# Patient Record
Sex: FEMALE | Race: BLACK OR AFRICAN AMERICAN | NOT HISPANIC OR LATINO | Employment: FULL TIME | URBAN - METROPOLITAN AREA
[De-identification: names, ages, dates, MRNs, and addresses within clinical notes are randomized per-mention and may not be internally consistent; named-entity substitution may affect disease eponyms.]

---

## 2023-10-01 ENCOUNTER — APPOINTMENT (OUTPATIENT)
Dept: CT IMAGING | Facility: HOSPITAL | Age: 53
DRG: 871 | End: 2023-10-01
Payer: COMMERCIAL

## 2023-10-01 ENCOUNTER — APPOINTMENT (OUTPATIENT)
Dept: GENERAL RADIOLOGY | Facility: HOSPITAL | Age: 53
DRG: 871 | End: 2023-10-01
Payer: COMMERCIAL

## 2023-10-01 ENCOUNTER — HOSPITAL ENCOUNTER (INPATIENT)
Facility: HOSPITAL | Age: 53
LOS: 11 days | Discharge: HOME HEALTH | DRG: 871 | End: 2023-10-12
Attending: EMERGENCY MEDICINE | Admitting: HOSPITALIST
Payer: COMMERCIAL

## 2023-10-01 DIAGNOSIS — G47.33 OSA (OBSTRUCTIVE SLEEP APNEA): ICD-10-CM

## 2023-10-01 DIAGNOSIS — J18.9 PNEUMONIA OF RIGHT UPPER LOBE DUE TO INFECTIOUS ORGANISM: Primary | ICD-10-CM

## 2023-10-01 DIAGNOSIS — J85.1 ABSCESS OF UPPER LOBE OF RIGHT LUNG WITH PNEUMONIA: ICD-10-CM

## 2023-10-01 DIAGNOSIS — E11.65 TYPE 2 DIABETES MELLITUS WITH HYPERGLYCEMIA, WITHOUT LONG-TERM CURRENT USE OF INSULIN: ICD-10-CM

## 2023-10-01 DIAGNOSIS — R09.02 HYPOXIA: ICD-10-CM

## 2023-10-01 DIAGNOSIS — I10 HYPERTENSION, UNSPECIFIED TYPE: ICD-10-CM

## 2023-10-01 PROBLEM — I16.0 HYPERTENSIVE URGENCY: Status: ACTIVE | Noted: 2023-10-01

## 2023-10-01 PROBLEM — J96.01 ACUTE RESPIRATORY FAILURE WITH HYPOXIA: Status: ACTIVE | Noted: 2023-10-01

## 2023-10-01 PROBLEM — R73.9 HYPERGLYCEMIA: Status: ACTIVE | Noted: 2023-10-01

## 2023-10-01 PROBLEM — A41.9 SEPSIS: Status: ACTIVE | Noted: 2023-10-01

## 2023-10-01 LAB
ALBUMIN SERPL-MCNC: 3.3 G/DL (ref 3.5–5.2)
ALBUMIN/GLOB SERPL: 0.7 G/DL
ALP SERPL-CCNC: 154 U/L (ref 39–117)
ALT SERPL W P-5'-P-CCNC: 35 U/L (ref 1–33)
ANION GAP SERPL CALCULATED.3IONS-SCNC: 10 MMOL/L (ref 5–15)
AST SERPL-CCNC: 19 U/L (ref 1–32)
B PARAPERT DNA SPEC QL NAA+PROBE: NOT DETECTED
B PERT DNA SPEC QL NAA+PROBE: NOT DETECTED
BASOPHILS # BLD AUTO: 0.09 10*3/MM3 (ref 0–0.2)
BASOPHILS NFR BLD AUTO: 0.4 % (ref 0–1.5)
BILIRUB SERPL-MCNC: 0.3 MG/DL (ref 0–1.2)
BUN SERPL-MCNC: 14 MG/DL (ref 6–20)
BUN/CREAT SERPL: 14.7 (ref 7–25)
C PNEUM DNA NPH QL NAA+NON-PROBE: NOT DETECTED
CALCIUM SPEC-SCNC: 9.6 MG/DL (ref 8.6–10.5)
CHLORIDE SERPL-SCNC: 101 MMOL/L (ref 98–107)
CO2 SERPL-SCNC: 30 MMOL/L (ref 22–29)
CREAT SERPL-MCNC: 0.95 MG/DL (ref 0.57–1)
D-LACTATE SERPL-SCNC: 1.2 MMOL/L (ref 0.5–2)
DEPRECATED RDW RBC AUTO: 44 FL (ref 37–54)
EGFRCR SERPLBLD CKD-EPI 2021: 72.2 ML/MIN/1.73
EOSINOPHIL # BLD AUTO: 0.02 10*3/MM3 (ref 0–0.4)
EOSINOPHIL NFR BLD AUTO: 0.1 % (ref 0.3–6.2)
ERYTHROCYTE [DISTWIDTH] IN BLOOD BY AUTOMATED COUNT: 14.6 % (ref 12.3–15.4)
FLUAV SUBTYP SPEC NAA+PROBE: NOT DETECTED
FLUBV RNA ISLT QL NAA+PROBE: NOT DETECTED
GEN 5 2HR TROPONIN T REFLEX: 37 NG/L
GLOBULIN UR ELPH-MCNC: 4.9 GM/DL
GLUCOSE BLDC GLUCOMTR-MCNC: 140 MG/DL (ref 70–130)
GLUCOSE BLDC GLUCOMTR-MCNC: 296 MG/DL (ref 70–130)
GLUCOSE SERPL-MCNC: 168 MG/DL (ref 65–99)
HADV DNA SPEC NAA+PROBE: NOT DETECTED
HBA1C MFR BLD: 7.5 % (ref 4.8–5.6)
HCOV 229E RNA SPEC QL NAA+PROBE: NOT DETECTED
HCOV HKU1 RNA SPEC QL NAA+PROBE: NOT DETECTED
HCOV NL63 RNA SPEC QL NAA+PROBE: NOT DETECTED
HCOV OC43 RNA SPEC QL NAA+PROBE: NOT DETECTED
HCT VFR BLD AUTO: 33.1 % (ref 34–46.6)
HGB BLD-MCNC: 10.4 G/DL (ref 12–15.9)
HMPV RNA NPH QL NAA+NON-PROBE: NOT DETECTED
HPIV1 RNA ISLT QL NAA+PROBE: NOT DETECTED
HPIV2 RNA SPEC QL NAA+PROBE: NOT DETECTED
HPIV3 RNA NPH QL NAA+PROBE: NOT DETECTED
HPIV4 P GENE NPH QL NAA+PROBE: NOT DETECTED
IMM GRANULOCYTES # BLD AUTO: 0.47 10*3/MM3 (ref 0–0.05)
IMM GRANULOCYTES NFR BLD AUTO: 2.1 % (ref 0–0.5)
LYMPHOCYTES # BLD AUTO: 1.1 10*3/MM3 (ref 0.7–3.1)
LYMPHOCYTES NFR BLD AUTO: 4.9 % (ref 19.6–45.3)
M PNEUMO IGG SER IA-ACNC: NOT DETECTED
MCH RBC QN AUTO: 26.1 PG (ref 26.6–33)
MCHC RBC AUTO-ENTMCNC: 31.4 G/DL (ref 31.5–35.7)
MCV RBC AUTO: 83.2 FL (ref 79–97)
MONOCYTES # BLD AUTO: 1.11 10*3/MM3 (ref 0.1–0.9)
MONOCYTES NFR BLD AUTO: 4.9 % (ref 5–12)
MRSA DNA SPEC QL NAA+PROBE: NORMAL
NEUTROPHILS NFR BLD AUTO: 19.66 10*3/MM3 (ref 1.7–7)
NEUTROPHILS NFR BLD AUTO: 87.6 % (ref 42.7–76)
NRBC BLD AUTO-RTO: 0.1 /100 WBC (ref 0–0.2)
NT-PROBNP SERPL-MCNC: 678 PG/ML (ref 0–900)
PLATELET # BLD AUTO: 395 10*3/MM3 (ref 140–450)
PMV BLD AUTO: 11 FL (ref 6–12)
POTASSIUM SERPL-SCNC: 4.2 MMOL/L (ref 3.5–5.2)
PROCALCITONIN SERPL-MCNC: 0.59 NG/ML (ref 0–0.25)
PROT SERPL-MCNC: 8.2 G/DL (ref 6–8.5)
QT INTERVAL: 304 MS
QTC INTERVAL: 439 MS
RBC # BLD AUTO: 3.98 10*6/MM3 (ref 3.77–5.28)
RHINOVIRUS RNA SPEC NAA+PROBE: NOT DETECTED
RSV RNA NPH QL NAA+NON-PROBE: NOT DETECTED
SARS-COV-2 RNA NPH QL NAA+NON-PROBE: NOT DETECTED
SODIUM SERPL-SCNC: 141 MMOL/L (ref 136–145)
TROPONIN T DELTA: 7 NG/L
TROPONIN T SERPL HS-MCNC: 30 NG/L
WBC NRBC COR # BLD: 22.45 10*3/MM3 (ref 3.4–10.8)

## 2023-10-01 PROCEDURE — 25510000001 IOPAMIDOL PER 1 ML: Performed by: HOSPITALIST

## 2023-10-01 PROCEDURE — 93010 ELECTROCARDIOGRAM REPORT: CPT | Performed by: INTERNAL MEDICINE

## 2023-10-01 PROCEDURE — 25010000002 PIPERACILLIN SOD-TAZOBACTAM PER 1 G: Performed by: HOSPITALIST

## 2023-10-01 PROCEDURE — 99285 EMERGENCY DEPT VISIT HI MDM: CPT

## 2023-10-01 PROCEDURE — 25010000002 VANCOMYCIN PER 500 MG: Performed by: HOSPITALIST

## 2023-10-01 PROCEDURE — 85025 COMPLETE CBC W/AUTO DIFF WBC: CPT | Performed by: EMERGENCY MEDICINE

## 2023-10-01 PROCEDURE — 25010000002 LABETALOL 5 MG/ML SOLUTION: Performed by: EMERGENCY MEDICINE

## 2023-10-01 PROCEDURE — 71045 X-RAY EXAM CHEST 1 VIEW: CPT

## 2023-10-01 PROCEDURE — 83880 ASSAY OF NATRIURETIC PEPTIDE: CPT | Performed by: EMERGENCY MEDICINE

## 2023-10-01 PROCEDURE — 0202U NFCT DS 22 TRGT SARS-COV-2: CPT | Performed by: EMERGENCY MEDICINE

## 2023-10-01 PROCEDURE — 83036 HEMOGLOBIN GLYCOSYLATED A1C: CPT | Performed by: HOSPITALIST

## 2023-10-01 PROCEDURE — 71275 CT ANGIOGRAPHY CHEST: CPT

## 2023-10-01 PROCEDURE — 87040 BLOOD CULTURE FOR BACTERIA: CPT | Performed by: EMERGENCY MEDICINE

## 2023-10-01 PROCEDURE — 25010000002 AZITHROMYCIN PER 500 MG: Performed by: EMERGENCY MEDICINE

## 2023-10-01 PROCEDURE — 63710000001 INSULIN LISPRO (HUMAN) PER 5 UNITS: Performed by: HOSPITALIST

## 2023-10-01 PROCEDURE — 84145 PROCALCITONIN (PCT): CPT | Performed by: EMERGENCY MEDICINE

## 2023-10-01 PROCEDURE — 82948 REAGENT STRIP/BLOOD GLUCOSE: CPT

## 2023-10-01 PROCEDURE — 93005 ELECTROCARDIOGRAM TRACING: CPT | Performed by: EMERGENCY MEDICINE

## 2023-10-01 PROCEDURE — 25010000002 CEFTRIAXONE PER 250 MG: Performed by: EMERGENCY MEDICINE

## 2023-10-01 PROCEDURE — 36415 COLL VENOUS BLD VENIPUNCTURE: CPT | Performed by: EMERGENCY MEDICINE

## 2023-10-01 PROCEDURE — 84484 ASSAY OF TROPONIN QUANT: CPT | Performed by: EMERGENCY MEDICINE

## 2023-10-01 PROCEDURE — 80053 COMPREHEN METABOLIC PANEL: CPT | Performed by: EMERGENCY MEDICINE

## 2023-10-01 PROCEDURE — 25010000002 ENOXAPARIN PER 10 MG: Performed by: HOSPITALIST

## 2023-10-01 PROCEDURE — 83605 ASSAY OF LACTIC ACID: CPT | Performed by: EMERGENCY MEDICINE

## 2023-10-01 PROCEDURE — 87641 MR-STAPH DNA AMP PROBE: CPT | Performed by: HOSPITALIST

## 2023-10-01 RX ORDER — AMLODIPINE BESYLATE 5 MG/1
5 TABLET ORAL
Status: DISCONTINUED | OUTPATIENT
Start: 2023-10-01 | End: 2023-10-02

## 2023-10-01 RX ORDER — DEXTROSE MONOHYDRATE 25 G/50ML
25 INJECTION, SOLUTION INTRAVENOUS
Status: DISCONTINUED | OUTPATIENT
Start: 2023-10-01 | End: 2023-10-12 | Stop reason: HOSPADM

## 2023-10-01 RX ORDER — VANCOMYCIN HYDROCHLORIDE 1 G/200ML
1000 INJECTION, SOLUTION INTRAVENOUS EVERY 12 HOURS
Status: DISCONTINUED | OUTPATIENT
Start: 2023-10-01 | End: 2023-10-02

## 2023-10-01 RX ORDER — LABETALOL HYDROCHLORIDE 5 MG/ML
10 INJECTION, SOLUTION INTRAVENOUS ONCE
Status: COMPLETED | OUTPATIENT
Start: 2023-10-01 | End: 2023-10-01

## 2023-10-01 RX ORDER — IBUPROFEN 600 MG/1
1 TABLET ORAL
Status: DISCONTINUED | OUTPATIENT
Start: 2023-10-01 | End: 2023-10-12 | Stop reason: HOSPADM

## 2023-10-01 RX ORDER — ONDANSETRON 2 MG/ML
4 INJECTION INTRAMUSCULAR; INTRAVENOUS EVERY 6 HOURS PRN
Status: DISCONTINUED | OUTPATIENT
Start: 2023-10-01 | End: 2023-10-12 | Stop reason: HOSPADM

## 2023-10-01 RX ORDER — NITROGLYCERIN 0.4 MG/1
0.4 TABLET SUBLINGUAL
Status: DISCONTINUED | OUTPATIENT
Start: 2023-10-01 | End: 2023-10-12 | Stop reason: HOSPADM

## 2023-10-01 RX ORDER — SODIUM CHLORIDE 0.9 % (FLUSH) 0.9 %
10 SYRINGE (ML) INJECTION AS NEEDED
Status: DISCONTINUED | OUTPATIENT
Start: 2023-10-01 | End: 2023-10-12 | Stop reason: HOSPADM

## 2023-10-01 RX ORDER — NICOTINE POLACRILEX 4 MG
15 LOZENGE BUCCAL
Status: DISCONTINUED | OUTPATIENT
Start: 2023-10-01 | End: 2023-10-12 | Stop reason: HOSPADM

## 2023-10-01 RX ORDER — BISACODYL 10 MG
10 SUPPOSITORY, RECTAL RECTAL DAILY PRN
Status: DISCONTINUED | OUTPATIENT
Start: 2023-10-01 | End: 2023-10-03

## 2023-10-01 RX ORDER — ACETAMINOPHEN 325 MG/1
650 TABLET ORAL EVERY 4 HOURS PRN
Status: DISCONTINUED | OUTPATIENT
Start: 2023-10-01 | End: 2023-10-12 | Stop reason: HOSPADM

## 2023-10-01 RX ORDER — INSULIN LISPRO 100 [IU]/ML
2-7 INJECTION, SOLUTION INTRAVENOUS; SUBCUTANEOUS
Status: DISCONTINUED | OUTPATIENT
Start: 2023-10-01 | End: 2023-10-12 | Stop reason: HOSPADM

## 2023-10-01 RX ORDER — ONDANSETRON 4 MG/1
4 TABLET, FILM COATED ORAL EVERY 6 HOURS PRN
Status: DISCONTINUED | OUTPATIENT
Start: 2023-10-01 | End: 2023-10-12 | Stop reason: HOSPADM

## 2023-10-01 RX ORDER — SODIUM CHLORIDE 0.9 % (FLUSH) 0.9 %
10 SYRINGE (ML) INJECTION EVERY 12 HOURS SCHEDULED
Status: DISCONTINUED | OUTPATIENT
Start: 2023-10-01 | End: 2023-10-12 | Stop reason: HOSPADM

## 2023-10-01 RX ORDER — AMOXICILLIN 250 MG
2 CAPSULE ORAL 2 TIMES DAILY
Status: DISCONTINUED | OUTPATIENT
Start: 2023-10-01 | End: 2023-10-03

## 2023-10-01 RX ORDER — ACETAMINOPHEN 650 MG/1
650 SUPPOSITORY RECTAL EVERY 4 HOURS PRN
Status: DISCONTINUED | OUTPATIENT
Start: 2023-10-01 | End: 2023-10-12 | Stop reason: HOSPADM

## 2023-10-01 RX ORDER — ENOXAPARIN SODIUM 100 MG/ML
40 INJECTION SUBCUTANEOUS DAILY
Status: DISCONTINUED | OUTPATIENT
Start: 2023-10-01 | End: 2023-10-01 | Stop reason: DRUGHIGH

## 2023-10-01 RX ORDER — BISACODYL 5 MG/1
5 TABLET, DELAYED RELEASE ORAL DAILY PRN
Status: DISCONTINUED | OUTPATIENT
Start: 2023-10-01 | End: 2023-10-03

## 2023-10-01 RX ORDER — POLYETHYLENE GLYCOL 3350 17 G/17G
17 POWDER, FOR SOLUTION ORAL DAILY PRN
Status: DISCONTINUED | OUTPATIENT
Start: 2023-10-01 | End: 2023-10-03

## 2023-10-01 RX ORDER — SODIUM CHLORIDE 9 MG/ML
100 INJECTION, SOLUTION INTRAVENOUS CONTINUOUS
Status: DISCONTINUED | OUTPATIENT
Start: 2023-10-01 | End: 2023-10-02

## 2023-10-01 RX ORDER — ACETAMINOPHEN 500 MG
1000 TABLET ORAL ONCE
Status: COMPLETED | OUTPATIENT
Start: 2023-10-01 | End: 2023-10-01

## 2023-10-01 RX ORDER — LABETALOL 100 MG/1
100 TABLET, FILM COATED ORAL EVERY 12 HOURS SCHEDULED
Status: DISCONTINUED | OUTPATIENT
Start: 2023-10-01 | End: 2023-10-03

## 2023-10-01 RX ORDER — ENOXAPARIN SODIUM 100 MG/ML
40 INJECTION SUBCUTANEOUS EVERY 12 HOURS
Status: DISCONTINUED | OUTPATIENT
Start: 2023-10-01 | End: 2023-10-03

## 2023-10-01 RX ORDER — SODIUM CHLORIDE 9 MG/ML
40 INJECTION, SOLUTION INTRAVENOUS AS NEEDED
Status: DISCONTINUED | OUTPATIENT
Start: 2023-10-01 | End: 2023-10-12 | Stop reason: HOSPADM

## 2023-10-01 RX ORDER — ACETAMINOPHEN 160 MG/5ML
650 SOLUTION ORAL EVERY 4 HOURS PRN
Status: DISCONTINUED | OUTPATIENT
Start: 2023-10-01 | End: 2023-10-12 | Stop reason: HOSPADM

## 2023-10-01 RX ADMIN — ACETAMINOPHEN 650 MG: 325 TABLET, FILM COATED ORAL at 18:15

## 2023-10-01 RX ADMIN — ENOXAPARIN SODIUM 40 MG: 100 INJECTION SUBCUTANEOUS at 17:23

## 2023-10-01 RX ADMIN — INSULIN LISPRO 4 UNITS: 100 INJECTION, SOLUTION INTRAVENOUS; SUBCUTANEOUS at 21:46

## 2023-10-01 RX ADMIN — SODIUM CHLORIDE, POTASSIUM CHLORIDE, SODIUM LACTATE AND CALCIUM CHLORIDE 1000 ML: 600; 310; 30; 20 INJECTION, SOLUTION INTRAVENOUS at 11:39

## 2023-10-01 RX ADMIN — VANCOMYCIN HYDROCHLORIDE 1000 MG: 1 INJECTION, SOLUTION INTRAVENOUS at 19:45

## 2023-10-01 RX ADMIN — LABETALOL HYDROCHLORIDE 100 MG: 100 TABLET, FILM COATED ORAL at 17:21

## 2023-10-01 RX ADMIN — AMLODIPINE BESYLATE 5 MG: 5 TABLET ORAL at 17:21

## 2023-10-01 RX ADMIN — IOPAMIDOL 100 ML: 755 INJECTION, SOLUTION INTRAVENOUS at 15:00

## 2023-10-01 RX ADMIN — PIPERACILLIN SODIUM AND TAZOBACTAM SODIUM 4.5 G: 4; .5 INJECTION, SOLUTION INTRAVENOUS at 18:41

## 2023-10-01 RX ADMIN — Medication 10 ML: at 21:47

## 2023-10-01 RX ADMIN — CEFTRIAXONE 2000 MG: 2 INJECTION, POWDER, FOR SOLUTION INTRAMUSCULAR; INTRAVENOUS at 13:13

## 2023-10-01 RX ADMIN — Medication 10 ML: at 17:23

## 2023-10-01 RX ADMIN — AZITHROMYCIN MONOHYDRATE 500 MG: 500 INJECTION, POWDER, LYOPHILIZED, FOR SOLUTION INTRAVENOUS at 14:04

## 2023-10-01 RX ADMIN — SODIUM CHLORIDE 100 ML/HR: 9 INJECTION, SOLUTION INTRAVENOUS at 17:23

## 2023-10-01 RX ADMIN — ACETAMINOPHEN 1000 MG: 500 TABLET ORAL at 11:44

## 2023-10-01 RX ADMIN — LABETALOL HYDROCHLORIDE 10 MG: 5 INJECTION, SOLUTION INTRAVENOUS at 13:03

## 2023-10-01 NOTE — ED PROVIDER NOTES
EMERGENCY DEPARTMENT ENCOUNTER    Room Number:  33/33  PCP: Provider, No Known  Historian: Patient      HPI:  Chief Complaint: Shortness of breath  A complete HPI/ROS/PMH/PSH/SH/FH are unobtainable due to: None  Context: Nay Gonzalez is a 53 y.o. female who presents to the ED c/o shortness of breath.  Patient states she has had cough and shortness of breath for a week.  Has had some shoulder pain as well.  Patient states she did not realize she had a fever till she got here.  No sick contacts.  Has had no vomiting or diarrhea.  No chest pain.  Patient does not have a primary doctor.  States she has never been told she has high blood pressure.  Has had no focal weakness or numbness.            PAST MEDICAL HISTORY  Active Ambulatory Problems     Diagnosis Date Noted    No Active Ambulatory Problems     Resolved Ambulatory Problems     Diagnosis Date Noted    No Resolved Ambulatory Problems     No Additional Past Medical History         PAST SURGICAL HISTORY  History reviewed. No pertinent surgical history.      FAMILY HISTORY  History reviewed. No pertinent family history.      SOCIAL HISTORY  Social History     Socioeconomic History    Marital status:    Tobacco Use    Smoking status: Never   Substance and Sexual Activity    Alcohol use: Yes    Drug use: Never         ALLERGIES  Patient has no known allergies.        REVIEW OF SYSTEMS  Review of Systems   Cough congestion, body aches, shortness of breath      PHYSICAL EXAM  ED Triage Vitals   Temp Heart Rate Resp BP SpO2   10/01/23 1046 10/01/23 1046 10/01/23 1046 10/01/23 1054 10/01/23 1046   (!) 102.5 øF (39.2 øC) (!) 135 22 (!) 245/130 92 %      Temp src Heart Rate Source Patient Position BP Location FiO2 (%)   10/01/23 1046 10/01/23 1046 -- -- --   Tympanic Monitor          Physical Exam      GENERAL: no acute distress  HENT: nares patent  EYES: no scleral icterus  CV: regular rhythm, tachycardic  RESPIRATORY: normal effort.  Diminished breath sounds  bilaterally  ABDOMEN: soft  MUSCULOSKELETAL: no deformity  NEURO: alert, moves all extremities, follows commands  PSYCH:  calm, cooperative  SKIN: warm, dry    Vital signs and nursing notes reviewed.          LAB RESULTS  Recent Results (from the past 24 hour(s))   ECG 12 Lead Dyspnea    Collection Time: 10/01/23 11:26 AM   Result Value Ref Range    QT Interval 304 ms    QTC Interval 439 ms   Respiratory Panel PCR w/COVID-19(SARS-CoV-2) N[facility]OL[facility]S/CHARLY/CORINE/PAD/COR/MAD/MEDARDO In-House, NP Swab in UTM/VTM, 3-4 HR TAT - Swab, Nasopharynx    Collection Time: 10/01/23 11:37 AM    Specimen: Nasopharynx; Swab   Result Value Ref Range    ADENOVIRUS, PCR Not Detected Not Detected    Coronavirus 229E Not Detected Not Detected    Coronavirus HKU1 Not Detected Not Detected    Coronavirus NL63 Not Detected Not Detected    Coronavirus OC43 Not Detected Not Detected    COVID19 Not Detected Not Detected - Ref. Range    Human Metapneumovirus Not Detected Not Detected    Human Rhinovirus/Enterovirus Not Detected Not Detected    Influenza A PCR Not Detected Not Detected    Influenza B PCR Not Detected Not Detected    Parainfluenza Virus 1 Not Detected Not Detected    Parainfluenza Virus 2 Not Detected Not Detected    Parainfluenza Virus 3 Not Detected Not Detected    Parainfluenza Virus 4 Not Detected Not Detected    RSV, PCR Not Detected Not Detected    Bordetella pertussis pcr Not Detected Not Detected    Bordetella parapertussis PCR Not Detected Not Detected    Chlamydophila pneumoniae PCR Not Detected Not Detected    Mycoplasma pneumo by PCR Not Detected Not Detected   Comprehensive Metabolic Panel    Collection Time: 10/01/23 11:38 AM    Specimen: Blood   Result Value Ref Range    Glucose 168 (H) 65 - 99 mg/dL    BUN 14 6 - 20 mg/dL    Creatinine 0.95 0.57 - 1.00 mg/dL    Sodium 141 136 - 145 mmol/L    Potassium 4.2 3.5 - 5.2 mmol/L    Chloride 101 98 - 107 mmol/L    CO2 30.0 (H) 22.0 - 29.0 mmol/L    Calcium 9.6 8.6 - 10.5 mg/dL    Total  Protein 8.2 6.0 - 8.5 g/dL    Albumin 3.3 (L) 3.5 - 5.2 g/dL    ALT (SGPT) 35 (H) 1 - 33 U/L    AST (SGOT) 19 1 - 32 U/L    Alkaline Phosphatase 154 (H) 39 - 117 U/L    Total Bilirubin 0.3 0.0 - 1.2 mg/dL    Globulin 4.9 gm/dL    A/G Ratio 0.7 g/dL    BUN/Creatinine Ratio 14.7 7.0 - 25.0    Anion Gap 10.0 5.0 - 15.0 mmol/L    eGFR 72.2 >60.0 mL/min/1.73   BNP    Collection Time: 10/01/23 11:38 AM    Specimen: Blood   Result Value Ref Range    proBNP 678.0 0.0 - 900.0 pg/mL   High Sensitivity Troponin T    Collection Time: 10/01/23 11:38 AM    Specimen: Blood   Result Value Ref Range    HS Troponin T 30 (H) <10 ng/L   Lactic Acid, Plasma    Collection Time: 10/01/23 11:38 AM    Specimen: Blood   Result Value Ref Range    Lactate 1.2 0.5 - 2.0 mmol/L   Procalcitonin    Collection Time: 10/01/23 11:38 AM    Specimen: Blood   Result Value Ref Range    Procalcitonin 0.59 (H) 0.00 - 0.25 ng/mL   CBC Auto Differential    Collection Time: 10/01/23 11:38 AM    Specimen: Blood   Result Value Ref Range    WBC 22.45 (H) 3.40 - 10.80 10*3/mm3    RBC 3.98 3.77 - 5.28 10*6/mm3    Hemoglobin 10.4 (L) 12.0 - 15.9 g/dL    Hematocrit 33.1 (L) 34.0 - 46.6 %    MCV 83.2 79.0 - 97.0 fL    MCH 26.1 (L) 26.6 - 33.0 pg    MCHC 31.4 (L) 31.5 - 35.7 g/dL    RDW 14.6 12.3 - 15.4 %    RDW-SD 44.0 37.0 - 54.0 fl    MPV 11.0 6.0 - 12.0 fL    Platelets 395 140 - 450 10*3/mm3    Neutrophil % 87.6 (H) 42.7 - 76.0 %    Lymphocyte % 4.9 (L) 19.6 - 45.3 %    Monocyte % 4.9 (L) 5.0 - 12.0 %    Eosinophil % 0.1 (L) 0.3 - 6.2 %    Basophil % 0.4 0.0 - 1.5 %    Immature Grans % 2.1 (H) 0.0 - 0.5 %    Neutrophils, Absolute 19.66 (H) 1.70 - 7.00 10*3/mm3    Lymphocytes, Absolute 1.10 0.70 - 3.10 10*3/mm3    Monocytes, Absolute 1.11 (H) 0.10 - 0.90 10*3/mm3    Eosinophils, Absolute 0.02 0.00 - 0.40 10*3/mm3    Basophils, Absolute 0.09 0.00 - 0.20 10*3/mm3    Immature Grans, Absolute 0.47 (H) 0.00 - 0.05 10*3/mm3    nRBC 0.1 0.0 - 0.2 /100 WBC       Ordered  the above labs and reviewed the results.        RADIOLOGY  XR Chest 1 View    Result Date: 10/1/2023  CHEST SINGLE VIEW  HISTORY: Shortness of breath and fever.  COMPARISON: None  FINDINGS: There is essentially complete opacity of the right upper lobe in the upper half of the right thorax extending above the minor fissure. This is consistent with large airspace disease/infiltrate. Mass or obstructing mass with postobstructive atelectasis or infiltrate also in the differential diagnosis. Findings need short interval follow-up or further evaluation with chest CT. The heart size is upper normal. Left lung appears clear.       Opacification of the upper half of the right thorax most likely due to a large right upper lobe infiltrate and this could be correlated with clinical data. Mass or obstructing mass with postobstructive atelectasis or infiltrate are also in the differential diagnosis and recommend short interval follow-up to evaluate for resolution or CT.  This report was finalized on 10/1/2023 12:18 PM by Dr. Jeffery Fung M.D.       Ordered the above noted radiological studies.  Chest x-ray independently interpreted by me and shows right-sided pneumonia          PROCEDURES  Procedures      EKG          EKG time: 1126  Rhythm/Rate: Sinus tachycardia 125  P waves and MA: Normal P waves  QRS, axis: Normal QRS  ST and T waves: Normal ST-T wave    Interpreted Contemporaneously by me, independently viewed  No prior      MEDICATIONS GIVEN IN ER  Medications   cefTRIAXone (ROCEPHIN) 2,000 mg in sodium chloride 0.9 % 100 mL IVPB-VTB (2,000 mg Intravenous New Bag 10/1/23 1313)   azithromycin (ZITHROMAX) 500 mg in sodium chloride 0.9 % 250 mL IVPB-VTB (has no administration in time range)   amLODIPine (NORVASC) tablet 5 mg (has no administration in time range)   labetalol (NORMODYNE) tablet 100 mg (has no administration in time range)   cefTRIAXone (ROCEPHIN) 2,000 mg in sodium chloride 0.9 % 100 mL IVPB-VTB (has no  administration in time range)   acetaminophen (TYLENOL) tablet 1,000 mg (1,000 mg Oral Given 10/1/23 1144)   lactated ringers bolus 1,000 mL (1,000 mL Intravenous New Bag 10/1/23 1139)   labetalol (NORMODYNE,TRANDATE) injection 10 mg (10 mg Intravenous Given 10/1/23 1303)                   MEDICAL DECISION MAKING, PROGRESS, and CONSULTS     Discussion below represents my analysis of pertinent findings related to patient's condition, differential diagnosis, treatment plan and final disposition.      Additional sources:  - Discussed/ obtained information from independent historians: None    - External (non-ED) record review: Epic reviewed and patient has never been here before and there are no records in epic    - Chronic or social conditions impacting care: None    - Shared decision making: None      Orders placed during this visit:  Orders Placed This Encounter   Procedures    Blood Culture - Blood,    Blood Culture - Blood,    Respiratory Panel PCR w/COVID-19(SARS-CoV-2) [facility]/CHARLY/CORINE/PAD/COR/MAD/MEDARDO In-House, NP Swab in UTM/VTM, 3-4 HR TAT - Swab, Nasopharynx    Legionella Antigen, Urine - Urine, Urine, Clean Catch    MRSA Screen, PCR (Inpatient) - Swab, Nares    Respiratory Culture - Sputum, Cough    S. Pneumo Ag Urine or CSF - Urine, Urine, Clean Catch    XR Chest 1 View    CT Angiogram Chest    Comprehensive Metabolic Panel    BNP    High Sensitivity Troponin T    Lactic Acid, Plasma    Procalcitonin    CBC Auto Differential    High Sensitivity Troponin T 2Hr    Hemoglobin A1c    Code Status and Medical Interventions:    [facility] (on-call MD unless specified) Details    ECG 12 Lead Dyspnea    Inpatient Admission    Inpatient Admission    CBC & Differential         Additional orders considered but not ordered:  None        Differential diagnosis includes but is not limited to:    Pneumonia versus pulmonary embolism versus sepsis      Independent interpretation of labs, radiology studies, and discussions with  consultants:  ED Course as of 10/01/23 1336   [place] Oct 01, 2023   7039 13:09 EDT  Patient with dense right upper lobe infiltrate.  Patient has been given Rocephin and azithromycin.  Has been given fluids.  Patient's blood pressure was markedly elevated but combination of acetaminophen and labetalol it is improved.  Patient is not on any outpatient medications.  Has been discussed with Dr. Elena who will admit for further eval. [SL]      ED Course User Index  [SL] Shaun Rome MD                 DIAGNOSIS  Final diagnoses:   Pneumonia of right upper lobe due to infectious organism   Hypoxia   Hypertension, unspecified type         DISPOSITION  admit            Latest Documented Vital Signs:  As of 13:36 EDT  BP- (!) 186/92 HR- 112 Temp- (!) 102.5 øF (39.2 øC) (Tympanic) O2 sat- 98%              --    Please note that portions of this were completed with a voice recognition program.       Note Disclaimer: At [facility], we believe that sharing information builds trust and better relationships. You are receiving this note because you are receiving care at [facility] or recently visited. It is possible you will see health information before a provider has talked with you about it. This kind of information can be easy to misunderstand. To help you fully understand what it means for your health, we urge you to discuss this note with your provider.            Shaun Rome MD  10/01/23 1661

## 2023-10-01 NOTE — Clinical Note
Level of Care: Telemetry [5]   Diagnosis: PNA (pneumonia) [131090]   Admitting Physician: KELLY MONTERROSO [[id number]]   Attending Physician: KELLY MONTERROSO [[id number]]   Certification: I Certify That Inpatient Hospital Services Are Medically Necessary For Greater Than 2 Midnights

## 2023-10-01 NOTE — CONSULTS
Referring Provider: Dr. Elena  Reason for Consultation: Pneumonia questionable need for bronchoscopy    Patient Care Team:  Provider, No Known as PCP - General    Chief complaint:   Fever and shortness of breath    History of present illness:    Subjective   This is a 53-year-old female patient, lifelong non-smoker with no prior history of lung disease.    She presented to the hospital today for symptoms of cough and shortness of breath.  Initially she started feeling sick about 10 days ago with congestion.  This was followed by dyspnea and cough which was initially dry but later productive of yellowish phlegm.  She denies trouble swallowing or choking.  She denies sick contact.  She is not aware of fever at home but she had a temperature of 102.5 on admission.  In addition her BP was extremely elevated reaching 245/130.  She had a CXR showing RUL dense pulmonary consolidation followed by CT chest as below.    Review of Systems  Constitutional: No fever or chills.   ENMT: No sinus congestion.  Snoring.  Apnea.  Witnessed by her .  Cardiovascular: No chest pain, palpitation or legs swelling.    Respiratory: Dyspnea as above.  Gastrointestinal: No constipation, diarrhea or abdominal pain   Neurology: No headache, weakness, numbness or dizziness.   Musculoskeletal: No joints pain, stiffness or swelling.  She did feel that she has swelling in her right upper chest started about the same time she started to feel sick.  Psychiatry: No depression.  Genitourinary: No dysuria or frequent urination  Endo: No weight changes. No cold or warm intolerance.  Lymphatic: No swollen glands.  Integumentary: No rash.    History  PMH: None  PSH: None  Allergies: Only seasonal.  Family history: Positive for heart disease  Social History     Socioeconomic History    Marital status:    Tobacco Use    Smoking status: Never   Substance and Sexual Activity    Alcohol use: Yes    Drug use: Never         ,   No  medications prior to admission.   , Scheduled Meds:  amLODIPine, 5 mg, Oral, Q24H  [START ON 10/2/2023] cefTRIAXone, 2,000 mg, Intravenous, Q24H  enoxaparin, 40 mg, Subcutaneous, Q12H  insulin lispro, 2-7 Units, Subcutaneous, 4x Daily AC & at Bedtime  labetalol, 100 mg, Oral, Q12H  senna-docusate sodium, 2 tablet, Oral, BID  sodium chloride, 10 mL, Intravenous, Q12H   , Continuous Infusions:   , PRN Meds:    acetaminophen **OR** acetaminophen **OR** acetaminophen    senna-docusate sodium **AND** polyethylene glycol **AND** bisacodyl **AND** bisacodyl    Calcium Replacement - Follow Nurse / BPA Driven Protocol    dextrose    dextrose    glucagon (human recombinant)    Magnesium Standard Dose Replacement - Follow Nurse / BPA Driven Protocol    nitroglycerin    ondansetron **OR** ondansetron    Phosphorus Replacement - Follow Nurse / BPA Driven Protocol    Potassium Replacement - Follow Nurse / BPA Driven Protocol    sodium chloride    sodium chloride, and Allergies:  Patient has no known allergies.    Objective     Vital Signs   Temp:  [99 øF (37.2 øC)-102.5 øF (39.2 øC)] 99 øF (37.2 øC)  Heart Rate:  [] 98  Resp:  [18-22] 18  BP: (163-245)/() 163/87    PPE used per hospital policy    Physical Exam:  Constitutional: Not in acute distress.  Eyes: Injected conjunctivae, EOMI. pupils equal reactive to light.  ENMT: [place] 3. No oral thrush. Tonsils grade  Neck: Large. Trachea midline. No thyromegaly  Heart: RRR, no murmur  Lungs/chest: Equal but diminished air entry throughout.  Coarse breath sounds mostly on the right.  In the right upper lobe..  Swelling and tenderness on palpation of the right sternoclavicular joint and medial aspect of the right clavicle.  Abdomen: Obese. Soft. No tenderness or dullness. No HSM.  Extremities: No cyanosis, clubbing or pitting edema.  Warm extremities and well-perfused.  Neuro: Conscious, alert, oriented x3.  Strength 5/5 in arms.  Psych: Appropriate mood and affect.     Integumentary: No rash.  Normal skin turgor  Lymphatic: No palpable cervical or supraclavicular lymph nodes.      Diagnostic imaging:  I personally and independently reviewed the following images:   CTA chest 10/1/2023: Large RUL masslike consolidation.  Noted different densities with possible abscess/necrosis.  Right lower lobe consolidation in the dependent region of the lungs likely atelectasis.  Mild to moderate right pleural effusion.  Enlarged mediastinal adenopathies: Station 2R and 4R    Laboratory workup:    Results from last 7 days   Lab Units 10/01/23  1138   SODIUM mmol/L 141   POTASSIUM mmol/L 4.2   CHLORIDE mmol/L 101   CO2 mmol/L 30.0*   BUN mg/dL 14   CREATININE mg/dL 0.95   GLUCOSE mg/dL 168*   CALCIUM mg/dL 9.6     Results from last 7 days   Lab Units 10/01/23  1339 10/01/23  1138   HSTROP T ng/L 37* 30*     Results from last 7 days   Lab Units 10/01/23  1138   WBC 10*3/mm3 22.45*   HEMOGLOBIN g/dL 10.4*   HEMATOCRIT % 33.1*   PLATELETS 10*3/mm3 395         Results from last 7 days   Lab Units 10/01/23  1138   PROBNP pg/mL 678.0       Assessment   RUL masslike consolidation and appears that has necrotic component and probably abscess  Sepsis  Enlarged mediastinal adenopathies: Station 2R and 4R.  Likely reactive  Tenderness and swelling on palpation of the right medial sternoclavicular joints concerning for infection and fading the area  RLL consolidation in the dependent region of the lungs likely atelectasis  Mild to moderate right pleural effusion  Acute hypoxic respiratory failure, secondary to the above  Super morbid obesity, BMI 59  Loud snoring and apnea concerning for sleep apnea  New diagnosis of HTN  New diagnosis of DM type II    Recommendations:    Change antibiotic therapy from Rocephin to Unasyn to cover anaerobic pathogen due to suspected abscess formation  Agree with thoracic surgery consultation  Oxygen by NC and titrate keep SPO2 >90%  Pharmacological DVT prophylaxis is  recommended  Would require follow-up imaging down the road regarding the mediastinal adenopathies but those are likely reactive.  HTN managed per primary.  Insulin as needed for DM type II.  IV hydration for sepsis    Patient seems to be hemodynamically stable for now but she is at high risk of deterioration due to severe infection and abscess formation.    Christian Bernal MD  10/01/23  15:16 EDT

## 2023-10-01 NOTE — H&P
Name: Nay Gonzalez ADMIT: 10/1/2023   : 1970  PCP: Provider, No Known    MRN: [id number] LOS: 0 days   AGE/SEX: 53 y.o. female  ROOM:      Chief Complaint   Patient presents with    Shortness of Breath    Fever       Subjective   Patient is a 53 y.o. female who presents to [facility] with the above chief complaint.  She feels her symptoms started about a week ago.  She has had a cough all week long and has had progressive shortness of breath.  She also developed some pain in her right shoulder.  She did not realize she was having fevers until she got here to the emergency room.  Her appetites been poor but she denies any vomiting or diarrhea.  She denies any chest pain.  She does not see physicians regularly and takes no medications.  She does not smoke has no history of asthma or allergies.    History of Present Illness    History reviewed. No pertinent past medical history.  History reviewed. No pertinent surgical history.  History reviewed. No pertinent family history.  Social History     Tobacco Use    Smoking status: Never   Substance Use Topics    Alcohol use: Yes    Drug use: Never     (Not in a hospital admission)    Allergies:  Patient has no known allergies.    Review of Systems     Objective    Vital Signs  Temp:  [102.5 øF (39.2 øC)] 102.5 øF (39.2 øC)  Heart Rate:  [112-135] 112  Resp:  [18-22] 18  BP: (186-245)/() 186/92  SpO2:  [85 %-98 %] 98 %  on  Flow (L/min):  [3-4] 3;   Device (Oxygen Therapy): nasal cannula  Body mass index is 59.34 kg/mý.    Physical Exam  Constitutional:       General: She is not in acute distress.     Appearance: She is obese. She is ill-appearing.   Cardiovascular:      Rate and Rhythm: Regular rhythm. Tachycardia present.   Pulmonary:      Effort: Pulmonary effort is normal. No respiratory distress.   Abdominal:      General: Bowel sounds are normal.      Palpations: Abdomen is soft.   Neurological:      General: No focal deficit  present.      Mental Status: She is alert. Mental status is at baseline.       Results Review:   I reviewed the patient's new clinical results.  Results from last 7 days   Lab Units 10/01/23  1138   WBC 10*3/mm3 22.45*   HEMOGLOBIN g/dL 10.4*   PLATELETS 10*3/mm3 395     Results from last 7 days   Lab Units 10/01/23  1138   SODIUM mmol/L 141   POTASSIUM mmol/L 4.2   CHLORIDE mmol/L 101   CO2 mmol/L 30.0*   BUN mg/dL 14   CREATININE mg/dL 0.95   GLUCOSE mg/dL 168*   ALBUMIN g/dL 3.3*   BILIRUBIN mg/dL 0.3   ALK PHOS U/L 154*   AST (SGOT) U/L 19   ALT (SGPT) U/L 35*   Estimated Creatinine Clearance: 100.8 mL/min (by C-G formula based on SCr of 0.95 mg/dL).  Results from last 7 days   Lab Units 10/01/23  1138   HSTROP T ng/L 30*   PROBNP pg/mL 678.0         Invalid input(s): LDLCALC    XR Chest 1 View   Final Result   Opacification of the upper half of the right thorax most   likely due to a large right upper lobe infiltrate and this could be   correlated with clinical data. Mass or obstructing mass with   postobstructive atelectasis or infiltrate are also in the differential   diagnosis and recommend short interval follow-up to evaluate for   resolution or CT.       This report was finalized on 10/1/2023 12:18 PM by Dr. Jeffery Fung M.D.          CT Angiogram Chest    (Results Pending)     Assessment & Plan       PNA (pneumonia)    Acute respiratory failure with hypoxia    Sepsis    Hyperglycemia    Hypertensive urgency    Pneumonia      Assessment & Plan  This is a 53-year-old female without significant past medical history presents to the hospital with shortness of breath and is found have a dense right upper lobe pneumonia with acute hypoxic respiratory failure  -What remains to question is whether this is a mass with postobstructive pneumonia or strictly right upper lobe pneumonia.  We need to get a CT scan of the chest to further evaluate as it does change the course of care if this is a postobstructive  pneumonia.  -In the meantime pneumonia work-up has been ordered and on IV antibiotics.  Respiratory viral panel negative for atypical organisms  -She has hypertensive urgency on admission with systolic pressures in the 250s.  Would like to get her systolic pressure down to 180 and further titrate medications.  I have initiated Norvasc and oral labetalol will titrate further medications over the coming days.  -The hope is that her oxygen requirements are related to the pneumonia but given her hypoxia and tachycardia we will go ahead and get a CTA of the chest to evaluate for possible thromboembolic disease as well.  -We will ask pulmonary to see the patient this is this is truly a dense pneumonia might require bronchoscopy to help clear secretions.  If it is postobstructive could benefit from bronchoscopy and biopsy.  -Continue supplemental oxygen and titrate as appropriate.  -Check A1c given hyperglycemia  -Lovenox for DVT prophylaxis  -Full code      I discussed the patients findings and my recommendations with patient, family, and nursing staff.    Juan Elena MD  [facility]  10/01/23  14:00 EDT     Addendum-  Discussed with radiology following completion of the CT scan.  Looks like a right UL abscess with some erosison into the chest wall and rib. Called and disucssed with ID and Thoracic surgery by phone and will broaden antibiotics.  Likely to need draiange and possible lobectomy down the road.

## 2023-10-01 NOTE — PLAN OF CARE
Goal Outcome Evaluation:  Plan of Care Reviewed With: patient        Progress: no change  Outcome Evaluation: pt says she can breathe better in the chair. Oxygen sats upper 90s on 3 liters nc but labored breathing and soa with activity. Pt c/o right shoulder pain and given tylenol. antibiotics and iv fluids in progress.

## 2023-10-01 NOTE — PROGRESS NOTES
Progress note    Full note to follow. Chart reviewed.     Colette is admitted with signs and symptoms of pneumonia. She is septic by clinic criteria without evidence of shock or organ failure. She had hypertensive crisis on admission with mild troponin leak which I believe could be related to demand ischemia. She has a large 12 cm right upper chest mass/ abscess with upper lobe consolidation and extension to the chest wall. There is a pocket of air next to right sternoclavicular joint, which raises the suspicion for SC joint involvement. I did not appreciate cortical changes on the CT scan which is not sensitive to assess bone infection. Her BMI is 60 and she has a relatively small skeleton for her body habitus. She also has small lung volumes.     The multitude and severity of the above medical problems reflect poor underlying health conditions and high risk for decompensation. I recommend:    Broad spectrum antibiotics.   ID consultation.  IR consultation for image guided chest tube.  Send pleural fluid for culture.   Follow blood culture.  TTE for mild troponin elevation and risk stratification.   Optimize blood pressure.     Lonnie Brantley MD  Thoracic Surgeon

## 2023-10-01 NOTE — ED NOTES
Nursing report ED to floor  Nay Gonzalez  53 y.o.  female    HPI :   Chief Complaint   Patient presents with    Shortness of Breath    Fever       Admitting doctor:   Juan Elena MD    Admitting diagnosis:   The primary encounter diagnosis was Pneumonia of right upper lobe due to infectious organism. Diagnoses of Hypoxia and Hypertension, unspecified type were also pertinent to this visit.    Code status:   Current Code Status       Date Active Code Status Order ID Comments User Context       10/1/2023 1256 CPR (Attempt to Resuscitate) [id number]  Juan Elena MD ED        Question Answer    Code Status (Patient has no pulse and is not breathing) CPR (Attempt to Resuscitate)    Medical Interventions (Patient has pulse or is breathing) Full Support                    Allergies:   Patient has no known allergies.    Isolation:   Enhanced Droplet/Contact     Intake and Output  No intake or output data in the 24 hours ending 10/01/23 1409    Weight:       10/01/23  1047   Weight: (!) 152 kg (335 lb)       Most recent vitals:   Vitals:    10/01/23 1105 10/01/23 1143 10/01/23 1302 10/01/23 1404   BP:  (!) 198/127 (!) 186/92 163/87   Pulse: (!) 128 (!) 124 112 98   Resp:   18 18   Temp:       TempSrc:       SpO2: 94%  98% 96%   Weight:       Height:           Active LDAs/IV Access:   Lines, Drains & Airways       Active LDAs       Name Placement date Placement time Site Days    Peripheral IV 10/01/23 1137 Anterior;Right Forearm 10/01/23  1137  Forearm  less than 1                    Labs (abnormal labs have a star):   Labs Reviewed   COMPREHENSIVE METABOLIC PANEL - Abnormal; Notable for the following components:       Result Value    Glucose 168 (*)     CO2 30.0 (*)     Albumin 3.3 (*)     ALT (SGPT) 35 (*)     Alkaline Phosphatase 154 (*)     All other components within normal limits    Narrative:     GFR Normal >60  Chronic Kidney Disease <60  Kidney Failure <15     TROPONIN - Abnormal; Notable for the  "following components:    HS Troponin T 30 (*)     All other components within normal limits    Narrative:     High Sensitive Troponin T Reference Range:  <10.0 ng/L- Negative Female for AMI  <15.0 ng/L- Negative Male for AMI  >=10 - Abnormal Female indicating possible myocardial injury.  >=15 - Abnormal Male indicating possible myocardial injury.   Clinicians would have to utilize clinical acumen, EKG, Troponin, and serial changes to determine if it is an Acute Myocardial Infarction or myocardial injury due to an underlying chronic condition.        PROCALCITONIN - Abnormal; Notable for the following components:    Procalcitonin 0.59 (*)     All other components within normal limits    Narrative:     As a Marker for Sepsis (Non-Neonates):    1. <0.5 ng/mL represents a low risk of severe sepsis and/or septic shock.  2. >2 ng/mL represents a high risk of severe sepsis and/or septic shock.    As a Marker for Lower Respiratory Tract Infections that require antibiotic therapy:    PCT on Admission    Antibiotic Therapy       6-12 Hrs later    >0.5                Strongly Recommended  >0.25 - <0.5        Recommended   0.1 - 0.25          Discouraged              Remeasure/reassess PCT  <0.1                Strongly Discouraged     Remeasure/reassess PCT    As 28 day mortality risk marker: \"Change in Procalcitonin Result\" (>80% or <=80%) if Day 0 (or Day 1) and Day 4 values are available. Refer to [email or web address]-pct-[email or web address]    Change in PCT <=80%  A decrease of PCT levels below or equal to 80% defines a positive change in PCT test result representing a higher risk for 28-day all-cause mortality of patients diagnosed with severe sepsis for septic shock.    Change in PCT >80%  A decrease of PCT levels of more than 80% defines a negative change in PCT result representing a lower risk for 28-day all-cause mortality of patients diagnosed with severe sepsis or septic shock.      CBC WITH AUTO DIFFERENTIAL - Abnormal; " Notable for the following components:    WBC 22.45 (*)     Hemoglobin 10.4 (*)     Hematocrit 33.1 (*)     MCH 26.1 (*)     MCHC 31.4 (*)     Neutrophil % 87.6 (*)     Lymphocyte % 4.9 (*)     Monocyte % 4.9 (*)     Eosinophil % 0.1 (*)     Immature Grans % 2.1 (*)     Neutrophils, Absolute 19.66 (*)     Monocytes, Absolute 1.11 (*)     Immature Grans, Absolute 0.47 (*)     All other components within normal limits   RESPIRATORY PANEL PCR W/ COVID-19 (SARS-COV-2) NICOL[facility]S/CHARLY/CORINE/PAD/COR/MAD/MEDARDO IN-HOUSE, NP SWAB IN [place], 3-4 HR TAT - Normal    Narrative:     In the setting of a positive respiratory panel with a viral infection PLUS a negative procalcitonin without other underlying concern for bacterial infection, consider observing off antibiotics or discontinuation of antibiotics and continue supportive care. If the respiratory panel is positive for atypical bacterial infection (Bordetella pertussis, Chlamydophila pneumoniae, or Mycoplasma pneumoniae), consider antibiotic de-escalation to target atypical bacterial infection.   BNP (IN-HOUSE) - Normal    Narrative:     This assay is used as an aid in the diagnosis of individuals suspected of having heart failure. It can be used as an aid in the diagnosis of acute decompensated heart failure (ADHF) in patients presenting with signs and symptoms of ADHF to the emergency department (ED). In addition, NT-proBNP of <300 pg/mL indicates ADHF is not likely.   LACTIC ACID, PLASMA - Normal   BLOOD CULTURE   BLOOD CULTURE   LEGIONELLA ANTIGEN, URINE   MRSA SCREEN, PCR   RESPIRATORY CULTURE   STREP PNEUMO AG, URINE OR CSF   HIGH SENSITIVITIY TROPONIN T 2HR   HEMOGLOBIN A1C   CBC AND DIFFERENTIAL    Narrative:     The following orders were created for panel order CBC & Differential.  Procedure                               Abnormality         Status                     ---------                               -----------         ------                     CBC Auto  Differential[[id number]]        Abnormal            Final result                 Please view results for these tests on the individual orders.       EKG:   ECG 12 Lead Dyspnea   Preliminary Result   HEART RATE= 125  bpm   RR Interval= 480  ms   RI Interval= 149  ms   P Horizontal Axis= 18  deg   P Front Axis= 58  deg   QRSD Interval= 83  ms   QT Interval= 304  ms   QTcB= 439  ms   QRS Axis= -40  deg   T Wave Axis= 89  deg   - ABNORMAL ECG -   Sinus tachycardia   Left axis deviation   Abnormal R-wave progression, late transition   Nonspecific T abnormalities, lateral leads   Baseline wander in lead(s) II,III,aVL,aVF,V1   Electronically Signed By:    Date and Time of Study: 2023-10-01 11:26:27          Meds given in ED:   Medications   azithromycin (ZITHROMAX) 500 mg in sodium chloride 0.9 % 250 mL IVPB-VTB (500 mg Intravenous New Bag 10/1/23 1404)   amLODIPine (NORVASC) tablet 5 mg (has no administration in time range)   labetalol (NORMODYNE) tablet 100 mg (has no administration in time range)   cefTRIAXone (ROCEPHIN) 2,000 mg in sodium chloride 0.9 % 100 mL IVPB-VTB (has no administration in time range)   acetaminophen (TYLENOL) tablet 1,000 mg (1,000 mg Oral Given 10/1/23 1144)   lactated ringers bolus 1,000 mL (1,000 mL Intravenous New Bag 10/1/23 1139)   cefTRIAXone (ROCEPHIN) 2,000 mg in sodium chloride 0.9 % 100 mL IVPB-VTB (2,000 mg Intravenous New Bag 10/1/23 1313)   labetalol (NORMODYNE,TRANDATE) injection 10 mg (10 mg Intravenous Given 10/1/23 1303)       Imaging results:  XR Chest 1 View    Result Date: 10/1/2023  Opacification of the upper half of the right thorax most likely due to a large right upper lobe infiltrate and this could be correlated with clinical data. Mass or obstructing mass with postobstructive atelectasis or infiltrate are also in the differential diagnosis and recommend short interval follow-up to evaluate for resolution or CT.  This report was finalized on 10/1/2023 12:18 PM by   Jeffery Fung M.D.       Ambulatory status:   -     Social issues:   Social History     Socioeconomic History    Marital status:    Tobacco Use    Smoking status: Never   Substance and Sexual Activity    Alcohol use: Yes    Drug use: Never       NIH Stroke Scale:       Tin Wills RN  10/01/23 14:09 EDT    Nursing report ED to floor  Nay Gonzalez  53 y.o.  female    HPI :   Chief Complaint   Patient presents with    Shortness of Breath    Fever       Admitting doctor:   Juan Elena MD    Admitting diagnosis:   The primary encounter diagnosis was Pneumonia of right upper lobe due to infectious organism. Diagnoses of Hypoxia and Hypertension, unspecified type were also pertinent to this visit.    Code status:   Current Code Status       Date Active Code Status Order ID Comments User Context       10/1/2023 1256 CPR (Attempt to Resuscitate) [id number]  Juan Elena MD ED        Question Answer    Code Status (Patient has no pulse and is not breathing) CPR (Attempt to Resuscitate)    Medical Interventions (Patient has pulse or is breathing) Full Support                    Allergies:   Patient has no known allergies.    Isolation:   Enhanced Droplet/Contact     Intake and Output  No intake or output data in the 24 hours ending 10/01/23 1409    Weight:       10/01/23  1047   Weight: (!) 152 kg (335 lb)       Most recent vitals:   Vitals:    10/01/23 1105 10/01/23 1143 10/01/23 1302 10/01/23 1404   BP:  (!) 198/127 (!) 186/92 163/87   Pulse: (!) 128 (!) 124 112 98   Resp:   18 18   Temp:       TempSrc:       SpO2: 94%  98% 96%   Weight:       Height:           Active LDAs/IV Access:   Lines, Drains & Airways       Active LDAs       Name Placement date Placement time Site Days    Peripheral IV 10/01/23 1137 Anterior;Right Forearm 10/01/23  1137  Forearm  less than 1                    Labs (abnormal labs have a star):   Labs Reviewed   COMPREHENSIVE METABOLIC PANEL - Abnormal; Notable for the  "following components:       Result Value    Glucose 168 (*)     CO2 30.0 (*)     Albumin 3.3 (*)     ALT (SGPT) 35 (*)     Alkaline Phosphatase 154 (*)     All other components within normal limits    Narrative:     GFR Normal >60  Chronic Kidney Disease <60  Kidney Failure <15     TROPONIN - Abnormal; Notable for the following components:    HS Troponin T 30 (*)     All other components within normal limits    Narrative:     High Sensitive Troponin T Reference Range:  <10.0 ng/L- Negative Female for AMI  <15.0 ng/L- Negative Male for AMI  >=10 - Abnormal Female indicating possible myocardial injury.  >=15 - Abnormal Male indicating possible myocardial injury.   Clinicians would have to utilize clinical acumen, EKG, Troponin, and serial changes to determine if it is an Acute Myocardial Infarction or myocardial injury due to an underlying chronic condition.        PROCALCITONIN - Abnormal; Notable for the following components:    Procalcitonin 0.59 (*)     All other components within normal limits    Narrative:     As a Marker for Sepsis (Non-Neonates):    1. <0.5 ng/mL represents a low risk of severe sepsis and/or septic shock.  2. >2 ng/mL represents a high risk of severe sepsis and/or septic shock.    As a Marker for Lower Respiratory Tract Infections that require antibiotic therapy:    PCT on Admission    Antibiotic Therapy       6-12 Hrs later    >0.5                Strongly Recommended  >0.25 - <0.5        Recommended   0.1 - 0.25          Discouraged              Remeasure/reassess PCT  <0.1                Strongly Discouraged     Remeasure/reassess PCT    As 28 day mortality risk marker: \"Change in Procalcitonin Result\" (>80% or <=80%) if Day 0 (or Day 1) and Day 4 values are available. Refer to http://www.[email or web address]-pct-calculator.com    Change in PCT <=80%  A decrease of PCT levels below or equal to 80% defines a positive change in PCT test result representing a higher risk for 28-day all-cause mortality of " patients diagnosed with severe sepsis for septic shock.    Change in PCT >80%  A decrease of PCT levels of more than 80% defines a negative change in PCT result representing a lower risk for 28-day all-cause mortality of patients diagnosed with severe sepsis or septic shock.      CBC WITH AUTO DIFFERENTIAL - Abnormal; Notable for the following components:    WBC 22.45 (*)     Hemoglobin 10.4 (*)     Hematocrit 33.1 (*)     MCH 26.1 (*)     MCHC 31.4 (*)     Neutrophil % 87.6 (*)     Lymphocyte % 4.9 (*)     Monocyte % 4.9 (*)     Eosinophil % 0.1 (*)     Immature Grans % 2.1 (*)     Neutrophils, Absolute 19.66 (*)     Monocytes, Absolute 1.11 (*)     Immature Grans, Absolute 0.47 (*)     All other components within normal limits   RESPIRATORY PANEL PCR W/ COVID-19 (SARS-COV-2) N[facility]OL[facility]S/CHARLY/CORINE/PAD/COR/MAD/MEDARDO IN-HOUSE, NP SWAB IN [facility]/VTP, 3-4 HR TAT - Normal    Narrative:     In the setting of a positive respiratory panel with a viral infection PLUS a negative procalcitonin without other underlying concern for bacterial infection, consider observing off antibiotics or discontinuation of antibiotics and continue supportive care. If the respiratory panel is positive for atypical bacterial infection (Bordetella pertussis, Chlamydophila pneumoniae, or Mycoplasma pneumoniae), consider antibiotic de-escalation to target atypical bacterial infection.   BNP (IN-HOUSE) - Normal    Narrative:     This assay is used as an aid in the diagnosis of individuals suspected of having heart failure. It can be used as an aid in the diagnosis of acute decompensated heart failure (ADHF) in patients presenting with signs and symptoms of ADHF to the emergency department (ED). In addition, NT-proBNP of <300 pg/mL indicates ADHF is not likely.   LACTIC ACID, PLASMA - Normal   BLOOD CULTURE   BLOOD CULTURE   LEGIONELLA ANTIGEN, URINE   MRSA SCREEN, PCR   RESPIRATORY CULTURE   STREP PNEUMO AG, URINE OR CSF   HIGH SENSITIVITIY TROPONIN T 2HR    HEMOGLOBIN A1C   CBC AND DIFFERENTIAL    Narrative:     The following orders were created for panel order CBC & Differential.  Procedure                               Abnormality         Status                     ---------                               -----------         ------                     CBC Auto Differential[[id number]]        Abnormal            Final result                 Please view results for these tests on the individual orders.       EKG:   ECG 12 Lead Dyspnea   Preliminary Result   HEART RATE= 125  bpm   RR Interval= 480  ms   IN Interval= 149  ms   P Horizontal Axis= 18  deg   P Front Axis= 58  deg   QRSD Interval= 83  ms   QT Interval= 304  ms   QTcB= 439  ms   QRS Axis= -40  deg   T Wave Axis= 89  deg   - ABNORMAL ECG -   Sinus tachycardia   Left axis deviation   Abnormal R-wave progression, late transition   Nonspecific T abnormalities, lateral leads   Baseline wander in lead(s) II,III,aVL,aVF,V1   Electronically Signed By:    Date and Time of Study: 2023-10-01 11:26:27          Meds given in ED:   Medications   azithromycin (ZITHROMAX) 500 mg in sodium chloride 0.9 % 250 mL IVPB-VTB (500 mg Intravenous New Bag 10/1/23 1404)   amLODIPine (NORVASC) tablet 5 mg (has no administration in time range)   labetalol (NORMODYNE) tablet 100 mg (has no administration in time range)   cefTRIAXone (ROCEPHIN) 2,000 mg in sodium chloride 0.9 % 100 mL IVPB-VTB (has no administration in time range)   acetaminophen (TYLENOL) tablet 1,000 mg (1,000 mg Oral Given 10/1/23 1144)   lactated ringers bolus 1,000 mL (1,000 mL Intravenous New Bag 10/1/23 1139)   cefTRIAXone (ROCEPHIN) 2,000 mg in sodium chloride 0.9 % 100 mL IVPB-VTB (2,000 mg Intravenous New Bag 10/1/23 1313)   labetalol (NORMODYNE,TRANDATE) injection 10 mg (10 mg Intravenous Given 10/1/23 1303)       Imaging results:  XR Chest 1 View    Result Date: 10/1/2023  Opacification of the upper half of the right thorax most likely due to a large right  upper lobe infiltrate and this could be correlated with clinical data. Mass or obstructing mass with postobstructive atelectasis or infiltrate are also in the differential diagnosis and recommend short interval follow-up to evaluate for resolution or CT.  This report was finalized on 10/1/2023 12:18 PM by Dr. Jeffery Fung M.D.       Ambulatory status:   -     Social issues:   Social History     Socioeconomic History    Marital status:    Tobacco Use    Smoking status: Never   Substance and Sexual Activity    Alcohol use: Yes    Drug use: Never       NIH Stroke Scale:       Tin Wills RN  10/01/23 14:09 EDT

## 2023-10-01 NOTE — PROGRESS NOTES
"[facility] Clinical Pharmacy Services: Vancomycin Pharmacokinetic Initial Consult Note    Nay Gonzalez is a 53 y.o. female who is on day 1/7 of pharmacy to dose vancomycin.    Indication:  Pulmonary Abscess  Consulting Provider: Dr. Juan Elena  Planned Duration of Therapy: 7 days  Loading Dose Ordered or Given: No loading dose  MRSA PCR performed: Yes; Result: In process  Culture/Source:   10/1 Respiratory Cx: Negative  10/1 Blood Cx (2/2): In process    Target: -600 mg/L.hr   Pertinent Vanc Dosing History: N/A  Other Antimicrobials:   Zoysn 4.5g IV Q8H    Vitals/Labs  Ht: 160 cm (63\"); Wt: (!) 152 kg (335 lb)  Temp Readings from Last 1 Encounters:   10/01/23 100 øF (37.8 øC) (Oral)    Estimated Creatinine Clearance: 100.8 mL/min (by C-G formula based on SCr of 0.95 mg/dL).       Results from last 7 days   Lab Units 10/01/23  1138   CREATININE mg/dL 0.95   WBC 10*3/mm3 22.45*     Assessment/Plan:    Vancomycin Dose: 1000 mg IV every 12 hours  Predictive AUC level for the dose ordered is 525 mg/L.hr, which is within the target of 400-600 mg/L.hr  Vanc Trough has been ordered for 10/2 at 1730     Pharmacy will follow patient's kidney function and will adjust doses and obtain levels as necessary. Thank you for involving pharmacy in this patient's care. Please contact pharmacy with any questions or concerns.                           Neli Barboza, [facility]  Clinical Pharmacist   "

## 2023-10-02 ENCOUNTER — APPOINTMENT (OUTPATIENT)
Dept: CARDIOLOGY | Facility: HOSPITAL | Age: 53
DRG: 871 | End: 2023-10-02
Payer: COMMERCIAL

## 2023-10-02 ENCOUNTER — APPOINTMENT (OUTPATIENT)
Dept: GENERAL RADIOLOGY | Facility: HOSPITAL | Age: 53
DRG: 871 | End: 2023-10-02
Payer: COMMERCIAL

## 2023-10-02 ENCOUNTER — APPOINTMENT (OUTPATIENT)
Dept: CT IMAGING | Facility: HOSPITAL | Age: 53
DRG: 871 | End: 2023-10-02
Payer: COMMERCIAL

## 2023-10-02 PROBLEM — J85.1 ABSCESS OF UPPER LOBE OF RIGHT LUNG WITH PNEUMONIA: Status: ACTIVE | Noted: 2023-10-01

## 2023-10-02 PROBLEM — A41.9 SEPSIS: Status: ACTIVE | Noted: 2023-10-02

## 2023-10-02 PROBLEM — E11.65 TYPE 2 DIABETES MELLITUS WITH HYPERGLYCEMIA: Status: ACTIVE | Noted: 2023-10-02

## 2023-10-02 LAB
ALBUMIN SERPL-MCNC: 2.9 G/DL (ref 3.5–5.2)
ALBUMIN SERPL-MCNC: 3.1 G/DL (ref 3.5–5.2)
ANION GAP SERPL CALCULATED.3IONS-SCNC: 10.3 MMOL/L (ref 5–15)
ANION GAP SERPL CALCULATED.3IONS-SCNC: 14.1 MMOL/L (ref 5–15)
ANISOCYTOSIS BLD QL: NORMAL
ARTERIAL PATENCY WRIST A: POSITIVE
ASCENDING AORTA: 3.3 CM
ATMOSPHERIC PRESS: 752.2 MMHG
ATMOSPHERIC PRESS: 753 MMHG
ATMOSPHERIC PRESS: 753.2 MMHG
ATMOSPHERIC PRESS: 753.5 MMHG
BASE EXCESS BLDA CALC-SCNC: -0.1 MMOL/L (ref 0–2)
BASE EXCESS BLDA CALC-SCNC: -0.3 MMOL/L (ref 0–2)
BASE EXCESS BLDA CALC-SCNC: -1.4 MMOL/L (ref 0–2)
BASE EXCESS BLDA CALC-SCNC: 1.6 MMOL/L (ref 0–2)
BASOPHILS # BLD AUTO: 0.11 10*3/MM3 (ref 0–0.2)
BASOPHILS NFR BLD AUTO: 0.5 % (ref 0–1.5)
BDY SITE: ABNORMAL
BH CV ECHO MEAS - ACS: 1.3 CM
BH CV ECHO MEAS - AO MAX PG: 14.3 MMHG
BH CV ECHO MEAS - AO MEAN PG: 8 MMHG
BH CV ECHO MEAS - AO ROOT DIAM: 3.6 CM
BH CV ECHO MEAS - AO V2 MAX: 189 CM/SEC
BH CV ECHO MEAS - AO V2 VTI: 31.2 CM
BH CV ECHO MEAS - AVA(I,D): 4.1 CM2
BH CV ECHO MEAS - EDV(CUBED): 157.5 ML
BH CV ECHO MEAS - EDV(MOD-SP2): 155 ML
BH CV ECHO MEAS - EDV(MOD-SP4): 146 ML
BH CV ECHO MEAS - EF(MOD-BP): 60.1 %
BH CV ECHO MEAS - EF(MOD-SP2): 62.6 %
BH CV ECHO MEAS - EF(MOD-SP4): 58.2 %
BH CV ECHO MEAS - ESV(CUBED): 50.7 ML
BH CV ECHO MEAS - ESV(MOD-SP2): 58 ML
BH CV ECHO MEAS - ESV(MOD-SP4): 61 ML
BH CV ECHO MEAS - FS: 31.5 %
BH CV ECHO MEAS - IVS/LVPW: 1.08 CM
BH CV ECHO MEAS - IVSD: 1.4 CM
BH CV ECHO MEAS - LAT PEAK E' VEL: 8.5 CM/SEC
BH CV ECHO MEAS - LV DIASTOLIC VOL/BSA (35-75): 62.8 CM2
BH CV ECHO MEAS - LV MASS(C)D: 311.7 GRAMS
BH CV ECHO MEAS - LV MAX PG: 6.4 MMHG
BH CV ECHO MEAS - LV MEAN PG: 3 MMHG
BH CV ECHO MEAS - LV SYSTOLIC VOL/BSA (12-30): 26.2 CM2
BH CV ECHO MEAS - LV V1 MAX: 126 CM/SEC
BH CV ECHO MEAS - LV V1 VTI: 22.3 CM
BH CV ECHO MEAS - LVIDD: 5.4 CM
BH CV ECHO MEAS - LVIDS: 3.7 CM
BH CV ECHO MEAS - LVOT AREA: 5.7 CM2
BH CV ECHO MEAS - LVOT DIAM: 2.7 CM
BH CV ECHO MEAS - LVPWD: 1.3 CM
BH CV ECHO MEAS - MED PEAK E' VEL: 7.1 CM/SEC
BH CV ECHO MEAS - MV A DUR: 0.11 SEC
BH CV ECHO MEAS - MV A MAX VEL: 88.9 CM/SEC
BH CV ECHO MEAS - MV DEC SLOPE: 692 CM/SEC2
BH CV ECHO MEAS - MV DEC TIME: 0.16 SEC
BH CV ECHO MEAS - MV E MAX VEL: 93.8 CM/SEC
BH CV ECHO MEAS - MV E/A: 1.06
BH CV ECHO MEAS - MV MAX PG: 5.9 MMHG
BH CV ECHO MEAS - MV MEAN PG: 4 MMHG
BH CV ECHO MEAS - MV P1/2T: 53.3 MSEC
BH CV ECHO MEAS - MV V2 VTI: 26.4 CM
BH CV ECHO MEAS - MVA(P1/2T): 4.1 CM2
BH CV ECHO MEAS - MVA(VTI): 4.8 CM2
BH CV ECHO MEAS - PA ACC TIME: 0.1 SEC
BH CV ECHO MEAS - PA V2 MAX: 107 CM/SEC
BH CV ECHO MEAS - QP/QS: 0.4
BH CV ECHO MEAS - RV MAX PG: 2.06 MMHG
BH CV ECHO MEAS - RV V1 MAX: 71.8 CM/SEC
BH CV ECHO MEAS - RV V1 VTI: 13.6 CM
BH CV ECHO MEAS - RVOT DIAM: 2.2 CM
BH CV ECHO MEAS - SI(MOD-SP2): 41.7 ML/M2
BH CV ECHO MEAS - SI(MOD-SP4): 36.6 ML/M2
BH CV ECHO MEAS - SV(LVOT): 127.7 ML
BH CV ECHO MEAS - SV(MOD-SP2): 97 ML
BH CV ECHO MEAS - SV(MOD-SP4): 85 ML
BH CV ECHO MEAS - SV(RVOT): 51.7 ML
BH CV ECHO MEAS - TAPSE (>1.6): 2.7 CM
BH CV ECHO MEASUREMENTS AVERAGE E/E' RATIO: 12.03
BH CV XLRA - RV BASE: 3.8 CM
BH CV XLRA - RV LENGTH: 9 CM
BH CV XLRA - RV MID: 3.5 CM
BH CV XLRA - TDI S': 13.1 CM/SEC
BUN SERPL-MCNC: 16 MG/DL (ref 6–20)
BUN SERPL-MCNC: 27 MG/DL (ref 6–20)
BUN/CREAT SERPL: 13.8 (ref 7–25)
BUN/CREAT SERPL: 14.8 (ref 7–25)
BURR CELLS BLD QL SMEAR: NORMAL
CALCIUM SPEC-SCNC: 9.3 MG/DL (ref 8.6–10.5)
CALCIUM SPEC-SCNC: 9.6 MG/DL (ref 8.6–10.5)
CHLORIDE SERPL-SCNC: 100 MMOL/L (ref 98–107)
CHLORIDE SERPL-SCNC: 102 MMOL/L (ref 98–107)
CO2 BLDA-SCNC: 30.5 MMOL/L (ref 23–27)
CO2 BLDA-SCNC: 31.6 MMOL/L (ref 23–27)
CO2 BLDA-SCNC: 34.8 MMOL/L (ref 23–27)
CO2 BLDA-SCNC: >36.08 MMOL/L (ref 23–27)
CO2 SERPL-SCNC: 23.9 MMOL/L (ref 22–29)
CO2 SERPL-SCNC: 29.7 MMOL/L (ref 22–29)
CREAT SERPL-MCNC: 1.08 MG/DL (ref 0.57–1)
CREAT SERPL-MCNC: 1.95 MG/DL (ref 0.57–1)
CRP SERPL-MCNC: 28.05 MG/DL (ref 0–0.5)
DEPRECATED RDW RBC AUTO: 46.8 FL (ref 37–54)
DEVICE COMMENT: ABNORMAL
EGFRCR SERPLBLD CKD-EPI 2021: 30.5 ML/MIN/1.73
EGFRCR SERPLBLD CKD-EPI 2021: 61.9 ML/MIN/1.73
EOSINOPHIL # BLD AUTO: 0.02 10*3/MM3 (ref 0–0.4)
EOSINOPHIL NFR BLD AUTO: 0.1 % (ref 0.3–6.2)
ERYTHROCYTE [DISTWIDTH] IN BLOOD BY AUTOMATED COUNT: 14.9 % (ref 12.3–15.4)
GAS FLOW AIRWAY: 6 LPM
GIANT PLATELETS: NORMAL
GLUCOSE BLDC GLUCOMTR-MCNC: 134 MG/DL (ref 70–130)
GLUCOSE BLDC GLUCOMTR-MCNC: 142 MG/DL (ref 70–130)
GLUCOSE BLDC GLUCOMTR-MCNC: 154 MG/DL (ref 70–130)
GLUCOSE BLDC GLUCOMTR-MCNC: 93 MG/DL (ref 70–130)
GLUCOSE SERPL-MCNC: 108 MG/DL (ref 65–99)
GLUCOSE SERPL-MCNC: 123 MG/DL (ref 65–99)
HCO3 BLDA-SCNC: 28.4 MMOL/L (ref 22–28)
HCO3 BLDA-SCNC: 29.8 MMOL/L (ref 22–28)
HCO3 BLDA-SCNC: 31.5 MMOL/L (ref 22–28)
HCO3 BLDA-SCNC: 32.9 MMOL/L (ref 22–28)
HCT VFR BLD AUTO: 34.8 % (ref 34–46.6)
HEMODILUTION: NO
HGB BLD-MCNC: 10.7 G/DL (ref 12–15.9)
HYPOCHROMIA BLD QL: NORMAL
INHALED O2 CONCENTRATION: 100 %
INHALED O2 CONCENTRATION: 60 %
INHALED O2 CONCENTRATION: 60 %
LEFT ATRIUM VOLUME INDEX: 38.5 ML/M2
LYMPHOCYTES # BLD AUTO: 1.22 10*3/MM3 (ref 0.7–3.1)
LYMPHOCYTES NFR BLD AUTO: 5 % (ref 19.6–45.3)
Lab: ABNORMAL
MAGNESIUM SERPL-MCNC: 2.1 MG/DL (ref 1.6–2.6)
MCH RBC QN AUTO: 26.4 PG (ref 26.6–33)
MCHC RBC AUTO-ENTMCNC: 30.7 G/DL (ref 31.5–35.7)
MCV RBC AUTO: 85.9 FL (ref 79–97)
MODALITY: ABNORMAL
MONOCYTES # BLD AUTO: 1.54 10*3/MM3 (ref 0.1–0.9)
MONOCYTES NFR BLD AUTO: 6.4 % (ref 5–12)
NEUTROPHILS NFR BLD AUTO: 20.74 10*3/MM3 (ref 1.7–7)
NEUTROPHILS NFR BLD AUTO: 85.4 % (ref 42.7–76)
NOTIFIED WHO: ABNORMAL
O2 A-A PPRESDIFF RESPIRATORY: 0.3 MMHG
OVALOCYTES BLD QL SMEAR: NORMAL
PCO2 BLDA: 108.4 MM HG (ref 35–45)
PCO2 BLDA: 112.4 MM HG (ref 35–45)
PCO2 BLDA: 59.3 MM HG (ref 35–45)
PCO2 BLDA: 66.9 MM HG (ref 35–45)
PH BLDA: 7.07 PH UNITS (ref 7.35–7.45)
PH BLDA: 7.07 PH UNITS (ref 7.35–7.45)
PH BLDA: 7.24 PH UNITS (ref 7.35–7.45)
PH BLDA: 7.31 PH UNITS (ref 7.35–7.45)
PHOSPHATE SERPL-MCNC: 4.6 MG/DL (ref 2.5–4.5)
PHOSPHATE SERPL-MCNC: 5.1 MG/DL (ref 2.5–4.5)
PLATELET # BLD AUTO: 353 10*3/MM3 (ref 140–450)
PMV BLD AUTO: 11.4 FL (ref 6–12)
PO2 BLDA: 122.2 MM HG (ref 80–100)
PO2 BLDA: 174.3 MM HG (ref 80–100)
PO2 BLDA: 90.5 MM HG (ref 80–100)
PO2 BLDA: 96.8 MM HG (ref 80–100)
POIKILOCYTOSIS BLD QL SMEAR: NORMAL
POLYCHROMASIA BLD QL SMEAR: NORMAL
POTASSIUM SERPL-SCNC: 5.2 MMOL/L (ref 3.5–5.2)
POTASSIUM SERPL-SCNC: 5.4 MMOL/L (ref 3.5–5.2)
PSV: 8 CMH2O
RBC # BLD AUTO: 4.05 10*6/MM3 (ref 3.77–5.28)
READ BACK: YES
SAO2 % BLDCOA: 91.2 % (ref 92–98.5)
SAO2 % BLDCOA: 92.7 % (ref 92–98.5)
SAO2 % BLDCOA: 97.8 % (ref 92–98.5)
SAO2 % BLDCOA: 99.4 % (ref 92–98.5)
SET MECH RESP RATE: 28
SET MECH RESP RATE: 28
SET MECH RESP RATE: 30
SINUS: 3.3 CM
SMALL PLATELETS BLD QL SMEAR: ADEQUATE
SODIUM SERPL-SCNC: 138 MMOL/L (ref 136–145)
SODIUM SERPL-SCNC: 142 MMOL/L (ref 136–145)
STJ: 2.9 CM
TOTAL RATE: 28 BREATHS/MINUTE
TOTAL RATE: 28 BREATHS/MINUTE
TOTAL RATE: 30 BREATHS/MINUTE
TOTAL RATE: 30 BREATHS/MINUTE
VENTILATOR MODE: ABNORMAL
VT ON VENT VENT: 445 ML
VT ON VENT VENT: 448 ML
VT ON VENT VENT: 500 ML
WBC MORPH BLD: NORMAL
WBC NRBC COR # BLD: 24.25 10*3/MM3 (ref 3.4–10.8)

## 2023-10-02 PROCEDURE — 63710000001 INSULIN LISPRO (HUMAN) PER 5 UNITS: Performed by: HOSPITALIST

## 2023-10-02 PROCEDURE — 0BH17EZ INSERTION OF ENDOTRACHEAL AIRWAY INTO TRACHEA, VIA NATURAL OR ARTIFICIAL OPENING: ICD-10-PCS | Performed by: HOSPITALIST

## 2023-10-02 PROCEDURE — 94799 UNLISTED PULMONARY SVC/PX: CPT

## 2023-10-02 PROCEDURE — 93306 TTE W/DOPPLER COMPLETE: CPT

## 2023-10-02 PROCEDURE — 36556 INSERT NON-TUNNEL CV CATH: CPT | Performed by: SURGERY

## 2023-10-02 PROCEDURE — 83735 ASSAY OF MAGNESIUM: CPT | Performed by: HOSPITALIST

## 2023-10-02 PROCEDURE — 25010000002 PIPERACILLIN SOD-TAZOBACTAM PER 1 G: Performed by: HOSPITALIST

## 2023-10-02 PROCEDURE — 36600 WITHDRAWAL OF ARTERIAL BLOOD: CPT

## 2023-10-02 PROCEDURE — 82948 REAGENT STRIP/BLOOD GLUCOSE: CPT

## 2023-10-02 PROCEDURE — 87205 SMEAR GRAM STAIN: CPT | Performed by: HOSPITALIST

## 2023-10-02 PROCEDURE — 82803 BLOOD GASES ANY COMBINATION: CPT

## 2023-10-02 PROCEDURE — 25010000002 FENTANYL CITRATE (PF) 50 MCG/ML SOLUTION

## 2023-10-02 PROCEDURE — 99223 1ST HOSP IP/OBS HIGH 75: CPT | Performed by: NURSE PRACTITIONER

## 2023-10-02 PROCEDURE — 76937 US GUIDE VASCULAR ACCESS: CPT | Performed by: SURGERY

## 2023-10-02 PROCEDURE — 85007 BL SMEAR W/DIFF WBC COUNT: CPT | Performed by: HOSPITALIST

## 2023-10-02 PROCEDURE — 25010000002 PROPOFOL 10 MG/ML EMULSION: Performed by: HOSPITALIST

## 2023-10-02 PROCEDURE — 80069 RENAL FUNCTION PANEL: CPT | Performed by: HOSPITALIST

## 2023-10-02 PROCEDURE — 0B9C8ZX DRAINAGE OF RIGHT UPPER LUNG LOBE, VIA NATURAL OR ARTIFICIAL OPENING ENDOSCOPIC, DIAGNOSTIC: ICD-10-PCS | Performed by: HOSPITALIST

## 2023-10-02 PROCEDURE — 94761 N-INVAS EAR/PLS OXIMETRY MLT: CPT

## 2023-10-02 PROCEDURE — 97530 THERAPEUTIC ACTIVITIES: CPT

## 2023-10-02 PROCEDURE — 97162 PT EVAL MOD COMPLEX 30 MIN: CPT

## 2023-10-02 PROCEDURE — 94002 VENT MGMT INPAT INIT DAY: CPT

## 2023-10-02 PROCEDURE — 25510000001 PERFLUTREN (DEFINITY) 8.476 MG IN SODIUM CHLORIDE (PF) 0.9 % 10 ML INJECTION: Performed by: NURSE PRACTITIONER

## 2023-10-02 PROCEDURE — 85025 COMPLETE CBC W/AUTO DIFF WBC: CPT | Performed by: HOSPITALIST

## 2023-10-02 PROCEDURE — 25010000002 PROPOFOL 10 MG/ML EMULSION

## 2023-10-02 PROCEDURE — 93306 TTE W/DOPPLER COMPLETE: CPT | Performed by: INTERNAL MEDICINE

## 2023-10-02 PROCEDURE — 5A1945Z RESPIRATORY VENTILATION, 24-96 CONSECUTIVE HOURS: ICD-10-PCS | Performed by: HOSPITALIST

## 2023-10-02 PROCEDURE — 02HV33Z INSERTION OF INFUSION DEVICE INTO SUPERIOR VENA CAVA, PERCUTANEOUS APPROACH: ICD-10-PCS | Performed by: SURGERY

## 2023-10-02 PROCEDURE — 94660 CPAP INITIATION&MGMT: CPT

## 2023-10-02 PROCEDURE — 87070 CULTURE OTHR SPECIMN AEROBIC: CPT | Performed by: HOSPITALIST

## 2023-10-02 PROCEDURE — 99221 1ST HOSP IP/OBS SF/LOW 40: CPT | Performed by: SURGERY

## 2023-10-02 PROCEDURE — 25010000002 ENOXAPARIN PER 10 MG: Performed by: HOSPITALIST

## 2023-10-02 PROCEDURE — 86140 C-REACTIVE PROTEIN: CPT | Performed by: INTERNAL MEDICINE

## 2023-10-02 PROCEDURE — 99223 1ST HOSP IP/OBS HIGH 75: CPT | Performed by: INTERNAL MEDICINE

## 2023-10-02 RX ORDER — PROPOFOL 10 MG/ML
VIAL (ML) INTRAVENOUS
Status: COMPLETED
Start: 2023-10-02 | End: 2023-10-02

## 2023-10-02 RX ORDER — FENTANYL CITRATE 50 UG/ML
INJECTION, SOLUTION INTRAMUSCULAR; INTRAVENOUS
Status: COMPLETED
Start: 2023-10-02 | End: 2023-10-02

## 2023-10-02 RX ORDER — PANTOPRAZOLE SODIUM 40 MG/10ML
40 INJECTION, POWDER, LYOPHILIZED, FOR SOLUTION INTRAVENOUS
Status: DISCONTINUED | OUTPATIENT
Start: 2023-10-02 | End: 2023-10-05

## 2023-10-02 RX ORDER — FENTANYL CITRATE 50 UG/ML
25 INJECTION, SOLUTION INTRAMUSCULAR; INTRAVENOUS
Status: DISPENSED | OUTPATIENT
Start: 2023-10-02 | End: 2023-10-09

## 2023-10-02 RX ORDER — IPRATROPIUM BROMIDE AND ALBUTEROL SULFATE 2.5; .5 MG/3ML; MG/3ML
3 SOLUTION RESPIRATORY (INHALATION) EVERY 6 HOURS PRN
Status: DISCONTINUED | OUTPATIENT
Start: 2023-10-02 | End: 2023-10-12 | Stop reason: HOSPADM

## 2023-10-02 RX ORDER — FENTANYL CITRATE 50 UG/ML
50 INJECTION, SOLUTION INTRAMUSCULAR; INTRAVENOUS ONCE
Status: COMPLETED | OUTPATIENT
Start: 2023-10-02 | End: 2023-10-02

## 2023-10-02 RX ORDER — CHLORHEXIDINE GLUCONATE ORAL RINSE 1.2 MG/ML
15 SOLUTION DENTAL EVERY 12 HOURS SCHEDULED
Status: DISCONTINUED | OUTPATIENT
Start: 2023-10-02 | End: 2023-10-05

## 2023-10-02 RX ORDER — VANCOMYCIN HYDROCHLORIDE 1 G/200ML
1000 INJECTION, SOLUTION INTRAVENOUS EVERY 12 HOURS
Status: DISCONTINUED | OUTPATIENT
Start: 2023-10-02 | End: 2023-10-02

## 2023-10-02 RX ADMIN — PROPOFOL INJECTABLE EMULSION 40 MCG/KG/MIN: 10 INJECTION, EMULSION INTRAVENOUS at 23:33

## 2023-10-02 RX ADMIN — AMLODIPINE BESYLATE 5 MG: 5 TABLET ORAL at 09:07

## 2023-10-02 RX ADMIN — Medication 10 ML: at 09:07

## 2023-10-02 RX ADMIN — INSULIN LISPRO 2 UNITS: 100 INJECTION, SOLUTION INTRAVENOUS; SUBCUTANEOUS at 09:07

## 2023-10-02 RX ADMIN — Medication 10 ML: at 20:04

## 2023-10-02 RX ADMIN — PERFLUTREN 3 ML: 6.52 INJECTION, SUSPENSION INTRAVENOUS at 17:25

## 2023-10-02 RX ADMIN — SENNOSIDES AND DOCUSATE SODIUM 2 TABLET: 50; 8.6 TABLET ORAL at 09:07

## 2023-10-02 RX ADMIN — PROPOFOL INJECTABLE EMULSION 40 MCG/KG/MIN: 10 INJECTION, EMULSION INTRAVENOUS at 17:50

## 2023-10-02 RX ADMIN — ACETAMINOPHEN 650 MG: 325 TABLET, FILM COATED ORAL at 11:02

## 2023-10-02 RX ADMIN — ENOXAPARIN SODIUM 40 MG: 100 INJECTION SUBCUTANEOUS at 20:03

## 2023-10-02 RX ADMIN — LABETALOL HYDROCHLORIDE 100 MG: 100 TABLET, FILM COATED ORAL at 06:55

## 2023-10-02 RX ADMIN — ACETAMINOPHEN 650 MG: 650 SUPPOSITORY RECTAL at 23:41

## 2023-10-02 RX ADMIN — PANTOPRAZOLE SODIUM 40 MG: 40 INJECTION, POWDER, FOR SOLUTION INTRAVENOUS at 20:03

## 2023-10-02 RX ADMIN — FENTANYL CITRATE 50 MCG: 50 INJECTION, SOLUTION INTRAMUSCULAR; INTRAVENOUS at 17:54

## 2023-10-02 RX ADMIN — PIPERACILLIN SODIUM AND TAZOBACTAM SODIUM 4.5 G: 4; .5 INJECTION, SOLUTION INTRAVENOUS at 20:03

## 2023-10-02 RX ADMIN — ACETAMINOPHEN 650 MG: 325 TABLET, FILM COATED ORAL at 00:17

## 2023-10-02 RX ADMIN — PROPOFOL INJECTABLE EMULSION 40 MCG/KG/MIN: 10 INJECTION, EMULSION INTRAVENOUS at 20:10

## 2023-10-02 RX ADMIN — PIPERACILLIN SODIUM AND TAZOBACTAM SODIUM 4.5 G: 4; .5 INJECTION, SOLUTION INTRAVENOUS at 11:44

## 2023-10-02 RX ADMIN — CHLORHEXIDINE GLUCONATE 15 ML: 1.2 RINSE ORAL at 20:04

## 2023-10-02 NOTE — PROCEDURES
Endotracheal Intubation Procedure Note    Indication for endotracheal intubation: respiratory failure.  Sedation:  propofol .  Equipment: A video GlideScope was used and  Glideoscope 4  laryngoscope blade and 7.5mm cuffed endotracheal tube.  Number of attempts: 2.  ETT location confirmed by  bronchoscopy .    Johnny Tuttle MD  10/2/2023

## 2023-10-02 NOTE — PROCEDURES
Insert Central Line At Bedside    Date/Time: 10/2/2023 1:31 PM  Performed by: Ilya Briones MD  Authorized by: Ilya Briones MD   Consent: Verbal consent obtained. Written consent obtained.  Consent given by: patient  Patient understanding: patient states understanding of the procedure being performed  Patient consent: the patient's understanding of the procedure matches consent given  Procedure consent: procedure consent matches procedure scheduled  Relevant documents: relevant documents present and verified  Patient identity confirmed: verbally with patient  Indications: vascular access    Anesthesia:  Local Anesthetic: lidocaine 1% with epinephrine  Anesthetic total: 5 mL    Sedation:  Patient sedated: no    Preparation: skin prepped with ChloraPrep  Skin prep agent dried: skin prep agent completely dried prior to procedure  Sterile barriers: all five maximum sterile barriers used - cap, mask, sterile gown, sterile gloves, and large sterile sheet  Hand hygiene: hand hygiene performed prior to central venous catheter insertion  Location details: left internal jugular  Patient position: Trendelenburg  Catheter type: triple lumen  Catheter size: 7 Fr  Pre-procedure: landmarks identified  Ultrasound guidance: yes  Sterile ultrasound techniques: sterile gel and sterile probe covers were used  Number of attempts: 3  Successful placement: yes  Post-procedure: line sutured and dressing applied  Assessment: blood return through all ports and free fluid flow  Patient tolerance: patient tolerated the procedure well with no immediate complications  Comments: Chest x-ray pending at this time

## 2023-10-02 NOTE — PROCEDURES
Bronchoscopy Procedure Note    Procedure:  Bronchoscopy, Diagnostic  Bronchoalveolar lavage, BAL    Pre-Operative Diagnosis:  Pneumonia    Post-Operative Diagnosis: Same    Indication:  Airway clearance    Anesthesia: ICU sedation    Procedure Details: The bronchocope was inserted into the main airway via the endotracheal tube. An anatomical survey was done of the main airways and the subsegmental bronchus to at least the first subsegmental level of all five lobes of both lungs.  The findings are reported below.  A bronchoalveolar lavage was performed using aliquots of normal saline instilled into the airways then aspirated back.    Findings:  Bronchoscope passed through ET tube to trachea. All airways were visualized to at least the first subsegment level of all 5 lobes of both lungs. Diffusely erythematous airways with RUL airways narrowed from extrinsic compression. Mild secretions which were suctioned. BAL was performed with 60 cc instilled in RUL and 15 cc returned. No endobronchial lesions seen.    Estimated Blood Loss:  Minimal           Specimens:  Sent serosanguinous fluid                Complications:  None; patient tolerated the procedure well.           Disposition: ICU - intubated and critically ill.      Patient tolerated the procedure well.    Johnny Tuttle MD  10/2/2023  18:01 EDT

## 2023-10-02 NOTE — H&P (VIEW-ONLY)
Inpatient Thoracic Surgery Consult  Consult performed by: Quynh Choe APRN  Consult ordered by: Juan Elena MD  Reason for consult: pulmonary abscess eroding into the chest wall involving the first rib        Patient Care Team:  Provider, No Known as PCP - General    Chief Complaint   Patient presents with    Shortness of Breath    Fever       Lenin Gonzalez is a 53-year-old female who presented to [facility] yesterday complaining of progressive shortness of air and cough.  She reports her symptoms have been ongoing for about the last 10 days but have worsened recently.  Her cough is productive of yellowish sputum but she denies hemoptysis.  She reports she has had a fever and diaphoresis but denies nausea or vomiting.  She denies any trouble swallowing or choking.  She is unaware of any sick contacts.  Blood pressure was extremely elevated at 245/130 in the emergency department.  Chest x-ray demonstrated a right upper lobe dense pulmonary consolidation so a CT of the chest was then performed.  Patient is a non-smoker.  He denies any history of lung disease or malignancy.  He denies any illicit drug use.  She does not take any home meds.  She is newly diagnosed type II diabetic.  CT imaging demonstrated a right upper lobe consolidation which appears to have a necrotic component and extend into the chest wall and first rib.  We have been consulted to see this lady for evaluation of this pulmonary abscess.  Upon arrival to her room, the patient is diaphoretic and febrile.  She is utilizing supplemental oxygen.  Her spouse is at bedside and assists with her history.      Review of Systems   Constitutional:  Positive for activity change, diaphoresis and fever. Negative for unexpected weight change.   HENT:  Positive for congestion. Negative for trouble swallowing.    Eyes: Negative.    Respiratory:  Positive for cough and shortness of breath.    Cardiovascular:  Negative  for chest pain.   Gastrointestinal:  Negative for diarrhea, nausea and vomiting.   Endocrine: Negative.    Genitourinary: Negative.    Skin: Negative.       History reviewed. No pertinent past medical history.  Past Surgical History:   Procedure Laterality Date    TEETH EXTRACTION       History reviewed. No pertinent family history.  Social History     Socioeconomic History    Marital status:    Tobacco Use    Smoking status: Never     Passive exposure: Never    Smokeless tobacco: Never   Vaping Use    Vaping Use: Never used   Substance and Sexual Activity    Alcohol use: Yes     Alcohol/week: 1.0 standard drink     Types: 1 Glasses of wine per week     Comment: MAYBE ONE DRINK A  MONTH    Drug use: Never    Sexual activity: Defer     No medications prior to admission.     No Known Allergies    Objective      Vital Signs  Temp:  [99 øF (37.2 øC)-102.7 øF (39.3 øC)] 99 øF (37.2 øC)  Heart Rate:  [100-125] 106  Resp:  [20-26] 26  BP: (123-161)/() 140/79    Intake & Output (last day)         10/01 0701  10/02 0700 10/02 0701  10/03 0700    I.V. (mL/kg) 1000 (7.1)     IV Piggyback 300     Total Intake(mL/kg) 1300 (9.3)     Urine (mL/kg/hr) 400 500 (0.5)    Total Output 400 500    Net +900 -500          Urine Unmeasured Occurrence 1 x 1 x            Physical Exam  Vitals and nursing note reviewed.   Constitutional:       Appearance: She is morbidly obese. She is toxic-appearing.      Interventions: Nasal cannula in place.   HENT:      Head: Normocephalic and atraumatic.      Mouth/Throat:      Mouth: Mucous membranes are moist.   Eyes:      General: No scleral icterus.     Conjunctiva/sclera: Conjunctivae normal.   Cardiovascular:      Rate and Rhythm: Tachycardia present.      Pulses: Normal pulses.   Pulmonary:      Breath sounds: Decreased breath sounds present. No wheezing, rhonchi or rales.   Musculoskeletal:         General: Swelling and tenderness present.      Cervical back: Neck supple.       Comments: Right sternoclavicular swelling and tenderness palpated   Skin:     General: Skin is warm.   Neurological:      Mental Status: She is alert and oriented to person, place, and time. Mental status is at baseline.   Psychiatric:         Mood and Affect: Mood normal.         Behavior: Behavior normal. Behavior is cooperative.         Thought Content: Thought content normal.         Judgment: Judgment normal.       Results Review:    I reviewed the patient's new clinical results.  I reviewed the patient's new imaging results and agree with the interpretation.  I reviewed the patient's other test results and agree with the interpretation  Discussed with patient, spouse at bedside, RN, Dr. Brantley.    Imaging Results (Last 24 Hours)       Procedure Component Value Units Date/Time    XR Chest Post CVA Port [[id number]] Resulted: 10/02/23 1348     Updated: 10/02/23 1417    CT Angiogram Chest [[id number]] Collected: 10/01/23 1521     Updated: 10/01/23 1543    Narrative:      EXAM: CT ANGIOGRAM CHEST-     HISTORY: Hypoxia with abnormal chest x-ray.     TECHNIQUE: Radiation dose reduction techniques were utilized, including  automated exposure control and exposure modulation based on body size.   3 mm images were obtained through the chest after the administration of  IV contrast.     COMPARISON: Same day chest radiograph        FINDINGS: Organized right upper lobe 11.8 x 7.2 cm air and fluid  collection (series 5 image 50). Collection causes compressive  atelectasis of the adjacent right upper lobe. Fluid and locules of air  extending to the anterior chest wall anterior to the first right rib and  anterior superior to the medial right clavicle (series 5/image 30,  series 5/image 15, series 7/image 73 and dedicated screenshot). No  osteolysis of the adjacent osseous structures. Small dependent right  pleural effusion. Right axillary, mediastinal and right hilar lymph  nodes are likely reactive. Reference 1.9 cm low right  paratracheal lymph  node (series 5/image 39). Additional reference 1.3 cm right axillary  lymph node (series 5/image 45). No pneumomediastinum or organized  mediastinal fluid collection.     Dilated main pulmonary artery (36 mm). Contrast bolus timing limits  evaluation of the pulmonary arteries. No central pulmonary embolus  (main, interlobar and proximal segmental). Nondilated thoracic aorta.  Nondilated esophagus.       Impression:      1. Large (12 cm) right upper lobe pulmonary abscess with extension into  the anterior right chest wall as detailed above.  2. Small right pleural effusion.  3. Mediastinal, right hilar and right axillary lymphadenopathy is likely  reactive.  4. No central pulmonary embolus. Contrast bolus timing limits evaluation  of the more distal pulmonary arteries.  5. Dilated main pulmonary artery (36 mm).     Above findings were discussed with Dr. Elena at 3:30 p.m. on  10/1/2023.     This report was finalized on 10/1/2023 3:40 PM by Dr. Aniceto Epstein M.D.               Lab Results:  Lab Results (last 24 hours)       Procedure Component Value Units Date/Time    Blood Gas, Arterial - [[id number]]  (Abnormal) Collected: 10/02/23 1344    Specimen: Arterial Blood Updated: 10/02/23 1351     Site Right Brachial     Cole's Test Positive     pH, Arterial 7.074 pH units      pCO2, Arterial 112.4 mm Hg      pO2, Arterial 96.8 mm Hg      HCO3, Arterial 32.9 mmol/L      Base Excess, Arterial -0.3 mmol/L      Comment: Serial Number: [id number]:  [id number]        O2 Saturation, Arterial 92.7 %      CO2 Content >36.08 mmol/L      Barometric Pressure for Blood Gas 752.2000 mmHg      Modality Cannula     Flow Rate 6.0000 lpm      Rate 30 Breaths/minute      Notified Who [email or web address] [email or web address]     Read Back Yes     Notified Time 13:48     Hemodilution No     Device Comment drawn by [id number] at 1340    POC Glucose Once [[id number]]  (Abnormal) Collected: 10/02/23 1347    Specimen: Blood Updated: 10/02/23 1348      Glucose 142 mg/dL     Blood Culture - Blood, Hand, Left [[id number]]  (Normal) Collected: 10/01/23 1339    Specimen: Blood from Hand, Left Updated: 10/02/23 1345     Blood Culture No growth at 24 hours    Renal Function Panel [[id number]]  (Abnormal) Collected: 10/02/23 0501    Specimen: Blood Updated: 10/02/23 1300     Glucose 108 mg/dL      BUN 16 mg/dL      Creatinine 1.08 mg/dL      Sodium 138 mmol/L      Potassium 5.4 mmol/L      Comment: Slight hemolysis detected by analyzer. Results may be affected.        Chloride 100 mmol/L      CO2 23.9 mmol/L      Calcium 9.6 mg/dL      Albumin 3.1 g/dL      Phosphorus 4.6 mg/dL      Anion Gap 14.1 mmol/L      BUN/Creatinine Ratio 14.8     eGFR 61.9 mL/min/1.73     Narrative:      GFR Normal >60  Chronic Kidney Disease <60  Kidney Failure <15      Blood Culture - Blood, Arm, Left [[id number]]  (Normal) Collected: 10/01/23 1231    Specimen: Blood from Arm, Left Updated: 10/02/23 1245     Blood Culture No growth at 24 hours    Narrative:      Less than seven (7) mL's of blood was collected.  Insufficient quantity may yield false negative results.    Magnesium [[id number]]  (Normal) Collected: 10/02/23 0501    Specimen: Blood Updated: 10/02/23 1216     Magnesium 2.1 mg/dL     C-reactive Protein [[id number]]  (Abnormal) Collected: 10/02/23 0501    Specimen: Blood Updated: 10/02/23 1216     C-Reactive Protein 28.05 mg/dL     POC Glucose Once [[id number]]  (Abnormal) Collected: 10/02/23 1123    Specimen: Blood Updated: 10/02/23 1124     Glucose 134 mg/dL     Scan Slide [[id number]] Collected: 10/02/23 0501    Specimen: Blood Updated: 10/02/23 0816     Anisocytosis Slight/1+     Flatonia Cells Slight/1+     Hypochromia Mod/2+     Ovalocytes Slight/1+     Poikilocytes Slight/1+     Polychromasia Slight/1+     WBC Morphology Normal     Platelet Estimate Adequate     Giant Platelets Slight/1+    POC Glucose Once [[id number]]  (Abnormal) Collected: 10/02/23 0745    Specimen: Blood Updated:  10/02/23 0746     Glucose 154 mg/dL     CBC & Differential [[id number]]  (Abnormal) Collected: 10/02/23 0501    Specimen: Blood Updated: 10/02/23 0646    Narrative:      The following orders were created for panel order CBC & Differential.  Procedure                               Abnormality         Status                     ---------                               -----------         ------                     CBC Auto Differential[[id number]]        Abnormal            Final result                 Please view results for these tests on the individual orders.    CBC Auto Differential [[id number]]  (Abnormal) Collected: 10/02/23 0501    Specimen: Blood Updated: 10/02/23 0646     WBC 24.25 10*3/mm3      RBC 4.05 10*6/mm3      Hemoglobin 10.7 g/dL      Hematocrit 34.8 %      MCV 85.9 fL      MCH 26.4 pg      MCHC 30.7 g/dL      RDW 14.9 %      RDW-SD 46.8 fl      MPV 11.4 fL      Platelets 353 10*3/mm3      Neutrophil % 85.4 %      Lymphocyte % 5.0 %      Monocyte % 6.4 %      Eosinophil % 0.1 %      Basophil % 0.5 %      Neutrophils, Absolute 20.74 10*3/mm3      Lymphocytes, Absolute 1.22 10*3/mm3      Monocytes, Absolute 1.54 10*3/mm3      Eosinophils, Absolute 0.02 10*3/mm3      Basophils, Absolute 0.11 10*3/mm3     POC Glucose Once [[id number]]  (Abnormal) Collected: 10/01/23 2048    Specimen: Blood Updated: 10/01/23 2050     Glucose 296 mg/dL     MRSA Screen, PCR (Inpatient) - Swab, Nares [[id number]]  (Normal) Collected: 10/01/23 1538    Specimen: Swab from Nares Updated: 10/01/23 1832     MRSA PCR No MRSA Detected    Narrative:      The negative predictive value of this diagnostic test is high and should only be used to consider de-escalating anti-MRSA therapy. A positive result may indicate colonization with MRSA and must be correlated clinically.    POC Glucose Once [[id number]]  (Abnormal) Collected: 10/01/23 1650    Specimen: Blood Updated: 10/01/23 1651     Glucose 140 mg/dL                 Assessment & Plan        PNA (pneumonia)    Acute respiratory failure with hypoxia    Hypertensive urgency    Abscess of upper lobe of right lung with pneumonia    Sepsis    Type 2 diabetes mellitus with hyperglycemia      Assessment & Plan    I have independently reviewed the patient's chart, labs and radiographic imaging.  CT angiogram demonstrates a large right upper lobe pulmonary abscess with extension into the anterior right chest wall.  There is a small right pleural effusion.  Mediastinal, right hilar and right axillary lymphadenopathy may be reactive.  No evidence of central pulmonary embolus.  Dilated main pulmonary artery is noted.    Right upper lobe consolidation/abscess: Appears to be tracking into the chest wall and first rib.  Patient has palpable tenderness and swelling.  We will for CT-guided chest tube and send the fluid for cultures. Chest tube to -20cm suction.    Sepsis: Antibiotics have been initiated.  ID is following.    Respiratory insufficiency: Requiring supplemental oxygen via nasal cannula.  She is quite dyspneic.  Hopefully can tolerate getting CT-guided chest tube.  Unsure if she will be able to lie flat.    Hypertension: Patient in hypertensive crisis on admission but now improved.  She has been initiated on labetalol and amlodipine.  Continue to follow closely.    I discussed the patients findings and our recommendations with patient, family, nursing staff, and Dr. Brantley.     Thank you for this consult and allowing us to participate in the care of your patient.  We will follow along with you during this hospitalization.       FERDINAND Zuniga  [facility]  10/02/23  14:19 EDT    Greater than 76 minutes was spent reviewing the patient's chart, radiographic imaging, labs, provider notes, assessing the patient and developing a plan of care which was discussed with the patient/family and other providers.    Addendum: Patient required general surgery central line placement this  afternoon due to difficult IV access with IV team unable to place PICC line.  Apparently, she was quite lethargic after the procedure.  ABGs demonstrated significant respiratory acidosis with pH is 7.07.  The patient was then transferred to ICU for further monitoring.  She is now on BiPAP.  Discussed with patient's RN.  We will have to hold off on CT-guided chest tube until patient is either not requiring BiPAP or is intubated.

## 2023-10-02 NOTE — PLAN OF CARE
Goal Outcome Evaluation:  Plan of Care Reviewed With: patient           Outcome Evaluation: Pt. is a 53 year old Female admitted to the hospital with PNA and c/o Right shoulder pain. Pt. reports that prior to admission she was independent with functional mobility and used no A.D. during ambulation.  Pt. currently presents with decreased strength, decreased balance, decreased ROM, and decreased tolerance to functional activity.  This AM, pt. able to ambulate 5 feet (FW/BW), CGA x 1, with HHA x 1.  Pt. requires CGA x 1 for sit <-> stand transfers with HHA x1.  Limited in upright mobility distance due to fatigue and weakness.  Instructed pt. in BLE ther. ex. program to be performed on her own throughout the day for general strengthening.  Pt. will benefit from skilled inpt. P.T. to address her functional deficits and to assist pt. in regaining her maximum level of independence with functional mobility.      Anticipated Discharge Disposition (PT): home with assist, home with home health, home with outpatient therapy services    Patient was not wearing a face mask during this therapy encounter. Therapist used appropriate personal protective equipment including eye protection, mask, and gloves.  Mask used was standard procedure mask. Appropriate PPE was worn during the entire therapy session. Hand hygiene was completed before and after therapy session. Patient is not in enhanced droplet precautions.

## 2023-10-02 NOTE — CONSULTS
Inpatient Thoracic Surgery Consult  Consult performed by: Quynh Choe APRN  Consult ordered by: Juan Elena MD  Reason for consult: pulmonary abscess eroding into the chest wall involving the first rib        Patient Care Team:  Provider, No Known as PCP - General    Chief Complaint   Patient presents with    Shortness of Breath    Fever       Lenin Gonzalez is a 53-year-old female who presented to [facility] yesterday complaining of progressive shortness of air and cough.  She reports her symptoms have been ongoing for about the last 10 days but have worsened recently.  Her cough is productive of yellowish sputum but she denies hemoptysis.  She reports she has had a fever and diaphoresis but denies nausea or vomiting.  She denies any trouble swallowing or choking.  She is unaware of any sick contacts.  Blood pressure was extremely elevated at 245/130 in the emergency department.  Chest x-ray demonstrated a right upper lobe dense pulmonary consolidation so a CT of the chest was then performed.  Patient is a non-smoker.  He denies any history of lung disease or malignancy.  He denies any illicit drug use.  She does not take any home meds.  She is newly diagnosed type II diabetic.  CT imaging demonstrated a right upper lobe consolidation which appears to have a necrotic component and extend into the chest wall and first rib.  We have been consulted to see this lady for evaluation of this pulmonary abscess.  Upon arrival to her room, the patient is diaphoretic and febrile.  She is utilizing supplemental oxygen.  Her spouse is at bedside and assists with her history.      Review of Systems   Constitutional:  Positive for activity change, diaphoresis and fever. Negative for unexpected weight change.   HENT:  Positive for congestion. Negative for trouble swallowing.    Eyes: Negative.    Respiratory:  Positive for cough and shortness of breath.    Cardiovascular:  Negative  for chest pain.   Gastrointestinal:  Negative for diarrhea, nausea and vomiting.   Endocrine: Negative.    Genitourinary: Negative.    Skin: Negative.       History reviewed. No pertinent past medical history.  Past Surgical History:   Procedure Laterality Date    TEETH EXTRACTION       History reviewed. No pertinent family history.  Social History     Socioeconomic History    Marital status:    Tobacco Use    Smoking status: Never     Passive exposure: Never    Smokeless tobacco: Never   Vaping Use    Vaping Use: Never used   Substance and Sexual Activity    Alcohol use: Yes     Alcohol/week: 1.0 standard drink     Types: 1 Glasses of wine per week     Comment: MAYBE ONE DRINK A  MONTH    Drug use: Never    Sexual activity: Defer     No medications prior to admission.     No Known Allergies    Objective      Vital Signs  Temp:  [99 øF (37.2 øC)-102.7 øF (39.3 øC)] 99 øF (37.2 øC)  Heart Rate:  [100-125] 106  Resp:  [20-26] 26  BP: (123-161)/() 140/79    Intake & Output (last day)         10/01 0701  10/02 0700 10/02 0701  10/03 0700    I.V. (mL/kg) 1000 (7.1)     IV Piggyback 300     Total Intake(mL/kg) 1300 (9.3)     Urine (mL/kg/hr) 400 500 (0.5)    Total Output 400 500    Net +900 -500          Urine Unmeasured Occurrence 1 x 1 x            Physical Exam  Vitals and nursing note reviewed.   Constitutional:       Appearance: She is morbidly obese. She is toxic-appearing.      Interventions: Nasal cannula in place.   HENT:      Head: Normocephalic and atraumatic.      Mouth/Throat:      Mouth: Mucous membranes are moist.   Eyes:      General: No scleral icterus.     Conjunctiva/sclera: Conjunctivae normal.   Cardiovascular:      Rate and Rhythm: Tachycardia present.      Pulses: Normal pulses.   Pulmonary:      Breath sounds: Decreased breath sounds present. No wheezing, rhonchi or rales.   Musculoskeletal:         General: Swelling and tenderness present.      Cervical back: Neck supple.       Comments: Right sternoclavicular swelling and tenderness palpated   Skin:     General: Skin is warm.   Neurological:      Mental Status: She is alert and oriented to person, place, and time. Mental status is at baseline.   Psychiatric:         Mood and Affect: Mood normal.         Behavior: Behavior normal. Behavior is cooperative.         Thought Content: Thought content normal.         Judgment: Judgment normal.       Results Review:    I reviewed the patient's new clinical results.  I reviewed the patient's new imaging results and agree with the interpretation.  I reviewed the patient's other test results and agree with the interpretation  Discussed with patient, spouse at bedside, RN, Dr. Brantley.    Imaging Results (Last 24 Hours)       Procedure Component Value Units Date/Time    XR Chest Post CVA Port [[id number]] Resulted: 10/02/23 1348     Updated: 10/02/23 1417    CT Angiogram Chest [[id number]] Collected: 10/01/23 1521     Updated: 10/01/23 1543    Narrative:      EXAM: CT ANGIOGRAM CHEST-     HISTORY: Hypoxia with abnormal chest x-ray.     TECHNIQUE: Radiation dose reduction techniques were utilized, including  automated exposure control and exposure modulation based on body size.   3 mm images were obtained through the chest after the administration of  IV contrast.     COMPARISON: Same day chest radiograph        FINDINGS: Organized right upper lobe 11.8 x 7.2 cm air and fluid  collection (series 5 image 50). Collection causes compressive  atelectasis of the adjacent right upper lobe. Fluid and locules of air  extending to the anterior chest wall anterior to the first right rib and  anterior superior to the medial right clavicle (series 5/image 30,  series 5/image 15, series 7/image 73 and dedicated screenshot). No  osteolysis of the adjacent osseous structures. Small dependent right  pleural effusion. Right axillary, mediastinal and right hilar lymph  nodes are likely reactive. Reference 1.9 cm low right  paratracheal lymph  node (series 5/image 39). Additional reference 1.3 cm right axillary  lymph node (series 5/image 45). No pneumomediastinum or organized  mediastinal fluid collection.     Dilated main pulmonary artery (36 mm). Contrast bolus timing limits  evaluation of the pulmonary arteries. No central pulmonary embolus  (main, interlobar and proximal segmental). Nondilated thoracic aorta.  Nondilated esophagus.       Impression:      1. Large (12 cm) right upper lobe pulmonary abscess with extension into  the anterior right chest wall as detailed above.  2. Small right pleural effusion.  3. Mediastinal, right hilar and right axillary lymphadenopathy is likely  reactive.  4. No central pulmonary embolus. Contrast bolus timing limits evaluation  of the more distal pulmonary arteries.  5. Dilated main pulmonary artery (36 mm).     Above findings were discussed with Dr. Elena at 3:30 p.m. on  10/1/2023.     This report was finalized on 10/1/2023 3:40 PM by Dr. Aniceto Epstein M.D.               Lab Results:  Lab Results (last 24 hours)       Procedure Component Value Units Date/Time    Blood Gas, Arterial - [[id number]]  (Abnormal) Collected: 10/02/23 1344    Specimen: Arterial Blood Updated: 10/02/23 1351     Site Right Brachial     Cole's Test Positive     pH, Arterial 7.074 pH units      pCO2, Arterial 112.4 mm Hg      pO2, Arterial 96.8 mm Hg      HCO3, Arterial 32.9 mmol/L      Base Excess, Arterial -0.3 mmol/L      Comment: Serial Number: [id number]:  [id number]        O2 Saturation, Arterial 92.7 %      CO2 Content >36.08 mmol/L      Barometric Pressure for Blood Gas 752.2000 mmHg      Modality Cannula     Flow Rate 6.0000 lpm      Rate 30 Breaths/minute      Notified Who [email or web address] [email or web address]     Read Back Yes     Notified Time 13:48     Hemodilution No     Device Comment drawn by [id number] at 1340    POC Glucose Once [[id number]]  (Abnormal) Collected: 10/02/23 1347    Specimen: Blood Updated: 10/02/23 1348      Glucose 142 mg/dL     Blood Culture - Blood, Hand, Left [[id number]]  (Normal) Collected: 10/01/23 1339    Specimen: Blood from Hand, Left Updated: 10/02/23 1345     Blood Culture No growth at 24 hours    Renal Function Panel [[id number]]  (Abnormal) Collected: 10/02/23 0501    Specimen: Blood Updated: 10/02/23 1300     Glucose 108 mg/dL      BUN 16 mg/dL      Creatinine 1.08 mg/dL      Sodium 138 mmol/L      Potassium 5.4 mmol/L      Comment: Slight hemolysis detected by analyzer. Results may be affected.        Chloride 100 mmol/L      CO2 23.9 mmol/L      Calcium 9.6 mg/dL      Albumin 3.1 g/dL      Phosphorus 4.6 mg/dL      Anion Gap 14.1 mmol/L      BUN/Creatinine Ratio 14.8     eGFR 61.9 mL/min/1.73     Narrative:      GFR Normal >60  Chronic Kidney Disease <60  Kidney Failure <15      Blood Culture - Blood, Arm, Left [[id number]]  (Normal) Collected: 10/01/23 1231    Specimen: Blood from Arm, Left Updated: 10/02/23 1245     Blood Culture No growth at 24 hours    Narrative:      Less than seven (7) mL's of blood was collected.  Insufficient quantity may yield false negative results.    Magnesium [[id number]]  (Normal) Collected: 10/02/23 0501    Specimen: Blood Updated: 10/02/23 1216     Magnesium 2.1 mg/dL     C-reactive Protein [[id number]]  (Abnormal) Collected: 10/02/23 0501    Specimen: Blood Updated: 10/02/23 1216     C-Reactive Protein 28.05 mg/dL     POC Glucose Once [[id number]]  (Abnormal) Collected: 10/02/23 1123    Specimen: Blood Updated: 10/02/23 1124     Glucose 134 mg/dL     Scan Slide [[id number]] Collected: 10/02/23 0501    Specimen: Blood Updated: 10/02/23 0816     Anisocytosis Slight/1+     [place] Cells Slight/1+     Hypochromia Mod/2+     Ovalocytes Slight/1+     Poikilocytes Slight/1+     Polychromasia Slight/1+     WBC Morphology Normal     Platelet Estimate Adequate     Giant Platelets Slight/1+    POC Glucose Once [[id number]]  (Abnormal) Collected: 10/02/23 0745    Specimen: Blood Updated:  10/02/23 0746     Glucose 154 mg/dL     CBC & Differential [[id number]]  (Abnormal) Collected: 10/02/23 0501    Specimen: Blood Updated: 10/02/23 0646    Narrative:      The following orders were created for panel order CBC & Differential.  Procedure                               Abnormality         Status                     ---------                               -----------         ------                     CBC Auto Differential[[id number]]        Abnormal            Final result                 Please view results for these tests on the individual orders.    CBC Auto Differential [[id number]]  (Abnormal) Collected: 10/02/23 0501    Specimen: Blood Updated: 10/02/23 0646     WBC 24.25 10*3/mm3      RBC 4.05 10*6/mm3      Hemoglobin 10.7 g/dL      Hematocrit 34.8 %      MCV 85.9 fL      MCH 26.4 pg      MCHC 30.7 g/dL      RDW 14.9 %      RDW-SD 46.8 fl      MPV 11.4 fL      Platelets 353 10*3/mm3      Neutrophil % 85.4 %      Lymphocyte % 5.0 %      Monocyte % 6.4 %      Eosinophil % 0.1 %      Basophil % 0.5 %      Neutrophils, Absolute 20.74 10*3/mm3      Lymphocytes, Absolute 1.22 10*3/mm3      Monocytes, Absolute 1.54 10*3/mm3      Eosinophils, Absolute 0.02 10*3/mm3      Basophils, Absolute 0.11 10*3/mm3     POC Glucose Once [[id number]]  (Abnormal) Collected: 10/01/23 2048    Specimen: Blood Updated: 10/01/23 2050     Glucose 296 mg/dL     MRSA Screen, PCR (Inpatient) - Swab, Nares [[id number]]  (Normal) Collected: 10/01/23 1538    Specimen: Swab from Nares Updated: 10/01/23 1832     MRSA PCR No MRSA Detected    Narrative:      The negative predictive value of this diagnostic test is high and should only be used to consider de-escalating anti-MRSA therapy. A positive result may indicate colonization with MRSA and must be correlated clinically.    POC Glucose Once [[id number]]  (Abnormal) Collected: 10/01/23 1650    Specimen: Blood Updated: 10/01/23 1651     Glucose 140 mg/dL                 Assessment & Plan        PNA (pneumonia)    Acute respiratory failure with hypoxia    Hypertensive urgency    Abscess of upper lobe of right lung with pneumonia    Sepsis    Type 2 diabetes mellitus with hyperglycemia      Assessment & Plan    I have independently reviewed the patient's chart, labs and radiographic imaging.  CT angiogram demonstrates a large right upper lobe pulmonary abscess with extension into the anterior right chest wall.  There is a small right pleural effusion.  Mediastinal, right hilar and right axillary lymphadenopathy may be reactive.  No evidence of central pulmonary embolus.  Dilated main pulmonary artery is noted.    Right upper lobe consolidation/abscess: Appears to be tracking into the chest wall and first rib.  Patient has palpable tenderness and swelling.  We will for CT-guided chest tube and send the fluid for cultures. Chest tube to -20cm suction.    Sepsis: Antibiotics have been initiated.  ID is following.    Respiratory insufficiency: Requiring supplemental oxygen via nasal cannula.  She is quite dyspneic.  Hopefully can tolerate getting CT-guided chest tube.  Unsure if she will be able to lie flat.    Hypertension: Patient in hypertensive crisis on admission but now improved.  She has been initiated on labetalol and amlodipine.  Continue to follow closely.    I discussed the patients findings and our recommendations with patient, family, nursing staff, and Dr. Brantley.     Thank you for this consult and allowing us to participate in the care of your patient.  We will follow along with you during this hospitalization.       FERDINAND Zuniga  [facility]  10/02/23  14:19 EDT    Greater than 76 minutes was spent reviewing the patient's chart, radiographic imaging, labs, provider notes, assessing the patient and developing a plan of care which was discussed with the patient/family and other providers.    Addendum: Patient required general surgery central line placement this  afternoon due to difficult IV access with IV team unable to place PICC line.  Apparently, she was quite lethargic after the procedure.  ABGs demonstrated significant respiratory acidosis with pH is 7.07.  The patient was then transferred to ICU for further monitoring.  She is now on BiPAP.  Discussed with patient's RN.  We will have to hold off on CT-guided chest tube until patient is either not requiring BiPAP or is intubated.

## 2023-10-02 NOTE — PROGRESS NOTES
[facility]    [phone]   LOS: 1 day     Name: Nay Gonzalez  Age/Sex: 53 y.o. female  :  1970        PCP: Provider, No Known  Chief Complaint   Patient presents with    Shortness of Breath    Fever      Subjective   She feels ok today denies new complaints still with shoulder discomfort and not feeling well.  Biggest complaint is being thristy.  Per Nursing and IV team She doesn't have any other areas for vscular access this AM.  REcomending vascualr to lace IJ or subclavian  General: No Fever or Chills, Cardiac: No Chest Pain or Palpitations, GI: No Nausea, Vomiting, or Diarrhea, and Other: No bleeding    amLODIPine, 5 mg, Oral, Q24H  enoxaparin, 40 mg, Subcutaneous, Q12H  insulin lispro, 2-7 Units, Subcutaneous, 4x Daily AC & at Bedtime  labetalol, 100 mg, Oral, Q12H  piperacillin-tazobactam, 4.5 g, Intravenous, Q8H  senna-docusate sodium, 2 tablet, Oral, BID  sodium chloride, 10 mL, Intravenous, Q12H  vancomycin, 1,000 mg, Intravenous, Q12H      Pharmacy Consult - Pharmacy to dose,   sodium chloride, 100 mL/hr, Last Rate: Stopped (10/02/23 0225)        Objective   Vital Signs  Temp:  [99 øF (37.2 øC)-102.7 øF (39.3 øC)] 100.2 øF (37.9 øC)  Heart Rate:  [] 116  Resp:  [18-22] 22  BP: (144-245)/() 152/91  Body mass index is 59.34 kg/mý.    Intake/Output Summary (Last 24 hours) at 10/2/2023 0555  Last data filed at 10/2/2023 0225  Gross per 24 hour   Intake 1300 ml   Output 400 ml   Net 900 ml       Physical Exam  Vitals and nursing note reviewed.   Constitutional:       General: She is not in acute distress.     Appearance: She is obese. She is ill-appearing.   Cardiovascular:      Rate and Rhythm: Regular rhythm. Tachycardia present.   Pulmonary:      Effort: Pulmonary effort is normal. No respiratory distress.      Comments: Dont are any movement in the RU lung file;ds otherwise CTA  Abdominal:      General: Bowel sounds are normal. There is no distension.       Palpations: Abdomen is soft.   Neurological:      General: No focal deficit present.      Mental Status: She is alert. Mental status is at baseline.   Psychiatric:         Mood and Affect: Mood normal.         Behavior: Behavior normal.         Results Review:       I reviewed the patient's new clinical results.  Results from last 7 days   Lab Units 10/01/23  1138   WBC 10*3/mm3 22.45*   HEMOGLOBIN g/dL 10.4*   PLATELETS 10*3/mm3 395     Results from last 7 days   Lab Units 10/01/23  1138   SODIUM mmol/L 141   POTASSIUM mmol/L 4.2   CHLORIDE mmol/L 101   CO2 mmol/L 30.0*   BUN mg/dL 14   CREATININE mg/dL 0.95   CALCIUM mg/dL 9.6   Estimated Creatinine Clearance: 100.8 mL/min (by C-G formula based on SCr of 0.95 mg/dL).  Lab Results   Component Value Date    HGBA1C 7.50 (H) 10/01/2023     Glucose   Date/Time Value Ref Range Status   10/02/2023 0745 154 (H) 70 - 130 mg/dL Final   10/01/2023 2048 296 (H) 70 - 130 mg/dL Final   10/01/2023 1650 140 (H) 70 - 130 mg/dL Final         Assessment & Plan   Active Hospital Problems    Diagnosis  POA    **PNA (pneumonia) [J18.9]  Yes    Acute respiratory failure with hypoxia [J96.01]  Unknown    Sepsis [A41.9]  Unknown    Hyperglycemia [R73.9]  Unknown    Hypertensive urgency [I16.0]  Unknown    Pneumonia [J18.9]  Yes      Resolved Hospital Problems   No resolved problems to display.       PLAN  This is a 53-year-old female with a history of morbid obesity, new diabetes of type 2 diabetes and hypertension who presents to the hospital with cough shortness of breath and fevers and is found to have a right upper lobe pulmonary abscess  -It remains unclear the source of this abscess.  Her MRSA screen was negative.  We can probably discontinue the vancomycin and continue Zosyn for now.  She does not have any risk factors for tuberculosis she is not an IV drug user.  I suspect this is likely related to chronic aspiration but it is unclear at this time.  ID and thoracic surgery to  evaluate today.  -Planning for IR drain placement today.  Again discussed with vascular surgery last night and they do plan on formally evaluating the patient today but given her tight lung windows and body habitus surgical intervention will be quite difficult.  -She needs to continue IV antibiotics.  We can discontinue IV fluids today given she is normotensive but mildly tachycardic.  -Access is an issue and will be needed for long term abx.  Unfortunately she isnt a candidate for a PICC or midline.  Could do left IJ and subclavian, would avoid the R side given infection proximity.  Discussed with general surgery and vascular surgery.  Blood cultures negative so far but not yet 24hrs old.    -A1c 7.5 cover with SSI today and plan Jardiamce or Metformin and ozempic at DC  -mechanical DVT prophylaxis  -full code      Disposition  Expected discharge date/ time has not been documented.       Juan Elena MD  [facility]  10/02/23  05:55 EDT      Update:  Notified by nursing this afternoon that she was more lethargic and not looking really great.  We were able to get her central IV access placed by general surgery and greatly appreciate their assistance.  An ABG was done following line placement and she is found to have significant CO2 retention and a very low pH.  We will need to transfer to the ICU for urgent BiPAP.  I have also ordered a repeat renal function panel as her creatinine was increasing and she did have some hyperkalemia this morning.  We will follow-up the chemistry results once they are back.  I did attempt to call her  on the phone but it is a nondescript voicemail and no voicemail was left.  Discussed with the pulmonologist and he agrees with plan to transfer to the ICU.  We will reach out to thoracic surgery and discussed with them as well as I do not think she is stable enough to go down for CT-guided drain placement at this time.

## 2023-10-02 NOTE — CONSULTS
Colorectal & General Surgery  Consultation    Patient: Nay Gonzalez  YOB: 1970  MRN: [id number]      Assessment  Nay Gonzalez is a 53 y.o. female with pneumonia and associated pulmonary abscess with difficult IV access.  I was asked to place a central line.  I discussed the risk, benefits, alternatives with the patient, including the risk of pneumothorax and arterial placement.  She wished to proceed.  See procedure note for details of the procedure.    Chest x-ray is currently pending at this time.  I will follow-up.      History of Present Illness   Nay Gonzalez is a 53 y.o. female who I am seeing in consultation regarding intravenous access at the request of Juan Elena MD.  53-year-old lady presented to the emergency department last night with shortness of air.  She was found to have sepsis secondary to pneumonia with associated pulmonary abscess.  Difficult IV access overnight.  The PICC team was unable to identify any suitable targets for PICC line.  I was subsequently asked to place a central line.  No blood thinners.      Past Medical History   History reviewed. No pertinent past medical history.     Past Surgical History   Past Surgical History:   Procedure Laterality Date    TEETH EXTRACTION         Social History  Social History     Socioeconomic History    Marital status:    Tobacco Use    Smoking status: Never     Passive exposure: Never    Smokeless tobacco: Never   Vaping Use    Vaping Use: Never used   Substance and Sexual Activity    Alcohol use: Yes     Alcohol/week: 1.0 standard drink     Types: 1 Glasses of wine per week     Comment: MAYBE ONE DRINK A  MONTH    Drug use: Never    Sexual activity: Defer       Family History  History reviewed. No pertinent family history.    Review of Systems  Negative except as documented in the HPI.     Allergies  No Known Allergies    Medications    Current Facility-Administered Medications:     acetaminophen (TYLENOL)  tablet 650 mg, 650 mg, Oral, Q4H PRN, 650 mg at 10/02/23 1102 **OR** acetaminophen (TYLENOL) 160 MG/5ML oral solution 650 mg, 650 mg, Oral, Q4H PRN **OR** acetaminophen (TYLENOL) suppository 650 mg, 650 mg, Rectal, Q4H PRN, Juan Elena MD    amLODIPine (NORVASC) tablet 5 mg, 5 mg, Oral, Q24H, Juan Elena MD, 5 mg at 10/02/23 0907    sennosides-docusate (PERICOLACE) 8.6-50 MG per tablet 2 tablet, 2 tablet, Oral, BID, 2 tablet at 10/02/23 0907 **AND** polyethylene glycol (MIRALAX) packet 17 g, 17 g, Oral, Daily PRN **AND** bisacodyl (DULCOLAX) EC tablet 5 mg, 5 mg, Oral, Daily PRN **AND** bisacodyl (DULCOLAX) suppository 10 mg, 10 mg, Rectal, Daily PRN, Juan Elena MD    Calcium Replacement - Follow Nurse / BPA Driven Protocol, , Does not apply, PRN, Juan Elena MD    dextrose (D50W) (25 g/50 mL) IV injection 25 g, 25 g, Intravenous, Q15 Min PRN, Juan Elena MD    dextrose (GLUTOSE) oral gel 15 g, 15 g, Oral, Q15 Min PRN, Juan Elena MD    Enoxaparin Sodium (LOVENOX) syringe 40 mg, 40 mg, Subcutaneous, Q12H, Juan Elena MD, 40 mg at 10/01/23 1723    glucagon (GLUCAGEN) injection 1 mg, 1 mg, Intramuscular, Q15 Min PRN, Juan Elena MD    [START ON 10/3/2023] influenza vac split quad (FLUZONE,FLUARIX,AFLURIA,FLULAVAL) injection 0.5 mL, 0.5 mL, Intramuscular, During Hospitalization, Juan Elena MD    insulin lispro (HUMALOG/ADMELOG) injection 2-7 Units, 2-7 Units, Subcutaneous, 4x Daily AC & at Bedtime, Juan Elena MD, 2 Units at 10/02/23 0907    ipratropium-albuterol (DUO-NEB) nebulizer solution 3 mL, 3 mL, Nebulization, Q6H PRN, Johnny Tuttle MD    labetalol (NORMODYNE) tablet 100 mg, 100 mg, Oral, Q12H, Juan Elena MD, 100 mg at 10/02/23 0655    Magnesium Standard Dose Replacement - Follow Nurse / BPA Driven Protocol, , Does not apply, PRN, Juan Elena MD    nitroglycerin (NITROSTAT) SL tablet 0.4 mg, 0.4  mg, Sublingual, Q5 Min PRN, Juan Elena MD    ondansetron (ZOFRAN) tablet 4 mg, 4 mg, Oral, Q6H PRN **OR** ondansetron (ZOFRAN) injection 4 mg, 4 mg, Intravenous, Q6H PRN, Juan Elena MD    Phosphorus Replacement - Follow Nurse / BPA Driven Protocol, , Does not apply, Ulices PALOMARES Stephen J, MD    piperacillin-tazobactam (ZOSYN) 4.5 g in iso-osmotic dextrose 100 mL IVPB (premix), 4.5 g, Intravenous, Q8H, Juan Elena MD, Last Rate: 0 mL/hr at 10/02/23 0214, 4.5 g at 10/02/23 1144    Potassium Replacement - Follow Nurse / BPA Driven Protocol, , Does not apply, Ulices PALOMARES Stephen J, MD    sodium chloride 0.9 % flush 10 mL, 10 mL, Intravenous, Q12H, Juan Elena MD, 10 mL at 10/02/23 0907    sodium chloride 0.9 % flush 10 mL, 10 mL, Intravenous, PRN, Juan Elena MD, 10 mL at 10/01/23 1723    sodium chloride 0.9 % infusion 40 mL, 40 mL, Intravenous, Ulices PALOMARES Stephen J, MD    Vital Signs  Vitals:    10/02/23 1144   BP:    Pulse: 112   Resp:    Temp: (!) 101 øF (38.3 øC)   SpO2:           Physical Exam  Constitutional: Resting comfortably, no acute distress  Neck: Supple, trachea midline  Respiratory: No increased work of breathing, Symmetric excursion  Cardiovascular: Well pefursed, no jugular venous distention evident   Abdominal:  Soft, non-tender, non-distended  Lymphatics: No cervical or suprascapular adenopathy  Skin: Warm, dry, no rash on visualized skin surfaces  Musculoskeletal: Symmetric strength, no obvious gross abnormalities  Psychiatric: Alert and oriented x3, normal affect     Laboratory Results  I have personally reviewed CBC with WBC 24, hemoglobin 10, platelets 353.  CRP 28.  Creatinine 1.08, potassium 5.4, albumin 3.1.    Radiology  I have personally reviewed CT scan of the chest demonstrates bilaterally patent internal jugular veins, quite deep from the skin.           Yamil Briones MD  Colorectal & General Surgery  [facility]    [address], [address]  [place], [place], [address]  P: [phone]  F: [phone]

## 2023-10-02 NOTE — CASE MANAGEMENT/SOCIAL WORK
Discharge Planning Assessment  [facility]     Patient Name: Nay Gonzalez  MRN: [id number]  Today's Date: 10/2/2023    Admit Date: 10/1/2023    Plan: Home, family will transport   Discharge Needs Assessment       Row Name 10/02/23 1310       Living Environment    People in Home spouse    Name(s) of People in Home Godwin Gonzalez    Current Living Arrangements home    Potentially Unsafe Housing Conditions none    Primary Care Provided by self    Provides Primary Care For no one    Family Caregiver if Needed spouse    Family Caregiver Names Godwin Gonzalez    Quality of Family Relationships helpful;involved    Able to Return to Prior Arrangements yes       Resource/Environmental Concerns    Resource/Environmental Concerns none    Transportation Concerns none       Food Insecurity    Within the past 12 months, you worried that your food would run out before you got the money to buy more. Never true    Within the past 12 months, the food you bought just didn't last and you didn't have money to get more. Never true       Transition Planning    Patient/Family Anticipates Transition to home    Patient/Family Anticipated Services at Transition none    Transportation Anticipated family or friend will provide       Discharge Needs Assessment    Equipment Currently Used at Home none    Concerns to be Addressed care coordination/care conferences    Anticipated Changes Related to Illness other (see comments)    Equipment Needed After Discharge walker, rolling    Provided Post Acute Provider List? N/A    Provided Post Acute Provider Quality & Resource List? N/A                   Discharge Plan       Row Name 10/02/23 131       Plan    Plan Home, family will transport    Plan Comments Met with pt at bedside. Introduced self, and explained role of . Face sheet verified, pt does not have a PCP, informed pt that at discharge, CCP will assist with obtaining a PCP. Pt stated that if any care needs arise, her   "or her mother could assist with any care needs. Prior to hospitalization, pt was independent in ADL's, however pt does not know what she will need at discharge. Pt has never used home health or been to rehab and pt has fears re returning home, as she has never \"been this sick\". Pt is requesting a walker at discharge. When questioned if she should need oxygen, pt stated that she has no preferred provider. Pt stated that her spouse will transport home. Explained that CCP would follow to assess for discharge needs. Informed that CCP would assist with DME and other needs that may arise prior to dc.                  Continued Care and Services - Admitted Since 10/1/2023    Coordination has not been started for this encounter.       Expected Discharge Date and Time       Expected Discharge Date Expected Discharge Time    Oct 6, 2023            Demographic Summary    No documentation.                  Functional Status    No documentation.                  Psychosocial    No documentation.                  Abuse/Neglect    No documentation.                  Legal    No documentation.                  Substance Abuse    No documentation.                  Patient Forms    No documentation.                     Sunita Bonilla RN    "

## 2023-10-02 NOTE — PROGRESS NOTES
"  Consult Daily Progress Note  [address]  9/23/2023    Patient Identification:  Nay Gonzalez  53 y.o.  female  1970  [id number]          LOS 2    Reason for Admission / Chief Complaint:  Fever, shortness of breath, pneumonia    Hospital Course:   53-year-old female, non-smoker, who presented with several days of cough, shortness of breath, fever.  Found to have a right upper lobe consolidation concerning for abscess.  Started on antibiotics with plan for IR guided chest tube placement.    Interval History:  Admitted overnight for pneumonia  Remains febrile to 101.5 this morning  States that her breathing is mildly improved  Continues to have a cough with clear sputum production  Denies any chest pain or palpitations  Denies any nausea, vomiting, diarrhea  Denies any previous history of pulmonary disease, no asthma, COPD, frequent lung infections, smoking    Physical Exam:  /65 (BP Location: Left arm, Patient Position: Lying)   Pulse 70   Temp 97.8 øF (36.6 øC) (Oral)   Resp 18   Ht 162.6 cm (64\")   Wt 45.6 kg (100 lb 8.5 oz)   SpO2 96%   BMI 17.26 kg/mý   Body mass index is 17.26 kg/mý.    Intake/Output    Intake/Output Summary (Last 24 hours) at 9/23/2023 1010  Last data filed at 9/23/2023 0900  Gross per 24 hour   Intake 720 ml   Output 70 ml   Net 650 ml     General: Alert, nontoxic, NAD, obese  HEENT: NC/AT, EOMI, MMM  Neck: Supple, trachea midline  Cardiac: RRR, no murmur, gallops, rubs  Pulmonary: Absent at right upper lobe, distant bilaterally, no wheezing  GI: Soft, non-tender, non-distended, normal bowel sounds  Extremities: Warm, well perfused, no LE edema  Skin: no visible rash  Neuro: CN II - XII grossly intact  Psychiatry: Normal mood and affect    Data Review:  Notable Labs:  Results from last 7 days   Lab Units 09/21/23  0550 09/20/23  0515 09/17/23 1943   WBC 10*3/mm3 7.94 10.92* 13.88*   HEMOGLOBIN g/dL 8.4* 9.2* 10.9*   PLATELETS 10*3/mm3 520* 559* 666* "     Results from last 7 days   Lab Units 09/21/23  0550 09/20/23 0515 09/17/23 1949   SODIUM mmol/L 140 138 133*   POTASSIUM mmol/L 3.4* 4.1 3.8   CHLORIDE mmol/L 110* 109* 98   CO2 mmol/L 23.0 16.7* 24.3   BUN mg/dL 11 12 14   CREATININE mg/dL 0.69 0.81 0.84   GLUCOSE mg/dL 93 58* 122*   CALCIUM mg/dL 7.9* 7.9* 8.4*   Estimated Creatinine Clearance: 47.6 mL/min (by C-G formula based on SCr of 0.69 mg/dL).    Results from last 7 days   Lab Units 09/21/23  0550 09/20/23  0515 09/17/23 2206 09/17/23 1949 09/17/23 1943   AST (SGOT) U/L 15  --   --  20  --    ALT (SGPT) U/L 5  --   --  9  --    LACTATE mmol/L  --   --  1.0  --   --    CRP mg/dL  --  9.52*  --   --   --    PLATELETS 10*3/mm3 520* 559*  --   --  666*     Imaging:  Reviewed chest images personally from past 3 days    ASSESSMENT  /  PLAN:    Right upper lobe pneumonia/abscess  Moderate right pleural effusion  Acute hypoxic respiratory failure  Atelectasis  Mediastinal lymphadenopathy  Super morbid obesity  Snoring/apnea with concerns for CRYSTAL  Hypertension  Diabetes mellitus, type II    Large right upper lobe consolidation with narrowed right upper lobe airways which are likely due to the consolidation and not a postobstructive process from endobronchial lesion based on imaging.  Mediastinal lymphadenopathy is also present which is likely reactive in nature.    -Continue Zosyn at this time with infectious work-up pending  -Plan for CT-guided chest tube placement with cultures today  -Wean oxygen as tolerated for SPO2 greater than 92%  -Infectious work-up as per infectious disease  -Appreciate thoracic surgery recommendations  -DuoNeb every 6 as needed  -Patient will need to follow-up in pulmonary clinic upon discharge to ensure resolution of consolidation and mediastinal lymphadenopathy    Thank you for allowing us to participate in this patients care. Pulmonary will continue to follow.     Johnny Tuttle MD  [facility]  Pulmonary and  Critical Care Medicine, Interventional Pulmonology    Parts of this note may be an electronic transcription/translation of spoken language to printed text using the Dragon dictation system.       Addendum  =========================  Patient with worsening mental status with ABG performed demonstrating respiratory acidosis with pH of 7.07 and PCO2 greater than 110.  Transferred to the ICU emergently and placed on BiPAP of 20/8 with tidal volumes 450-550, respiratory rate set 28, minute ventilation greater than 15.  Suspect that her hypercapnic respiratory failure is in the setting of pneumonia with decreased reserve in the setting of morbid obesity.  She will remain on Zosyn therapy, MRSA negative we will continue to hold off on vancomycin.  We will keep her on BiPAP at this time and repeat ABG in 30 minutes.  If worsening respiratory status and unable to correct hypercapnia we will have to consider intubation.  She will also need to have CT-guided chest tube placed once more clinically stable.    Critical care time: 40 minutes

## 2023-10-02 NOTE — NURSING NOTE
Iv team called to evaluate pt for piv, attempts made during the night without success. Placed a 20G successfully in lt arm after 2 attempts (once in RFA). Pt only had 2 very small accessible veins noted with U/S in each forearm, unable to thread the RFA, with current iv tx plan and due to the size of the vessel and lack of blood flow around the catheter the vascular integrity of this vessel will be very limited. I do recommend pt be evaluated soon for a CVC due to absolutely no other accessible veins found. Upper arms were evaluated with U/S and due to size of the arms the depth of the vessels can't be visualized for a picc or midline. Spoke with Dr Elena and Zohra RN about the above issues and need to get better access as soon as possible to avoid pt having no veins for any other piv for iv meds. Pt also reported lab unable to obtain blood work needed.

## 2023-10-02 NOTE — THERAPY EVALUATION
Patient Name: Nay Gonzalez  : 1970    MRN: [id number]                              Today's Date: 10/2/2023       Admit Date: 10/1/2023    Visit Dx:     ICD-10-CM ICD-9-CM   1. Pneumonia of right upper lobe due to infectious organism  J18.9 486   2. Hypoxia  R09.02 799.02   3. Hypertension, unspecified type  I10 401.9     Patient Active Problem List   Diagnosis    PNA (pneumonia)    Acute respiratory failure with hypoxia    Hypertensive urgency    Abscess of upper lobe of right lung with pneumonia    Sepsis    Type 2 diabetes mellitus with hyperglycemia     History reviewed. No pertinent past medical history.  Past Surgical History:   Procedure Laterality Date    TEETH EXTRACTION        General Information       Row Name 10/02/23 1105          Physical Therapy Time and Intention    Document Type evaluation  Pt. admitted with PNA and Right shld pain  -MS     Mode of Treatment physical therapy;individual therapy  -MS       Row Name 10/02/23 110          General Information    Patient Profile Reviewed yes  -MS     Prior Level of Function independent:  No use of A.D. prior to admission per pt. report  -MS     Existing Precautions/Restrictions fall   Exit alarm  -MS     Barriers to Rehab none identified  -MS       Row Name 10/02/23 1105          Cognition    Orientation Status (Cognition) oriented x 3  -MS       Row Name 10/02/23 1105          Safety Issues, Functional Mobility    Comment, Safety Issues/Impairments (Mobility) Gait belt used for safety.  -MS               User Key  (r) = Recorded By, (t) = Taken By, (c) = Cosigned By      Initials Name Provider Type    MS Aniceto Gandara, PT Physical Therapist                   Mobility       Row Name 10/02/23 110          Bed Mobility    Comment, (Bed Mobility) Up in chair upon entering room.  -MS       Row Name 10/02/23 1106          Transfers    Comment, (Transfers) Pt. performed sit <-> stand transers x 2 reps for functional strength training.  -MS        Row Name 10/02/23 1106          Sit-Stand Transfer    Sit-Stand [place] (Transfers) contact guard  -MS     Assistive Device (Sit-Stand Transfers) --  HHA x 1  -MS       Row Name 10/02/23 1106          Gait/Stairs (Locomotion)    [place] Level (Gait) contact guard  -MS     Assistive Device (Gait) --  HHA x 1  -MS     Distance in Feet (Gait) 5 feet (FW/BW)  -MS     Comment, (Gait/Stairs) Limited in gait distance due to fatigue and weakness.  -MS               User Key  (r) = Recorded By, (t) = Taken By, (c) = Cosigned By      Initials Name Provider Type    MS GandaraAniceto, PT Physical Therapist                   Obj/Interventions       Row Name 10/02/23 1107          Range of Motion Comprehensive    Comment, General Range of Motion BUE/LE (imp. 25%)  -MS       Row Name 10/02/23 1107          Strength Comprehensive (MMT)    Comment, General Manual Muscle Testing (MMT) Assessment BUE/LE (3-/5)  -MS       Row Name 10/02/23 1107          Motor Skills    Therapeutic Exercise --  Instructed pt. in BLE ther. ex. program (Hip flexion, LAQ's) to be done throughout the day on her own for general strengthening.  -MS               User Key  (r) = Recorded By, (t) = Taken By, (c) = Cosigned By      Initials Name Provider Type    MS Gandara Aniceto RODRIGUEZ, PT Physical Therapist                   Goals/Plan       Row Name 10/02/23 1109          Bed Mobility Goal 1 (PT)    Activity/Assistive Device (Bed Mobility Goal 1, PT) bed mobility activities, all  -MS     [place] Level/Cues Needed (Bed Mobility Goal 1, PT) standby assist  -MS     Time Frame (Bed Mobility Goal 1, PT) long term goal (LTG);1 week  -MS       Row Name 10/02/23 1109          Transfer Goal 1 (PT)    Activity/Assistive Device (Transfer Goal 1, PT) transfers, all  -MS     [place] Level/Cues Needed (Transfer Goal 1, PT) standby assist  -MS     Time Frame (Transfer Goal 1, PT) long term goal (LTG);1 week  -MS       Row Name 10/02/23 1109          Gait  Training Goal 1 (PT)    Activity/Assistive Device (Gait Training Goal 1, PT) gait (walking locomotion)  -MS     [place] Level (Gait Training Goal 1, PT) standby assist  -MS     Distance (Gait Training Goal 1, PT) 100 feet  -MS     Time Frame (Gait Training Goal 1, PT) long term goal (LTG);1 week  -MS       Row Name 10/02/23 1109          Therapy Assessment/Plan (PT)    Planned Therapy Interventions (PT) balance training;bed mobility training;gait training;home exercise program;patient/family education;postural re-education;transfer training;strengthening;ROM (range of motion)  -MS               User Key  (r) = Recorded By, (t) = Taken By, (c) = Cosigned By      Initials Name Provider Type    Aniceto Ac L, PT Physical Therapist                   Clinical Impression       Row Name 10/02/23 1108          Pain    Pretreatment Pain Rating 6/10  -MS     Posttreatment Pain Rating 6/10  -MS     Pain Location - Side/Orientation Right  -MS     Pain Location - shoulder  -MS     Pre/Posttreatment Pain Comment Pt. reports this pain began ~ 1 week ago (no h/o fall)  -MS     Pain Intervention(s) Medication (See MAR);Nursing Notified;Repositioned  -MS       Row Name 10/02/23 1108          Plan of Care Review    Plan of Care Reviewed With patient  -MS       Row Name 10/02/23 1108          Therapy Assessment/Plan (PT)    Rehab Potential (PT) good, to achieve stated therapy goals  -MS     Criteria for Skilled Interventions Met (PT) skilled treatment is necessary  -MS     Therapy Frequency (PT) 6 times/wk  -MS       Row Name 10/02/23 1108          Positioning and Restraints    Pre-Treatment Position sitting in chair/recliner  -MS     Post Treatment Position chair  -MS     In Chair notified nsg;sitting;call light within reach;encouraged to call for assist;exit alarm on  All lines intact.  -MS               User Key  (r) = Recorded By, (t) = Taken By, (c) = Cosigned By      Initials Name Provider Type    Aniceto Ac  L, PT Physical Therapist                   Outcome Measures       Row Name 10/02/23 1109 10/02/23 0830       How much help from another person do you currently need...    Turning from your back to your side while in flat bed without using bedrails? 3  -MS 3  -HS    Moving from lying on back to sitting on the side of a flat bed without bedrails? 3  -MS 3  -HS    Moving to and from a bed to a chair (including a wheelchair)? 3  -MS 3  -HS    Standing up from a chair using your arms (e.g., wheelchair, bedside chair)? 3  -MS 3  -HS    Climbing 3-5 steps with a railing? 3  -MS 2  -HS    To walk in hospital room? 3  -MS 3  -HS    AM-PAC 6 Clicks Score (PT) 18  -MS 17  -HS    Highest level of mobility 6 --> Walked 10 steps or more  -MS 5 --> Static standing  -HS      Row Name 10/02/23 1109          Functional Assessment    Outcome Measure Options AM-PAC 6 Clicks Basic Mobility (PT)  -MS               User Key  (r) = Recorded By, (t) = Taken By, (c) = Cosigned By      Initials Name Provider Type    MS Aniceto Gandara, PT Physical Therapist    HS Zohra Powell, RN Registered Nurse                                 Physical Therapy Education       Title: PT OT SLP Therapies (Done)       Topic: Physical Therapy (Done)       Point: Mobility training (Done)       Learning Progress Summary             Patient Acceptance, E,D, VU,NR by MS at 10/2/2023 1110                         Point: Home exercise program (Done)       Learning Progress Summary             Patient Acceptance, E,D, VU,NR by MS at 10/2/2023 1110                         Point: Body mechanics (Done)       Learning Progress Summary             Patient Acceptance, E,D, VU,NR by MS at 10/2/2023 1110                         Point: Precautions (Done)       Learning Progress Summary             Patient Acceptance, E,D, VU,NR by MS at 10/2/2023 1110                                         User Key       Initials Effective Dates Name Provider Type Discipline    MS  06/16/21 -  Aniceto Gandara PT Physical Therapist PT                  PT Recommendation and Plan  Planned Therapy Interventions (PT): balance training, bed mobility training, gait training, home exercise program, patient/family education, postural re-education, transfer training, strengthening, ROM (range of motion)  Plan of Care Reviewed With: patient  Outcome Evaluation: Pt. is a 53 year old Female admitted to the hospital with PNA and c/o Right shoulder pain. Pt. reports that prior to admission she was independent with functional mobility and used no A.D. during ambulation.  Pt. currently presents with decreased strength, decreased balance, decreased ROM, and decreased tolerance to functional activity.  This AM, pt. able to ambulate 5 feet (FW/BW), CGA x 1, with HHA x 1.  Pt. requires CGA x 1 for sit <-> stand transfers with HHA x1.  Limited in upright mobility distance due to fatigue and weakness.  Instructed pt. in BLE ther. ex. program to be performed on her own throughout the day for general strengthening.  Pt. will benefit from skilled inpt. P.T. to address her functional deficits and to assist pt. in regaining her maximum level of independence with functional mobility.     Time Calculation:         PT Charges       Row Name 10/02/23 1114             Time Calculation    Start Time 1005  -MS      Stop Time 1020  -MS      Time Calculation (min) 15 min  -MS      PT Received On 10/02/23  -MS      PT - Next Appointment 10/03/23  -MS      PT Goal Re-Cert Due Date 10/09/23  -MS         Time Calculation- PT    Total Timed Code Minutes- PT 14 minute(s)  -MS                User Key  (r) = Recorded By, (t) = Taken By, (c) = Cosigned By      Initials Name Provider Type    Aniceto Ac PT Physical Therapist                  Therapy Charges for Today       Code Description Service Date Service Provider Modifiers Qty    69666397985 HC PT EVAL MOD COMPLEXITY 2 10/2/2023 Aniceto Gandara, PT GP 1    [id number]  HC PT THERAPEUTIC ACT EA 15 MIN 10/2/2023 Aniecto Gandara, PT GP 1            PT G-Codes  Outcome Measure Options: AM-PAC 6 Clicks Basic Mobility (PT)  AM-PAC 6 Clicks Score (PT): 18  PT Discharge Summary  Anticipated Discharge Disposition (PT): home with assist, home with home health, home with outpatient therapy services    Aniceto Gandara, PT  10/2/2023

## 2023-10-02 NOTE — PLAN OF CARE
Problem: Adult Inpatient Plan of Care  Goal: Plan of Care Review  10/2/2023 0800 by Jackelyn Navarro, RN  Outcome: Ongoing, Progressing  Flowsheets  Taken 10/2/2023 0800  Progress: no change  Taken 10/2/2023 0800  Outcome Evaluation: Pt up in chair during shift, reports easier to breathe. Maintained on 3L N/C with sats >90%. Soa with min  exertion, non prod cough at present. IVF's and ABX as ordered. IV site required placement by Ultrasound by ER RN, new site very positional. Temp max 102.7, trending down after Tylenol, 99.7. NPO after midnight per MD order. Temp max, poor venous access and NPO order  D/W SHERYL STREETER, no new orders noted. New IV site now occluded. IV Therapy paged.  Pt stood for weight this am, very weak and love poorly. Safety maintained.

## 2023-10-02 NOTE — CONSULTS
"Referring Provider: Juan Elena MD  [id number]  [facility] [address]  [place] [address]    Reason for Consultation: pulm abscess     History of present illness:  Nay Gonzalez is a very nice 53 y.o. who I am asked to evaluate and give opinion for \"pulm abscess.\" History is obtained from the patient and review of the old medical records which I summarize/synthesize as follows: She says she does not seek medical care for any problems very often. She has a fear of going to see doctors. She denies a past history of any serious or unusual infections.     She presented to the ER on 10/1/23 with shortness of breath and R shoulder pain that had been going on for about a week. She was coughing up thick yellow-green sputum. No recent infections. She had her teeth pulled many years ago. She does not choke on her food very often. She had not noticed fevers at home but was febrile in the ER. There were no alleviating factors to her symptoms.     In the ER she had a temp of 102.5 F with  and /130. Labs with a WBC of 22. CXR with a RUL infiltrate and CT showed a RUL abscess. Thoracic surgery, pulmonary and now ID have all been consulted. She is vancomycin and Zosyn. The current plan is for thoracic surgery to order an IR-guided chest tube to be placed and to treat w/ antibiotics.     PMH:  Super obesity BMI 54    Past Surgical History:   Procedure Laterality Date    TEETH EXTRACTION         Social History:  Works from home for [employer]    Non-smoker  No IV drug use    Antibiotic allergies and intolerances:  None    Medications:    Current Facility-Administered Medications:     acetaminophen (TYLENOL) tablet 650 mg, 650 mg, Oral, Q4H PRN, 650 mg at 10/02/23 0017 **OR** acetaminophen (TYLENOL) 160 MG/5ML oral solution 650 mg, 650 mg, Oral, Q4H PRN **OR** acetaminophen (TYLENOL) suppository 650 mg, 650 mg, Rectal, Q4H PRN, Juan Elena MD    amLODIPine (NORVASC) tablet 5 mg, 5 mg, Oral, " Q24H, Juan Elena MD, 5 mg at 10/01/23 1721    sennosides-docusate (PERICOLACE) 8.6-50 MG per tablet 2 tablet, 2 tablet, Oral, BID **AND** polyethylene glycol (MIRALAX) packet 17 g, 17 g, Oral, Daily PRN **AND** bisacodyl (DULCOLAX) EC tablet 5 mg, 5 mg, Oral, Daily PRN **AND** bisacodyl (DULCOLAX) suppository 10 mg, 10 mg, Rectal, Daily PRN, Juan Elena MD    Calcium Replacement - Follow Nurse / BPA Driven Protocol, , Does not apply, PRN, Juan Elena MD    dextrose (D50W) (25 g/50 mL) IV injection 25 g, 25 g, Intravenous, Q15 Min PRN, Juan Elena MD    dextrose (GLUTOSE) oral gel 15 g, 15 g, Oral, Q15 Min PRN, Juan Elena MD    Enoxaparin Sodium (LOVENOX) syringe 40 mg, 40 mg, Subcutaneous, Q12H, Juan Elena MD, 40 mg at 10/01/23 1723    glucagon (GLUCAGEN) injection 1 mg, 1 mg, Intramuscular, Q15 Min PRN, Juan Elena MD    [START ON 10/3/2023] influenza vac split quad (FLUZONE,FLUARIX,AFLURIA,FLULAVAL) injection 0.5 mL, 0.5 mL, Intramuscular, During Hospitalization, Juan Elena MD    insulin lispro (HUMALOG/ADMELOG) injection 2-7 Units, 2-7 Units, Subcutaneous, 4x Daily AC & at Bedtime, Juan Elena MD, 4 Units at 10/01/23 2146    labetalol (NORMODYNE) tablet 100 mg, 100 mg, Oral, Q12H, Juan Elena MD, 100 mg at 10/02/23 0655    Magnesium Standard Dose Replacement - Follow Nurse / BPA Driven Protocol, , Does not apply, PRN, Juan Elena MD    nitroglycerin (NITROSTAT) SL tablet 0.4 mg, 0.4 mg, Sublingual, Q5 Min PRN, Juan Elena MD    ondansetron (ZOFRAN) tablet 4 mg, 4 mg, Oral, Q6H PRN **OR** ondansetron (ZOFRAN) injection 4 mg, 4 mg, Intravenous, Q6H PRN, Juan Elena MD    Pharmacy to dose vancomycin, , Does not apply, Continuous PRN, Juan Elena MD    Phosphorus Replacement - Follow Nurse / BPA Driven Protocol, , Does not apply, PRUlices SALCEDO Stephen J, MD    piperacillin-tazobactam  (ZOSYN) 4.5 g in iso-osmotic dextrose 100 mL IVPB (premix), 4.5 g, Intravenous, Q8H, Juan Elena MD, Last Rate: 0 mL/hr at 10/02/23 0214, Restarted at 10/02/23 0425    Potassium Replacement - Follow Nurse / BPA Driven Protocol, , Does not apply, PRN, Juan Elena MD    sodium chloride 0.9 % flush 10 mL, 10 mL, Intravenous, Q12H, Juan Elena MD, 10 mL at 10/01/23 2147    sodium chloride 0.9 % flush 10 mL, 10 mL, Intravenous, PRN, Juan Elena MD, 10 mL at 10/01/23 1723    sodium chloride 0.9 % infusion 40 mL, 40 mL, Intravenous, PRN, Juan Elena MD    sodium chloride 0.9 % infusion, 100 mL/hr, Intravenous, Continuous, Christian Bernal MD, Stopped at 10/02/23 0225    vancomycin (VANCOCIN) 1000 mg/200 mL dextrose 5% IVPB, 1,000 mg, Intravenous, Q12H, Juan Elena MD      Objective   Vital Signs   Temp:  [99 øF (37.2 øC)-102.7 øF (39.3 øC)] 101.5 øF (38.6 øC)  Heart Rate:  [] 100  Resp:  [18-22] 20  BP: (123-245)/() 123/70    Physical Exam:   General: awake, alert, NAD, very nice, in chair  Eyes: no scleral icterus  ENT: no thrush; edentulous  Cardiovascular: tachycardic  Respiratory: normal work of breathing on 3L NC; R rales  GI: Abdomen is soft, not tender  :  no Sauer catheter  Skin: No rashes  Neurological: Alert and oriented x 3  Psychiatric: Normal mood and affect   Vasc: PIV w/o erythema    Labs:     Lab Results   Component Value Date    WBC 24.25 (H) 10/02/2023    HGB 10.7 (L) 10/02/2023    HCT 34.8 10/02/2023    MCV 85.9 10/02/2023     10/02/2023       Lab Results   Component Value Date    GLUCOSE 168 (H) 10/01/2023    BUN 14 10/01/2023    CREATININE 0.95 10/01/2023    BCR 14.7 10/01/2023    CO2 30.0 (H) 10/01/2023    CALCIUM 9.6 10/01/2023    ALBUMIN 3.3 (L) 10/01/2023    AST 19 10/01/2023    ALT 35 (H) 10/01/2023     No results found for: CRP    Lab Results   Component Value Date    HGBA1C 7.50 (H) 10/01/2023     Procal  "0.59    Microbiology:  10/1 RPP: negative  10/1 BCx: NGTD  10/1 MRSA nares: negative      Radiology:  CXR personally reviewed and shows a RUL infiltrate    CT chest:   \"1. Large (12 cm) right upper lobe pulmonary abscess with extension into   the anterior right chest wall as detailed above.   2. Small right pleural effusion.   3. Mediastinal, right hilar and right axillary lymphadenopathy is likely   reactive.   4. No central pulmonary embolus. Contrast bolus timing limits evaluation   of the more distal pulmonary arteries.   5. Dilated main pulmonary artery (36 mm). \"    ASSESSMENT/PLAN:  Right upper lobe pulmonary abscess  Sepsis due to #1  Super obesity BMI 54  Uncontrolled diabetes type 2 A1c  7.5%  Hypertension    Unclear etiology of her pulmonary abscess. Aspiration would be most likely. Noted plans for IR-guided drainage today. Please send for cultures. Continue Zosyn. Stop vancomycin w/ negative MRSA nares screen. I can resume it if culture positive for MRSA. Check CRP. Check CBC, BMP, HIV Ab, and Hep C Ab in the AM.     ID will follow.     "

## 2023-10-03 ENCOUNTER — APPOINTMENT (OUTPATIENT)
Dept: CT IMAGING | Facility: HOSPITAL | Age: 53
DRG: 871 | End: 2023-10-03
Payer: COMMERCIAL

## 2023-10-03 ENCOUNTER — APPOINTMENT (OUTPATIENT)
Dept: ULTRASOUND IMAGING | Facility: HOSPITAL | Age: 53
DRG: 871 | End: 2023-10-03
Payer: COMMERCIAL

## 2023-10-03 ENCOUNTER — APPOINTMENT (OUTPATIENT)
Dept: GENERAL RADIOLOGY | Facility: HOSPITAL | Age: 53
DRG: 871 | End: 2023-10-03
Payer: COMMERCIAL

## 2023-10-03 PROBLEM — E87.5 HYPERKALEMIA: Status: RESOLVED | Noted: 2023-10-03 | Resolved: 2023-10-03

## 2023-10-03 PROBLEM — E87.5 HYPERKALEMIA: Status: ACTIVE | Noted: 2023-10-03

## 2023-10-03 PROBLEM — E66.01 OBESITY, MORBID, BMI 50 OR HIGHER: Status: ACTIVE | Noted: 2023-10-03

## 2023-10-03 PROBLEM — D64.9 ANEMIA: Status: ACTIVE | Noted: 2023-10-03

## 2023-10-03 PROBLEM — N17.9 AKI (ACUTE KIDNEY INJURY): Status: ACTIVE | Noted: 2023-10-03

## 2023-10-03 LAB
ABO GROUP BLD: NORMAL
ALBUMIN SERPL-MCNC: 2.5 G/DL (ref 3.5–5.2)
ALBUMIN/GLOB SERPL: 0.6 G/DL
ALP SERPL-CCNC: 138 U/L (ref 39–117)
ALT SERPL W P-5'-P-CCNC: 22 U/L (ref 1–33)
ANION GAP SERPL CALCULATED.3IONS-SCNC: 15 MMOL/L (ref 5–15)
ARTERIAL PATENCY WRIST A: POSITIVE
AST SERPL-CCNC: 17 U/L (ref 1–32)
ATMOSPHERIC PRESS: 752.9 MMHG
BASE EXCESS BLDA CALC-SCNC: 4.1 MMOL/L (ref 0–2)
BASOPHILS # BLD AUTO: 0.02 10*3/MM3 (ref 0–0.2)
BASOPHILS NFR BLD AUTO: 0.1 % (ref 0–1.5)
BDY SITE: ABNORMAL
BILIRUB SERPL-MCNC: 0.3 MG/DL (ref 0–1.2)
BLD GP AB SCN SERPL QL: NEGATIVE
BUN SERPL-MCNC: 31 MG/DL (ref 6–20)
BUN/CREAT SERPL: 11.4 (ref 7–25)
CALCIUM SPEC-SCNC: 9 MG/DL (ref 8.6–10.5)
CHLORIDE SERPL-SCNC: 102 MMOL/L (ref 98–107)
CO2 BLDA-SCNC: 28.1 MMOL/L (ref 23–27)
CO2 SERPL-SCNC: 25 MMOL/L (ref 22–29)
CREAT SERPL-MCNC: 2.73 MG/DL (ref 0.57–1)
DEPRECATED RDW RBC AUTO: 43.8 FL (ref 37–54)
DEVICE COMMENT: ABNORMAL
EGFRCR SERPLBLD CKD-EPI 2021: 20.4 ML/MIN/1.73
EOSINOPHIL # BLD AUTO: 0.04 10*3/MM3 (ref 0–0.4)
EOSINOPHIL NFR BLD AUTO: 0.2 % (ref 0.3–6.2)
ERYTHROCYTE [DISTWIDTH] IN BLOOD BY AUTOMATED COUNT: 14.9 % (ref 12.3–15.4)
FERRITIN SERPL-MCNC: 440 NG/ML (ref 13–150)
FOLATE SERPL-MCNC: >20 NG/ML (ref 4.78–24.2)
GLOBULIN UR ELPH-MCNC: 4.4 GM/DL
GLUCOSE BLDC GLUCOMTR-MCNC: 122 MG/DL (ref 70–130)
GLUCOSE BLDC GLUCOMTR-MCNC: 130 MG/DL (ref 70–130)
GLUCOSE FLD-MCNC: 2 MG/DL
GLUCOSE SERPL-MCNC: 87 MG/DL (ref 65–99)
HCO3 BLDA-SCNC: 27 MMOL/L (ref 22–28)
HCT VFR BLD AUTO: 25.2 % (ref 34–46.6)
HCT VFR BLD AUTO: 25.7 % (ref 34–46.6)
HCV AB SER DONR QL: NORMAL
HEMODILUTION: NO
HGB BLD-MCNC: 8 G/DL (ref 12–15.9)
HGB BLD-MCNC: 8 G/DL (ref 12–15.9)
HIV 1+2 AB+HIV1 P24 AG SERPL QL IA: NORMAL
IMM GRANULOCYTES # BLD AUTO: 0.27 10*3/MM3 (ref 0–0.05)
IMM GRANULOCYTES NFR BLD AUTO: 1.5 % (ref 0–0.5)
INHALED O2 CONCENTRATION: 65 %
IRON 24H UR-MRATE: 18 MCG/DL (ref 37–145)
IRON SATN MFR SERPL: 9 % (ref 20–50)
LDH FLD-CCNC: >2500 U/L
LYMPHOCYTES # BLD AUTO: 1.03 10*3/MM3 (ref 0.7–3.1)
LYMPHOCYTES NFR BLD AUTO: 5.9 % (ref 19.6–45.3)
MCH RBC QN AUTO: 26.1 PG (ref 26.6–33)
MCHC RBC AUTO-ENTMCNC: 31.7 G/DL (ref 31.5–35.7)
MCV RBC AUTO: 82.4 FL (ref 79–97)
MODALITY: ABNORMAL
MONOCYTES # BLD AUTO: 1.04 10*3/MM3 (ref 0.1–0.9)
MONOCYTES NFR BLD AUTO: 6 % (ref 5–12)
NEUTROPHILS NFR BLD AUTO: 15.07 10*3/MM3 (ref 1.7–7)
NEUTROPHILS NFR BLD AUTO: 86.3 % (ref 42.7–76)
NRBC BLD AUTO-RTO: 0.2 /100 WBC (ref 0–0.2)
O2 A-A PPRESDIFF RESPIRATORY: 0.2 MMHG
PCO2 BLDA: 34.8 MM HG (ref 35–45)
PEEP RESPIRATORY: 8 CM[H2O]
PH BLDA: 7.5 PH UNITS (ref 7.35–7.45)
PLATELET # BLD AUTO: 319 10*3/MM3 (ref 140–450)
PMV BLD AUTO: 10.9 FL (ref 6–12)
PO2 BLDA: 109.1 MM HG (ref 80–100)
POTASSIUM SERPL-SCNC: 4.5 MMOL/L (ref 3.5–5.2)
PROT SERPL-MCNC: 6.9 G/DL (ref 6–8.5)
RBC # BLD AUTO: 3.06 10*6/MM3 (ref 3.77–5.28)
RETICS # AUTO: 0.03 10*6/MM3 (ref 0.02–0.13)
RETICS/RBC NFR AUTO: 1.06 % (ref 0.7–1.9)
RH BLD: POSITIVE
SAO2 % BLDCOA: 98.7 % (ref 92–98.5)
SET MECH RESP RATE: 30
SODIUM SERPL-SCNC: 142 MMOL/L (ref 136–145)
T&S EXPIRATION DATE: NORMAL
TIBC SERPL-MCNC: 197 MCG/DL (ref 298–536)
TOTAL RATE: 30 BREATHS/MINUTE
TRANSFERRIN SERPL-MCNC: 132 MG/DL (ref 200–360)
URATE SERPL-MCNC: 9.4 MG/DL (ref 2.4–5.7)
VENTILATOR MODE: ABNORMAL
VIT B12 BLD-MCNC: 962 PG/ML (ref 211–946)
VT ON VENT VENT: 500 ML
WBC NRBC COR # BLD: 17.47 10*3/MM3 (ref 3.4–10.8)

## 2023-10-03 PROCEDURE — 86901 BLOOD TYPING SEROLOGIC RH(D): CPT | Performed by: HOSPITALIST

## 2023-10-03 PROCEDURE — 25010000002 ENOXAPARIN PER 10 MG: Performed by: NURSE PRACTITIONER

## 2023-10-03 PROCEDURE — 87205 SMEAR GRAM STAIN: CPT | Performed by: NURSE PRACTITIONER

## 2023-10-03 PROCEDURE — C1729 CATH, DRAINAGE: HCPCS

## 2023-10-03 PROCEDURE — 82746 ASSAY OF FOLIC ACID SERUM: CPT | Performed by: HOSPITALIST

## 2023-10-03 PROCEDURE — 94761 N-INVAS EAR/PLS OXIMETRY MLT: CPT

## 2023-10-03 PROCEDURE — 85025 COMPLETE CBC W/AUTO DIFF WBC: CPT | Performed by: INTERNAL MEDICINE

## 2023-10-03 PROCEDURE — 85045 AUTOMATED RETICULOCYTE COUNT: CPT | Performed by: HOSPITALIST

## 2023-10-03 PROCEDURE — 82948 REAGENT STRIP/BLOOD GLUCOSE: CPT

## 2023-10-03 PROCEDURE — 85018 HEMOGLOBIN: CPT | Performed by: HOSPITALIST

## 2023-10-03 PROCEDURE — 87116 MYCOBACTERIA CULTURE: CPT | Performed by: NURSE PRACTITIONER

## 2023-10-03 PROCEDURE — 87070 CULTURE OTHR SPECIMN AEROBIC: CPT | Performed by: NURSE PRACTITIONER

## 2023-10-03 PROCEDURE — 25010000002 ENOXAPARIN PER 10 MG: Performed by: HOSPITALIST

## 2023-10-03 PROCEDURE — 86900 BLOOD TYPING SEROLOGIC ABO: CPT | Performed by: HOSPITALIST

## 2023-10-03 PROCEDURE — G0432 EIA HIV-1/HIV-2 SCREEN: HCPCS | Performed by: INTERNAL MEDICINE

## 2023-10-03 PROCEDURE — 25010000002 PROPOFOL 10 MG/ML EMULSION: Performed by: HOSPITALIST

## 2023-10-03 PROCEDURE — 86803 HEPATITIS C AB TEST: CPT | Performed by: INTERNAL MEDICINE

## 2023-10-03 PROCEDURE — 0 LIDOCAINE 1 % SOLUTION: Performed by: RADIOLOGY

## 2023-10-03 PROCEDURE — 84550 ASSAY OF BLOOD/URIC ACID: CPT | Performed by: HOSPITALIST

## 2023-10-03 PROCEDURE — 82945 GLUCOSE OTHER FLUID: CPT | Performed by: NURSE PRACTITIONER

## 2023-10-03 PROCEDURE — 94799 UNLISTED PULMONARY SVC/PX: CPT

## 2023-10-03 PROCEDURE — 82728 ASSAY OF FERRITIN: CPT | Performed by: HOSPITALIST

## 2023-10-03 PROCEDURE — 87186 SC STD MICRODIL/AGAR DIL: CPT | Performed by: NURSE PRACTITIONER

## 2023-10-03 PROCEDURE — 88305 TISSUE EXAM BY PATHOLOGIST: CPT | Performed by: NURSE PRACTITIONER

## 2023-10-03 PROCEDURE — 94003 VENT MGMT INPAT SUBQ DAY: CPT

## 2023-10-03 PROCEDURE — 82784 ASSAY IGA/IGD/IGG/IGM EACH: CPT | Performed by: INTERNAL MEDICINE

## 2023-10-03 PROCEDURE — 99233 SBSQ HOSP IP/OBS HIGH 50: CPT | Performed by: INTERNAL MEDICINE

## 2023-10-03 PROCEDURE — 25010000002 FENTANYL CITRATE (PF) 50 MCG/ML SOLUTION: Performed by: INTERNAL MEDICINE

## 2023-10-03 PROCEDURE — 85014 HEMATOCRIT: CPT | Performed by: HOSPITALIST

## 2023-10-03 PROCEDURE — 87070 CULTURE OTHR SPECIMN AEROBIC: CPT | Performed by: INTERNAL MEDICINE

## 2023-10-03 PROCEDURE — 99232 SBSQ HOSP IP/OBS MODERATE 35: CPT | Performed by: NURSE PRACTITIONER

## 2023-10-03 PROCEDURE — 87206 SMEAR FLUORESCENT/ACID STAI: CPT | Performed by: NURSE PRACTITIONER

## 2023-10-03 PROCEDURE — 83540 ASSAY OF IRON: CPT | Performed by: HOSPITALIST

## 2023-10-03 PROCEDURE — 88112 CYTOPATH CELL ENHANCE TECH: CPT | Performed by: NURSE PRACTITIONER

## 2023-10-03 PROCEDURE — 86334 IMMUNOFIX E-PHORESIS SERUM: CPT | Performed by: INTERNAL MEDICINE

## 2023-10-03 PROCEDURE — 36600 WITHDRAWAL OF ARTERIAL BLOOD: CPT

## 2023-10-03 PROCEDURE — 83521 IG LIGHT CHAINS FREE EACH: CPT | Performed by: INTERNAL MEDICINE

## 2023-10-03 PROCEDURE — 0W9930Z DRAINAGE OF RIGHT PLEURAL CAVITY WITH DRAINAGE DEVICE, PERCUTANEOUS APPROACH: ICD-10-PCS | Performed by: RADIOLOGY

## 2023-10-03 PROCEDURE — 87075 CULTR BACTERIA EXCEPT BLOOD: CPT | Performed by: NURSE PRACTITIONER

## 2023-10-03 PROCEDURE — 86850 RBC ANTIBODY SCREEN: CPT | Performed by: HOSPITALIST

## 2023-10-03 PROCEDURE — 76775 US EXAM ABDO BACK WALL LIM: CPT

## 2023-10-03 PROCEDURE — 87015 SPECIMEN INFECT AGNT CONCNTJ: CPT | Performed by: NURSE PRACTITIONER

## 2023-10-03 PROCEDURE — 25010000002 PIPERACILLIN SOD-TAZOBACTAM PER 1 G: Performed by: HOSPITALIST

## 2023-10-03 PROCEDURE — 83615 LACTATE (LD) (LDH) ENZYME: CPT | Performed by: NURSE PRACTITIONER

## 2023-10-03 PROCEDURE — 82803 BLOOD GASES ANY COMBINATION: CPT

## 2023-10-03 PROCEDURE — 84466 ASSAY OF TRANSFERRIN: CPT | Performed by: HOSPITALIST

## 2023-10-03 PROCEDURE — 82607 VITAMIN B-12: CPT | Performed by: HOSPITALIST

## 2023-10-03 PROCEDURE — 80053 COMPREHEN METABOLIC PANEL: CPT | Performed by: INTERNAL MEDICINE

## 2023-10-03 PROCEDURE — 71045 X-RAY EXAM CHEST 1 VIEW: CPT

## 2023-10-03 PROCEDURE — 87205 SMEAR GRAM STAIN: CPT | Performed by: INTERNAL MEDICINE

## 2023-10-03 PROCEDURE — 87147 CULTURE TYPE IMMUNOLOGIC: CPT | Performed by: NURSE PRACTITIONER

## 2023-10-03 RX ORDER — BISACODYL 10 MG
10 SUPPOSITORY, RECTAL RECTAL DAILY PRN
Status: DISCONTINUED | OUTPATIENT
Start: 2023-10-03 | End: 2023-10-05

## 2023-10-03 RX ORDER — LIDOCAINE HYDROCHLORIDE 10 MG/ML
25 INJECTION, SOLUTION INFILTRATION; PERINEURAL ONCE
Status: COMPLETED | OUTPATIENT
Start: 2023-10-03 | End: 2023-10-03

## 2023-10-03 RX ORDER — HYDRALAZINE HYDROCHLORIDE 20 MG/ML
20 INJECTION INTRAMUSCULAR; INTRAVENOUS EVERY 4 HOURS PRN
Status: DISCONTINUED | OUTPATIENT
Start: 2023-10-03 | End: 2023-10-11

## 2023-10-03 RX ORDER — HYDRALAZINE HYDROCHLORIDE 25 MG/1
25 TABLET, FILM COATED ORAL EVERY 8 HOURS SCHEDULED
Status: DISCONTINUED | OUTPATIENT
Start: 2023-10-03 | End: 2023-10-03

## 2023-10-03 RX ORDER — AMOXICILLIN 250 MG
2 CAPSULE ORAL 2 TIMES DAILY
Status: DISCONTINUED | OUTPATIENT
Start: 2023-10-03 | End: 2023-10-05

## 2023-10-03 RX ORDER — HYDRALAZINE HYDROCHLORIDE 25 MG/1
25 TABLET, FILM COATED ORAL EVERY 8 HOURS SCHEDULED
Status: DISCONTINUED | OUTPATIENT
Start: 2023-10-03 | End: 2023-10-05

## 2023-10-03 RX ORDER — BISACODYL 5 MG/1
5 TABLET, DELAYED RELEASE ORAL DAILY PRN
Status: DISCONTINUED | OUTPATIENT
Start: 2023-10-03 | End: 2023-10-05

## 2023-10-03 RX ORDER — LABETALOL 100 MG/1
100 TABLET, FILM COATED ORAL EVERY 12 HOURS SCHEDULED
Status: DISCONTINUED | OUTPATIENT
Start: 2023-10-03 | End: 2023-10-05

## 2023-10-03 RX ORDER — ENOXAPARIN SODIUM 100 MG/ML
60 INJECTION SUBCUTANEOUS EVERY 12 HOURS
Status: DISCONTINUED | OUTPATIENT
Start: 2023-10-03 | End: 2023-10-04

## 2023-10-03 RX ORDER — POLYETHYLENE GLYCOL 3350 17 G/17G
17 POWDER, FOR SOLUTION ORAL DAILY PRN
Status: DISCONTINUED | OUTPATIENT
Start: 2023-10-03 | End: 2023-10-05

## 2023-10-03 RX ADMIN — PIPERACILLIN SODIUM AND TAZOBACTAM SODIUM 4.5 G: 4; .5 INJECTION, SOLUTION INTRAVENOUS at 02:29

## 2023-10-03 RX ADMIN — ENOXAPARIN SODIUM 60 MG: 100 INJECTION SUBCUTANEOUS at 21:15

## 2023-10-03 RX ADMIN — ENOXAPARIN SODIUM 40 MG: 100 INJECTION SUBCUTANEOUS at 04:00

## 2023-10-03 RX ADMIN — PROPOFOL INJECTABLE EMULSION 40 MCG/KG/MIN: 10 INJECTION, EMULSION INTRAVENOUS at 12:56

## 2023-10-03 RX ADMIN — PROPOFOL INJECTABLE EMULSION 40 MCG/KG/MIN: 10 INJECTION, EMULSION INTRAVENOUS at 15:51

## 2023-10-03 RX ADMIN — FENTANYL CITRATE 25 MCG: 50 INJECTION, SOLUTION INTRAMUSCULAR; INTRAVENOUS at 09:34

## 2023-10-03 RX ADMIN — LIDOCAINE HYDROCHLORIDE 25 ML: 10 INJECTION, SOLUTION INFILTRATION; PERINEURAL at 13:45

## 2023-10-03 RX ADMIN — LABETALOL HYDROCHLORIDE 100 MG: 100 TABLET, FILM COATED ORAL at 12:50

## 2023-10-03 RX ADMIN — PIPERACILLIN SODIUM AND TAZOBACTAM SODIUM 4.5 G: 4; .5 INJECTION, SOLUTION INTRAVENOUS at 20:29

## 2023-10-03 RX ADMIN — PROPOFOL INJECTABLE EMULSION 45 MCG/KG/MIN: 10 INJECTION, EMULSION INTRAVENOUS at 01:44

## 2023-10-03 RX ADMIN — CHLORHEXIDINE GLUCONATE 15 ML: 1.2 RINSE ORAL at 21:15

## 2023-10-03 RX ADMIN — PROPOFOL INJECTABLE EMULSION 40 MCG/KG/MIN: 10 INJECTION, EMULSION INTRAVENOUS at 09:48

## 2023-10-03 RX ADMIN — PROPOFOL INJECTABLE EMULSION 35 MCG/KG/MIN: 10 INJECTION, EMULSION INTRAVENOUS at 06:47

## 2023-10-03 RX ADMIN — FENTANYL CITRATE 25 MCG: 50 INJECTION, SOLUTION INTRAMUSCULAR; INTRAVENOUS at 18:58

## 2023-10-03 RX ADMIN — Medication 10 ML: at 09:49

## 2023-10-03 RX ADMIN — PROPOFOL INJECTABLE EMULSION 40 MCG/KG/MIN: 10 INJECTION, EMULSION INTRAVENOUS at 18:29

## 2023-10-03 RX ADMIN — PROPOFOL INJECTABLE EMULSION 40 MCG/KG/MIN: 10 INJECTION, EMULSION INTRAVENOUS at 21:15

## 2023-10-03 RX ADMIN — FENTANYL CITRATE 25 MCG: 50 INJECTION, SOLUTION INTRAMUSCULAR; INTRAVENOUS at 06:23

## 2023-10-03 RX ADMIN — LABETALOL HYDROCHLORIDE 100 MG: 100 TABLET, FILM COATED ORAL at 20:31

## 2023-10-03 RX ADMIN — SENNOSIDES AND DOCUSATE SODIUM 2 TABLET: 50; 8.6 TABLET ORAL at 12:51

## 2023-10-03 RX ADMIN — HYDRALAZINE HYDROCHLORIDE 25 MG: 25 TABLET, FILM COATED ORAL at 15:49

## 2023-10-03 RX ADMIN — FENTANYL CITRATE 25 MCG: 50 INJECTION, SOLUTION INTRAMUSCULAR; INTRAVENOUS at 22:04

## 2023-10-03 RX ADMIN — SENNOSIDES AND DOCUSATE SODIUM 2 TABLET: 50; 8.6 TABLET ORAL at 21:17

## 2023-10-03 RX ADMIN — PANTOPRAZOLE SODIUM 40 MG: 40 INJECTION, POWDER, FOR SOLUTION INTRAVENOUS at 09:33

## 2023-10-03 RX ADMIN — CHLORHEXIDINE GLUCONATE 15 ML: 1.2 RINSE ORAL at 08:55

## 2023-10-03 RX ADMIN — HYDRALAZINE HYDROCHLORIDE 25 MG: 25 TABLET, FILM COATED ORAL at 21:15

## 2023-10-03 RX ADMIN — PIPERACILLIN SODIUM AND TAZOBACTAM SODIUM 4.5 G: 4; .5 INJECTION, SOLUTION INTRAVENOUS at 12:52

## 2023-10-03 RX ADMIN — PROPOFOL INJECTABLE EMULSION 40 MCG/KG/MIN: 10 INJECTION, EMULSION INTRAVENOUS at 03:59

## 2023-10-03 NOTE — PROGRESS NOTES
"    Chief Complaint: Pulmonary abscess, respiratory failure, follow-up    Subjective:  Symptoms:  Worsening.    Diet:  NPO.    Patient is now intubated and sedated in CCU.  Spoke with patient's mother and  who is at bedside.    Vital Signs:  Temp:  [99.2 øF (37.3 øC)-101.1 øF (38.4 øC)] 99.3 øF (37.4 øC)  Heart Rate:  [] 91  Resp:  [30] 30  BP: ()/() 173/86  FiO2 (%):  [49 %-99 %] 49 %    Intake & Output (last day)         10/02 0701  10/03 0700 10/03 0701  10/04 0700    I.V. (mL/kg) 131.4 (0.9)     IV Piggyback 200 100    Total Intake(mL/kg) 331.4 (2.4) 100 (0.7)    Urine (mL/kg/hr) 500 (0.1)     Total Output 500     Net -168.6 +100          Urine Unmeasured Occurrence 2 x             Objective:  General Appearance:  Comfortable and ill-appearing.    Vital signs: (most recent): Blood pressure 173/86, pulse 91, temperature 99.3 øF (37.4 øC), temperature source Oral, resp. rate (!) 30, height 160 cm (62.99\"), weight (!) 140 kg (308 lb 3.3 oz), last menstrual period 10/01/2023, SpO2 98 %.  Vital signs are normal.  No fever.    Lungs:  Normal effort and tachypnea.  There are decreased breath sounds.  No rales, wheezes or rhonchi.    Heart: Normal rate.  Regular rhythm.    Chest: (Right sternoclavicular swelling updated)  Abdomen: Abdomen is soft.  Bowel sounds are normal.     Extremities: Decreased range of motion.    Skin:  Warm and dry.          Results Review:     I reviewed the patient's new clinical results.  I reviewed the patient's new imaging results and agree with the interpretation.  I reviewed the patient's other test results and agree with the interpretation  Discussed with patient, family at bedside, RN and Dr. Ny.    Imaging Results (Last 24 Hours)       Procedure Component Value Units Date/Time    CT Guided Chest Tube [[id number]] Resulted: 10/03/23 1507     Updated: 10/03/23 1425    XR Chest 1 View [[id number]] Collected: 10/03/23 0720     Updated: 10/03/23 0725    Narrative:   "    XR CHEST 1 VW-10/30/2023     HISTORY: Intubation.     Endotracheal tube is seen with its tip overlying the trachea at the  level of the aortic arch. Heart size is mildly enlarged. There is  wedge-shaped moderately large area of dense consolidation/atelectasis in  the right upper lobe. Left lung appears clear.     Left-sided central venous catheter is again seen but it courses across  the midline terminating in the right apex possibly in the right  subclavian and/or back into the lower portion of the right internal  jugular vein near its junction with the right subclavian vein.     No pneumothorax is seen.       Impression:      1. Endotracheal tube in good position as described.  2. Dense consolidation/atelectasis of the right upper lobe is again  seen.  3. Again, the left side central venous catheter courses across the  midline terminating in the right apex as described.  4. No pneumothorax is seen.     This report was finalized on 10/3/2023 7:22 AM by Dr. Jeramy Marquez M.D.               Lab Results:     Lab Results (last 24 hours)       Procedure Component Value Units Date/Time    Reticulocytes [[id number]]  (Normal) Collected: 10/03/23 0408    Specimen: Blood Updated: 10/03/23 1526     Reticulocyte % 1.06 %      Reticulocyte Absolute 0.0328 10*6/mm3     Body Fluid Culture - Body Fluid, Pleural Cavity [[id number]] Collected: 10/03/23 1400    Specimen: Body Fluid from Pleural Cavity Updated: 10/03/23 1433    Anaerobic Culture - Body Fluid, Pleural Cavity [[id number]] Collected: 10/03/23 1400    Specimen: Body Fluid from Pleural Cavity Updated: 10/03/23 1433    pH, Body Fluid - Body Fluid, Pleural Cavity [[id number]] Collected: 10/03/23 1400    Specimen: Body Fluid from Pleural Cavity Updated: 10/03/23 1433    AFB Culture - Body Fluid, Pleural Cavity [[id number]] Collected: 10/03/23 1400    Specimen: Body Fluid from Pleural Cavity Updated: 10/03/23 1433    Lactate Dehydrogenase, Body Fluid - Body Fluid, Pleural  Cavity [[id number]] Collected: 10/03/23 1400    Specimen: Body Fluid from Pleural Cavity Updated: 10/03/23 1433    Glucose, Body Fluid - Pleural Fluid, Pleural Cavity [[id number]] Collected: 10/03/23 1400    Specimen: Pleural Fluid from Pleural Cavity Updated: 10/03/23 1433    Respiratory Culture - Sputum, ET Suction [[id number]] Collected: 10/03/23 0945    Specimen: Sputum from ET Suction Updated: 10/03/23 1427     Gram Stain No WBCs or organisms seen    Blood Culture - Blood, Hand, Left [[id number]]  (Normal) Collected: 10/01/23 1339    Specimen: Blood from Hand, Left Updated: 10/03/23 1345     Blood Culture No growth at 2 days    POC Glucose Once [[id number]]  (Normal) Collected: 10/03/23 1243    Specimen: Blood Updated: 10/03/23 1245     Glucose 130 mg/dL     Blood Culture - Blood, Arm, Left [[id number]]  (Normal) Collected: 10/01/23 1231    Specimen: Blood from Arm, Left Updated: 10/03/23 1245     Blood Culture No growth at 2 days    Narrative:      Less than seven (7) mL's of blood was collected.  Insufficient quantity may yield false negative results.    Folate [[id number]]  (Normal) Collected: 10/03/23 0408    Specimen: Blood Updated: 10/03/23 1211     Folate >20.00 ng/mL     Narrative:      Results may be falsely increased if patient taking Biotin.      Ferritin [[id number]]  (Abnormal) Collected: 10/03/23 0408    Specimen: Blood Updated: 10/03/23 1211     Ferritin 440.00 ng/mL     Narrative:      Results may be falsely decreased if patient taking Biotin.      Vitamin B12 [[id number]]  (Abnormal) Collected: 10/03/23 0408    Specimen: Blood Updated: 10/03/23 1211     Vitamin B-12 962 pg/mL     Narrative:      Results may be falsely increased if patient taking Biotin.      Iron Profile [[id number]]  (Abnormal) Collected: 10/03/23 0408    Specimen: Blood Updated: 10/03/23 1158     Iron 18 mcg/dL      Iron Saturation (TSAT) 9 %      Transferrin 132 mg/dL      TIBC 197 mcg/dL     Respiratory Culture - Wash, Bronchus  [[id number]] Collected: 10/02/23 1756    Specimen: Wash from Bronchus Updated: 10/03/23 1153     Respiratory Culture Scant growth (1+) The culture consists of normal respiratory annabel. This is a preliminary report; final report to follow.     Gram Stain Rare (1+) WBCs seen      No organisms seen    Uric Acid [[id number]]  (Abnormal) Collected: 10/03/23 0408    Specimen: Blood Updated: 10/03/23 1004     Uric Acid 9.4 mg/dL     CBC & Differential [[id number]]  (Abnormal) Collected: 10/03/23 0408    Specimen: Blood Updated: 10/03/23 0529    Narrative:      The following orders were created for panel order CBC & Differential.  Procedure                               Abnormality         Status                     ---------                               -----------         ------                     CBC Auto Differential[[id number]]        Abnormal            Final result                 Please view results for these tests on the individual orders.    CBC Auto Differential [[id number]]  (Abnormal) Collected: 10/03/23 0408    Specimen: Blood Updated: 10/03/23 0529     WBC 17.47 10*3/mm3      RBC 3.06 10*6/mm3      Hemoglobin 8.0 g/dL      Hematocrit 25.2 %      MCV 82.4 fL      MCH 26.1 pg      MCHC 31.7 g/dL      RDW 14.9 %      RDW-SD 43.8 fl      MPV 10.9 fL      Platelets 319 10*3/mm3      Neutrophil % 86.3 %      Lymphocyte % 5.9 %      Monocyte % 6.0 %      Eosinophil % 0.2 %      Basophil % 0.1 %      Immature Grans % 1.5 %      Neutrophils, Absolute 15.07 10*3/mm3      Lymphocytes, Absolute 1.03 10*3/mm3      Monocytes, Absolute 1.04 10*3/mm3      Eosinophils, Absolute 0.04 10*3/mm3      Basophils, Absolute 0.02 10*3/mm3      Immature Grans, Absolute 0.27 10*3/mm3      nRBC 0.2 /100 WBC     HIV-1 / O / 2 Ag / Antibody [[id number]]  (Normal) Collected: 10/03/23 0408    Specimen: Blood Updated: 10/03/23 0459     HIV DUO Non-Reactive    Narrative:      The HIV antibody/antigen combo assay is a qualitative assay for HIV that  includes the p24 antigen as well as antibodies to HIV types 1 and 2. This test is intended to be used as a screening assay in the diagnosis of HIV infection in patients over the age of 2.    Hepatitis C Antibody [[id number]]  (Normal) Collected: 10/03/23 0408    Specimen: Blood Updated: 10/03/23 0459     Hepatitis C Ab Non-Reactive    Narrative:      Results may be falsely decreased if patient taking Biotin.      Comprehensive Metabolic Panel [[id number]]  (Abnormal) Collected: 10/03/23 0408    Specimen: Blood Updated: 10/03/23 0453     Glucose 87 mg/dL      BUN 31 mg/dL      Creatinine 2.73 mg/dL      Sodium 142 mmol/L      Potassium 4.5 mmol/L      Chloride 102 mmol/L      CO2 25.0 mmol/L      Calcium 9.0 mg/dL      Total Protein 6.9 g/dL      Albumin 2.5 g/dL      ALT (SGPT) 22 U/L      AST (SGOT) 17 U/L      Alkaline Phosphatase 138 U/L      Total Bilirubin 0.3 mg/dL      Globulin 4.4 gm/dL      A/G Ratio 0.6 g/dL      BUN/Creatinine Ratio 11.4     Anion Gap 15.0 mmol/L      eGFR 20.4 mL/min/1.73     Narrative:      GFR Normal >60  Chronic Kidney Disease <60  Kidney Failure <15      Blood Gas, Arterial - [[id number]]  (Abnormal) Collected: 10/03/23 0339    Specimen: Arterial Blood Updated: 10/03/23 0342     Site Right Radial     Cole's Test Positive     pH, Arterial 7.498 pH units      pCO2, Arterial 34.8 mm Hg      pO2, Arterial 109.1 mm Hg      HCO3, Arterial 27.0 mmol/L      Base Excess, Arterial 4.1 mmol/L      Comment: Serial Number: [id number]:  [id number]        O2 Saturation, Arterial 98.7 %      A-a DO2 0.2 mmHg      CO2 Content 28.1 mmol/L      Barometric Pressure for Blood Gas 752.9000 mmHg      Modality Adult Vent     FIO2 65 %      Ventilator Mode VC     Set Tidal Volume 500     Set Mech Resp Rate 30     Rate 30 Breaths/minute      PEEP 8     Hemodilution No     Device Comment Sat 97% VC+ ETCO2 30    POC Glucose Once [[id number]]  (Normal) Collected: 10/02/23 2137    Specimen: Blood Updated: 10/02/23  2138     Glucose 93 mg/dL     Renal Function Panel [[id number]]  (Abnormal) Collected: 10/02/23 2026    Specimen: Blood, Central Line Updated: 10/02/23 2056     Glucose 123 mg/dL      BUN 27 mg/dL      Creatinine 1.95 mg/dL      Sodium 142 mmol/L      Potassium 5.2 mmol/L      Chloride 102 mmol/L      CO2 29.7 mmol/L      Calcium 9.3 mg/dL      Albumin 2.9 g/dL      Phosphorus 5.1 mg/dL      Anion Gap 10.3 mmol/L      BUN/Creatinine Ratio 13.8     eGFR 30.5 mL/min/1.73     Narrative:      GFR Normal >60  Chronic Kidney Disease <60  Kidney Failure <15      Blood Gas, Arterial - [[id number]]  (Abnormal) Collected: 10/02/23 1840    Specimen: Arterial Blood Updated: 10/02/23 1844     Site Right Radial     Cole's Test Positive     pH, Arterial 7.309 pH units      pCO2, Arterial 59.3 mm Hg      pO2, Arterial 174.3 mm Hg      HCO3, Arterial 29.8 mmol/L      Base Excess, Arterial 1.6 mmol/L      Comment: Serial Number: [id number]:  [id number]        O2 Saturation, Arterial 99.4 %      A-a DO2 0.3 mmHg      CO2 Content 31.6 mmol/L      Barometric Pressure for Blood Gas 753.0000 mmHg      Modality Adult Vent     FIO2 100 %      Ventilator Mode VC     Set Tidal Volume 500     Set Mech Resp Rate 30     Rate 30 Breaths/minute      PSV 8 cmH2O      Notified Who Damaris Elena     Read Back Yes     Notified Time 18:43     Hemodilution No     Device Comment Sat 100% VC+ ETCO2 47    Blood Gas, Arterial - [[id number]]  (Abnormal) Collected: 10/02/23 1701    Specimen: Arterial Blood Updated: 10/02/23 1704     Site Right Radial     Cole's Test Positive     pH, Arterial 7.071 pH units      pCO2, Arterial 108.4 mm Hg      pO2, Arterial 90.5 mm Hg      HCO3, Arterial 31.5 mmol/L      Base Excess, Arterial -1.4 mmol/L      Comment: Serial Number: [id number]:  [id number]        O2 Saturation, Arterial 91.2 %      A-a DO2 0.3 mmHg      CO2 Content 34.8 mmol/L      Barometric Pressure for Blood Gas 753.2000 mmHg      Modality BiPap     FIO2 60  %      Set Tidal Volume 448     Set [facility] Resp Rate 28     Rate 28 Breaths/minute      Notified Who Dr Tuttle     Read Back Yes     Notified Time 17:02     Hemodilution No     Device Comment ST F28/IPAP 24/EPAP 8/60%             Assessment & Plan       PNA (pneumonia)    Acute respiratory failure with hypoxia    Hypertensive urgency    Abscess of upper lobe of right lung with pneumonia    Sepsis    Type 2 diabetes mellitus with hyperglycemia    ELLY (acute kidney injury)    Obesity, morbid, BMI 50 or higher    Anemia       Assessment & Plan    Patient is now intubated and sedated in ICU.  Have requested proceeding with chest tube placement for drainage of this abscess, at least while patient is sedated and intubated.  Based on drainage and follow-up chest imaging, further surgery/debridement may be warranted.  We have requested cultures of the fluid.   Place chest tube to -20 cm suction today.  Check chest x-ray in AM.  We will continue to follow.    FERDINAND Zuniga  [facility]  10/03/23  15:30 EDT    Greater than 38 minutes was spent reviewing the patient's chart, radiographic imaging, labs, provider notes, assessing the patient and developing a plan of care.  This was discussed with the patient and RN.

## 2023-10-03 NOTE — PROGRESS NOTES
[facility]    [phone]   LOS: 2 days     Name: Nay Gonzalez  Age/Sex: 53 y.o. female  :  1970        PCP: Provider, No Known  Chief Complaint   Patient presents with    Shortness of Breath    Fever      Subjective   Intubated and sedated  at the bedside    chlorhexidine, 15 mL, Mouth/Throat, Q12H  enoxaparin, 40 mg, Subcutaneous, Q12H  hydrALAZINE, 25 mg, Oral, Q8H  insulin lispro, 2-7 Units, Subcutaneous, 4x Daily AC & at Bedtime  labetalol, 100 mg, Oral, Q12H  pantoprazole, 40 mg, Intravenous, Q24H  piperacillin-tazobactam, 4.5 g, Intravenous, Q8H  senna-docusate sodium, 2 tablet, Oral, BID  sodium chloride, 10 mL, Intravenous, Q12H      propofol, 5-50 mcg/kg/min, Last Rate: 40 mcg/kg/min (10/03/23 0948)        Objective   Vital Signs  Temp:  [99 øF (37.2 øC)-101.1 øF (38.4 øC)] 99.3 øF (37.4 øC)  Heart Rate:  [] 85  Resp:  [26-44] 30  BP: ()/() 166/77  FiO2 (%):  [49 %-99 %] 49 %  Body mass index is 54.61 kg/mý.    Intake/Output Summary (Last 24 hours) at 10/3/2023 1132  Last data filed at 10/2/2023 2200  Gross per 24 hour   Intake 331.39 ml   Output --   Net 331.39 ml       Physical Exam  Vitals and nursing note reviewed.   Constitutional:       General: She is not in acute distress.     Appearance: She is obese. She is ill-appearing.      Comments: Intubated and sedated   Cardiovascular:      Rate and Rhythm: Normal rate and regular rhythm.         Results Review:       I reviewed the patient's new clinical results.  Results from last 7 days   Lab Units 10/03/23  0408 10/02/23  0501 10/01/23  1138   WBC 10*3/mm3 17.47* 24.25* 22.45*   HEMOGLOBIN g/dL 8.0* 10.7* 10.4*   PLATELETS 10*3/mm3 319 353 395     Results from last 7 days   Lab Units 10/03/23  0408 10/02/23  2026 10/02/23  0501 10/01/23  1138   SODIUM mmol/L 142 142 138 141   POTASSIUM mmol/L 4.5 5.2 5.4* 4.2   CHLORIDE mmol/L 102 102 100 101   CO2 mmol/L 25.0 29.7* 23.9 30.0*   BUN mg/dL 31* 27*  16 14   CREATININE mg/dL 2.73* 1.95* 1.08* 0.95   CALCIUM mg/dL 9.0 9.3 9.6 9.6   MAGNESIUM mg/dL  --   --  2.1  --    PHOSPHORUS mg/dL  --  5.1* 4.6*  --    Estimated Creatinine Clearance: 33.3 mL/min (A) (by C-G formula based on SCr of 2.73 mg/dL (H)).      Assessment & Plan   Active Hospital Problems    Diagnosis  POA    **PNA (pneumonia) [J18.9]  Yes    ELLY (acute kidney injury) [N17.9]  Unknown    Obesity, morbid, BMI 50 or higher [E66.01]  Unknown    Anemia [D64.9]  Unknown    Sepsis [A41.9]  Unknown    Type 2 diabetes mellitus with hyperglycemia [E11.65]  Unknown    Acute respiratory failure with hypoxia [J96.01]  Unknown    Hypertensive urgency [I16.0]  Unknown    Abscess of upper lobe of right lung with pneumonia [J85.1]  Yes      Resolved Hospital Problems    Diagnosis Date Resolved POA    Hyperkalemia [E87.5] 10/03/2023 Unknown       PLAN  This is a 53-year-old female with a history of morbid obesity, new diabetes of type 2 diabetes and hypertension who presents to the hospital with cough shortness of breath and fevers and is found to have a right upper lobe pulmonary abscess   -ABG better after intubation  -plan CT guided chest tube today  -hgb down 2.5 gm no evifence of blood loss with type and screen and follow labs  -anemia workup initiated  -ELLY worse today ask nephrology to see and workup inititated; likely secondary to sepsis in combination with BP fluctuations and GEREMIAS  -WBC trding down, contineu zosyn ID following  -aprreciate pulm assistance  -Full code  -Lovenox for DVT (if hgb drops further consider holding)        Disposition  Expected Discharge Date: 10/6/2023; Expected Discharge Time:        Juan Elena MD  [facility]  10/03/23  11:32 EDT

## 2023-10-03 NOTE — PROGRESS NOTES
"[facility] Clinical Pharmacy Services: Enoxaparin Consult    Nay Gonzalez has a pharmacy consult to dose prophylactic enoxaparin per  's request.     Indication: VTE Prophylaxis  Home Anticoagulation: none     Relevant clinical data and objective history reviewed:  53 y.o. female 160 cm (62.99\") (!) 140 kg (308 lb 3.3 oz)   Body mass index is 54.61 kg/mý.   Results from last 7 days   Lab Units 10/03/23  0408 10/02/23  2026 10/02/23  0501 10/01/23  1138   CREATININE mg/dL 2.73* 1.95* 1.08* 0.95   PLATELETS 10*3/mm3 319  --  353 395   HEMOGLOBIN g/dL 8.0*  --  10.7* 10.4*     Estimated Creatinine Clearance: 33.3 mL/min (A) (by C-G formula based on SCr of 2.73 mg/dL (H)).    Assessment/Plan    ELLY, creatinine increasing, not anuric. Nephrology following. Dosing further complicated by morbid obesity with BMI>50. Will start enoxaparin 60mg Sq q12h, dose adjusted for elevated BMI. If renal function worsens will need to adjust dosing. Will continue to follow and adjust as needed.    Patricia Jimenez, PharmD  Clinical Pharmacist    "

## 2023-10-03 NOTE — PROGRESS NOTES
[facility] INPATIENT PROGRESS NOTE         [facility] CORONARY CARE    10/3/2023      PATIENT IDENTIFICATION:  Name: Nay Gonzalez ADMIT: 10/1/2023   : 1970  PCP: Provider, No Known    MRN: [id number] LOS: 2 days   AGE/SEX: 53 y.o. female  ROOM: [facility]                     LOS 2    Reason for visit: Respiratory failure and pneumonia      SUBJECTIVE:      Sedated on ventilator.  Hypertensive with systolic in the 170s.  On 50% FiO2 and 7.5 with PEEP.  Chart reviewed.  Discussed with family at bedside and questions answered to their satisfaction.  Diminished breath sounds bilaterally secondary to body habitus.  No wheezing or rhonchi.  No edema.    Objective   OBJECTIVE:    Vital Sign Min/Max for last 24 hours  Temp  Min: 99 øF (37.2 øC)  Max: 102.3 øF (39.1 øC)   BP  Min: 93/58  Max: 170/76   Pulse  Min: 80  Max: 112   Resp  Min: 26  Max: 44   SpO2  Min: 88 %  Max: 100 %   No data recorded   Weight  Min: 140 kg (308 lb 3.3 oz)  Max: 140 kg (308 lb 10.3 oz)       FiO2 (%):  [49 %-99 %] 49 %  S RR:  [20-30] 30  PEEP/CPAP (cm H2O):  [7.5 cm H20] 7.5 cm H20  MAP (cm H2O):  [9-20] 19           FiO2 (%): 49 %     Body mass index is 54.61 kg/mý.    Intake/Output Summary (Last 24 hours) at 10/3/2023 0946  Last data filed at 10/2/2023 2200  Gross per 24 hour   Intake 331.39 ml   Output --   Net 331.39 ml         Exam:  GEN:  No distress, appears stated age.  Morbidly obese  EYES:   PERRL, anicteric sclerae  ENT:    External ears/nose normal, OP clear  NECK:  No adenopathy, midline trachea  LUNGS: Normal chest on inspection, palpation and diminished breath sounds on auscultation  CV:  Normal S1S2, without murmur  ABD:  Nontender, nondistended, no hepatosplenomegaly, +BS  EXT:  No pitting edema.  No cyanosis or clubbing.  No mottling and normal cap refill.    Assessment     Scheduled meds:  chlorhexidine, 15 mL, Mouth/Throat, Q12H  enoxaparin, 40 mg, Subcutaneous, Q12H  insulin lispro, 2-7 Units,  Lab draw at bedside   Subcutaneous, 4x Daily AC & at Bedtime  labetalol, 100 mg, Oral, Q12H  pantoprazole, 40 mg, Intravenous, Q24H  piperacillin-tazobactam, 4.5 g, Intravenous, Q8H  senna-docusate sodium, 2 tablet, Oral, BID  sodium chloride, 10 mL, Intravenous, Q12H      IV meds:                      propofol, 5-50 mcg/kg/min, Last Rate: 40 mcg/kg/min (10/03/23 0913)      Data Review:  Results from last 7 days   Lab Units 10/03/23  0408 10/02/23  2026 10/02/23  0501 10/01/23  1138   SODIUM mmol/L 142 142 138 141   POTASSIUM mmol/L 4.5 5.2 5.4* 4.2   CHLORIDE mmol/L 102 102 100 101   CO2 mmol/L 25.0 29.7* 23.9 30.0*   BUN mg/dL 31* 27* 16 14   CREATININE mg/dL 2.73* 1.95* 1.08* 0.95   GLUCOSE mg/dL 87 123* 108* 168*   CALCIUM mg/dL 9.0 9.3 9.6 9.6         Estimated Creatinine Clearance: 33.3 mL/min (A) (by C-G formula based on SCr of 2.73 mg/dL (H)).  Results from last 7 days   Lab Units 10/03/23  0408 10/02/23  0501 10/01/23  1138   WBC 10*3/mm3 17.47* 24.25* 22.45*   HEMOGLOBIN g/dL 8.0* 10.7* 10.4*   PLATELETS 10*3/mm3 319 353 395         Results from last 7 days   Lab Units 10/03/23  0408 10/01/23  1138   ALT (SGPT) U/L 22 35*   AST (SGOT) U/L 17 19     Results from last 7 days   Lab Units 10/03/23  0339 10/02/23  1840 10/02/23  1701 10/02/23  1459 10/02/23  1344   PH, ARTERIAL pH units 7.498* 7.309* 7.071* 7.236* 7.074*   PO2 ART mm Hg 109.1* 174.3* 90.5 122.2* 96.8   PCO2, ARTERIAL mm Hg 34.8* 59.3* 108.4* 66.9* 112.4*   HCO3 ART mmol/L 27.0 29.8* 31.5* 28.4* 32.9*     Results from last 7 days   Lab Units 10/01/23  1138   PROCALCITONIN ng/mL 0.59*   LACTATE mmol/L 1.2         Hemoglobin A1C   Date/Time Value Ref Range Status   10/01/2023 1138 7.50 (H) 4.80 - 5.60 % Final     Glucose   Date/Time Value Ref Range Status   10/02/2023 2137 93 70 - 130 mg/dL Final   10/02/2023 1347 142 (H) 70 - 130 mg/dL Final   10/02/2023 1123 134 (H) 70 - 130 mg/dL Final   10/02/2023 0745 154 (H) 70 - 130 mg/dL Final   10/01/2023 2048 296 (H) 70 - 130  mg/dL Final   10/01/2023 1650 140 (H) 70 - 130 mg/dL Final     10/3 chest x-ray reviewed            2D echo 10/2 reviewed: EF 61 to 65% with grade 2 diastolic dysfunction.    CT chest 10/1 reviewed        Microbiology reviewed: No growth to date              Active Hospital Problems    Diagnosis  POA    **PNA (pneumonia) [J18.9]  Yes    ELLY (acute kidney injury) [N17.9]  Unknown    Obesity, morbid, BMI 50 or higher [E66.01]  Unknown    Anemia [D64.9]  Unknown    Sepsis [A41.9]  Unknown    Type 2 diabetes mellitus with hyperglycemia [E11.65]  Unknown    Acute respiratory failure with hypoxia [J96.01]  Unknown    Hypertensive urgency [I16.0]  Unknown    Abscess of upper lobe of right lung with pneumonia [J85.1]  Yes      Resolved Hospital Problems    Diagnosis Date Resolved POA    Hyperkalemia [E87.5] 10/03/2023 Unknown         ASSESSMENT:  Right upper lobe pneumonia/abscess  Respiratory failure requiring mechanical ventilation  Moderate right pleural effusion  Acute hypoxic respiratory failure  Atelectasis  Mediastinal lymphadenopathy  Super morbid obesity  Snoring/apnea with concerns for CRYSTAL  Hypertension  Diabetes mellitus, type II  Acute kidney injury: Worsening      PLAN:  Make appropriate ventilator changes based on ABG results and continue supportive care.  Antibiotics per infectious disease recommendations.  Thoracic surgery notes reviewed and noted plan for CT-guided chest tube.  May require more definitive surgical intervention if does not resolve.  Consult nephrology for worsening acute kidney injury.  Control glucose.  Control blood pressure and will increase blood pressure medications for persistent hypertension.  DVT/ulcer prophylaxis.  Discussed with family at bedside in detail and questions answered to their satisfaction.    Discussed with multidisciplinary ICU team on rounds this morning.        CCT: 45 min    Brannon Gonzales MD  Pulmonary and Critical Care Medicine  [facility]  [facility]  10/3/2023    09:46 EDT

## 2023-10-03 NOTE — SIGNIFICANT NOTE
10/03/23 0920   OTHER   Discipline physical therapist   Rehab Time/Intention   Session Not Performed unable to treat, medical status change  (pt required intubation yesterday with transfer to CCU, PT will follow up tomorrow for pt status)   Recommendation   PT - Next Appointment 10/04/23

## 2023-10-03 NOTE — POST-PROCEDURE NOTE
POST PROCEDURE NOTE    Procedure: CT guided chest tube    Pre-Procedure Diagnosis: r lung abscess    Post-procedure Diagnosis: same    Findings: successful 14 Fr right chest tube plcmt    Complications: none    Blood loss: min    Specimen Removed: 60 ml purulent fluid    Disposition:   Transfer back to inpatient room

## 2023-10-03 NOTE — PLAN OF CARE
Goal Outcome Evaluation: Pt brought to ICU for bipap, ABG repeated, pt intubated and bronched, wash sent for culture. VSS on 40 of Propofol, pt arouses to voice, nods appropriately and follows commands in all four extremities. Family updated at bedside.         Problem: Adult Inpatient Plan of Care  Goal: Plan of Care Review  Outcome: Ongoing, Not Progressing     Problem: Adult Inpatient Plan of Care  Goal: Absence of Hospital-Acquired Illness or Injury  Intervention: Prevent Infection  Recent Flowsheet Documentation  Taken 10/2/2023 2200 by Damaris Prado RN  Infection Prevention: environmental surveillance performed  Taken 10/2/2023 2100 by Damaris Prado RN  Infection Prevention: environmental surveillance performed  Taken 10/2/2023 2000 by Damaris Prado RN  Infection Prevention: environmental surveillance performed  Taken 10/2/2023 1900 by Damaris Prado RN  Infection Prevention: environmental surveillance performed  Taken 10/2/2023 1800 by Damaris Prado RN  Infection Prevention: environmental surveillance performed  Taken 10/2/2023 1700 by Damaris Prado RN  Infection Prevention: environmental surveillance performed  Taken 10/2/2023 1600 by Damaris Prado RN  Infection Prevention: environmental surveillance performed  Taken 10/2/2023 1430 by Damaris Prado RN  Infection Prevention:   environmental surveillance performed   equipment surfaces disinfected   hand hygiene promoted   personal protective equipment utilized   rest/sleep promoted   single patient room provided   visitors restricted/screened     Problem: Adult Inpatient Plan of Care  Goal: Optimal Comfort and Wellbeing  Outcome: Ongoing, Not Progressing     Problem: Gas Exchange Impaired  Goal: Optimal Gas Exchange  Outcome: Ongoing, Not Progressing  Intervention: Optimize Oxygenation and Ventilation  Recent Flowsheet Documentation  Taken 10/2/2023 2200 by Damaris Prado RN  Head of Bed (HOB) Positioning: HOB at 30  degrees  Taken 10/2/2023 2000 by Damaris Prado RN  Head of Bed (HOB) Positioning: HOB at 30 degrees  Airway/Ventilation Management: pulmonary hygiene promoted  Taken 10/2/2023 1800 by Damaris Prado RN  Head of Bed (HOB) Positioning: HOB at 30 degrees  Taken 10/2/2023 1600 by Damaris Prado RN  Head of Bed (HOB) Positioning: HOB at 30-45 degrees  Taken 10/2/2023 1430 by Damaris Prado RN  Head of Bed (HOB) Positioning: HOB at 30 degrees  Airway/Ventilation Management: pulmonary hygiene promoted     Problem: Adjustment to Illness (Sepsis/Septic Shock)  Goal: Optimal Coping  Outcome: Ongoing, Not Progressing  Intervention: Optimize Psychosocial Adjustment to Illness  Recent Flowsheet Documentation  Taken 10/2/2023 2000 by Damaris Prado RN  Family/Support System Care:   self-care encouraged   support provided  Taken 10/2/2023 1430 by Damaris Prado RN  Family/Support System Care:   self-care encouraged   support provided     Problem: Glycemic Control Impaired (Sepsis/Septic Shock)  Goal: Blood Glucose Level Within Desired Range  Outcome: Ongoing, Not Progressing  Intervention: Optimize Glycemic Control  Recent Flowsheet Documentation  Taken 10/2/2023 2000 by Damaris Prado RN  Glycemic Management: blood glucose monitored  Taken 10/2/2023 1430 by Damaris Prado RN  Glycemic Management: blood glucose monitored     Problem: Infection Progression (Sepsis/Septic Shock)  Goal: Absence of Infection Signs and Symptoms  Outcome: Ongoing, Not Progressing  Intervention: Initiate Sepsis Management  Recent Flowsheet Documentation  Taken 10/2/2023 2200 by Damaris Prado RN  Infection Prevention: environmental surveillance performed  Taken 10/2/2023 2100 by Damaris Prado RN  Infection Prevention: environmental surveillance performed  Taken 10/2/2023 2000 by Damaris Prado RN  Infection Prevention: environmental surveillance performed  Isolation Precautions:   protective   precautions  maintained  Taken 10/2/2023 1900 by Damaris Prado RN  Infection Prevention: environmental surveillance performed  Taken 10/2/2023 1800 by Damaris Prado RN  Infection Prevention: environmental surveillance performed  Taken 10/2/2023 1700 by Damaris Prado RN  Infection Prevention: environmental surveillance performed  Taken 10/2/2023 1600 by Damaris Prado RN  Infection Prevention: environmental surveillance performed  Isolation Precautions:   protective   precautions maintained  Taken 10/2/2023 1430 by Damaris Prado RN  Infection Prevention:   environmental surveillance performed   equipment surfaces disinfected   hand hygiene promoted   personal protective equipment utilized   rest/sleep promoted   single patient room provided   visitors restricted/screened  Isolation Precautions:   protective   precautions maintained  Intervention: Promote Recovery  Recent Flowsheet Documentation  Taken 10/2/2023 2000 by Damaris Prado RN  Activity Management: bedrest  Airway/Ventilation Support: pulmonary hygiene promoted  Taken 10/2/2023 1430 by Damaris Prado RN  Activity Management: bedrest  Airway/Ventilation Support: pulmonary hygiene promoted     Problem: Nutrition Impaired (Sepsis/Septic Shock)  Goal: Optimal Nutrition Intake  Outcome: Ongoing, Not Progressing

## 2023-10-03 NOTE — CONSULTS
[facility] Consult Note      Patient Name: Nay Gonzalez  : 1970  MRN: [id number]  Primary Care Physician:  Provider, No Known  Referring Physician: Juan Elena MD  Date of admission: 10/1/2023    Subjective     Reason for Consult: Acute kidney injury    HPI:   Nay Gonzalez is a 53 y.o. female patient was admitted on 10/1/2023, when she presented with fever and shortness of breath he had hypertensive crisis blood pressure was about 245/130 dropped rapidly to the 120 range for short period of time and it was late to go up and currently about 160 systolic.  Her creatinine on admission 0.95 and now it is up to 2.73, hence nephrology consult was requested  According to her mother she had a recent sinus infection and she was taken some decongestant but she has not been on any medication on a regular basis.  No reported medical history of any type of except the patient is obese.  On 10/1/2023 she had also CT angiogram of the chest.  According to her mother she did not have any history of hypertension or diabetes    Review of Systems:   Not obtainable    Personal History     History reviewed. No pertinent past medical history.    Past Surgical History:   Procedure Laterality Date    TEETH EXTRACTION         Family History: family history is not on file.    Social History:  reports that she has never smoked. She has never been exposed to tobacco smoke. She has never used smokeless tobacco. She reports current alcohol use of about 1.0 standard drink per week. She reports that she does not use drugs.    Home Medications:  Prior to Admission medications    Not on File       Allergies:  No Known Allergies    Objective     Vitals:   Temp:  [99 øF (37.2 øC)-101.1 øF (38.4 øC)] 99.3 øF (37.4 øC)  Heart Rate:  [] 85  Resp:  [26-44] 30  BP: ()/() 166/77  Flow (L/min):  [5] 5  FiO2 (%):  [49 %-99 %] 49 %    Intake/Output Summary (Last 24 hours) at 10/3/2023  1128  Last data filed at 10/2/2023 2200  Gross per 24 hour   Intake 331.39 ml   Output --   Net 331.39 ml       Physical Exam:   Constitutional: Super morbid obesity BMI 4054.61 kg/mý, on the ventilator and sedated no acute distress  HEENT: Sclera anicteric, no conjunctival injection, orally intubated  Neck: Come to assess because of her body habitus  Respiratory: Lateral rhonchi and crackles, nonlabored respiration  Cardiovascular: RRR, no murmurs, no rubs or gallops, no carotid bruit  Gastrointestinal: Positive bowel sounds, abdomen is soft, protuberant, no guarding  : No palpable bladder, external urinary catheter  Musculoskeletal: No edema, no clubbing or cyanosis  Psychiatric: Unable to assess  Neurologic: Unable to assess  Skin: Warm and dry       Scheduled Meds:     chlorhexidine, 15 mL, Mouth/Throat, Q12H  enoxaparin, 40 mg, Subcutaneous, Q12H  hydrALAZINE, 25 mg, Oral, Q8H  insulin lispro, 2-7 Units, Subcutaneous, 4x Daily AC & at Bedtime  labetalol, 100 mg, Oral, Q12H  pantoprazole, 40 mg, Intravenous, Q24H  piperacillin-tazobactam, 4.5 g, Intravenous, Q8H  senna-docusate sodium, 2 tablet, Oral, BID  sodium chloride, 10 mL, Intravenous, Q12H      IV Meds:   propofol, 5-50 mcg/kg/min, Last Rate: 40 mcg/kg/min (10/03/23 0948)        Results Reviewed:   I have personally reviewed the results from the time of this admission to 10/3/2023 11:28 EDT     Lab Results   Component Value Date    GLUCOSE 87 10/03/2023    CALCIUM 9.0 10/03/2023     10/03/2023    K 4.5 10/03/2023    CO2 25.0 10/03/2023     10/03/2023    BUN 31 (H) 10/03/2023    CREATININE 2.73 (H) 10/03/2023    BCR 11.4 10/03/2023    ANIONGAP 15.0 10/03/2023      Lab Results   Component Value Date    MG 2.1 10/02/2023    PHOS 5.1 (H) 10/02/2023    ALBUMIN 2.5 (L) 10/03/2023           Assessment / Plan       PNA (pneumonia)    Acute respiratory failure with hypoxia    Hypertensive urgency    Abscess of upper lobe of right lung with  pneumonia    Sepsis    Type 2 diabetes mellitus with hyperglycemia    ELLY (acute kidney injury)    Obesity, morbid, BMI 50 or higher    Anemia      ASSESSMENT:  Acute kidney injury associated with sudden correction of her hypertension initially also possible component of contrast-induced nephropathy because patient had CTA on 10/1/2023.  Creatinine is increasing, But her electrolyte within acceptable range.  Acute respiratory failure on the ventilator  Now diagnosis of hypertension  New diagnosis of diabetes mellitus type 2  Super morbid obesity  Anemia, the etiology not very clear    PLAN:  Check random urine for sodium, chloride and protein to creatinine ratio  Check iron stores and immunofixation  I agree with the present treatment and will try to keep her blood pressure in the 1 40-1 60 systolic range hoping that will help renal function stabilizes  Surveillance labs  I discussed the case with the patient's mother at the bedside also with the nursing staff    I reviewed the chart and other providers notes, reviewed imaging and lab data.    Thank you for involving us in the care of Nay Gonzalez.  Please feel free to call with any questions.    Iain Owens MD  10/03/23  11:28 EDT    [facility]  [phone]      Please note that portions of this note were completed with a voice recognition program.

## 2023-10-03 NOTE — PROGRESS NOTES
ID NOTE    CC: f/u pulm abscess     Subj: History from pt's mother since she is now intubated. She moved to CCU yesterday afternoon and was intubated. Fever curve better. WBC better. Creatinine is worse.     Medications:    Current Facility-Administered Medications:     acetaminophen (TYLENOL) tablet 650 mg, 650 mg, Oral, Q4H PRN, 650 mg at 10/02/23 1102 **OR** acetaminophen (TYLENOL) 160 MG/5ML oral solution 650 mg, 650 mg, Oral, Q4H PRN **OR** acetaminophen (TYLENOL) suppository 650 mg, 650 mg, Rectal, Q4H PRN, Juan Elena MD, 650 mg at 10/02/23 2341    sennosides-docusate (PERICOLACE) 8.6-50 MG per tablet 2 tablet, 2 tablet, Oral, BID, 2 tablet at 10/02/23 0907 **AND** polyethylene glycol (MIRALAX) packet 17 g, 17 g, Oral, Daily PRN **AND** bisacodyl (DULCOLAX) EC tablet 5 mg, 5 mg, Oral, Daily PRN **AND** bisacodyl (DULCOLAX) suppository 10 mg, 10 mg, Rectal, Daily PRN, Juan Elena MD    Calcium Replacement - Follow Nurse / BPA Driven Protocol, , Does not apply, PRN, Juan Elena MD    chlorhexidine (PERIDEX) 0.12 % solution 15 mL, 15 mL, Mouth/Throat, Q12H, Johnny Tuttle MD, 15 mL at 10/02/23 2004    dextrose (D50W) (25 g/50 mL) IV injection 25 g, 25 g, Intravenous, Q15 Min PRN, Juan Elena MD    dextrose (GLUTOSE) oral gel 15 g, 15 g, Oral, Q15 Min PRN, Juan Elena MD    Enoxaparin Sodium (LOVENOX) syringe 40 mg, 40 mg, Subcutaneous, Q12H, Juan Elena MD, 40 mg at 10/03/23 0400    fentaNYL citrate (PF) (SUBLIMAZE) injection 25 mcg, 25 mcg, Intravenous, Q30 Min PRN, Otto Castillo MD, 25 mcg at 10/03/23 0623    glucagon (GLUCAGEN) injection 1 mg, 1 mg, Intramuscular, Q15 Min PRN, Juan Elena MD    influenza vac split quad (FLUZONE,FLUARIX,AFLURIA,FLULAVAL) injection 0.5 mL, 0.5 mL, Intramuscular, During Hospitalization, Juan Elena MD    insulin lispro (HUMALOG/ADMELOG) injection 2-7 Units, 2-7 Units, Subcutaneous, 4x Daily AC &  at Bedtime, Juan Elena MD, 2 Units at 10/02/23 0907    ipratropium-albuterol (DUO-NEB) nebulizer solution 3 mL, 3 mL, Nebulization, Q6H PRN, Johnny Tuttle MD    labetalol (NORMODYNE) tablet 100 mg, 100 mg, Oral, Q12H, Juan Elena MD, 100 mg at 10/02/23 0655    Magnesium Standard Dose Replacement - Follow Nurse / BPA Driven Protocol, , Does not apply, PRN, Juan Elena MD    nitroglycerin (NITROSTAT) SL tablet 0.4 mg, 0.4 mg, Sublingual, Q5 Min PRN, Juan Elena MD    ondansetron (ZOFRAN) tablet 4 mg, 4 mg, Oral, Q6H PRN **OR** ondansetron (ZOFRAN) injection 4 mg, 4 mg, Intravenous, Q6H PRN, Juan Elena MD    pantoprazole (PROTONIX) injection 40 mg, 40 mg, Intravenous, Q24H, Johnny Tuttle MD, 40 mg at 10/02/23 2003    Phosphorus Replacement - Follow Nurse / BPA Driven Protocol, , Does not apply, PRN, Juan Elena MD    piperacillin-tazobactam (ZOSYN) 4.5 g in iso-osmotic dextrose 100 mL IVPB (premix), 4.5 g, Intravenous, Q8H, Juan Elena MD, Last Rate: 0 mL/hr at 10/02/23 0214, 4.5 g at 10/03/23 0229    Potassium Replacement - Follow Nurse / BPA Driven Protocol, , Does not apply, PRN, Juan Elena MD    propofol (DIPRIVAN) infusion 10 mg/mL 100 mL, 5-50 mcg/kg/min, Intravenous, Titrated, Johnny Tuttle MD, Last Rate: 29.4 mL/hr at 10/03/23 0647, 35 mcg/kg/min at 10/03/23 0647    sodium chloride 0.9 % flush 10 mL, 10 mL, Intravenous, Q12H, Juan Elena MD, 10 mL at 10/02/23 2004    sodium chloride 0.9 % flush 10 mL, 10 mL, Intravenous, PRN, Juan Elena MD, 10 mL at 10/01/23 1723    sodium chloride 0.9 % infusion 40 mL, 40 mL, Intravenous, PRN, Juan Elena MD      Objective   Vital Signs   Temp:  [99 øF (37.2 øC)-102.3 øF (39.1 øC)] 99.5 øF (37.5 øC)  Heart Rate:  [] 82  Resp:  [26-44] 30  BP: ()/() 168/74  FiO2 (%):  [49 %-99 %] 49 %    Physical Exam:   General: sedated and intubated, not on  "pressors  Eyes: no scleral icterus  ENT: ETT in place  Cardiovascular: NR  Respiratory: R rales; no wheezing; 50% FiO2  GI: Abdomen is soft, not tender  :  external urinary catheter  Skin: No rashes  Vasc: LIJ CVC w/o erythema    Labs:   CBC, BMP, CRP, and blood cultures reviewed today  Lab Results   Component Value Date    WBC 17.47 (H) 10/03/2023    HGB 8.0 (L) 10/03/2023    HCT 25.2 (L) 10/03/2023    MCV 82.4 10/03/2023     10/03/2023     Lab Results   Component Value Date    GLUCOSE 87 10/03/2023    CALCIUM 9.0 10/03/2023     10/03/2023    K 4.5 10/03/2023    CO2 25.0 10/03/2023     10/03/2023    BUN 31 (H) 10/03/2023    CREATININE 2.73 (H) 10/03/2023    EGFR 20.4 (L) 10/03/2023    BCR 11.4 10/03/2023    ANIONGAP 15.0 10/03/2023     Lab Results   Component Value Date    CRP 28.05 (H) 10/02/2023     Lab Results   Component Value Date    HGBA1C 7.50 (H) 10/01/2023     HIV negative  Hep C negative  Procal 0.59    Microbiology:  10/1 RPP: negative  10/1 BCx: NGTD  10/1 MRSA nares: negative    New Radiology:  CXR personally reviewed and shows a RUL infiltrate and new ETT in place    Prior Radiology:  CT chest:   \"1. Large (12 cm) right upper lobe pulmonary abscess with extension into   the anterior right chest wall as detailed above.   2. Small right pleural effusion.   3. Mediastinal, right hilar and right axillary lymphadenopathy is likely   reactive.   4. No central pulmonary embolus. Contrast bolus timing limits evaluation   of the more distal pulmonary arteries.   5. Dilated main pulmonary artery (36 mm). \"    ASSESSMENT/PLAN:  Right upper lobe pulmonary abscess  Acute hypercapneic and hypoxic respiratory failure requiring mechanical ventilation  Sepsis due to #1  Super obesity BMI 54  Uncontrolled diabetes type 2 A1c  7.5%  Hypertension    She is now intubated. Plans for CT-guided chest tube today. I will follow-up her cultures. Continue Zosyn dosed for obesity and renal function. I will " order a sputum/respiratory culture now that she is intubated. It would be helpful to identify an organism.     ID will follow. D/W thoracic surgery team re: case and plan.

## 2023-10-04 ENCOUNTER — APPOINTMENT (OUTPATIENT)
Dept: GENERAL RADIOLOGY | Facility: HOSPITAL | Age: 53
DRG: 871 | End: 2023-10-04
Payer: COMMERCIAL

## 2023-10-04 LAB
ALBUMIN SERPL-MCNC: 2.6 G/DL (ref 3.5–5.2)
ALBUMIN/GLOB SERPL: 0.6 G/DL
ALP SERPL-CCNC: 114 U/L (ref 39–117)
ALT SERPL W P-5'-P-CCNC: 17 U/L (ref 1–33)
AMORPH URATE CRY URNS QL MICRO: ABNORMAL /HPF
ANION GAP SERPL CALCULATED.3IONS-SCNC: 13.7 MMOL/L (ref 5–15)
ARTERIAL PATENCY WRIST A: POSITIVE
ARTERIAL PATENCY WRIST A: POSITIVE
AST SERPL-CCNC: 10 U/L (ref 1–32)
ATMOSPHERIC PRESS: 751.2 MMHG
ATMOSPHERIC PRESS: 751.8 MMHG
BACTERIA SPEC RESP CULT: NORMAL
BACTERIA UR QL AUTO: ABNORMAL /HPF
BASE EXCESS BLDA CALC-SCNC: -0.2 MMOL/L (ref 0–2)
BASE EXCESS BLDA CALC-SCNC: 0.6 MMOL/L (ref 0–2)
BASOPHILS # BLD AUTO: 0.04 10*3/MM3 (ref 0–0.2)
BASOPHILS NFR BLD AUTO: 0.3 % (ref 0–1.5)
BDY SITE: ABNORMAL
BDY SITE: ABNORMAL
BILIRUB SERPL-MCNC: 0.3 MG/DL (ref 0–1.2)
BILIRUB UR QL STRIP: NEGATIVE
BUN SERPL-MCNC: 41 MG/DL (ref 6–20)
BUN/CREAT SERPL: 8.8 (ref 7–25)
CALCIUM SPEC-SCNC: 9.1 MG/DL (ref 8.6–10.5)
CHLORIDE SERPL-SCNC: 99 MMOL/L (ref 98–107)
CHLORIDE UR-SCNC: <20 MMOL/L
CLARITY UR: ABNORMAL
CO2 BLDA-SCNC: 26.9 MMOL/L (ref 23–27)
CO2 BLDA-SCNC: 27.5 MMOL/L (ref 23–27)
CO2 SERPL-SCNC: 27.3 MMOL/L (ref 22–29)
COARSE GRAN CASTS URNS QL MICRO: ABNORMAL /LPF
COLOR UR: ABNORMAL
CREAT SERPL-MCNC: 4.64 MG/DL (ref 0.57–1)
CREAT UR-MCNC: 168.6 MG/DL
CREAT UR-MCNC: 170.4 MG/DL
DEPRECATED RDW RBC AUTO: 44.7 FL (ref 37–54)
DEVICE COMMENT: ABNORMAL
DEVICE COMMENT: ABNORMAL
EGFRCR SERPLBLD CKD-EPI 2021: 10.8 ML/MIN/1.73
EOSINOPHIL # BLD AUTO: 0.09 10*3/MM3 (ref 0–0.4)
EOSINOPHIL NFR BLD AUTO: 0.7 % (ref 0.3–6.2)
EOSINOPHIL SPEC QL MICRO: 0 % EOS/100 CELLS (ref 0–0)
ERYTHROCYTE [DISTWIDTH] IN BLOOD BY AUTOMATED COUNT: 14.9 % (ref 12.3–15.4)
FERRITIN SERPL-MCNC: 426 NG/ML (ref 13–150)
GLOBULIN UR ELPH-MCNC: 4.4 GM/DL
GLUCOSE BLDC GLUCOMTR-MCNC: 109 MG/DL (ref 70–130)
GLUCOSE BLDC GLUCOMTR-MCNC: 130 MG/DL (ref 70–130)
GLUCOSE BLDC GLUCOMTR-MCNC: 134 MG/DL (ref 70–130)
GLUCOSE BLDC GLUCOMTR-MCNC: 98 MG/DL (ref 70–130)
GLUCOSE SERPL-MCNC: 119 MG/DL (ref 65–99)
GLUCOSE UR STRIP-MCNC: NEGATIVE MG/DL
GRAM STN SPEC: NORMAL
GRAM STN SPEC: NORMAL
GRAN CASTS URNS QL MICRO: ABNORMAL /LPF
HCO3 BLDA-SCNC: 25.5 MMOL/L (ref 22–28)
HCO3 BLDA-SCNC: 26.2 MMOL/L (ref 22–28)
HCT VFR BLD AUTO: 24.5 % (ref 34–46.6)
HEMODILUTION: NO
HEMODILUTION: NO
HGB BLD-MCNC: 7.8 G/DL (ref 12–15.9)
HGB UR QL STRIP.AUTO: ABNORMAL
HYALINE CASTS UR QL AUTO: ABNORMAL /LPF
IGA SERPL-MCNC: 509 MG/DL (ref 87–352)
IGG SERPL-MCNC: 1612 MG/DL (ref 586–1602)
IGM SERPL-MCNC: 74 MG/DL (ref 26–217)
IMM GRANULOCYTES # BLD AUTO: 0.16 10*3/MM3 (ref 0–0.05)
IMM GRANULOCYTES NFR BLD AUTO: 1.3 % (ref 0–0.5)
INHALED O2 CONCENTRATION: 25 %
INHALED O2 CONCENTRATION: 40 %
INSPIRATORY TIME: 1
IRON 24H UR-MRATE: 27 MCG/DL (ref 37–145)
IRON SATN MFR SERPL: 13 % (ref 20–50)
KAPPA LC FREE SER-MCNC: 157.8 MG/L (ref 3.3–19.4)
KAPPA LC FREE/LAMBDA FREE SER: 1.26 {RATIO} (ref 0.26–1.65)
KETONES UR QL STRIP: NEGATIVE
LAMBDA LC FREE SERPL-MCNC: 125.4 MG/L (ref 5.7–26.3)
LEUKOCYTE ESTERASE UR QL STRIP.AUTO: NEGATIVE
LYMPHOCYTES # BLD AUTO: 1.08 10*3/MM3 (ref 0.7–3.1)
LYMPHOCYTES NFR BLD AUTO: 8.9 % (ref 19.6–45.3)
MAGNESIUM SERPL-MCNC: 2.6 MG/DL (ref 1.6–2.6)
MCH RBC QN AUTO: 26.4 PG (ref 26.6–33)
MCHC RBC AUTO-ENTMCNC: 31.8 G/DL (ref 31.5–35.7)
MCV RBC AUTO: 82.8 FL (ref 79–97)
MODALITY: ABNORMAL
MODALITY: ABNORMAL
MONOCYTES # BLD AUTO: 0.65 10*3/MM3 (ref 0.1–0.9)
MONOCYTES NFR BLD AUTO: 5.4 % (ref 5–12)
NEUTROPHILS NFR BLD AUTO: 10.08 10*3/MM3 (ref 1.7–7)
NEUTROPHILS NFR BLD AUTO: 83.4 % (ref 42.7–76)
NITRITE UR QL STRIP: NEGATIVE
NRBC BLD AUTO-RTO: 0.2 /100 WBC (ref 0–0.2)
O2 A-A PPRESDIFF RESPIRATORY: 0.5 MMHG
O2 A-A PPRESDIFF RESPIRATORY: 0.6 MMHG
PCO2 BLDA: 44.7 MM HG (ref 35–45)
PCO2 BLDA: 45.6 MM HG (ref 35–45)
PEEP RESPIRATORY: 5 CM[H2O]
PEEP RESPIRATORY: 8 CM[H2O]
PH BLDA: 7.36 PH UNITS (ref 7.35–7.45)
PH BLDA: 7.38 PH UNITS (ref 7.35–7.45)
PH UR STRIP.AUTO: <=5 [PH] (ref 5–8)
PHOSPHATE SERPL-MCNC: 7 MG/DL (ref 2.5–4.5)
PLATELET # BLD AUTO: 285 10*3/MM3 (ref 140–450)
PMV BLD AUTO: 11 FL (ref 6–12)
PO2 BLDA: 127.2 MM HG (ref 80–100)
PO2 BLDA: 77.8 MM HG (ref 80–100)
POTASSIUM SERPL-SCNC: 4.9 MMOL/L (ref 3.5–5.2)
PROT ?TM UR-MCNC: 356 MG/DL
PROT PATTERN SERPL IFE-IMP: ABNORMAL
PROT SERPL-MCNC: 7 G/DL (ref 6–8.5)
PROT UR QL STRIP: ABNORMAL
PROT/CREAT UR: 2111.5 MG/G CREA (ref 0–200)
PSV: 10 CMH2O
RBC # BLD AUTO: 2.96 10*6/MM3 (ref 3.77–5.28)
RBC # UR STRIP: ABNORMAL /HPF
REF LAB TEST METHOD: ABNORMAL
RENAL EPI CELLS #/AREA URNS HPF: ABNORMAL /HPF
SAO2 % BLDCOA: 94.7 % (ref 92–98.5)
SAO2 % BLDCOA: 98.8 % (ref 92–98.5)
SET MECH RESP RATE: 18
SODIUM SERPL-SCNC: 140 MMOL/L (ref 136–145)
SODIUM UR-SCNC: 20 MMOL/L
SODIUM UR-SCNC: 23 MMOL/L
SP GR UR STRIP: 1.03 (ref 1–1.03)
SQUAMOUS #/AREA URNS HPF: ABNORMAL /HPF
TIBC SERPL-MCNC: 201 MCG/DL (ref 298–536)
TOTAL RATE: 18 BREATHS/MINUTE
TOTAL RATE: 38 BREATHS/MINUTE
TRANSFERRIN SERPL-MCNC: 135 MG/DL (ref 200–360)
URATE SERPL-MCNC: 9.9 MG/DL (ref 2.4–5.7)
UROBILINOGEN UR QL STRIP: ABNORMAL
UUN 24H UR-MCNC: 378 MG/DL
VENTILATOR MODE: ABNORMAL
VENTILATOR MODE: ABNORMAL
VT ON VENT VENT: 500 ML
WBC # UR STRIP: ABNORMAL /HPF
WBC NRBC COR # BLD: 12.1 10*3/MM3 (ref 3.4–10.8)

## 2023-10-04 PROCEDURE — 25010000002 CEFTRIAXONE PER 250 MG: Performed by: INTERNAL MEDICINE

## 2023-10-04 PROCEDURE — 94799 UNLISTED PULMONARY SVC/PX: CPT

## 2023-10-04 PROCEDURE — 99233 SBSQ HOSP IP/OBS HIGH 50: CPT | Performed by: INTERNAL MEDICINE

## 2023-10-04 PROCEDURE — 25010000002 PROPOFOL 10 MG/ML EMULSION: Performed by: HOSPITALIST

## 2023-10-04 PROCEDURE — 84100 ASSAY OF PHOSPHORUS: CPT | Performed by: HOSPITALIST

## 2023-10-04 PROCEDURE — 84156 ASSAY OF PROTEIN URINE: CPT | Performed by: HOSPITALIST

## 2023-10-04 PROCEDURE — 80053 COMPREHEN METABOLIC PANEL: CPT | Performed by: INTERNAL MEDICINE

## 2023-10-04 PROCEDURE — 94003 VENT MGMT INPAT SUBQ DAY: CPT

## 2023-10-04 PROCEDURE — 25010000002 HEPARIN (PORCINE) PER 1000 UNITS: Performed by: INTERNAL MEDICINE

## 2023-10-04 PROCEDURE — 25010000002 METRONIDAZOLE 500 MG/100ML SOLUTION: Performed by: INTERNAL MEDICINE

## 2023-10-04 PROCEDURE — 82570 ASSAY OF URINE CREATININE: CPT | Performed by: HOSPITALIST

## 2023-10-04 PROCEDURE — 84300 ASSAY OF URINE SODIUM: CPT | Performed by: HOSPITALIST

## 2023-10-04 PROCEDURE — 3E0L3GC INTRODUCTION OF OTHER THERAPEUTIC SUBSTANCE INTO PLEURAL CAVITY, PERCUTANEOUS APPROACH: ICD-10-PCS | Performed by: NURSE PRACTITIONER

## 2023-10-04 PROCEDURE — 99232 SBSQ HOSP IP/OBS MODERATE 35: CPT | Performed by: NURSE PRACTITIONER

## 2023-10-04 PROCEDURE — 84540 ASSAY OF URINE/UREA-N: CPT | Performed by: HOSPITALIST

## 2023-10-04 PROCEDURE — 83540 ASSAY OF IRON: CPT | Performed by: INTERNAL MEDICINE

## 2023-10-04 PROCEDURE — 82948 REAGENT STRIP/BLOOD GLUCOSE: CPT

## 2023-10-04 PROCEDURE — 94660 CPAP INITIATION&MGMT: CPT

## 2023-10-04 PROCEDURE — 81001 URINALYSIS AUTO W/SCOPE: CPT | Performed by: HOSPITALIST

## 2023-10-04 PROCEDURE — 82436 ASSAY OF URINE CHLORIDE: CPT | Performed by: INTERNAL MEDICINE

## 2023-10-04 PROCEDURE — 85025 COMPLETE CBC W/AUTO DIFF WBC: CPT | Performed by: INTERNAL MEDICINE

## 2023-10-04 PROCEDURE — 32561 LYSE CHEST FIBRIN INIT DAY: CPT | Performed by: NURSE PRACTITIONER

## 2023-10-04 PROCEDURE — 84300 ASSAY OF URINE SODIUM: CPT | Performed by: INTERNAL MEDICINE

## 2023-10-04 PROCEDURE — 25010000002 ALTEPLASE PER 1 MG: Performed by: NURSE PRACTITIONER

## 2023-10-04 PROCEDURE — 36600 WITHDRAWAL OF ARTERIAL BLOOD: CPT

## 2023-10-04 PROCEDURE — 82728 ASSAY OF FERRITIN: CPT | Performed by: INTERNAL MEDICINE

## 2023-10-04 PROCEDURE — 83735 ASSAY OF MAGNESIUM: CPT | Performed by: INTERNAL MEDICINE

## 2023-10-04 PROCEDURE — 25010000002 PIPERACILLIN SOD-TAZOBACTAM PER 1 G: Performed by: HOSPITALIST

## 2023-10-04 PROCEDURE — 25010000002 FENTANYL CITRATE (PF) 50 MCG/ML SOLUTION: Performed by: INTERNAL MEDICINE

## 2023-10-04 PROCEDURE — 82803 BLOOD GASES ANY COMBINATION: CPT

## 2023-10-04 PROCEDURE — 84550 ASSAY OF BLOOD/URIC ACID: CPT | Performed by: INTERNAL MEDICINE

## 2023-10-04 PROCEDURE — 94761 N-INVAS EAR/PLS OXIMETRY MLT: CPT

## 2023-10-04 PROCEDURE — 87205 SMEAR GRAM STAIN: CPT | Performed by: HOSPITALIST

## 2023-10-04 PROCEDURE — 71045 X-RAY EXAM CHEST 1 VIEW: CPT

## 2023-10-04 PROCEDURE — 84466 ASSAY OF TRANSFERRIN: CPT | Performed by: INTERNAL MEDICINE

## 2023-10-04 RX ORDER — METRONIDAZOLE 500 MG/100ML
500 INJECTION, SOLUTION INTRAVENOUS EVERY 8 HOURS
Status: DISCONTINUED | OUTPATIENT
Start: 2023-10-04 | End: 2023-10-05

## 2023-10-04 RX ORDER — HEPARIN SODIUM 5000 [USP'U]/ML
5000 INJECTION, SOLUTION INTRAVENOUS; SUBCUTANEOUS EVERY 8 HOURS SCHEDULED
Status: DISCONTINUED | OUTPATIENT
Start: 2023-10-04 | End: 2023-10-12

## 2023-10-04 RX ORDER — ENOXAPARIN SODIUM 100 MG/ML
30 INJECTION SUBCUTANEOUS EVERY 24 HOURS
Status: DISCONTINUED | OUTPATIENT
Start: 2023-10-04 | End: 2023-10-04

## 2023-10-04 RX ADMIN — CHLORHEXIDINE GLUCONATE 15 ML: 1.2 RINSE ORAL at 20:07

## 2023-10-04 RX ADMIN — ALTEPLASE 10 MG: KIT at 12:06

## 2023-10-04 RX ADMIN — HYDRALAZINE HYDROCHLORIDE 25 MG: 25 TABLET, FILM COATED ORAL at 14:49

## 2023-10-04 RX ADMIN — FENTANYL CITRATE 25 MCG: 50 INJECTION, SOLUTION INTRAMUSCULAR; INTRAVENOUS at 01:44

## 2023-10-04 RX ADMIN — CEFTRIAXONE SODIUM 2000 MG: 2 INJECTION, POWDER, FOR SOLUTION INTRAMUSCULAR; INTRAVENOUS at 12:04

## 2023-10-04 RX ADMIN — DORNASE ALFA 5 MG: 1 SOLUTION RESPIRATORY (INHALATION) at 12:07

## 2023-10-04 RX ADMIN — HYDRALAZINE HYDROCHLORIDE 25 MG: 25 TABLET, FILM COATED ORAL at 07:30

## 2023-10-04 RX ADMIN — PIPERACILLIN SODIUM AND TAZOBACTAM SODIUM 4.5 G: 4; .5 INJECTION, SOLUTION INTRAVENOUS at 03:10

## 2023-10-04 RX ADMIN — PROPOFOL INJECTABLE EMULSION 40 MCG/KG/MIN: 10 INJECTION, EMULSION INTRAVENOUS at 05:39

## 2023-10-04 RX ADMIN — METRONIDAZOLE 500 MG: 500 INJECTION, SOLUTION INTRAVENOUS at 20:05

## 2023-10-04 RX ADMIN — Medication 10 ML: at 08:46

## 2023-10-04 RX ADMIN — PROPOFOL INJECTABLE EMULSION 40 MCG/KG/MIN: 10 INJECTION, EMULSION INTRAVENOUS at 00:15

## 2023-10-04 RX ADMIN — PANTOPRAZOLE SODIUM 40 MG: 40 INJECTION, POWDER, FOR SOLUTION INTRAVENOUS at 08:46

## 2023-10-04 RX ADMIN — LABETALOL HYDROCHLORIDE 100 MG: 100 TABLET, FILM COATED ORAL at 08:46

## 2023-10-04 RX ADMIN — METRONIDAZOLE 500 MG: 500 INJECTION, SOLUTION INTRAVENOUS at 12:05

## 2023-10-04 RX ADMIN — CHLORHEXIDINE GLUCONATE 15 ML: 1.2 RINSE ORAL at 08:51

## 2023-10-04 RX ADMIN — Medication 10 ML: at 20:07

## 2023-10-04 RX ADMIN — HEPARIN SODIUM 5000 UNITS: 5000 INJECTION INTRAVENOUS; SUBCUTANEOUS at 21:19

## 2023-10-04 RX ADMIN — PROPOFOL INJECTABLE EMULSION 40 MCG/KG/MIN: 10 INJECTION, EMULSION INTRAVENOUS at 03:09

## 2023-10-04 RX ADMIN — HYDRALAZINE HYDROCHLORIDE 25 MG: 25 TABLET, FILM COATED ORAL at 21:19

## 2023-10-04 RX ADMIN — LABETALOL HYDROCHLORIDE 100 MG: 100 TABLET, FILM COATED ORAL at 20:05

## 2023-10-04 RX ADMIN — SENNOSIDES AND DOCUSATE SODIUM 2 TABLET: 50; 8.6 TABLET ORAL at 08:51

## 2023-10-04 RX ADMIN — PROPOFOL INJECTABLE EMULSION 40 MCG/KG/MIN: 10 INJECTION, EMULSION INTRAVENOUS at 08:41

## 2023-10-04 NOTE — PROCEDURES
Pre-op Diagnosis: Loculated Pleural Effusion, right     Post-Op Diagnosis: Loculated Pleural Effusion, right     Procedure performed: Irrigation pleural cavity with TPA and dornase    Anesthesia: None    Summary of procedure: The right pleural tube was accessed. 10 mg of TPA (reconstituted in 30cc of sterile water) and 5 mg of dornase (reconstituted in 30cc normal saline) was instilled into the pleural cavity. The pleural tube then was clamped.      The patient's position is to be changed to every 15 minutes for 2 hours.  The tube will then be unclamped and placed back to suction.  Patient tolerated the procedure well. We will re-evaluate with a CXR in the morning.      Appreciate assistance from PAOLA Mcgrath.    Dayna Harvey, DNP, APRN

## 2023-10-04 NOTE — CONSULTS
"Nutrition Services    Patient Name:  Nay Gonzalez  YOB: 1970  MRN: [id number]  Admit Date:  10/1/2023  Assessment Date:  10/04/23    Summary: Nutrition Consult for TF assessment  This is a 52 yo female who presented with SOA/fevers and noted to have a pulmonary abscess. She is currently on the vent. ELLY worse  and nephrology following. No indication for dialysis today   Labs: BUN 41, cr 4.64, phos 7.0  Propofol 33.69(887kcals)  BMI 54.49, morbid obesity    Plan/Recommendations:  1) When ok to start nutrition, rec Novasource Renal at 20ml/hr do not advance.  2) Water flushes 75sql5ly and nephrology to manage    Will continue to follow clinical course and monitor nutritional needs.       CLINICAL NUTRITION ASSESSMENT      Reason for Assessment Tube Feeding Assessment      Diagnosis/Problem   Pulmonary abscess/pneumonia, sepsis, pleural effusion, respiratory failure on the vent, Acute kidney injury, super morbid obesity, anemia, DM   Medical/Surgical History History reviewed. No pertinent past medical history.    Past Surgical History:   Procedure Laterality Date    TEETH EXTRACTION          Anthropometrics        Current Height  Current Weight  BMI kg/m2 Height: 160 cm (62.99\")  Weight: (!) 140 kg (307 lb 8.7 oz) (10/04/23 0500)  Body mass index is 54.49 kg/mý.   Adjusted BMI (if applicable)    BMI Category Obese, Class III (40 or higher)   Ideal Body Weight (IBW) 115lb   Usual Body Weight (UBW) 300's   Weight Trend Stable   Weight History Wt Readings from Last 30 Encounters:   10/04/23 0500 (!) 140 kg (307 lb 8.7 oz)   10/03/23 2304 (!) 140 kg (307 lb 8.7 oz)   10/03/23 0500 (!) 140 kg (308 lb 3.3 oz)   10/02/23 1724 (!) 140 kg (308 lb 10.3 oz)   10/02/23 0659 (!) 140 kg (308 lb 10.3 oz)   10/01/23 1047 (!) 152 kg (335 lb)      --  Labs       Pertinent Labs    Results from last 7 days   Lab Units 10/04/23  0414 10/03/23  0408 10/02/23  2026 10/02/23  0501 10/01/23  1138   SODIUM mmol/L 140 142 142  "  < > 141   POTASSIUM mmol/L 4.9 4.5 5.2   < > 4.2   CHLORIDE mmol/L 99 102 102   < > 101   CO2 mmol/L 27.3 25.0 29.7*   < > 30.0*   BUN mg/dL 41* 31* 27*   < > 14   CREATININE mg/dL 4.64* 2.73* 1.95*   < > 0.95   CALCIUM mg/dL 9.1 9.0 9.3   < > 9.6   BILIRUBIN mg/dL 0.3 0.3  --   --  0.3   ALK PHOS U/L 114 138*  --   --  154*   ALT (SGPT) U/L 17 22  --   --  35*   AST (SGOT) U/L 10 17  --   --  19   GLUCOSE mg/dL 119* 87 123*   < > 168*    < > = values in this interval not displayed.     Results from last 7 days   Lab Units 10/04/23  0414 10/02/23  2026 10/02/23  0501   MAGNESIUM mg/dL 2.6  --  2.1   PHOSPHORUS mg/dL 7.0*   < > 4.6*   HEMOGLOBIN g/dL 7.8*   < > 10.7*   HEMATOCRIT % 24.5*   < > 34.8   WBC 10*3/mm3 12.10*   < > 24.25*   ALBUMIN g/dL 2.6*   < > 3.1*    < > = values in this interval not displayed.     Results from last 7 days   Lab Units 10/04/23  0414 10/03/23  0408 10/02/23  0501 10/01/23  1138   PLATELETS 10*3/mm3 285 319 353 395     COVID19   Date Value Ref Range Status   10/01/2023 Not Detected Not Detected - Ref. Range Final     Lab Results   Component Value Date    HGBA1C 7.50 (H) 10/01/2023          Medications           Scheduled Medications alteplase (ACTIVASE) 10 mg in 0.9% NaCl 30 mL, 10 mg, Intrapleural, Once   And  dornase alpha (PULMOZYME) 5 mg in SWFI intrapleural syringe, 5 mg, Intrapleural, Once  cefTRIAXone, 2,000 mg, Intravenous, Q24H  chlorhexidine, 15 mL, Mouth/Throat, Q12H  heparin (porcine), 5,000 Units, Subcutaneous, Q8H  hydrALAZINE, 25 mg, Nasogastric, Q8H  insulin lispro, 2-7 Units, Subcutaneous, 4x Daily AC & at Bedtime  labetalol, 100 mg, Nasogastric, Q12H  metroNIDAZOLE, 500 mg, Intravenous, Q8H  pantoprazole, 40 mg, Intravenous, Q24H  senna-docusate sodium, 2 tablet, Nasogastric, BID  sodium chloride, 10 mL, Intravenous, Q12H       Infusions propofol, 5-50 mcg/kg/min, Last Rate: 40 mcg/kg/min (10/04/23 0841)       PRN Medications   acetaminophen **OR** acetaminophen  **OR** acetaminophen    senna-docusate sodium **AND** polyethylene glycol **AND** bisacodyl **AND** bisacodyl    Calcium Replacement - Follow Nurse / BPA Driven Protocol    dextrose    dextrose    fentaNYL citrate (PF)    glucagon (human recombinant)    hydrALAZINE    influenza vaccine    ipratropium-albuterol    Magnesium Standard Dose Replacement - Follow Nurse / BPA Driven Protocol    nitroglycerin    ondansetron **OR** ondansetron    Phosphorus Replacement - Follow Nurse / BPA Driven Protocol    Potassium Replacement - Follow Nurse / BPA Driven Protocol    sodium chloride    sodium chloride     Physical Findings          General Findings obese, ventilator support   Oral/Mouth Cavity tooth or teeth missing   Edema  2+ (mild)   Gastrointestinal last bowel movement: 9/30   Skin  skin intact   Tubes/Drains/Lines chest tube, Cortrak, NG tube, bridle in place   NFPE Not indicated at this time   --  Estimated/Assessed Needs        Current Weight  Weight: (!) 140 kg (307 lb 8.7 oz) (10/04/23 0500)       Energy Requirements    Weight for Calculation 52.4 kg   Method for Estimation  22 kcal/kg, 25 kcal/kg   EST Needs (kcal/day) 6071-9695       Protein Requirements    Weight for Calculation 55.4 kg   EST Protein Needs (g/kg) 1.5 gm/kg   EST Daily Needs (g/day) 83       Fluid Requirements     Method for Estimation Defer to physician    EST Needs (mL/day)      Current Nutrition Orders & Evaluation of Intake       Oral Nutrition     Food Allergies NKFA   Current PO Diet NPO Diet NPO Type: Strict NPO   Supplement n/a   PO Evaluation     % PO Intake     Factors Affecting Intake: altered respiratory status   --  PES STATEMENT / NUTRITION DIAGNOSIS      Nutrition Dx Problem  Problem: Inadequate Oral Intake  Etiology: Medical Diagnosis - respiratory failure on vent    Signs/Symptoms: NPO     NUTRITION INTERVENTION / PLAN OF CARE      Intervention Goal(s) Maintain nutrition status, Reduce/improve symptoms, Disease  management/therapy, Initiate TF/PN, and No significant weight loss         RD Intervention/Action Await initiation of EN/PN, Continue to monitor, Care plan reviewed, and Recommend/order: Enteral Nutrition   --      Prescription/Orders:       PO Diet       Supplements       Enteral Nutrition See below      Parenteral Nutrition    New Prescription Ordered?    --   Enteral Prescription:     Enteral Route NG    TF Delivery Method Continuous    Enteral Product Novasource Renal    Modular None    Propofol Rate/Kcal 33.69(887kcals)    TF Start Rate  20 mL/hr--trickle feeds for now    TF Goal Rate      Free Water Flush 30 mL Q 4 hr--MD to manage    Provision at Goal:          Calories 960 kcals plus propofol kcal,          Protein  43 gm protein,          Fluid (mL) 344 mL free water + 180 mL in flushes    Prescription Ordered Yes         Monitor/Evaluation Per protocol   Discharge Plan/Needs Pending clinical course   --    RD to follow per protocol.      Electronically signed by:  Maura Gann RD  10/04/23 11:43 EDT

## 2023-10-04 NOTE — PROGRESS NOTES
[facility] Progress Note      Patient Name: Nay Gonzalez  : 1970  MRN: [id number]  Primary Care Physician:  Provider, No Known  Date of admission: 10/1/2023    Subjective     Interval History:   Follow-up acute kidney injury    The patient is currently on the ventilator, sedated, her vital signs appears to be stable urine output in the last shift was 250 cc patient has external urinary catheter she would benefit from Sauer catheter a better understanding of her I's and O's.    Review of Systems:   As noted above    Objective     Vitals:   Heart Rate:  [71-92] 72  Resp:  [18-30] 18  BP: (109-173)/(60-86) 122/71  Flow (L/min):  [5] 5  FiO2 (%):  [30 %-49 %] 30 %    Intake/Output Summary (Last 24 hours) at 10/4/2023 0906  Last data filed at 10/4/2023 0841  Gross per 24 hour   Intake 839.15 ml   Output 370 ml   Net 469.15 ml       Physical Exam:    General Appearance: Morbidly obese, on the ventilator, sedated, chronically ill, no acute distress  Skin: warm and dry  HEENT: Orally intubated  Neck: Difficult to assess due to body habitus  Lungs: Bilateral rhonchi, breathing effort not  Heart: RRR, normal S1 and S2, no  Abdomen: soft, no guarding, protuberant, normoactive bowel  : Difficult to assess for palpable bladder with body habitus  Extremities: no edema, cyanosis or clubbing  Neuro: Unable to assess    Scheduled Meds:     cefTRIAXone, 2,000 mg, Intravenous, Q24H  chlorhexidine, 15 mL, Mouth/Throat, Q12H  enoxaparin, 30 mg, Subcutaneous, Q24H  hydrALAZINE, 25 mg, Nasogastric, Q8H  insulin lispro, 2-7 Units, Subcutaneous, 4x Daily AC & at Bedtime  labetalol, 100 mg, Nasogastric, Q12H  metroNIDAZOLE, 500 mg, Intravenous, Q8H  pantoprazole, 40 mg, Intravenous, Q24H  senna-docusate sodium, 2 tablet, Nasogastric, BID  sodium chloride, 10 mL, Intravenous, Q12H      IV Meds:   Pharmacy to Dose enoxaparin (LOVENOX),   propofol, 5-50 mcg/kg/min, Last Rate: 40 mcg/kg/min (10/04/23  0841)        Results Reviewed:   I have personally reviewed the results from the time of this admission to 10/4/2023 09:06 EDT     Results from last 7 days   Lab Units 10/04/23  0414 10/03/23  0408 10/02/23  2026 10/02/23  0501 10/01/23  1138   SODIUM mmol/L 140 142 142   < > 141   POTASSIUM mmol/L 4.9 4.5 5.2   < > 4.2   CHLORIDE mmol/L 99 102 102   < > 101   CO2 mmol/L 27.3 25.0 29.7*   < > 30.0*   BUN mg/dL 41* 31* 27*   < > 14   CREATININE mg/dL 4.64* 2.73* 1.95*   < > 0.95   CALCIUM mg/dL 9.1 9.0 9.3   < > 9.6   BILIRUBIN mg/dL 0.3 0.3  --   --  0.3   ALK PHOS U/L 114 138*  --   --  154*   ALT (SGPT) U/L 17 22  --   --  35*   AST (SGOT) U/L 10 17  --   --  19   GLUCOSE mg/dL 119* 87 123*   < > 168*    < > = values in this interval not displayed.       Estimated Creatinine Clearance: 19.6 mL/min (A) (by C-G formula based on SCr of 4.64 mg/dL (H)).    Results from last 7 days   Lab Units 10/04/23  0414 10/02/23  2026 10/02/23  0501   MAGNESIUM mg/dL 2.6  --  2.1   PHOSPHORUS mg/dL 7.0* 5.1* 4.6*       Results from last 7 days   Lab Units 10/04/23  0414 10/03/23  0408   URIC ACID mg/dL 9.9* 9.4*       Results from last 7 days   Lab Units 10/04/23  0414 10/03/23  1916 10/03/23  0408 10/02/23  0501 10/01/23  1138   WBC 10*3/mm3 12.10*  --  17.47* 24.25* 22.45*   HEMOGLOBIN g/dL 7.8* 8.0* 8.0* 10.7* 10.4*   PLATELETS 10*3/mm3 285  --  319 353 395             Assessment / Plan     ASSESSMENT:  Acute kidney injury associated with sudden correction of her hypertension initially also possible component of contrast-induced nephropathy because patient had CTA on 10/1/2023.  Creatinine is increasing up to 4.64, But her electrolyte within acceptable range.  No indication for dialysis  Acute respiratory failure on the ventilator  Now diagnosis of hypertension  New diagnosis of diabetes mellitus type 2  Super morbid obesity  Anemia, the etiology not very clear, most likely patient have iron deficiency iron saturation 13% but  her TIBC is on the low side, suggestive of anemia of chronic disease too.    PLAN:  Continue the same treatment  No indication for dialysis today  Surveillance labs    I discussed the case with the patient's mother at the bedside and her nurse  I reviewed the chart and other providers notes, I reviewed imaging and lab data.  Copied text in this note has been reviewed and is accurate as of 10/04/23.       Thank you for involving us in the care of Nay Gonzalez.  Please feel free to call with any questions.    Iain Owens MD  10/04/23  09:06 EDT    [facility]  [phone]    Please note that portions of this note were completed with a voice recognition program.

## 2023-10-04 NOTE — PAYOR COMM NOTE
"Tc Faganyn (53 y.o. Female)     PLEASE SEE ATTACHED FOR CONTINUED INPT STAY DAYS    REF #   [id number]    PLEASE CALL JOCELYN GEORGE RN/ DEPT @ [phone]   OR [phone]    THANK YOU  JOCELYN GEORGE RN  [facility]        Date of Birth   1970    Social Security Number       Address   [address] [address]    Home Phone   [phone]    MRN   [id number]       [facility]    Marital Status                               Admission Date   10/1/23    Admission Type   Emergency    Admitting Provider   Juan Elena MD    Attending Provider   Juan Elena MD    Department, Room/Bed   [facility] CORONARY CARE, N333/1       Discharge Date       Discharge Disposition       Discharge Destination                                 Attending Provider: Juan Elena MD    Allergies: No Known Allergies    Isolation: None   Infection: None   Code Status: CPR    Ht: 160 cm (62.99\")   Wt: 140 kg (307 lb 8.7 oz)    Admission Cmt: None   Principal Problem: PNA (pneumonia) [J18.9]                   Active Insurance as of 10/1/2023       Primary Coverage       Payor Plan Insurance Group Employer/Plan Group    [employer]HE[employer] BLUE CROSS ANTHEM BLUE CROSS BLUE SHIELD PPO [id number]       Payor Plan Address Payor Plan Phone Number Payor Plan Fax Number Effective Dates    [address] [phone]  12/11/2022 - None Entered    [facility] [id number]         Subscriber Name Subscriber Birth Date Member ID       GODWIN FAGAN [birth year]/1972 [id number]                     Emergency Contacts        (Rel.) Home Phone Work Phone Mobile Phone    Godwin Fagan (Spouse) -- -- [phone]    Hoa Duncan (Mother) -- -- [phone]              [place]: NPI [id number]  Tax ID [id number]  Oxygen Therapy (last 2 days)       Date/Time SpO2 Device (Oxygen Therapy) Flow (L/min) Oxygen Concentration (%) ETCO2 (mmHg)    10/04/23 1307 -- -- -- " -- 41    10/04/23 1200 99 ventilator 5 25 --    10/04/23 1100 99 -- -- -- --    10/04/23 1000 98 -- -- -- --    10/04/23 0900 100 -- -- -- --    10/04/23 0800 99 -- 5 30 --    10/04/23 0725 100 ventilator -- 30 41    10/04/23 0700 100 -- -- -- --    10/04/23 0600 100 -- -- -- --    10/04/23 0500 100 -- -- -- --    10/04/23 0400 100 ventilator -- 40 41    10/04/23 0328 100 ventilator -- 40 --    10/04/23 0300 100 -- -- -- --    10/04/23 0200 100 -- -- -- --    10/04/23 0100 100 -- -- -- --    10/04/23 0000 100 ventilator -- 40 --    10/03/23 2328 100 ventilator -- 40 44    10/03/23 2304 100 -- -- -- --    10/03/23 2300 100 -- -- -- --    10/03/23 2100 100 -- -- -- --    10/03/23 2000 -- ventilator -- 40 --    10/03/23 1911 100 ventilator -- 40 48    10/03/23 1900 100 -- -- -- --    10/03/23 1830 99 -- -- -- --    10/03/23 1800 100 -- -- -- --    10/03/23 1607 99 ventilator -- 40 --    10/03/23 1606 -- -- -- -- 32    10/03/23 1600 -- ventilator 5 40 --    10/03/23 1300 99 ventilator -- 50 --    10/03/23 1200 -- ventilator 5 50 --    10/03/23 1130 98 ventilator -- 50 --    10/03/23 1000 99 -- -- -- --    10/03/23 0900 98 -- -- -- --    10/03/23 0800 97 ventilator 5 50 --    10/03/23 0718 -- -- -- -- 29    10/03/23 0715 97 ventilator -- 50 --    10/03/23 0700 97 -- -- -- --    10/03/23 0630 98 -- -- -- --    10/03/23 0600 98 -- -- -- --    10/03/23 0530 98 -- -- -- --    10/03/23 0500 98 -- -- -- --    10/03/23 0430 99 -- -- -- --    10/03/23 0400 96 ventilator -- 50 --    10/03/23 0344 96 ventilator -- 50 --    10/03/23 0330 98 -- -- -- --    10/03/23 0328 98 ventilator -- 65 30    10/03/23 0300 98 -- -- -- --    10/03/23 0230 98 -- -- -- --    10/03/23 0200 98 -- -- -- --    10/03/23 0147 98 -- -- -- --    10/03/23 0145 98 -- -- -- --    10/03/23 0130 98 -- -- -- --    10/03/23 0115 98 -- -- -- --    10/03/23 0100 98 -- -- -- --    10/03/23 0030 99 -- -- -- --    10/03/23 0016 99 -- -- -- --    10/03/23 0000 --  ventilator -- 65 --    10/02/23 2334 100 ventilator -- 65 --    10/02/23 2331 100 ventilator -- 85 32    10/02/23 2330 100 -- -- -- --    10/02/23 2300 100 -- -- -- --    10/02/23 2245 100 -- -- -- --    10/02/23 2230 100 -- -- -- --    10/02/23 2215 100 -- -- -- --    10/02/23 2200 100 -- -- -- --    10/02/23 2145 100 -- -- -- --    10/02/23 2130 100 -- -- -- --    10/02/23 2115 100 -- -- -- --    10/02/23 2100 100 -- -- -- --    10/02/23 2045 100 -- -- -- --    10/02/23 2030 100 -- -- -- --    10/02/23 2015 100 -- -- -- --    10/02/23 2000 100 ventilator -- 85 --    10/02/23 1945 100 -- -- -- --    10/02/23 1930 100 -- -- -- --    10/02/23 1915 100 -- -- -- --    10/02/23 1904 93 ventilator -- 85 47    10/02/23 1900 99 ventilator -- -- --    10/02/23 1845 100 -- -- -- --    10/02/23 1830 100 -- -- -- --    10/02/23 1815 100 -- -- -- --    10/02/23 1800 97 -- -- -- --    10/02/23 1745 98 -- -- -- --    10/02/23 1743 98 ventilator -- 100 58    10/02/23 1730 96 -- -- -- --    10/02/23 1715 99 -- -- -- --    10/02/23 1700 96 -- -- -- --    10/02/23 1645 95 -- -- -- --    10/02/23 1630 93 -- -- -- --    10/02/23 1615 93 -- -- -- --    10/02/23 1600 93 -- -- -- --    10/02/23 1545 94 -- -- -- --    10/02/23 1530 93 -- -- -- --    10/02/23 1515 93 -- -- -- --    10/02/23 1500 95 -- -- -- --    10/02/23 1445 88 -- -- -- --    10/02/23 1430 91 NPPV/NIV -- 40 --    10/02/23 1429 92 NPPV/NIV -- 40 --    10/02/23 1354 98 nasal cannula 5 -- --    10/02/23 0830 -- nasal cannula 3 -- --    10/02/23 0744 98 nasal cannula 3 -- --    10/02/23 0652 95 nasal cannula 3 -- --    10/02/23 0423 93 nasal cannula 3 -- --    10/02/23 0357 -- nasal cannula 3 -- --    10/02/23 0110 97 nasal cannula 3 -- --    10/02/23 0028 -- nasal cannula 3 -- --          Ventilator/Non-Invasive Ventilation Settings (From admission, onward)       Start     Ordered    10/03/23 5591  Ventilator - Vent Mode: AC/VC+; Rate: Other; Rate: 18; FiO2: Titrate Per  SpO2; Titrate Oxygen for SpO2: 92% or Greater; PEEP: 7.5; Tidal Volume: mL; TV: 500  Continuous        Question Answer Comment   Vent Mode AC/VC+    Rate Other    Rate 18    FiO2 Titrate Per SpO2    Titrate Oxygen for SpO2 92% or Greater    PEEP 7.5    Tidal Volume mL            10/03/23 1749    10/02/23 1758  Ventilator - Vent Mode: AC/VC+; Rate: Other; Rate: 30; FiO2: Titrate Per SpO2; Titrate Oxygen for SpO2: 92% or Greater; PEEP: 7.5; Tidal Volume: mL; TV: 500  Continuous,   Status:  Canceled        Question Answer Comment   Vent Mode AC/VC+    Rate Other    Rate 30    FiO2 Titrate Per SpO2    Titrate Oxygen for SpO2 92% or Greater    PEEP 7.5    Tidal Volume mL            10/02/23 1758    10/02/23 1352  NIPPV (CPAP or BIPAP)  Until Discontinued,   Status:  Canceled        Question Answer Comment   Indication Acute Respiratory Failure    Type BIPAP    IPAP 15    EPAP 8    Titrate Oxygen for SpO2 90% or Greater        10/02/23 1352                     Physician Progress Notes (last 72 hours)        Dayna Harvey, MOSES, APRN at 10/04/23 1230       Attestation signed by Lonnie Brantley MD at 10/04/23 1312    I have reviewed this documentation and agree.                      Chief Complaint: Pulmonary abscess, respiratory failure, follow-up    Subjective:  Symptoms:  Worsening.    Diet:  NPO.    Patient remains intubated and sedated in CCU.        Vital Signs:  Heart Rate:  [68-92] 73  Resp:  [18] 18  BP: (109-173)/(60-86) 112/66  FiO2 (%):  [30 %-40 %] 30 %    Intake & Output (last day)         10/03 0701  10/04 0700 10/04 0701  10/05 0700    I.V. (mL/kg) 689.2 (5) 50 (0.4)    IV Piggyback 100 100    Total Intake(mL/kg) 789.2 (5.7) 150 (1.1)    Urine (mL/kg/hr) 250 (0.1)     Chest Tube 120     Total Output 370     Net +419.2 +150                  Objective:  General Appearance:  Comfortable and ill-appearing.    Vital signs: (most recent): Blood pressure 112/66, pulse 73, temperature 99.3 øF (37.4 øC),  "temperature source Oral, resp. rate 18, height 160 cm (62.99\"), weight (!) 140 kg (307 lb 8.7 oz), last menstrual period 10/01/2023, SpO2 98 %.  Vital signs are normal.  No fever.    Lungs:  Normal effort and tachypnea.  There are decreased breath sounds.  No rales, wheezes or rhonchi.    Heart: Normal rate.  Regular rhythm.    Chest: (Right sternoclavicular swelling updated)  Abdomen: Abdomen is soft.  Bowel sounds are normal.     Extremities: Decreased range of motion.    Skin:  Warm and dry.            Chest tube:   Site: Right, Clean, Dry, Intact, and Securement device intact  Suction: -20 cm  Air Leak: negative  24 Hour Total: 120ml, purulent       Results Review:     I reviewed the patient's new clinical results.  I reviewed the patient's new imaging results and agree with the interpretation.  I reviewed the patient's other test results and agree with the interpretation  Discussed with patient, family at bedside, RN and Dr. Ny.    Imaging Results (Last 24 Hours)       Procedure Component Value Units Date/Time    XR Chest 1 View [[id number]] Collected: 10/04/23 0548     Updated: 10/04/23 0554    Narrative:      SINGLE VIEW OF THE CHEST     HISTORY: Respiratory failure     COMPARISON: October 30,023     FINDINGS:  Weighted enteric feeding tube extends into the upper abdomen. Otherwise,  tubes and lines are stable. Left internal jugular vein central venous  line is again noted with the tip extending towards the right innominate  vein. A pigtail pleural drainage catheter has been placed. No  pneumothorax is seen dense consolidation within the right upper lobe  slightly improved, but overall opacification of the right hemithorax is  worsened. There is some persistent left basilar consolidation. No  definite effusion is seen.       Impression:      There has been some increased aeration within the right upper lobe when  compared to the prior study. However, overall opacification of the right  hemithorax has " worsened.     This report was finalized on 10/4/2023 5:50 AM by Dr. Zulay Paredes M.D on Workstation: [id number]       US Renal Bilateral [[id number]] Collected: 10/03/23 1641     Updated: 10/03/23 1647    Narrative:      RENAL ULTRASOUND     HISTORY: Acute kidney injury     COMPARISON: None     TECHNIQUE: Grayscale, color Doppler images of the kidneys and bladder  were obtained.     FINDINGS:  Grayscale and color Doppler images of the kidneys and bladder were  obtained.     The right kidney measures 14.2 cm in length.     The left kidney measures 14.1 cm in length.     The kidneys demonstrate normal echogenicity and cortical thickness.  There is no hydronephrosis. Trace bilateral perinephric fluid is  present. Irregular, bulging contour of the midpole of the left kidney  measuring up to approximately 3.5 cm.     The bladder is unremarkable.     Visualized portions of the aorta and IVC are normal in caliber and  appearance. Findings suggestive of multiple uterine fibroids on limited  evaluation of the uterus.       Impression:      1.  Trace bilateral perinephric fluid. There is prominence of the  midpole of the left kidney which has a slightly bulged contour,  measuring approximate 3.5 cm. Underlying renal mass cannot be excluded  and further evaluation with CT versus MRI of the abdomen with and  without contrast is recommended to better characterize.  2.  There appear to be multiple fibroids in the uterus; however findings  are incompletely evaluated. Findings can be better characterized with  pelvic sonogram if clinically indicated.  3.  Other findings as above.           This report was finalized on 10/3/2023 4:44 PM by Dr. Diego Brown M.D  on Workstation: [id number]       CT Guided Chest Tube [[id number]] Collected: 10/03/23 1543     Updated: 10/03/23 1554    Narrative:      CT GUIDED CHEST TUBE     HISTORY:  Right lung abscess.     COMPARISON: CTA 10/1/2023     PROCEDURE: After informed consent was  obtained and documented, the  patient was placed on the CT table in supine position. A verbal time out  was performed with appropriate identifiers confirmed. An ICU nurse was  continuously present for monitoring. A preliminary partial scan of the  thorax was obtained; in the interval there was improvement of anterior  right upper lobe aeration but increase of right pleural effusion. A  route was planned for catheter placement and an appropriate skin site  chosen. This site was then prepped and draped in the usual sterile  manner and the soft tissues anesthetized with 1% lidocaine down to the  pleura. A needle was placed into the apparent right upper lobe abscess.  A wire was advanced through the needle which was then removed. After  serial dilation, a 14 [place] skater drainage catheter was placed into  the fluid collection, coiled, and locked. The catheter was secured with  Dermabond reinforced suture and stay-fix dressing and attached to an  atrium device. 60 mL purulent appearing fluid were manually removed with  specimen sent to lab. The patient was stable throughout, there were no  immediate complications. Radiation dose reduction techniques were  utilized, including automated exposure control and exposure modulation  based on body size.          Impression:      Successful 14 [place] chest tube placement for right lung abscess as  described above.        This report was finalized on 10/3/2023 3:51 PM by Dr. Jon Singer M.D  on Workstation: [id number]               Lab Results:     Lab Results (last 24 hours)       Procedure Component Value Units Date/Time    POC Glucose Once [[id number]]  (Abnormal) Collected: 10/04/23 1216    Specimen: Blood Updated: 10/04/23 1217     Glucose 134 mg/dL     Non-gynecologic Cytology [[id number]] Collected: 10/03/23 1400    Specimen: Body Fluid from Pleural Cavity Updated: 10/04/23 0949    Respiratory Culture - Sputum, ET Suction [[id number]] Collected: 10/03/23 0945    Specimen:  Sputum from ET Suction Updated: 10/04/23 0920     Respiratory Culture Rare The culture consists of normal respiratory annabel. This is a preliminary report; final report to follow.     Gram Stain No WBCs or organisms seen    Respiratory Culture - Wash, Bronchus [[id number]] Collected: 10/02/23 1756    Specimen: Wash from Bronchus Updated: 10/04/23 0915     Respiratory Culture Scant growth (1+) Normal respiratory annabel. No S. aureus or Pseudomonas aeruginosa detected. Final report.     Gram Stain Rare (1+) WBCs seen      No organisms seen    Body Fluid Culture - Body Fluid, Pleural Cavity [[id number]] Collected: 10/03/23 1400    Specimen: Body Fluid from Pleural Cavity Updated: 10/04/23 0843     Body Fluid Culture Culture in progress     Gram Stain Many (4+) WBCs seen      Few (2+) Gram positive cocci in chains    Ferritin [[id number]]  (Abnormal) Collected: 10/04/23 0414    Specimen: Blood Updated: 10/04/23 0452     Ferritin 426.00 ng/mL     Narrative:      Results may be falsely decreased if patient taking Biotin.      Phosphorus [[id number]]  (Abnormal) Collected: 10/04/23 0414    Specimen: Blood Updated: 10/04/23 0450     Phosphorus 7.0 mg/dL     Magnesium [[id number]]  (Normal) Collected: 10/04/23 0414    Specimen: Blood Updated: 10/04/23 0450     Magnesium 2.6 mg/dL     Comprehensive Metabolic Panel [[id number]]  (Abnormal) Collected: 10/04/23 0414    Specimen: Blood Updated: 10/04/23 0450     Glucose 119 mg/dL      BUN 41 mg/dL      Creatinine 4.64 mg/dL      Sodium 140 mmol/L      Potassium 4.9 mmol/L      Chloride 99 mmol/L      CO2 27.3 mmol/L      Calcium 9.1 mg/dL      Total Protein 7.0 g/dL      Albumin 2.6 g/dL      ALT (SGPT) 17 U/L      AST (SGOT) 10 U/L      Alkaline Phosphatase 114 U/L      Total Bilirubin 0.3 mg/dL      Globulin 4.4 gm/dL      A/G Ratio 0.6 g/dL      BUN/Creatinine Ratio 8.8     Anion Gap 13.7 mmol/L      eGFR 10.8 mL/min/1.73      Comment: <15 Indicative of kidney failure       Narrative:       GFR Normal >60  Chronic Kidney Disease <60  Kidney Failure <15      Uric Acid [[id number]]  (Abnormal) Collected: 10/04/23 0414    Specimen: Blood Updated: 10/04/23 0449     Uric Acid 9.9 mg/dL     Iron Profile [[id number]]  (Abnormal) Collected: 10/04/23 0414    Specimen: Blood Updated: 10/04/23 0449     Iron 27 mcg/dL      Iron Saturation (TSAT) 13 %      Transferrin 135 mg/dL      TIBC 201 mcg/dL     Chloride, Urine, Random - Straight Cath [[id number]] Collected: 10/04/23 0257    Specimen: Urine from Straight Cath Updated: 10/04/23 0443     Chloride, Urine <20 mmol/L     Narrative:      Reference intervals for random urine have not been established.  Clinical usage is dependent upon physician's interpretation in combination with other laboratory tests.       CBC & Differential [[id number]]  (Abnormal) Collected: 10/04/23 0414    Specimen: Blood Updated: 10/04/23 0428    Narrative:      The following orders were created for panel order CBC & Differential.  Procedure                               Abnormality         Status                     ---------                               -----------         ------                     CBC Auto Differential[[id number]]        Abnormal            Final result                 Please view results for these tests on the individual orders.    CBC Auto Differential [[id number]]  (Abnormal) Collected: 10/04/23 0414    Specimen: Blood Updated: 10/04/23 0428     WBC 12.10 10*3/mm3      RBC 2.96 10*6/mm3      Hemoglobin 7.8 g/dL      Hematocrit 24.5 %      MCV 82.8 fL      MCH 26.4 pg      MCHC 31.8 g/dL      RDW 14.9 %      RDW-SD 44.7 fl      MPV 11.0 fL      Platelets 285 10*3/mm3      Neutrophil % 83.4 %      Lymphocyte % 8.9 %      Monocyte % 5.4 %      Eosinophil % 0.7 %      Basophil % 0.3 %      Immature Grans % 1.3 %      Neutrophils, Absolute 10.08 10*3/mm3      Lymphocytes, Absolute 1.08 10*3/mm3      Monocytes, Absolute 0.65 10*3/mm3      Eosinophils, Absolute 0.09  10*3/mm3      Basophils, Absolute 0.04 10*3/mm3      Immature Grans, Absolute 0.16 10*3/mm3      nRBC 0.2 /100 WBC     Eosinophil Smear - Urine, Urine, Clean Catch [[id number]]  (Normal) Collected: 10/04/23 0257    Specimen: Urine, Clean Catch Updated: 10/04/23 0409     Eosinophil Smear 0 % EOS/100 Cells     Blood Gas, Arterial - [[id number]]  (Abnormal) Collected: 10/04/23 0357    Specimen: Arterial Blood Updated: 10/04/23 0400     Site Right Radial     Cole's Test Positive     pH, Arterial 7.376 pH units      pCO2, Arterial 44.7 mm Hg      pO2, Arterial 127.2 mm Hg      HCO3, Arterial 26.2 mmol/L      Base Excess, Arterial 0.6 mmol/L      Comment: Serial Number: [id number]:  [id number]        O2 Saturation, Arterial 98.8 %      A-a DO2 0.5 mmHg      CO2 Content 27.5 mmol/L      Barometric Pressure for Blood Gas 751.8000 mmHg      Modality Adult Vent     FIO2 40 %      Ventilator Mode VC     Set Tidal Volume 500     Set Mech Resp Rate 18     Rate 18 Breaths/minute      PEEP 8     Hemodilution No     Inspiratory Time 1     Device Comment Sat 100% VC+ ETCO2 41    Protein / Creatinine Ratio, Urine - Straight Cath [[id number]]  (Abnormal) Collected: 10/04/23 0257    Specimen: Urine from Straight Cath Updated: 10/04/23 0352     Protein/Creatinine Ratio, Urine 2,111.5 mg/G Crea      Creatinine, Urine 168.6 mg/dL      Total Protein, Urine 356.0 mg/dL     Sodium, Urine, Random - Straight Cath [[id number]] Collected: 10/04/23 0257    Specimen: Urine from Straight Cath Updated: 10/04/23 0352     Sodium, Urine 20 mmol/L     Narrative:      Reference intervals for random urine have not been established.  Clinical usage is dependent upon physician's interpretation in combination with other laboratory tests.       Urinalysis, Microscopic Only - Straight Cath [[id number]]  (Abnormal) Collected: 10/04/23 0257    Specimen: Urine from Straight Cath Updated: 10/04/23 0349     RBC, UA 6-12 /HPF      WBC, UA 3-5 /HPF      Comment: Urine  culture not indicated.        Bacteria, UA Trace /HPF      Squamous Epithelial Cells, UA 3-6 /HPF      Renal Epithelial Cells, UA 3-6 /HPF      Hyaline Casts, UA 0-2 /LPF      Granular Casts, UA 3-6 /LPF      Coarse Granular Casts, UA 3-6 /LPF      Amorphous Crystals, UA Moderate/2+ /HPF      Methodology Manual Light Microscopy    Sodium, Urine, Random - Urine, Clean Catch [[id number]] Collected: 10/04/23 0257    Specimen: Urine, Clean Catch Updated: 10/04/23 0340     Sodium, Urine 23 mmol/L     Narrative:      Reference intervals for random urine have not been established.  Clinical usage is dependent upon physician's interpretation in combination with other laboratory tests.       Urea Nitrogen, Urine - Urine, Clean Catch [[id number]] Collected: 10/04/23 0257    Specimen: Urine, Clean Catch Updated: 10/04/23 0340     Urea Nitrogen, Urine 378 mg/dL     Narrative:      Reference intervals for random urine have not been established.  Clinical usage is dependent upon physician's interpretation in combination with other laboratory tests.       Creatinine Urine Random (kidney function) GFR component - Urine, Clean Catch [[id number]] Collected: 10/04/23 0257    Specimen: Urine, Clean Catch Updated: 10/04/23 0340     Creatinine, Urine 170.4 mg/dL     Narrative:      Reference intervals for random urine have not been established.  Clinical usage is dependent upon physician's interpretation in combination with other laboratory tests.       Urinalysis With Culture If Indicated - Straight Cath [[id number]]  (Abnormal) Collected: 10/04/23 0257    Specimen: Urine from Straight Cath Updated: 10/04/23 0335     Color, UA Dark Yellow     Appearance, UA Turbid     pH, UA <=5.0     Specific Gravity, UA 1.027     Glucose, UA Negative     Ketones, UA Negative     Bilirubin, UA Negative     Blood, UA Small (1+)     Protein, UA >=300 mg/dL (3+)     Leuk Esterase, UA Negative     Nitrite, UA Negative     Urobilinogen, UA 0.2 E.U./dL     Narrative:      In absence of clinical symptoms, the presence of pyuria, bacteria, and/or nitrites on the urinalysis result does not correlate with infection.    Hemoglobin & Hematocrit, Blood [[id number]]  (Abnormal) Collected: 10/03/23 1916    Specimen: Blood Updated: 10/03/23 1932     Hemoglobin 8.0 g/dL      Hematocrit 25.7 %     POC Glucose Once [[id number]]  (Normal) Collected: 10/03/23 1815    Specimen: Blood Updated: 10/03/23 1816     Glucose 122 mg/dL     Glucose, Body Fluid - Body Fluid, Pleural Cavity [[id number]] Collected: 10/03/23 1400    Specimen: Body Fluid from Pleural Cavity Updated: 10/03/23 1720     Glucose, Fluid 2 mg/dL     Narrative:      No Reference Ranges Established.    Serous fluid glucose less than 60 mg/dL or less than 30 mg/dL below serum glucose suggests an infectious or malignant exudate.     This test was developed, it performance characteristics determined and judged suitable for clinical purposes by [facility].  It has not been cleared or approved by the FDA.  The laboratory is regulated under CLIA as qualified to perform high-complexity testing.     Lactate Dehydrogenase, Body Fluid - Body Fluid, Pleural Cavity [[id number]] Collected: 10/03/23 1400    Specimen: Body Fluid from Pleural Cavity Updated: 10/03/23 1719     Lactate Dehydrogenase (LD), Fluid >2,500 U/L     Narrative:      No Reference Ranges Established.    Serous fluid LDH greater than 60 percent of the serum LDH or serous fluid LDH two-thirds of the upper limit of normal for serum LDH suggests the fluid is an exudate.     1. Pleural TP/Serum TP >0.5  2. Pleural LD/Serum LD >0.6  3. Pleural LD >2/3 of the upper limit of normal for serum LDH    This test was developed, it performance characteristics determined and judged suitable for clinical purposes by [facility].  It has not been cleared or approved by the FDA.  The laboratory is regulated under CLIA as qualified to  perform high-complexity testing.     Immunoglobulin Free LT Chains Blood [[id number]] Collected: 10/03/23 1545    Specimen: Blood Updated: 10/03/23 1603    Immunofixation, Serum [[id number]] Collected: 10/03/23 1545    Specimen: Blood Updated: 10/03/23 1602    Reticulocytes [[id number]]  (Normal) Collected: 10/03/23 0408    Specimen: Blood Updated: 10/03/23 1526     Reticulocyte % 1.06 %      Reticulocyte Absolute 0.0328 10*6/mm3     Anaerobic Culture - Body Fluid, Pleural Cavity [[id number]] Collected: 10/03/23 1400    Specimen: Body Fluid from Pleural Cavity Updated: 10/03/23 1433    AFB Culture - Body Fluid, Pleural Cavity [[id number]] Collected: 10/03/23 1400    Specimen: Body Fluid from Pleural Cavity Updated: 10/03/23 1433    Blood Culture - Blood, Hand, Left [[id number]]  (Normal) Collected: 10/01/23 1339    Specimen: Blood from Hand, Left Updated: 10/03/23 1345     Blood Culture No growth at 2 days    POC Glucose Once [[id number]]  (Normal) Collected: 10/03/23 1243    Specimen: Blood Updated: 10/03/23 1245     Glucose 130 mg/dL     Blood Culture - Blood, Arm, Left [[id number]]  (Normal) Collected: 10/01/23 1231    Specimen: Blood from Arm, Left Updated: 10/03/23 1245     Blood Culture No growth at 2 days    Narrative:      Less than seven (7) mL's of blood was collected.  Insufficient quantity may yield false negative results.             Assessment & Plan       PNA (pneumonia)    Acute respiratory failure with hypoxia    Hypertensive urgency    Abscess of upper lobe of right lung with pneumonia    Sepsis    Type 2 diabetes mellitus with hyperglycemia    ELLY (acute kidney injury)    Obesity, morbid, BMI 50 or higher    Anemia       Assessment & Plan    Patient is currently intubated and sedated in ICU. S/p  CT-guided chest tube to right pulmonary abscess with purulent output overnight.  We will plan for a round of intrapleural lytic therapy today and likely the next several days.  Recheck chest x-ray in  a.m.  Pulmonary cultures from pleural fluid are growing gram-positive cocci in chains.  Antibiotic management per ID.  Extubation per pulmonary medicine.  Discussed with Dr. Gonzales.    We will follow.    Dayna Harvey, MOSES, APRN  [facility]  10/04/23  12:30 EDT    Greater than 35 minutes was spent reviewing the patient's chart, radiographic imaging, labs, provider notes, assessing the patient and developing a plan of care.  This was discussed with the patient and RN.        Electronically signed by Lonnie Brantley MD at 10/04/23 1312       Iain Owens MD at 10/04/23 0906              [facility] Progress Note      Patient Name: Nay Gonzalez  : 1970  MRN: [id number]  Primary Care Physician:  Provider, No Known  Date of admission: 10/1/2023    Subjective     Interval History:   Follow-up acute kidney injury    The patient is currently on the ventilator, sedated, her vital signs appears to be stable urine output in the last shift was 250 cc patient has external urinary catheter she would benefit from Sauer catheter a better understanding of her I's and O's.    Review of Systems:   As noted above    Objective     Vitals:   Heart Rate:  [71-92] 72  Resp:  [18-30] 18  BP: (109-173)/(60-86) 122/71  Flow (L/min):  [5] 5  FiO2 (%):  [30 %-49 %] 30 %    Intake/Output Summary (Last 24 hours) at 10/4/2023 0906  Last data filed at 10/4/2023 0841  Gross per 24 hour   Intake 839.15 ml   Output 370 ml   Net 469.15 ml       Physical Exam:    General Appearance: Morbidly obese, on the ventilator, sedated, chronically ill, no acute distress  Skin: warm and dry  HEENT: Orally intubated  Neck: Difficult to assess due to body habitus  Lungs: Bilateral rhonchi, breathing effort not  Heart: RRR, normal S1 and S2, no  Abdomen: soft, no guarding, protuberant, normoactive bowel  : Difficult to assess for palpable bladder with body habitus  Extremities: no edema, cyanosis or  clubbing  Neuro: Unable to assess    Scheduled Meds:     cefTRIAXone, 2,000 mg, Intravenous, Q24H  chlorhexidine, 15 mL, Mouth/Throat, Q12H  enoxaparin, 30 mg, Subcutaneous, Q24H  hydrALAZINE, 25 mg, Nasogastric, Q8H  insulin lispro, 2-7 Units, Subcutaneous, 4x Daily AC & at Bedtime  labetalol, 100 mg, Nasogastric, Q12H  metroNIDAZOLE, 500 mg, Intravenous, Q8H  pantoprazole, 40 mg, Intravenous, Q24H  senna-docusate sodium, 2 tablet, Nasogastric, BID  sodium chloride, 10 mL, Intravenous, Q12H      IV Meds:   Pharmacy to Dose enoxaparin (LOVENOX),   propofol, 5-50 mcg/kg/min, Last Rate: 40 mcg/kg/min (10/04/23 0841)        Results Reviewed:   I have personally reviewed the results from the time of this admission to 10/4/2023 09:06 EDT     Results from last 7 days   Lab Units 10/04/23  0414 10/03/23  0408 10/02/23  2026 10/02/23  0501 10/01/23  1138   SODIUM mmol/L 140 142 142   < > 141   POTASSIUM mmol/L 4.9 4.5 5.2   < > 4.2   CHLORIDE mmol/L 99 102 102   < > 101   CO2 mmol/L 27.3 25.0 29.7*   < > 30.0*   BUN mg/dL 41* 31* 27*   < > 14   CREATININE mg/dL 4.64* 2.73* 1.95*   < > 0.95   CALCIUM mg/dL 9.1 9.0 9.3   < > 9.6   BILIRUBIN mg/dL 0.3 0.3  --   --  0.3   ALK PHOS U/L 114 138*  --   --  154*   ALT (SGPT) U/L 17 22  --   --  35*   AST (SGOT) U/L 10 17  --   --  19   GLUCOSE mg/dL 119* 87 123*   < > 168*    < > = values in this interval not displayed.       Estimated Creatinine Clearance: 19.6 mL/min (A) (by C-G formula based on SCr of 4.64 mg/dL (H)).    Results from last 7 days   Lab Units 10/04/23  0414 10/02/23  2026 10/02/23  0501   MAGNESIUM mg/dL 2.6  --  2.1   PHOSPHORUS mg/dL 7.0* 5.1* 4.6*       Results from last 7 days   Lab Units 10/04/23  0414 10/03/23  0408   URIC ACID mg/dL 9.9* 9.4*       Results from last 7 days   Lab Units 10/04/23  0414 10/03/23  1916 10/03/23  0408 10/02/23  0501 10/01/23  1138   WBC 10*3/mm3 12.10*  --  17.47* 24.25* 22.45*   HEMOGLOBIN g/dL 7.8* 8.0* 8.0* 10.7* 10.4*    PLATELETS 10*3/mm3 285  --  319 353 395             Assessment / Plan     ASSESSMENT:  Acute kidney injury associated with sudden correction of her hypertension initially also possible component of contrast-induced nephropathy because patient had CTA on 10/1/2023.  Creatinine is increasing up to 4.64, But her electrolyte within acceptable range.  No indication for dialysis  Acute respiratory failure on the ventilator  Now diagnosis of hypertension  New diagnosis of diabetes mellitus type 2  Super morbid obesity  Anemia, the etiology not very clear, most likely patient have iron deficiency iron saturation 13% but her TIBC is on the low side, suggestive of anemia of chronic disease too.    PLAN:  Continue the same treatment  No indication for dialysis today  Surveillance labs    I discussed the case with the patient's mother at the bedside and her nurse  I reviewed the chart and other providers notes, I reviewed imaging and lab data.  Copied text in this note has been reviewed and is accurate as of 10/04/23.       Thank you for involving us in the care of Nay Gonzalez.  Please feel free to call with any questions.    Iain Owens MD  10/04/23  09:06 EDT    [facility]  [phone]    Please note that portions of this note were completed with a voice recognition program.    Electronically signed by Iain Owens MD at 10/04/23 0911       Akaash Azul MD at 10/04/23 0842          ID NOTE    CC: f/u pulmonary abscess     Subj: History from pt's mother and RN. She remains intubated but is awake and following commands. She is trying to speak and squeezes my hand. Fever curve better. WBC trending down. Creatinine worse. S/P IR guided CT placement. 60 cc pus removed.     Medications:    Current Facility-Administered Medications:     acetaminophen (TYLENOL) tablet 650 mg, 650 mg, Oral, Q4H PRN, 650 mg at 10/02/23 1102 **OR** acetaminophen (TYLENOL) 160 MG/5ML oral  solution 650 mg, 650 mg, Oral, Q4H PRN **OR** acetaminophen (TYLENOL) suppository 650 mg, 650 mg, Rectal, Q4H PRN, Juan Elena MD, 650 mg at 10/02/23 2341    sennosides-docusate (PERICOLACE) 8.6-50 MG per tablet 2 tablet, 2 tablet, Nasogastric, BID, 2 tablet at 10/03/23 2117 **AND** polyethylene glycol (MIRALAX) packet 17 g, 17 g, Oral, Daily PRN **AND** bisacodyl (DULCOLAX) EC tablet 5 mg, 5 mg, Oral, Daily PRN **AND** bisacodyl (DULCOLAX) suppository 10 mg, 10 mg, Rectal, Daily PRN, Brannon Gonzales MD    Calcium Replacement - Follow Nurse / BPA Driven Protocol, , Does not apply, PRN, Juan Elena MD    cefTRIAXone (ROCEPHIN) 2,000 mg in sodium chloride 0.9 % 100 mL IVPB-VTB, 2,000 mg, Intravenous, Q24H, Aakash Azul MD    chlorhexidine (PERIDEX) 0.12 % solution 15 mL, 15 mL, Mouth/Throat, Q12H, Johnny Tuttle MD, 15 mL at 10/03/23 2115    dextrose (D50W) (25 g/50 mL) IV injection 25 g, 25 g, Intravenous, Q15 Min PRN, Juan Elena MD    dextrose (GLUTOSE) oral gel 15 g, 15 g, Oral, Q15 Min PRN, Juan Elena MD    Enoxaparin Sodium (LOVENOX) syringe 30 mg, 30 mg, Subcutaneous, Q24H, Quynh Choe APRN    fentaNYL citrate (PF) (SUBLIMAZE) injection 25 mcg, 25 mcg, Intravenous, Q30 Min PRN, Otto Castillo MD, 25 mcg at 10/04/23 0144    glucagon (GLUCAGEN) injection 1 mg, 1 mg, Intramuscular, Q15 Min PRN, Juan Elena MD    hydrALAZINE (APRESOLINE) injection 20 mg, 20 mg, Intravenous, Q4H PRN, Brannon Gonzales MD    hydrALAZINE (APRESOLINE) tablet 25 mg, 25 mg, Nasogastric, Q8H, Brannon Gonzales MD, 25 mg at 10/04/23 0730    influenza vac split quad (FLUZONE,FLUARIX,AFLURIA,FLULAVAL) injection 0.5 mL, 0.5 mL, Intramuscular, During Hospitalization, Juan Elena MD    insulin lispro (HUMALOG/ADMELOG) injection 2-7 Units, 2-7 Units, Subcutaneous, 4x Daily AC & at Bedtime, Juan Elena MD, 2 Units at 10/02/23 0907     ipratropium-albuterol (DUO-NEB) nebulizer solution 3 mL, 3 mL, Nebulization, Q6H PRN, Johnny Tuttle MD    labetalol (NORMODYNE) tablet 100 mg, 100 mg, Nasogastric, Q12H, Brannon Gonzales MD, 100 mg at 10/03/23 2031    Magnesium Standard Dose Replacement - Follow Nurse / BPA Driven Protocol, , Does not apply, PRN, Juan Elena MD    metroNIDAZOLE (FLAGYL) IVPB 500 mg, 500 mg, Intravenous, Q8H, Aakash Azul MD    nitroglycerin (NITROSTAT) SL tablet 0.4 mg, 0.4 mg, Sublingual, Q5 Min PRN, Juan Elena MD    ondansetron (ZOFRAN) tablet 4 mg, 4 mg, Oral, Q6H PRN **OR** ondansetron (ZOFRAN) injection 4 mg, 4 mg, Intravenous, Q6H PRN, Juan Elena MD    pantoprazole (PROTONIX) injection 40 mg, 40 mg, Intravenous, Q24H, Johnny Tuttle MD, 40 mg at 10/03/23 0933    Pharmacy to Dose enoxaparin (LOVENOX), , Does not apply, Continuous PRN, Quynh Choe APRN    Phosphorus Replacement - Follow Nurse / BPA Driven Protocol, , Does not apply, PRN, Juan Elena MD    Potassium Replacement - Follow Nurse / BPA Driven Protocol, , Does not apply, PRN, Juan Elena MD    propofol (DIPRIVAN) infusion 10 mg/mL 100 mL, 5-50 mcg/kg/min, Intravenous, Titrated, Johnny Tuttle MD, Last Rate: 33.6 mL/hr at 10/04/23 0539, 40 mcg/kg/min at 10/04/23 0539    sodium chloride 0.9 % flush 10 mL, 10 mL, Intravenous, Q12H, Juan Elena MD, 10 mL at 10/03/23 0949    sodium chloride 0.9 % flush 10 mL, 10 mL, Intravenous, PRN, Juan Elena MD, 10 mL at 10/01/23 1723    sodium chloride 0.9 % infusion 40 mL, 40 mL, Intravenous, PRN, Juan Elena MD      Objective   Vital Signs   Temp:  [99.3 øF (37.4 øC)] 99.3 øF (37.4 øC)  Heart Rate:  [71-92] 72  Resp:  [18-30] 18  BP: (109-173)/() 122/71  FiO2 (%):  [30 %-49 %] 30 %    Physical Exam:   General: intubated, awake, calm, follows commands  Eyes: no scleral icterus  ENT: ETT in place  Cardiovascular:  "NR  Respiratory: R rales; no wheezing; 30% FiO2; R chest drain w/ thick brown material in tubing  GI: Abdomen is soft, not tender  :  external urinary cathete  Skin: No rashes  Vasc: LIJ CVC w/o erythema    Labs:   CBC, BMP, and blood and body fluid cultures reviewed today  Lab Results   Component Value Date    WBC 12.10 (H) 10/04/2023    HGB 7.8 (L) 10/04/2023    HCT 24.5 (L) 10/04/2023    MCV 82.8 10/04/2023     10/04/2023     Lab Results   Component Value Date    GLUCOSE 119 (H) 10/04/2023    CALCIUM 9.1 10/04/2023     10/04/2023    K 4.9 10/04/2023    CO2 27.3 10/04/2023    CL 99 10/04/2023    BUN 41 (H) 10/04/2023    CREATININE 4.64 (H) 10/04/2023    EGFR 10.8 (L) 10/04/2023    BCR 8.8 10/04/2023    ANIONGAP 13.7 10/04/2023     Lab Results   Component Value Date    CRP 28.05 (H) 10/02/2023     Lab Results   Component Value Date    HGBA1C 7.50 (H) 10/01/2023     HIV negative  Hep C negative  Procal 0.59    Microbiology:  10/1 RPP: negative  10/1 BCx: NGTD  10/1 MRSA nares: negative  10/2 BAL Cx: pending  10/3 ETT Cx: pending  10/3 Lung Abscess Cx: GPCs in [facility]    New Radiology:  CXR personally reviewed and shows improved RUL infiltrate    Prior Radiology:  CT chest:   \"1. Large (12 cm) right upper lobe pulmonary abscess with extension into   the anterior right chest wall as detailed above.   2. Small right pleural effusion.   3. Mediastinal, right hilar and right axillary lymphadenopathy is likely   reactive.   4. No central pulmonary embolus. Contrast bolus timing limits evaluation   of the more distal pulmonary arteries.   5. Dilated main pulmonary artery (36 mm). \"    ASSESSMENT/PLAN:  Right upper lobe pulmonary abscess due to GPCs in [facility]  Acute hypercapneic and hypoxic respiratory failure requiring mechanical ventilation  Sepsis due to #1  Super obesity BMI 54  Uncontrolled diabetes type 2 A1c  7.5%  Hypertension    She is afebrile and WBC trending down. On 10/3/23, she had an IR-guided " CT chest tube placement. Gram stain w/ GPCs in chains which is likely Strep. Creatinine is worse. Stop Zosyn then start ceftriaxone 2 g IV q24h and Flagyl 500 mg IV q8h with duration TBD. I'll follow-up thoracic surgery evaluation today. I'll check a BMP daily for monitoring. Thanks to nephrologist for his recommendations.     ID will follow.     Electronically signed by Aakash Azul MD at 10/04/23 0828       Brannon Gonzales MD at 10/04/23 0756              [facility] INPATIENT PROGRESS NOTE         [facility] CORONARY CARE    10/4/2023      PATIENT IDENTIFICATION:  Name: Nay Gonzalez ADMIT: 10/1/2023   : 1970  PCP: Provider, No Known    MRN: [id number] LOS: 3 days   AGE/SEX: 53 y.o. female  ROOM: [facility]                     LOS 3    Reason for visit: Respiratory failure and pneumonia      SUBJECTIVE:      Sedated on ventilator.  Down to 30% FiO2 and 7.5 of PEEP.  Discussed with family at bedside.  Chest tube stable.  No new issues overnight.    Objective   OBJECTIVE:    Vital Sign Min/Max for last 24 hours  Temp  Min: 99.3 øF (37.4 øC)  Max: 99.3 øF (37.4 øC)   BP  Min: 109/62  Max: 173/86   Pulse  Min: 71  Max: 92   Resp  Min: 18  Max: 30   SpO2  Min: 98 %  Max: 100 %   No data recorded   Weight  Min: 140 kg (307 lb 8.7 oz)  Max: 140 kg (307 lb 8.7 oz)       FiO2 (%):  [30 %-49 %] 30 %  S RR:  [18-30] 18  PEEP/CPAP (cm H2O):  [7.5 cm H20] 7.5 cm H20  MAP (cm H2O):  [14-19] 15           FiO2 (%): 30 %     Body mass index is 54.49 kg/mý.    Intake/Output Summary (Last 24 hours) at 10/4/2023 0845  Last data filed at 10/4/2023 0015  Gross per 24 hour   Intake 789.15 ml   Output 370 ml   Net 419.15 ml         Exam:  GEN:  No distress, appears stated age.  Morbidly obese.  Sedated on ventilator  EYES:   PERRL, anicteric sclerae  ENT:    External ears/nose normal, OP clear  NECK:  No adenopathy, midline trachea  LUNGS: Normal chest on inspection, palpation and diminished breath  sounds on auscultation.  Chest tube stable  CV:  Normal S1S2, without murmur  ABD:  Nontender, nondistended, no hepatosplenomegaly, +BS  EXT:  No pitting edema.  No cyanosis or clubbing.  No mottling and normal cap refill.    Assessment     Scheduled meds:  cefTRIAXone, 2,000 mg, Intravenous, Q24H  chlorhexidine, 15 mL, Mouth/Throat, Q12H  enoxaparin, 30 mg, Subcutaneous, Q24H  hydrALAZINE, 25 mg, Nasogastric, Q8H  insulin lispro, 2-7 Units, Subcutaneous, 4x Daily AC & at Bedtime  labetalol, 100 mg, Nasogastric, Q12H  metroNIDAZOLE, 500 mg, Intravenous, Q8H  pantoprazole, 40 mg, Intravenous, Q24H  senna-docusate sodium, 2 tablet, Nasogastric, BID  sodium chloride, 10 mL, Intravenous, Q12H      IV meds:                      Pharmacy to Dose enoxaparin (LOVENOX),   propofol, 5-50 mcg/kg/min, Last Rate: 40 mcg/kg/min (10/04/23 0841)      Data Review:  Results from last 7 days   Lab Units 10/04/23  0414 10/03/23  0408 10/02/23  2026 10/02/23  0501 10/01/23  1138   SODIUM mmol/L 140 142 142 138 141   POTASSIUM mmol/L 4.9 4.5 5.2 5.4* 4.2   CHLORIDE mmol/L 99 102 102 100 101   CO2 mmol/L 27.3 25.0 29.7* 23.9 30.0*   BUN mg/dL 41* 31* 27* 16 14   CREATININE mg/dL 4.64* 2.73* 1.95* 1.08* 0.95   GLUCOSE mg/dL 119* 87 123* 108* 168*   CALCIUM mg/dL 9.1 9.0 9.3 9.6 9.6         Estimated Creatinine Clearance: 19.6 mL/min (A) (by C-G formula based on SCr of 4.64 mg/dL (H)).  Results from last 7 days   Lab Units 10/04/23  0414 10/03/23  1916 10/03/23  0408 10/02/23  0501 10/01/23  1138   WBC 10*3/mm3 12.10*  --  17.47* 24.25* 22.45*   HEMOGLOBIN g/dL 7.8* 8.0* 8.0* 10.7* 10.4*   PLATELETS 10*3/mm3 285  --  319 353 395         Results from last 7 days   Lab Units 10/04/23  0414 10/03/23  0408 10/01/23  1138   ALT (SGPT) U/L 17 22 35*   AST (SGOT) U/L 10 17 19     Results from last 7 days   Lab Units 10/04/23  0357 10/03/23  0339 10/02/23  1840 10/02/23  1701 10/02/23  1459 10/02/23  1344   PH, ARTERIAL pH units 7.376 7.498* 7.309*  7.071* 7.236* 7.074*   PO2 ART mm Hg 127.2* 109.1* 174.3* 90.5 122.2* 96.8   PCO2, ARTERIAL mm Hg 44.7 34.8* 59.3* 108.4* 66.9* 112.4*   HCO3 ART mmol/L 26.2 27.0 29.8* 31.5* 28.4* 32.9*     Results from last 7 days   Lab Units 10/01/23  1138   PROCALCITONIN ng/mL 0.59*   LACTATE mmol/L 1.2         Hemoglobin A1C   Date/Time Value Ref Range Status   10/01/2023 1138 7.50 (H) 4.80 - 5.60 % Final     Glucose   Date/Time Value Ref Range Status   10/03/2023 1815 122 70 - 130 mg/dL Final   10/03/2023 1243 130 70 - 130 mg/dL Final   10/02/2023 2137 93 70 - 130 mg/dL Final   10/02/2023 1347 142 (H) 70 - 130 mg/dL Final   10/02/2023 1123 134 (H) 70 - 130 mg/dL Final   10/02/2023 0745 154 (H) 70 - 130 mg/dL Final   10/01/2023 2048 296 (H) 70 - 130 mg/dL Final     10/4 chest x-ray reviewed            2D echo 10/2 reviewed: EF 61 to 65% with grade 2 diastolic dysfunction.    CT chest 10/1 reviewed        Microbiology reviewed: No growth to date             Active Hospital Problems    Diagnosis  POA    **PNA (pneumonia) [J18.9]  Yes    ELLY (acute kidney injury) [N17.9]  Unknown    Obesity, morbid, BMI 50 or higher [E66.01]  Unknown    Anemia [D64.9]  Unknown    Sepsis [A41.9]  Unknown    Type 2 diabetes mellitus with hyperglycemia [E11.65]  Unknown    Acute respiratory failure with hypoxia [J96.01]  Unknown    Hypertensive urgency [I16.0]  Unknown    Abscess of upper lobe of right lung with pneumonia [J85.1]  Yes      Resolved Hospital Problems    Diagnosis Date Resolved POA    Hyperkalemia [E87.5] 10/03/2023 Unknown         ASSESSMENT:  Right upper lobe pneumonia/abscess  Sepsis secondary to above  Respiratory failure requiring mechanical ventilation  Moderate right pleural effusion  Acute hypoxic respiratory failure  Atelectasis  Mediastinal lymphadenopathy  Super morbid obesity  Snoring/apnea with concerns for CRYSTAL  Hypertension  Diabetes mellitus, type II  Acute kidney injury: Worsening      PLAN:  Make appropriate ventilator  changes based on ABG results and continue supportive care.  We will touch base with thoracic surgery to see if they think a surgical intervention would be required.  If so we will keep her on the ventilator.  If not consider starting sedation vacation and spontaneous breathing trials.  Antibiotics per infectious disease recommendations.  Control glucose.  Control blood pressure and will increase blood pressure medications for persistent hypertension.  DVT/ulcer prophylaxis.  Discussed with family at bedside in detail and questions answered to their satisfaction.    Discussed with multidisciplinary ICU team on rounds this morning.        CCT: 35 min    Brannon Gonzales MD  Pulmonary and Critical Care Medicine  [facility]  10/4/2023    08:45 EDT       Electronically signed by Brannon Gonzales MD at 10/04/23 0848       Quynh Choe APRN at 10/03/23 1516       Attestation signed by Tristan Ny MD PhD at 10/04/23 1441    I have reviewed this documentation and agree.                      Chief Complaint: Pulmonary abscess, respiratory failure, follow-up    Subjective:  Symptoms:  Worsening.    Diet:  NPO.    Patient is now intubated and sedated in CCU.  Spoke with patient's mother and  who is at bedside.    Vital Signs:  Temp:  [99.2 øF (37.3 øC)-101.1 øF (38.4 øC)] 99.3 øF (37.4 øC)  Heart Rate:  [] 91  Resp:  [30] 30  BP: ()/() 173/86  FiO2 (%):  [49 %-99 %] 49 %    Intake & Output (last day)         10/02 0701  10/03 0700 10/03 0701  10/04 0700    I.V. (mL/kg) 131.4 (0.9)     IV Piggyback 200 100    Total Intake(mL/kg) 331.4 (2.4) 100 (0.7)    Urine (mL/kg/hr) 500 (0.1)     Total Output 500     Net -168.6 +100          Urine Unmeasured Occurrence 2 x             Objective:  General Appearance:  Comfortable and ill-appearing.    Vital signs: (most recent): Blood pressure 173/86, pulse 91, temperature 99.3 øF (37.4 øC), temperature source Oral, resp. rate (!) 30,  "height 160 cm (62.99\"), weight (!) 140 kg (308 lb 3.3 oz), last menstrual period 10/01/2023, SpO2 98 %.  Vital signs are normal.  No fever.    Lungs:  Normal effort and tachypnea.  There are decreased breath sounds.  No rales, wheezes or rhonchi.    Heart: Normal rate.  Regular rhythm.    Chest: (Right sternoclavicular swelling updated)  Abdomen: Abdomen is soft.  Bowel sounds are normal.     Extremities: Decreased range of motion.    Skin:  Warm and dry.          Results Review:     I reviewed the patient's new clinical results.  I reviewed the patient's new imaging results and agree with the interpretation.  I reviewed the patient's other test results and agree with the interpretation  Discussed with patient, family at bedside, RN and Dr. Ny.    Imaging Results (Last 24 Hours)       Procedure Component Value Units Date/Time    CT Guided Chest Tube [[id number]] Resulted: 10/03/23 1507     Updated: 10/03/23 1425    XR Chest 1 View [[id number]] Collected: 10/03/23 0720     Updated: 10/03/23 0725    Narrative:      XR CHEST 1 VW-10/30/2023     HISTORY: Intubation.     Endotracheal tube is seen with its tip overlying the trachea at the  level of the aortic arch. Heart size is mildly enlarged. There is  wedge-shaped moderately large area of dense consolidation/atelectasis in  the right upper lobe. Left lung appears clear.     Left-sided central venous catheter is again seen but it courses across  the midline terminating in the right apex possibly in the right  subclavian and/or back into the lower portion of the right internal  jugular vein near its junction with the right subclavian vein.     No pneumothorax is seen.       Impression:      1. Endotracheal tube in good position as described.  2. Dense consolidation/atelectasis of the right upper lobe is again  seen.  3. Again, the left side central venous catheter courses across the  midline terminating in the right apex as described.  4. No pneumothorax is seen.   "   This report was finalized on 10/3/2023 7:22 AM by Dr. Jeramy Marquez M.D.               Lab Results:     Lab Results (last 24 hours)       Procedure Component Value Units Date/Time    Reticulocytes [[id number]]  (Normal) Collected: 10/03/23 0408    Specimen: Blood Updated: 10/03/23 1526     Reticulocyte % 1.06 %      Reticulocyte Absolute 0.0328 10*6/mm3     Body Fluid Culture - Body Fluid, Pleural Cavity [[id number]] Collected: 10/03/23 1400    Specimen: Body Fluid from Pleural Cavity Updated: 10/03/23 1433    Anaerobic Culture - Body Fluid, Pleural Cavity [[id number]] Collected: 10/03/23 1400    Specimen: Body Fluid from Pleural Cavity Updated: 10/03/23 1433    pH, Body Fluid - Body Fluid, Pleural Cavity [[id number]] Collected: 10/03/23 1400    Specimen: Body Fluid from Pleural Cavity Updated: 10/03/23 1433    AFB Culture - Body Fluid, Pleural Cavity [[id number]] Collected: 10/03/23 1400    Specimen: Body Fluid from Pleural Cavity Updated: 10/03/23 1433    Lactate Dehydrogenase, Body Fluid - Body Fluid, Pleural Cavity [[id number]] Collected: 10/03/23 1400    Specimen: Body Fluid from Pleural Cavity Updated: 10/03/23 1433    Glucose, Body Fluid - Pleural Fluid, Pleural Cavity [[id number]] Collected: 10/03/23 1400    Specimen: Pleural Fluid from Pleural Cavity Updated: 10/03/23 1433    Respiratory Culture - Sputum, ET Suction [[id number]] Collected: 10/03/23 0945    Specimen: Sputum from ET Suction Updated: 10/03/23 1427     Gram Stain No WBCs or organisms seen    Blood Culture - Blood, Hand, Left [[id number]]  (Normal) Collected: 10/01/23 1339    Specimen: Blood from Hand, Left Updated: 10/03/23 1345     Blood Culture No growth at 2 days    POC Glucose Once [[id number]]  (Normal) Collected: 10/03/23 1243    Specimen: Blood Updated: 10/03/23 1245     Glucose 130 mg/dL     Blood Culture - Blood, Arm, Left [[id number]]  (Normal) Collected: 10/01/23 1231    Specimen: Blood from Arm, Left Updated: 10/03/23 1245     Blood  Culture No growth at 2 days    Narrative:      Less than seven (7) mL's of blood was collected.  Insufficient quantity may yield false negative results.    Folate [[id number]]  (Normal) Collected: 10/03/23 0408    Specimen: Blood Updated: 10/03/23 1211     Folate >20.00 ng/mL     Narrative:      Results may be falsely increased if patient taking Biotin.      Ferritin [[id number]]  (Abnormal) Collected: 10/03/23 0408    Specimen: Blood Updated: 10/03/23 1211     Ferritin 440.00 ng/mL     Narrative:      Results may be falsely decreased if patient taking Biotin.      Vitamin B12 [[id number]]  (Abnormal) Collected: 10/03/23 0408    Specimen: Blood Updated: 10/03/23 1211     Vitamin B-12 962 pg/mL     Narrative:      Results may be falsely increased if patient taking Biotin.      Iron Profile [[id number]]  (Abnormal) Collected: 10/03/23 0408    Specimen: Blood Updated: 10/03/23 1158     Iron 18 mcg/dL      Iron Saturation (TSAT) 9 %      Transferrin 132 mg/dL      TIBC 197 mcg/dL     Respiratory Culture - Wash, Bronchus [[id number]] Collected: 10/02/23 1756    Specimen: Wash from Bronchus Updated: 10/03/23 1153     Respiratory Culture Scant growth (1+) The culture consists of normal respiratory annabel. This is a preliminary report; final report to follow.     Gram Stain Rare (1+) WBCs seen      No organisms seen    Uric Acid [[id number]]  (Abnormal) Collected: 10/03/23 0408    Specimen: Blood Updated: 10/03/23 1004     Uric Acid 9.4 mg/dL     CBC & Differential [[id number]]  (Abnormal) Collected: 10/03/23 0408    Specimen: Blood Updated: 10/03/23 0529    Narrative:      The following orders were created for panel order CBC & Differential.  Procedure                               Abnormality         Status                     ---------                               -----------         ------                     CBC Auto Differential[[id number]]        Abnormal            Final result                 Please view results for these  tests on the individual orders.    CBC Auto Differential [[id number]]  (Abnormal) Collected: 10/03/23 0408    Specimen: Blood Updated: 10/03/23 0529     WBC 17.47 10*3/mm3      RBC 3.06 10*6/mm3      Hemoglobin 8.0 g/dL      Hematocrit 25.2 %      MCV 82.4 fL      MCH 26.1 pg      MCHC 31.7 g/dL      RDW 14.9 %      RDW-SD 43.8 fl      MPV 10.9 fL      Platelets 319 10*3/mm3      Neutrophil % 86.3 %      Lymphocyte % 5.9 %      Monocyte % 6.0 %      Eosinophil % 0.2 %      Basophil % 0.1 %      Immature Grans % 1.5 %      Neutrophils, Absolute 15.07 10*3/mm3      Lymphocytes, Absolute 1.03 10*3/mm3      Monocytes, Absolute 1.04 10*3/mm3      Eosinophils, Absolute 0.04 10*3/mm3      Basophils, Absolute 0.02 10*3/mm3      Immature Grans, Absolute 0.27 10*3/mm3      nRBC 0.2 /100 WBC     HIV-1 / O / 2 Ag / Antibody [[id number]]  (Normal) Collected: 10/03/23 0408    Specimen: Blood Updated: 10/03/23 0459     HIV DUO Non-Reactive    Narrative:      The HIV antibody/antigen combo assay is a qualitative assay for HIV that includes the p24 antigen as well as antibodies to HIV types 1 and 2. This test is intended to be used as a screening assay in the diagnosis of HIV infection in patients over the age of 2.    Hepatitis C Antibody [[id number]]  (Normal) Collected: 10/03/23 0408    Specimen: Blood Updated: 10/03/23 0459     Hepatitis C Ab Non-Reactive    Narrative:      Results may be falsely decreased if patient taking Biotin.      Comprehensive Metabolic Panel [[id number]]  (Abnormal) Collected: 10/03/23 0408    Specimen: Blood Updated: 10/03/23 0453     Glucose 87 mg/dL      BUN 31 mg/dL      Creatinine 2.73 mg/dL      Sodium 142 mmol/L      Potassium 4.5 mmol/L      Chloride 102 mmol/L      CO2 25.0 mmol/L      Calcium 9.0 mg/dL      Total Protein 6.9 g/dL      Albumin 2.5 g/dL      ALT (SGPT) 22 U/L      AST (SGOT) 17 U/L      Alkaline Phosphatase 138 U/L      Total Bilirubin 0.3 mg/dL      Globulin 4.4 gm/dL      A/G Ratio  0.6 g/dL      BUN/Creatinine Ratio 11.4     Anion Gap 15.0 mmol/L      eGFR 20.4 mL/min/1.73     Narrative:      GFR Normal >60  Chronic Kidney Disease <60  Kidney Failure <15      Blood Gas, Arterial - [[id number]]  (Abnormal) Collected: 10/03/23 0339    Specimen: Arterial Blood Updated: 10/03/23 0342     Site Right Radial     Cole's Test Positive     pH, Arterial 7.498 pH units      pCO2, Arterial 34.8 mm Hg      pO2, Arterial 109.1 mm Hg      HCO3, Arterial 27.0 mmol/L      Base Excess, Arterial 4.1 mmol/L      Comment: Serial Number: [id number]:  [id number]        O2 Saturation, Arterial 98.7 %      A-a DO2 0.2 mmHg      CO2 Content 28.1 mmol/L      Barometric Pressure for Blood Gas 752.9000 mmHg      Modality Adult Vent     FIO2 65 %      Ventilator Mode VC     Set Tidal Volume 500     Set Mech Resp Rate 30     Rate 30 Breaths/minute      PEEP 8     Hemodilution No     Device Comment Sat 97% VC+ ETCO2 30    POC Glucose Once [[id number]]  (Normal) Collected: 10/02/23 2137    Specimen: Blood Updated: 10/02/23 2138     Glucose 93 mg/dL     Renal Function Panel [[id number]]  (Abnormal) Collected: 10/02/23 2026    Specimen: Blood, Central Line Updated: 10/02/23 2056     Glucose 123 mg/dL      BUN 27 mg/dL      Creatinine 1.95 mg/dL      Sodium 142 mmol/L      Potassium 5.2 mmol/L      Chloride 102 mmol/L      CO2 29.7 mmol/L      Calcium 9.3 mg/dL      Albumin 2.9 g/dL      Phosphorus 5.1 mg/dL      Anion Gap 10.3 mmol/L      BUN/Creatinine Ratio 13.8     eGFR 30.5 mL/min/1.73     Narrative:      GFR Normal >60  Chronic Kidney Disease <60  Kidney Failure <15      Blood Gas, Arterial - [[id number]]  (Abnormal) Collected: 10/02/23 1840    Specimen: Arterial Blood Updated: 10/02/23 1844     Site Right Radial     Cole's Test Positive     pH, Arterial 7.309 pH units      pCO2, Arterial 59.3 mm Hg      pO2, Arterial 174.3 mm Hg      HCO3, Arterial 29.8 mmol/L      Base Excess, Arterial 1.6 mmol/L      Comment: Serial Number:  [id number]:  [id number]        O2 Saturation, Arterial 99.4 %      A-a DO2 0.3 mmHg      CO2 Content 31.6 mmol/L      Barometric Pressure for Blood Gas 753.0000 mmHg      Modality Adult Vent     FIO2 100 %      Ventilator Mode VC     Set Tidal Volume 500     Set Mech Resp Rate 30     Rate 30 Breaths/minute      PSV 8 cmH2O      Notified Who Damaris Elena     Read Back Yes     Notified Time 18:43     Hemodilution No     Device Comment Sat 100% VC+ ETCO2 47    Blood Gas, Arterial - [[id number]]  (Abnormal) Collected: 10/02/23 1701    Specimen: Arterial Blood Updated: 10/02/23 1704     Site Right Radial     Cole's Test Positive     pH, Arterial 7.071 pH units      pCO2, Arterial 108.4 mm Hg      pO2, Arterial 90.5 mm Hg      HCO3, Arterial 31.5 mmol/L      Base Excess, Arterial -1.4 mmol/L      Comment: Serial Number: [id number]:  [id number]        O2 Saturation, Arterial 91.2 %      A-a DO2 0.3 mmHg      CO2 Content 34.8 mmol/L      Barometric Pressure for Blood Gas 753.2000 mmHg      Modality BiPap     FIO2 60 %      Set Tidal Volume 448     Set Mech Resp Rate 28     Rate 28 Breaths/minute      Notified Who Dr Tuttle     Read Back Yes     Notified Time 17:02     Hemodilution No     Device Comment ST F28/IPAP 24/EPAP 8/60%             Assessment & Plan       PNA (pneumonia)    Acute respiratory failure with hypoxia    Hypertensive urgency    Abscess of upper lobe of right lung with pneumonia    Sepsis    Type 2 diabetes mellitus with hyperglycemia    ELLY (acute kidney injury)    Obesity, morbid, BMI 50 or higher    Anemia       Assessment & Plan    Patient is now intubated and sedated in ICU.  Have requested proceeding with chest tube placement for drainage of this abscess, at least while patient is sedated and intubated.  Based on drainage and follow-up chest imaging, further surgery/debridement may be warranted.  We have requested cultures of the fluid.   Place chest tube to -20 cm suction today.  Check chest x-ray  in AM.  We will continue to follow.    FERDINAND Zuniga  [facility]  10/03/23  15:30 EDT    Greater than 38 minutes was spent reviewing the patient's chart, radiographic imaging, labs, provider notes, assessing the patient and developing a plan of care.  This was discussed with the patient and RN.        Electronically signed by Tristan Ny MD PhD at 10/04/23 1441       Juan Elena MD at 10/03/23 1131            [facility]    [phone]   LOS: 2 days     Name: Nay Gonzalez  Age/Sex: 53 y.o. female  :  1970        PCP: Provider, No Known  Chief Complaint   Patient presents with    Shortness of Breath    Fever      Subjective   Intubated and sedated  at the bedside    chlorhexidine, 15 mL, Mouth/Throat, Q12H  enoxaparin, 40 mg, Subcutaneous, Q12H  hydrALAZINE, 25 mg, Oral, Q8H  insulin lispro, 2-7 Units, Subcutaneous, 4x Daily AC & at Bedtime  labetalol, 100 mg, Oral, Q12H  pantoprazole, 40 mg, Intravenous, Q24H  piperacillin-tazobactam, 4.5 g, Intravenous, Q8H  senna-docusate sodium, 2 tablet, Oral, BID  sodium chloride, 10 mL, Intravenous, Q12H      propofol, 5-50 mcg/kg/min, Last Rate: 40 mcg/kg/min (10/03/23 0948)        Objective   Vital Signs  Temp:  [99 øF (37.2 øC)-101.1 øF (38.4 øC)] 99.3 øF (37.4 øC)  Heart Rate:  [] 85  Resp:  [26-44] 30  BP: ()/() 166/77  FiO2 (%):  [49 %-99 %] 49 %  Body mass index is 54.61 kg/mý.    Intake/Output Summary (Last 24 hours) at 10/3/2023 1132  Last data filed at 10/2/2023 2200  Gross per 24 hour   Intake 331.39 ml   Output --   Net 331.39 ml       Physical Exam  Vitals and nursing note reviewed.   Constitutional:       General: She is not in acute distress.     Appearance: She is obese. She is ill-appearing.      Comments: Intubated and sedated   Cardiovascular:      Rate and Rhythm: Normal rate and regular rhythm.         Results Review:       I reviewed the patient's new clinical  results.  Results from last 7 days   Lab Units 10/03/23  0408 10/02/23  0501 10/01/23  1138   WBC 10*3/mm3 17.47* 24.25* 22.45*   HEMOGLOBIN g/dL 8.0* 10.7* 10.4*   PLATELETS 10*3/mm3 319 353 395     Results from last 7 days   Lab Units 10/03/23  0408 10/02/23  2026 10/02/23  0501 10/01/23  1138   SODIUM mmol/L 142 142 138 141   POTASSIUM mmol/L 4.5 5.2 5.4* 4.2   CHLORIDE mmol/L 102 102 100 101   CO2 mmol/L 25.0 29.7* 23.9 30.0*   BUN mg/dL 31* 27* 16 14   CREATININE mg/dL 2.73* 1.95* 1.08* 0.95   CALCIUM mg/dL 9.0 9.3 9.6 9.6   MAGNESIUM mg/dL  --   --  2.1  --    PHOSPHORUS mg/dL  --  5.1* 4.6*  --    Estimated Creatinine Clearance: 33.3 mL/min (A) (by C-G formula based on SCr of 2.73 mg/dL (H)).      Assessment & Plan   Active Hospital Problems    Diagnosis  POA    **PNA (pneumonia) [J18.9]  Yes    ELLY (acute kidney injury) [N17.9]  Unknown    Obesity, morbid, BMI 50 or higher [E66.01]  Unknown    Anemia [D64.9]  Unknown    Sepsis [A41.9]  Unknown    Type 2 diabetes mellitus with hyperglycemia [E11.65]  Unknown    Acute respiratory failure with hypoxia [J96.01]  Unknown    Hypertensive urgency [I16.0]  Unknown    Abscess of upper lobe of right lung with pneumonia [J85.1]  Yes      Resolved Hospital Problems    Diagnosis Date Resolved POA    Hyperkalemia [E87.5] 10/03/2023 Unknown       PLAN  This is a 53-year-old female with a history of morbid obesity, new diabetes of type 2 diabetes and hypertension who presents to the hospital with cough shortness of breath and fevers and is found to have a right upper lobe pulmonary abscess   -ABG better after intubation  -plan CT guided chest tube today  -hgb down 2.5 gm no evifence of blood loss with type and screen and follow labs  -anemia workup initiated  -ELLY worse today ask nephrology to see and workup inititated; likely secondary to sepsis in combination with BP fluctuations and GEREMIAS  -WBC trding down, contineu zosyn ID following  -aprreciate pulm assistance  -Full  code  -Lovenox for DVT (if hgb drops further consider holding)        Disposition  Expected Discharge Date: 10/6/2023; Expected Discharge Time:        Juan Elena MD  [facility]  10/03/23  11:32 EDT            Electronically signed by Juan Elena MD at 10/03/23 1329       Aakash Azul MD at 10/03/23 0847          ID NOTE    CC: f/u pulm abscess     Subj: History from pt's mother since she is now intubated. She moved to CCU yesterday afternoon and was intubated. Fever curve better. WBC better. Creatinine is worse.     Medications:    Current Facility-Administered Medications:     acetaminophen (TYLENOL) tablet 650 mg, 650 mg, Oral, Q4H PRN, 650 mg at 10/02/23 1102 **OR** acetaminophen (TYLENOL) 160 MG/5ML oral solution 650 mg, 650 mg, Oral, Q4H PRN **OR** acetaminophen (TYLENOL) suppository 650 mg, 650 mg, Rectal, Q4H PRN, Juan Elena MD, 650 mg at 10/02/23 2341    sennosides-docusate (PERICOLACE) 8.6-50 MG per tablet 2 tablet, 2 tablet, Oral, BID, 2 tablet at 10/02/23 0907 **AND** polyethylene glycol (MIRALAX) packet 17 g, 17 g, Oral, Daily PRN **AND** bisacodyl (DULCOLAX) EC tablet 5 mg, 5 mg, Oral, Daily PRN **AND** bisacodyl (DULCOLAX) suppository 10 mg, 10 mg, Rectal, Daily PRN, Juan Elena MD    Calcium Replacement - Follow Nurse / BPA Driven Protocol, , Does not apply, PRN, Juan Elena MD    chlorhexidine (PERIDEX) 0.12 % solution 15 mL, 15 mL, Mouth/Throat, Q12H, Johnny Tuttle MD, 15 mL at 10/02/23 2004    dextrose (D50W) (25 g/50 mL) IV injection 25 g, 25 g, Intravenous, Q15 Min PRN, Juan Elena MD    dextrose (GLUTOSE) oral gel 15 g, 15 g, Oral, Q15 Min PRN, Juan Elena MD    Enoxaparin Sodium (LOVENOX) syringe 40 mg, 40 mg, Subcutaneous, Q12H, Juan Elena MD, 40 mg at 10/03/23 0400    fentaNYL citrate (PF) (SUBLIMAZE) injection 25 mcg, 25 mcg, Intravenous, Q30 Min PRN, Otto Castillo MD,  25 mcg at 10/03/23 0623    glucagon (GLUCAGEN) injection 1 mg, 1 mg, Intramuscular, Q15 Min PRN, Juan Elena MD    influenza vac split quad (FLUZONE,FLUARIX,AFLURIA,FLULAVAL) injection 0.5 mL, 0.5 mL, Intramuscular, During Hospitalization, Juan Elena MD    insulin lispro (HUMALOG/ADMELOG) injection 2-7 Units, 2-7 Units, Subcutaneous, 4x Daily AC & at Bedtime, Juan Elena MD, 2 Units at 10/02/23 0907    ipratropium-albuterol (DUO-NEB) nebulizer solution 3 mL, 3 mL, Nebulization, Q6H PRN, Johnny Tuttle MD    labetalol (NORMODYNE) tablet 100 mg, 100 mg, Oral, Q12H, Juan Elena MD, 100 mg at 10/02/23 0655    Magnesium Standard Dose Replacement - Follow Nurse / BPA Driven Protocol, , Does not apply, PRN, Juan Elena MD    nitroglycerin (NITROSTAT) SL tablet 0.4 mg, 0.4 mg, Sublingual, Q5 Min PRN, Juan Elena MD    ondansetron (ZOFRAN) tablet 4 mg, 4 mg, Oral, Q6H PRN **OR** ondansetron (ZOFRAN) injection 4 mg, 4 mg, Intravenous, Q6H PRN, Juan Elena MD    pantoprazole (PROTONIX) injection 40 mg, 40 mg, Intravenous, Q24H, Johnny Tuttle MD, 40 mg at 10/02/23 2003    Phosphorus Replacement - Follow Nurse / BPA Driven Protocol, , Does not apply, PRN, Juan Elena MD    piperacillin-tazobactam (ZOSYN) 4.5 g in iso-osmotic dextrose 100 mL IVPB (premix), 4.5 g, Intravenous, Q8H, Juan Elena MD, Last Rate: 0 mL/hr at 10/02/23 0214, 4.5 g at 10/03/23 0229    Potassium Replacement - Follow Nurse / BPA Driven Protocol, , Does not apply, PRN, Juan Elena MD    propofol (DIPRIVAN) infusion 10 mg/mL 100 mL, 5-50 mcg/kg/min, Intravenous, Titrated, Johnny Tuttle MD, Last Rate: 29.4 mL/hr at 10/03/23 0647, 35 mcg/kg/min at 10/03/23 0647    sodium chloride 0.9 % flush 10 mL, 10 mL, Intravenous, Q12H, Juan Elena MD, 10 mL at 10/02/23 2004    sodium chloride 0.9 % flush 10 mL, 10 mL, Intravenous, PRN, Juan Elena MD,  "10 mL at 10/01/23 1723    sodium chloride 0.9 % infusion 40 mL, 40 mL, Intravenous, PRN, Juan Elena MD      Objective   Vital Signs   Temp:  [99 øF (37.2 øC)-102.3 øF (39.1 øC)] 99.5 øF (37.5 øC)  Heart Rate:  [] 82  Resp:  [26-44] 30  BP: ()/() 168/74  FiO2 (%):  [49 %-99 %] 49 %    Physical Exam:   General: sedated and intubated, not on pressors  Eyes: no scleral icterus  ENT: ETT in place  Cardiovascular: NR  Respiratory: R rales; no wheezing; 50% FiO2  GI: Abdomen is soft, not tender  :  external urinary catheter  Skin: No rashes  Vasc: LIJ CVC w/o erythema    Labs:   CBC, BMP, CRP, and blood cultures reviewed today  Lab Results   Component Value Date    WBC 17.47 (H) 10/03/2023    HGB 8.0 (L) 10/03/2023    HCT 25.2 (L) 10/03/2023    MCV 82.4 10/03/2023     10/03/2023     Lab Results   Component Value Date    GLUCOSE 87 10/03/2023    CALCIUM 9.0 10/03/2023     10/03/2023    K 4.5 10/03/2023    CO2 25.0 10/03/2023     10/03/2023    BUN 31 (H) 10/03/2023    CREATININE 2.73 (H) 10/03/2023    EGFR 20.4 (L) 10/03/2023    BCR 11.4 10/03/2023    ANIONGAP 15.0 10/03/2023     Lab Results   Component Value Date    CRP 28.05 (H) 10/02/2023     Lab Results   Component Value Date    HGBA1C 7.50 (H) 10/01/2023     HIV negative  Hep C negative  Procal 0.59    Microbiology:  10/1 RPP: negative  10/1 BCx: NGTD  10/1 MRSA nares: negative    New Radiology:  CXR personally reviewed and shows a RUL infiltrate and new ETT in place    Prior Radiology:  CT chest:   \"1. Large (12 cm) right upper lobe pulmonary abscess with extension into   the anterior right chest wall as detailed above.   2. Small right pleural effusion.   3. Mediastinal, right hilar and right axillary lymphadenopathy is likely   reactive.   4. No central pulmonary embolus. Contrast bolus timing limits evaluation   of the more distal pulmonary arteries.   5. Dilated main pulmonary artery (36 mm). " "\"    ASSESSMENT/PLAN:  Right upper lobe pulmonary abscess  Acute hypercapneic and hypoxic respiratory failure requiring mechanical ventilation  Sepsis due to #1  Super obesity BMI 54  Uncontrolled diabetes type 2 A1c  7.5%  Hypertension    She is now intubated. Plans for CT-guided chest tube today. I will follow-up her cultures. Continue Zosyn dosed for obesity and renal function. I will order a sputum/respiratory culture now that she is intubated. It would be helpful to identify an organism.     ID will follow. D/W thoracic surgery team re: case and plan.       Electronically signed by Aakash Azul MD at 10/03/23 0850       Brannon Gonzales MD at 10/03/23 0749              [facility] INPATIENT PROGRESS NOTE         [facility]    10/3/2023      PATIENT IDENTIFICATION:  Name: Nay Gonzalez ADMIT: 10/1/2023   : 1970  PCP: Provider, No Known    MRN: [id number] LOS: 2 days   AGE/SEX: 53 y.o. female  ROOM: [facility]                     LOS 2    Reason for visit: Respiratory failure and pneumonia      SUBJECTIVE:      Sedated on ventilator.  Hypertensive with systolic in the 170s.  On 50% FiO2 and 7.5 with PEEP.  Chart reviewed.  Discussed with family at bedside and questions answered to their satisfaction.  Diminished breath sounds bilaterally secondary to body habitus.  No wheezing or rhonchi.  No edema.    Objective   OBJECTIVE:    Vital Sign Min/Max for last 24 hours  Temp  Min: 99 øF (37.2 øC)  Max: 102.3 øF (39.1 øC)   BP  Min: 93/58  Max: 170/76   Pulse  Min: 80  Max: 112   Resp  Min: 26  Max: 44   SpO2  Min: 88 %  Max: 100 %   No data recorded   Weight  Min: 140 kg (308 lb 3.3 oz)  Max: 140 kg (308 lb 10.3 oz)       FiO2 (%):  [49 %-99 %] 49 %  S RR:  [20-30] 30  PEEP/CPAP (cm H2O):  [7.5 cm H20] 7.5 cm H20  MAP (cm H2O):  [9-20] 19           FiO2 (%): 49 %     Body mass index is 54.61 kg/mý.    Intake/Output Summary (Last 24 hours) at 10/3/2023 0971  Last data filed " at 10/2/2023 2200  Gross per 24 hour   Intake 331.39 ml   Output --   Net 331.39 ml         Exam:  GEN:  No distress, appears stated age.  Morbidly obese  EYES:   PERRL, anicteric sclerae  ENT:    External ears/nose normal, OP clear  NECK:  No adenopathy, midline trachea  LUNGS: Normal chest on inspection, palpation and diminished breath sounds on auscultation  CV:  Normal S1S2, without murmur  ABD:  Nontender, nondistended, no hepatosplenomegaly, +BS  EXT:  No pitting edema.  No cyanosis or clubbing.  No mottling and normal cap refill.    Assessment     Scheduled meds:  chlorhexidine, 15 mL, Mouth/Throat, Q12H  enoxaparin, 40 mg, Subcutaneous, Q12H  insulin lispro, 2-7 Units, Subcutaneous, 4x Daily AC & at Bedtime  labetalol, 100 mg, Oral, Q12H  pantoprazole, 40 mg, Intravenous, Q24H  piperacillin-tazobactam, 4.5 g, Intravenous, Q8H  senna-docusate sodium, 2 tablet, Oral, BID  sodium chloride, 10 mL, Intravenous, Q12H      IV meds:                      propofol, 5-50 mcg/kg/min, Last Rate: 40 mcg/kg/min (10/03/23 0913)      Data Review:  Results from last 7 days   Lab Units 10/03/23  0408 10/02/23  2026 10/02/23  0501 10/01/23  1138   SODIUM mmol/L 142 142 138 141   POTASSIUM mmol/L 4.5 5.2 5.4* 4.2   CHLORIDE mmol/L 102 102 100 101   CO2 mmol/L 25.0 29.7* 23.9 30.0*   BUN mg/dL 31* 27* 16 14   CREATININE mg/dL 2.73* 1.95* 1.08* 0.95   GLUCOSE mg/dL 87 123* 108* 168*   CALCIUM mg/dL 9.0 9.3 9.6 9.6         Estimated Creatinine Clearance: 33.3 mL/min (A) (by C-G formula based on SCr of 2.73 mg/dL (H)).  Results from last 7 days   Lab Units 10/03/23  0408 10/02/23  0501 10/01/23  1138   WBC 10*3/mm3 17.47* 24.25* 22.45*   HEMOGLOBIN g/dL 8.0* 10.7* 10.4*   PLATELETS 10*3/mm3 319 353 395         Results from last 7 days   Lab Units 10/03/23  0408 10/01/23  1138   ALT (SGPT) U/L 22 35*   AST (SGOT) U/L 17 19     Results from last 7 days   Lab Units 10/03/23  0339 10/02/23  1840 10/02/23  1701 10/02/23  1459  10/02/23  1344   PH, ARTERIAL pH units 7.498* 7.309* 7.071* 7.236* 7.074*   PO2 ART mm Hg 109.1* 174.3* 90.5 122.2* 96.8   PCO2, ARTERIAL mm Hg 34.8* 59.3* 108.4* 66.9* 112.4*   HCO3 ART mmol/L 27.0 29.8* 31.5* 28.4* 32.9*     Results from last 7 days   Lab Units 10/01/23  1138   PROCALCITONIN ng/mL 0.59*   LACTATE mmol/L 1.2         Hemoglobin A1C   Date/Time Value Ref Range Status   10/01/2023 1138 7.50 (H) 4.80 - 5.60 % Final     Glucose   Date/Time Value Ref Range Status   10/02/2023 2137 93 70 - 130 mg/dL Final   10/02/2023 1347 142 (H) 70 - 130 mg/dL Final   10/02/2023 1123 134 (H) 70 - 130 mg/dL Final   10/02/2023 0745 154 (H) 70 - 130 mg/dL Final   10/01/2023 2048 296 (H) 70 - 130 mg/dL Final   10/01/2023 1650 140 (H) 70 - 130 mg/dL Final     10/3 chest x-ray reviewed            2D echo 10/2 reviewed: EF 61 to 65% with grade 2 diastolic dysfunction.    CT chest 10/1 reviewed        Microbiology reviewed: No growth to date             Active Hospital Problems    Diagnosis  POA    **PNA (pneumonia) [J18.9]  Yes    ELLY (acute kidney injury) [N17.9]  Unknown    Obesity, morbid, BMI 50 or higher [E66.01]  Unknown    Anemia [D64.9]  Unknown    Sepsis [A41.9]  Unknown    Type 2 diabetes mellitus with hyperglycemia [E11.65]  Unknown    Acute respiratory failure with hypoxia [J96.01]  Unknown    Hypertensive urgency [I16.0]  Unknown    Abscess of upper lobe of right lung with pneumonia [J85.1]  Yes      Resolved Hospital Problems    Diagnosis Date Resolved POA    Hyperkalemia [E87.5] 10/03/2023 Unknown         ASSESSMENT:  Right upper lobe pneumonia/abscess  Respiratory failure requiring mechanical ventilation  Moderate right pleural effusion  Acute hypoxic respiratory failure  Atelectasis  Mediastinal lymphadenopathy  Super morbid obesity  Snoring/apnea with concerns for CRYSTAL  Hypertension  Diabetes mellitus, type II  Acute kidney injury: Worsening      PLAN:  Make appropriate ventilator changes based on ABG results  "and continue supportive care.  Antibiotics per infectious disease recommendations.  Thoracic surgery notes reviewed and noted plan for CT-guided chest tube.  May require more definitive surgical intervention if does not resolve.  Consult nephrology for worsening acute kidney injury.  Control glucose.  Control blood pressure and will increase blood pressure medications for persistent hypertension.  DVT/ulcer prophylaxis.  Discussed with family at bedside in detail and questions answered to their satisfaction.    Discussed with multidisciplinary ICU team on rounds this morning.        CCT: 45 min    Brannon Gonzales MD  Pulmonary and Critical Care Medicine  [facility], [facility]  10/3/2023    09:46 EDT       Electronically signed by Brannon Gonzales MD at 10/03/23 0955       Johnny Tuttle MD at 10/02/23 1110            Consult Daily Progress Note  [address]  9/23/2023    Patient Identification:  Nay Gonzalez  53 y.o.  female  1970  [id number]          LOS 2    Reason for Admission / Chief Complaint:  Fever, shortness of breath, pneumonia    Hospital Course:   53-year-old female, non-smoker, who presented with several days of cough, shortness of breath, fever.  Found to have a right upper lobe consolidation concerning for abscess.  Started on antibiotics with plan for IR guided chest tube placement.    Interval History:  Admitted overnight for pneumonia  Remains febrile to 101.5 this morning  States that her breathing is mildly improved  Continues to have a cough with clear sputum production  Denies any chest pain or palpitations  Denies any nausea, vomiting, diarrhea  Denies any previous history of pulmonary disease, no asthma, COPD, frequent lung infections, smoking    Physical Exam:  /65 (BP Location: Left arm, Patient Position: Lying)   Pulse 70   Temp 97.8 øF (36.6 øC) (Oral)   Resp 18   Ht 162.6 cm (64\")   Wt 45.6 kg (100 lb 8.5 oz)   SpO2 96%   BMI " 17.26 kg/mý   Body mass index is 17.26 kg/mý.    Intake/Output    Intake/Output Summary (Last 24 hours) at 9/23/2023 1010  Last data filed at 9/23/2023 0900  Gross per 24 hour   Intake 720 ml   Output 70 ml   Net 650 ml     General: Alert, nontoxic, NAD, obese  HEENT: NC/AT, EOMI, MMM  Neck: Supple, trachea midline  Cardiac: RRR, no murmur, gallops, rubs  Pulmonary: Absent at right upper lobe, distant bilaterally, no wheezing  GI: Soft, non-tender, non-distended, normal bowel sounds  Extremities: Warm, well perfused, no LE edema  Skin: no visible rash  Neuro: CN II - XII grossly intact  Psychiatry: Normal mood and affect    Data Review:  Notable Labs:  Results from last 7 days   Lab Units 09/21/23 0550 09/20/23 0515 09/17/23 1943   WBC 10*3/mm3 7.94 10.92* 13.88*   HEMOGLOBIN g/dL 8.4* 9.2* 10.9*   PLATELETS 10*3/mm3 520* 559* 666*     Results from last 7 days   Lab Units 09/21/23  0550 09/20/23 0515 09/17/23 1949   SODIUM mmol/L 140 138 133*   POTASSIUM mmol/L 3.4* 4.1 3.8   CHLORIDE mmol/L 110* 109* 98   CO2 mmol/L 23.0 16.7* 24.3   BUN mg/dL 11 12 14   CREATININE mg/dL 0.69 0.81 0.84   GLUCOSE mg/dL 93 58* 122*   CALCIUM mg/dL 7.9* 7.9* 8.4*   Estimated Creatinine Clearance: 47.6 mL/min (by C-G formula based on SCr of 0.69 mg/dL).    Results from last 7 days   Lab Units 09/21/23  0550 09/20/23  0515 09/17/23 2206 09/17/23 1949 09/17/23 1943   AST (SGOT) U/L 15  --   --  20  --    ALT (SGPT) U/L 5  --   --  9  --    LACTATE mmol/L  --   --  1.0  --   --    CRP mg/dL  --  9.52*  --   --   --    PLATELETS 10*3/mm3 520* 559*  --   --  666*     Imaging:  Reviewed chest images personally from past 3 days    ASSESSMENT  /  PLAN:    Right upper lobe pneumonia/abscess  Moderate right pleural effusion  Acute hypoxic respiratory failure  Atelectasis  Mediastinal lymphadenopathy  Super morbid obesity  Snoring/apnea with concerns for CRYSTAL  Hypertension  Diabetes mellitus, type II    Large right upper lobe consolidation  with narrowed right upper lobe airways which are likely due to the consolidation and not a postobstructive process from endobronchial lesion based on imaging.  Mediastinal lymphadenopathy is also present which is likely reactive in nature.    -Continue Zosyn at this time with infectious work-up pending  -Plan for CT-guided chest tube placement with cultures today  -Wean oxygen as tolerated for SPO2 greater than 92%  -Infectious work-up as per infectious disease  -Appreciate thoracic surgery recommendations  -DuoNeb every 6 as needed  -Patient will need to follow-up in pulmonary clinic upon discharge to ensure resolution of consolidation and mediastinal lymphadenopathy    Thank you for allowing us to participate in this patients care. Pulmonary will continue to follow.     Johnny Tuttle MD  [facility]  Pulmonary and Critical Care Medicine, Interventional Pulmonology    Parts of this note may be an electronic transcription/translation of spoken language to printed text using the Dragon dictation system.       Addendum  =========================  Patient with worsening mental status with ABG performed demonstrating respiratory acidosis with pH of 7.07 and PCO2 greater than 110.  Transferred to the ICU emergently and placed on BiPAP of 20/8 with tidal volumes 450-550, respiratory rate set 28, minute ventilation greater than 15.  Suspect that her hypercapnic respiratory failure is in the setting of pneumonia with decreased reserve in the setting of morbid obesity.  She will remain on Zosyn therapy, MRSA negative we will continue to hold off on vancomycin.  We will keep her on BiPAP at this time and repeat ABG in 30 minutes.  If worsening respiratory status and unable to correct hypercapnia we will have to consider intubation.  She will also need to have CT-guided chest tube placed once more clinically stable.    Critical care time: 40 minutes      Electronically signed by Johnny Tuttle MD at  10/03/23 0854       Juan Elena MD at 10/02/23 0554            Dedicated to Hospital Care    [phone]   LOS: 1 day     Name: Nay Gonzalez  Age/Sex: 53 y.o. female  :  1970        PCP: Provider, No Known  Chief Complaint   Patient presents with    Shortness of Breath    Fever      Subjective   She feels ok today denies new complaints still with shoulder discomfort and not feeling well.  Biggest complaint is being thristy.  Per Nursing and IV team She doesn't have any other areas for vscular access this AM.  REcomending vascualr to lace IJ or subclavian  General: No Fever or Chills, Cardiac: No Chest Pain or Palpitations, GI: No Nausea, Vomiting, or Diarrhea, and Other: No bleeding    amLODIPine, 5 mg, Oral, Q24H  enoxaparin, 40 mg, Subcutaneous, Q12H  insulin lispro, 2-7 Units, Subcutaneous, 4x Daily AC & at Bedtime  labetalol, 100 mg, Oral, Q12H  piperacillin-tazobactam, 4.5 g, Intravenous, Q8H  senna-docusate sodium, 2 tablet, Oral, BID  sodium chloride, 10 mL, Intravenous, Q12H  vancomycin, 1,000 mg, Intravenous, Q12H      Pharmacy Consult - Pharmacy to dose,   sodium chloride, 100 mL/hr, Last Rate: Stopped (10/02/23 0225)        Objective   Vital Signs  Temp:  [99 øF (37.2 øC)-102.7 øF (39.3 øC)] 100.2 øF (37.9 øC)  Heart Rate:  [] 116  Resp:  [18-22] 22  BP: (144-245)/() 152/91  Body mass index is 59.34 kg/mý.    Intake/Output Summary (Last 24 hours) at 10/2/2023 05  Last data filed at 10/2/2023 0225  Gross per 24 hour   Intake 1300 ml   Output 400 ml   Net 900 ml       Physical Exam  Vitals and nursing note reviewed.   Constitutional:       General: She is not in acute distress.     Appearance: She is obese. She is ill-appearing.   Cardiovascular:      Rate and Rhythm: Regular rhythm. Tachycardia present.   Pulmonary:      Effort: Pulmonary effort is normal. No respiratory distress.      Comments: Dont are any movement in the RU lung file;ds otherwise CTA  Abdominal:       General: Bowel sounds are normal. There is no distension.      Palpations: Abdomen is soft.   Neurological:      General: No focal deficit present.      Mental Status: She is alert. Mental status is at baseline.   Psychiatric:         Mood and Affect: Mood normal.         Behavior: Behavior normal.         Results Review:       I reviewed the patient's new clinical results.  Results from last 7 days   Lab Units 10/01/23  1138   WBC 10*3/mm3 22.45*   HEMOGLOBIN g/dL 10.4*   PLATELETS 10*3/mm3 395     Results from last 7 days   Lab Units 10/01/23  1138   SODIUM mmol/L 141   POTASSIUM mmol/L 4.2   CHLORIDE mmol/L 101   CO2 mmol/L 30.0*   BUN mg/dL 14   CREATININE mg/dL 0.95   CALCIUM mg/dL 9.6   Estimated Creatinine Clearance: 100.8 mL/min (by C-G formula based on SCr of 0.95 mg/dL).  Lab Results   Component Value Date    HGBA1C 7.50 (H) 10/01/2023     Glucose   Date/Time Value Ref Range Status   10/02/2023 0745 154 (H) 70 - 130 mg/dL Final   10/01/2023 2048 296 (H) 70 - 130 mg/dL Final   10/01/2023 1650 140 (H) 70 - 130 mg/dL Final         Assessment & Plan   Active Hospital Problems    Diagnosis  POA    **PNA (pneumonia) [J18.9]  Yes    Acute respiratory failure with hypoxia [J96.01]  Unknown    Sepsis [A41.9]  Unknown    Hyperglycemia [R73.9]  Unknown    Hypertensive urgency [I16.0]  Unknown    Pneumonia [J18.9]  Yes      Resolved Hospital Problems   No resolved problems to display.       PLAN  This is a 53-year-old female with a history of morbid obesity, new diabetes of type 2 diabetes and hypertension who presents to the hospital with cough shortness of breath and fevers and is found to have a right upper lobe pulmonary abscess  -It remains unclear the source of this abscess.  Her MRSA screen was negative.  We can probably discontinue the vancomycin and continue Zosyn for now.  She does not have any risk factors for tuberculosis she is not an IV drug user.  I suspect this is likely related to chronic aspiration  but it is unclear at this time.  ID and thoracic surgery to evaluate today.  -Planning for IR drain placement today.  Again discussed with vascular surgery last night and they do plan on formally evaluating the patient today but given her tight lung windows and body habitus surgical intervention will be quite difficult.  -She needs to continue IV antibiotics.  We can discontinue IV fluids today given she is normotensive but mildly tachycardic.  -Access is an issue and will be needed for long term abx.  Unfortunately she isnt a candidate for a PICC or midline.  Could do left IJ and subclavian, would avoid the R side given infection proximity.  Discussed with general surgery and vascular surgery.  Blood cultures negative so far but not yet 24hrs old.    -A1c 7.5 cover with SSI today and plan Jardiamce or Metformin and ozempic at DC  -mechanical DVT prophylaxis  -full code      Disposition  Expected discharge date/ time has not been documented.       Juan Elena MD  [facility]  10/02/23  05:55 EDT      Update:  Notified by nursing this afternoon that she was more lethargic and not looking really great.  We were able to get her central IV access placed by general surgery and greatly appreciate their assistance.  An ABG was done following line placement and she is found to have significant CO2 retention and a very low pH.  We will need to transfer to the ICU for urgent BiPAP.  I have also ordered a repeat renal function panel as her creatinine was increasing and she did have some hyperkalemia this morning.  We will follow-up the chemistry results once they are back.  I did attempt to call her  on the phone but it is a nondescript voicemail and no voicemail was left.  Discussed with the pulmonologist and he agrees with plan to transfer to the ICU.  We will reach out to thoracic surgery and discussed with them as well as I do not think she is stable enough to go down for CT-guided drain  placement at this time.      Electronically signed by Juan Elena MD at 10/02/23 1444       Lonnie Brantley MD at 10/01/23 1808          Progress note    Full note to follow. Chart reviewed.     Colette is admitted with signs and symptoms of pneumonia. She is septic by clinic criteria without evidence of shock or organ failure. She had hypertensive crisis on admission with mild troponin leak which I believe could be related to demand ischemia. She has a large 12 cm right upper chest mass/ abscess with upper lobe consolidation and extension to the chest wall. There is a pocket of air next to right sternoclavicular joint, which raises the suspicion for SC joint involvement. I did not appreciate cortical changes on the CT scan which is not sensitive to assess bone infection. Her BMI is 60 and she has a relatively small skeleton for her body habitus. She also has small lung volumes.     The multitude and severity of the above medical problems reflect poor underlying health conditions and high risk for decompensation. I recommend:    Broad spectrum antibiotics.   ID consultation.  IR consultation for image guided chest tube.  Send pleural fluid for culture.   Follow blood culture.  TTE for mild troponin elevation and risk stratification.   Optimize blood pressure.     Lonnie Brantley MD  Thoracic Surgeon    Electronically signed by Lonnie Brantley MD at 10/01/23 1830          Consult Notes (last 72 hours)        Iain Owens MD at 10/03/23 1128        Consult Orders    1. Inpatient Nephrology Consult [[id number]] ordered by Brannon Gonzales MD at 10/03/23 0980                   [facility] Consult Note      Patient Name: Nay Gonzalez  : 1970  MRN: [id number]  Primary Care Physician:  Provider, No Known  Referring Physician: Juan Elena MD  Date of admission: 10/1/2023    Subjective     Reason for Consult: Acute kidney injury    HPI:   Nay Gonzalez is a 53 y.o.  female patient was admitted on 10/1/2023, when she presented with fever and shortness of breath he had hypertensive crisis blood pressure was about 245/130 dropped rapidly to the 120 range for short period of time and it was late to go up and currently about 160 systolic.  Her creatinine on admission 0.95 and now it is up to 2.73, hence nephrology consult was requested  According to her mother she had a recent sinus infection and she was taken some decongestant but she has not been on any medication on a regular basis.  No reported medical history of any type of except the patient is obese.  On 10/1/2023 she had also CT angiogram of the chest.  According to her mother she did not have any history of hypertension or diabetes    Review of Systems:   Not obtainable    Personal History     History reviewed. No pertinent past medical history.    Past Surgical History:   Procedure Laterality Date    TEETH EXTRACTION         Family History: family history is not on file.    Social History:  reports that she has never smoked. She has never been exposed to tobacco smoke. She has never used smokeless tobacco. She reports current alcohol use of about 1.0 standard drink per week. She reports that she does not use drugs.    Home Medications:  Prior to Admission medications    Not on File       Allergies:  No Known Allergies    Objective     Vitals:   Temp:  [99 øF (37.2 øC)-101.1 øF (38.4 øC)] 99.3 øF (37.4 øC)  Heart Rate:  [] 85  Resp:  [26-44] 30  BP: ()/() 166/77  Flow (L/min):  [5] 5  FiO2 (%):  [49 %-99 %] 49 %    Intake/Output Summary (Last 24 hours) at 10/3/2023 1128  Last data filed at 10/2/2023 2200  Gross per 24 hour   Intake 331.39 ml   Output --   Net 331.39 ml       Physical Exam:   Constitutional: Super morbid obesity BMI 4054.61 kg/mý, on the ventilator and sedated no acute distress  HEENT: Sclera anicteric, no conjunctival injection, orally intubated  Neck: Come to assess because of her body  habitus  Respiratory: Lateral rhonchi and crackles, nonlabored respiration  Cardiovascular: RRR, no murmurs, no rubs or gallops, no carotid bruit  Gastrointestinal: Positive bowel sounds, abdomen is soft, protuberant, no guarding  : No palpable bladder, external urinary catheter  Musculoskeletal: No edema, no clubbing or cyanosis  Psychiatric: Unable to assess  Neurologic: Unable to assess  Skin: Warm and dry       Scheduled Meds:     chlorhexidine, 15 mL, Mouth/Throat, Q12H  enoxaparin, 40 mg, Subcutaneous, Q12H  hydrALAZINE, 25 mg, Oral, Q8H  insulin lispro, 2-7 Units, Subcutaneous, 4x Daily AC & at Bedtime  labetalol, 100 mg, Oral, Q12H  pantoprazole, 40 mg, Intravenous, Q24H  piperacillin-tazobactam, 4.5 g, Intravenous, Q8H  senna-docusate sodium, 2 tablet, Oral, BID  sodium chloride, 10 mL, Intravenous, Q12H      IV Meds:   propofol, 5-50 mcg/kg/min, Last Rate: 40 mcg/kg/min (10/03/23 0948)        Results Reviewed:   I have personally reviewed the results from the time of this admission to 10/3/2023 11:28 EDT     Lab Results   Component Value Date    GLUCOSE 87 10/03/2023    CALCIUM 9.0 10/03/2023     10/03/2023    K 4.5 10/03/2023    CO2 25.0 10/03/2023     10/03/2023    BUN 31 (H) 10/03/2023    CREATININE 2.73 (H) 10/03/2023    BCR 11.4 10/03/2023    ANIONGAP 15.0 10/03/2023      Lab Results   Component Value Date    MG 2.1 10/02/2023    PHOS 5.1 (H) 10/02/2023    ALBUMIN 2.5 (L) 10/03/2023           Assessment / Plan       PNA (pneumonia)    Acute respiratory failure with hypoxia    Hypertensive urgency    Abscess of upper lobe of right lung with pneumonia    Sepsis    Type 2 diabetes mellitus with hyperglycemia    ELLY (acute kidney injury)    Obesity, morbid, BMI 50 or higher    Anemia      ASSESSMENT:  Acute kidney injury associated with sudden correction of her hypertension initially also possible component of contrast-induced nephropathy because patient had CTA on 10/1/2023.  Creatinine is  increasing, But her electrolyte within acceptable range.  Acute respiratory failure on the ventilator  Now diagnosis of hypertension  New diagnosis of diabetes mellitus type 2  Super morbid obesity  Anemia, the etiology not very clear    PLAN:  Check random urine for sodium, chloride and protein to creatinine ratio  Check iron stores and immunofixation  I agree with the present treatment and will try to keep her blood pressure in the 1 40-1 60 systolic range hoping that will help renal function stabilizes  Surveillance labs  I discussed the case with the patient's mother at the bedside also with the nursing staff    I reviewed the chart and other providers notes, reviewed imaging and lab data.    Thank you for involving us in the care of Nay Gonzalez.  Please feel free to call with any questions.    Iain Owens MD  10/03/23  11:28 EDT    [facility]  [phone]      Please note that portions of this note were completed with a voice recognition program.    Electronically signed by Iain Owens MD at 10/03/23 1137       Ilya Briones MD at 10/02/23 1328        Consult Orders    1. Inpatient General Surgery Consult [[id number]] ordered by Juan Elena MD at 10/02/23 1013                 Colorectal & General Surgery  Consultation    Patient: Nay Gonzalez  YOB: 1970  MRN: [id number]      Assessment  Nay Gonzalez is a 53 y.o. female with pneumonia and associated pulmonary abscess with difficult IV access.  I was asked to place a central line.  I discussed the risk, benefits, alternatives with the patient, including the risk of pneumothorax and arterial placement.  She wished to proceed.  See procedure note for details of the procedure.    Chest x-ray is currently pending at this time.  I will follow-up.      History of Present Illness   Nay Gonzalez is a 53 y.o. female who I am seeing in consultation regarding intravenous access  at the request of Juan Elena MD.  53-year-old lady presented to the emergency department last night with shortness of air.  She was found to have sepsis secondary to pneumonia with associated pulmonary abscess.  Difficult IV access overnight.  The PICC team was unable to identify any suitable targets for PICC line.  I was subsequently asked to place a central line.  No blood thinners.      Past Medical History   History reviewed. No pertinent past medical history.     Past Surgical History   Past Surgical History:   Procedure Laterality Date    TEETH EXTRACTION         Social History  Social History     Socioeconomic History    Marital status:    Tobacco Use    Smoking status: Never     Passive exposure: Never    Smokeless tobacco: Never   Vaping Use    Vaping Use: Never used   Substance and Sexual Activity    Alcohol use: Yes     Alcohol/week: 1.0 standard drink     Types: 1 Glasses of wine per week     Comment: MAYBE ONE DRINK A  MONTH    Drug use: Never    Sexual activity: Defer       Family History  History reviewed. No pertinent family history.    Review of Systems  Negative except as documented in the HPI.     Allergies  No Known Allergies    Medications    Current Facility-Administered Medications:     acetaminophen (TYLENOL) tablet 650 mg, 650 mg, Oral, Q4H PRN, 650 mg at 10/02/23 1102 **OR** acetaminophen (TYLENOL) 160 MG/5ML oral solution 650 mg, 650 mg, Oral, Q4H PRN **OR** acetaminophen (TYLENOL) suppository 650 mg, 650 mg, Rectal, Q4H PRN, Juan Elena MD    amLODIPine (NORVASC) tablet 5 mg, 5 mg, Oral, Q24H, Juan Elena MD, 5 mg at 10/02/23 0907    sennosides-docusate (PERICOLACE) 8.6-50 MG per tablet 2 tablet, 2 tablet, Oral, BID, 2 tablet at 10/02/23 0907 **AND** polyethylene glycol (MIRALAX) packet 17 g, 17 g, Oral, Daily PRN **AND** bisacodyl (DULCOLAX) EC tablet 5 mg, 5 mg, Oral, Daily PRN **AND** bisacodyl (DULCOLAX) suppository 10 mg, 10 mg, Rectal, Daily PRN,  Juan Elena MD    Calcium Replacement - Follow Nurse / BPA Driven Protocol, , Does not apply, PRN, Juan Elena MD    dextrose (D50W) (25 g/50 mL) IV injection 25 g, 25 g, Intravenous, Q15 Min PRN, Juan Elena MD    dextrose (GLUTOSE) oral gel 15 g, 15 g, Oral, Q15 Min PRN, Juan Elena MD    Enoxaparin Sodium (LOVENOX) syringe 40 mg, 40 mg, Subcutaneous, Q12H, Juan Elena MD, 40 mg at 10/01/23 1723    glucagon (GLUCAGEN) injection 1 mg, 1 mg, Intramuscular, Q15 Min PRN, Juan Elena MD    [START ON 10/3/2023] influenza vac split quad (FLUZONE,FLUARIX,AFLURIA,FLULAVAL) injection 0.5 mL, 0.5 mL, Intramuscular, During Hospitalization, Juan Elena MD    insulin lispro (HUMALOG/ADMELOG) injection 2-7 Units, 2-7 Units, Subcutaneous, 4x Daily AC & at Bedtime, Juan Elena MD, 2 Units at 10/02/23 0907    ipratropium-albuterol (DUO-NEB) nebulizer solution 3 mL, 3 mL, Nebulization, Q6H PRN, Johnny Tuttle MD    labetalol (NORMODYNE) tablet 100 mg, 100 mg, Oral, Q12H, Juan Elena MD, 100 mg at 10/02/23 0655    Magnesium Standard Dose Replacement - Follow Nurse / BPA Driven Protocol, , Does not apply, PRN, Juan Elena MD    nitroglycerin (NITROSTAT) SL tablet 0.4 mg, 0.4 mg, Sublingual, Q5 Min PRN, Juan Elena MD    ondansetron (ZOFRAN) tablet 4 mg, 4 mg, Oral, Q6H PRN **OR** ondansetron (ZOFRAN) injection 4 mg, 4 mg, Intravenous, Q6H PRN, Juan Elena MD    Phosphorus Replacement - Follow Nurse / BPA Driven Protocol, , Does not apply, PRN, Juan Elena MD    piperacillin-tazobactam (ZOSYN) 4.5 g in iso-osmotic dextrose 100 mL IVPB (premix), 4.5 g, Intravenous, Q8H, Juan Elena MD, Last Rate: 0 mL/hr at 10/02/23 0214, 4.5 g at 10/02/23 1144    Potassium Replacement - Follow Nurse / BPA Driven Protocol, , Does not apply, PRN, Juan Elena MD    sodium chloride 0.9 % flush 10 mL, 10 mL, Intravenous,  Q12H, Juan Elena MD, 10 mL at 10/02/23 0907    sodium chloride 0.9 % flush 10 mL, 10 mL, Intravenous, PRN, Juan Elena MD, 10 mL at 10/01/23 1723    sodium chloride 0.9 % infusion 40 mL, 40 mL, Intravenous, PRN, Juan Elena MD    Vital Signs  Vitals:    10/02/23 1144   BP:    Pulse: 112   Resp:    Temp: (!) 101 øF (38.3 øC)   SpO2:           Physical Exam  Constitutional: Resting comfortably, no acute distress  Neck: Supple, trachea midline  Respiratory: No increased work of breathing, Symmetric excursion  Cardiovascular: Well pefursed, no jugular venous distention evident   Abdominal:  Soft, non-tender, non-distended  Lymphatics: No cervical or suprascapular adenopathy  Skin: Warm, dry, no rash on visualized skin surfaces  Musculoskeletal: Symmetric strength, no obvious gross abnormalities  Psychiatric: Alert and oriented x3, normal affect     Laboratory Results  I have personally reviewed CBC with WBC 24, hemoglobin 10, platelets 353.  CRP 28.  Creatinine 1.08, potassium 5.4, albumin 3.1.    Radiology  I have personally reviewed CT scan of the chest demonstrates bilaterally patent internal jugular veins, quite deep from the skin.           Yamil Briones MD  Colorectal & General Surgery  [facility]    [address] [place] [address], [address]  [place], [place], [facility]  P: [phone]  F: [phone]       Electronically signed by Ilya Briones MD at 10/02/23 1330       Quynh Choe APRN at 10/02/23 1118        Consult Orders    1. Inpatient Thoracic Surgery Consult [[id number]] ordered by Juan Elena MD              Attestation signed by Lonnie Brantley MD at 10/02/23 7196    I have reviewed this documentation and agree.                      Inpatient Thoracic Surgery Consult  Consult performed by: Quynh Choe APRN  Consult ordered by: Juan Elena MD  Reason for consult: pulmonary abscess eroding into the chest wall involving the first  rib        Patient Care Team:  Provider, No Known as PCP - General    Chief Complaint   Patient presents with    Shortness of Breath    Fever       Lenin Gonzalez is a 53-year-old female who presented to [facility] yesterday complaining of progressive shortness of air and cough.  She reports her symptoms have been ongoing for about the last 10 days but have worsened recently.  Her cough is productive of yellowish sputum but she denies hemoptysis.  She reports she has had a fever and diaphoresis but denies nausea or vomiting.  She denies any trouble swallowing or choking.  She is unaware of any sick contacts.  Blood pressure was extremely elevated at 245/130 in the emergency department.  Chest x-ray demonstrated a right upper lobe dense pulmonary consolidation so a CT of the chest was then performed.  Patient is a non-smoker.  He denies any history of lung disease or malignancy.  He denies any illicit drug use.  She does not take any home meds.  She is newly diagnosed type II diabetic.  CT imaging demonstrated a right upper lobe consolidation which appears to have a necrotic component and extend into the chest wall and first rib.  We have been consulted to see this lady for evaluation of this pulmonary abscess.  Upon arrival to her room, the patient is diaphoretic and febrile.  She is utilizing supplemental oxygen.  Her spouse is at bedside and assists with her history.      Review of Systems   Constitutional:  Positive for activity change, diaphoresis and fever. Negative for unexpected weight change.   HENT:  Positive for congestion. Negative for trouble swallowing.    Eyes: Negative.    Respiratory:  Positive for cough and shortness of breath.    Cardiovascular:  Negative for chest pain.   Gastrointestinal:  Negative for diarrhea, nausea and vomiting.   Endocrine: Negative.    Genitourinary: Negative.    Skin: Negative.       History reviewed. No pertinent past medical history.  Past  Surgical History:   Procedure Laterality Date    TEETH EXTRACTION       History reviewed. No pertinent family history.  Social History     Socioeconomic History    Marital status:    Tobacco Use    Smoking status: Never     Passive exposure: Never    Smokeless tobacco: Never   Vaping Use    Vaping Use: Never used   Substance and Sexual Activity    Alcohol use: Yes     Alcohol/week: 1.0 standard drink     Types: 1 Glasses of wine per week     Comment: MAYBE ONE DRINK A  MONTH    Drug use: Never    Sexual activity: Defer     No medications prior to admission.     No Known Allergies    Objective      Vital Signs  Temp:  [99 øF (37.2 øC)-102.7 øF (39.3 øC)] 99 øF (37.2 øC)  Heart Rate:  [100-125] 106  Resp:  [20-26] 26  BP: (123-161)/() 140/79    Intake & Output (last day)         10/01 0701  10/02 0700 10/02 0701  10/03 0700    I.V. (mL/kg) 1000 (7.1)     IV Piggyback 300     Total Intake(mL/kg) 1300 (9.3)     Urine (mL/kg/hr) 400 500 (0.5)    Total Output 400 500    Net +900 -500          Urine Unmeasured Occurrence 1 x 1 x            Physical Exam  Vitals and nursing note reviewed.   Constitutional:       Appearance: She is morbidly obese. She is toxic-appearing.      Interventions: Nasal cannula in place.   HENT:      Head: Normocephalic and atraumatic.      Mouth/Throat:      Mouth: Mucous membranes are moist.   Eyes:      General: No scleral icterus.     Conjunctiva/sclera: Conjunctivae normal.   Cardiovascular:      Rate and Rhythm: Tachycardia present.      Pulses: Normal pulses.   Pulmonary:      Breath sounds: Decreased breath sounds present. No wheezing, rhonchi or rales.   Musculoskeletal:         General: Swelling and tenderness present.      Cervical back: Neck supple.      Comments: Right sternoclavicular swelling and tenderness palpated   Skin:     General: Skin is warm.   Neurological:      Mental Status: She is alert and oriented to person, place, and time. Mental status is at baseline.    Psychiatric:         Mood and Affect: Mood normal.         Behavior: Behavior normal. Behavior is cooperative.         Thought Content: Thought content normal.         Judgment: Judgment normal.       Results Review:    I reviewed the patient's new clinical results.  I reviewed the patient's new imaging results and agree with the interpretation.  I reviewed the patient's other test results and agree with the interpretation  Discussed with patient, spouse at bedside, RN, Dr. Brantley.    Imaging Results (Last 24 Hours)       Procedure Component Value Units Date/Time    XR Chest Post CVA Port [[id number]] Resulted: 10/02/23 1348     Updated: 10/02/23 1417    CT Angiogram Chest [[id number]] Collected: 10/01/23 1521     Updated: 10/01/23 1543    Narrative:      EXAM: CT ANGIOGRAM CHEST-     HISTORY: Hypoxia with abnormal chest x-ray.     TECHNIQUE: Radiation dose reduction techniques were utilized, including  automated exposure control and exposure modulation based on body size.   3 mm images were obtained through the chest after the administration of  IV contrast.     COMPARISON: Same day chest radiograph        FINDINGS: Organized right upper lobe 11.8 x 7.2 cm air and fluid  collection (series 5 image 50). Collection causes compressive  atelectasis of the adjacent right upper lobe. Fluid and locules of air  extending to the anterior chest wall anterior to the first right rib and  anterior superior to the medial right clavicle (series 5/image 30,  series 5/image 15, series 7/image 73 and dedicated screenshot). No  osteolysis of the adjacent osseous structures. Small dependent right  pleural effusion. Right axillary, mediastinal and right hilar lymph  nodes are likely reactive. Reference 1.9 cm low right paratracheal lymph  node (series 5/image 39). Additional reference 1.3 cm right axillary  lymph node (series 5/image 45). No pneumomediastinum or organized  mediastinal fluid collection.     Dilated main pulmonary artery  (36 mm). Contrast bolus timing limits  evaluation of the pulmonary arteries. No central pulmonary embolus  (main, interlobar and proximal segmental). Nondilated thoracic aorta.  Nondilated esophagus.       Impression:      1. Large (12 cm) right upper lobe pulmonary abscess with extension into  the anterior right chest wall as detailed above.  2. Small right pleural effusion.  3. Mediastinal, right hilar and right axillary lymphadenopathy is likely  reactive.  4. No central pulmonary embolus. Contrast bolus timing limits evaluation  of the more distal pulmonary arteries.  5. Dilated main pulmonary artery (36 mm).     Above findings were discussed with Dr. Elena at 3:30 p.m. on  10/1/2023.     This report was finalized on 10/1/2023 3:40 PM by Dr. Aniceto Epstein M.D.               Lab Results:  Lab Results (last 24 hours)       Procedure Component Value Units Date/Time    Blood Gas, Arterial - [[id number]]  (Abnormal) Collected: 10/02/23 1344    Specimen: Arterial Blood Updated: 10/02/23 1351     Site Right Brachial     Cole's Test Positive     pH, Arterial 7.074 pH units      pCO2, Arterial 112.4 mm Hg      pO2, Arterial 96.8 mm Hg      HCO3, Arterial 32.9 mmol/L      Base Excess, Arterial -0.3 mmol/L      Comment: Serial Number: [id number]:  [id number]        O2 Saturation, Arterial 92.7 %      CO2 Content >36.08 mmol/L      Barometric Pressure for Blood Gas 752.2000 mmHg      Modality Cannula     Flow Rate 6.0000 lpm      Rate 30 Breaths/minute      Notified Who jhonatan [email or web address]     Read Back Yes     Notified Time 13:48     Hemodilution No     Device Comment drawn by [id number] at 1340    POC Glucose Once [[id number]]  (Abnormal) Collected: 10/02/23 1347    Specimen: Blood Updated: 10/02/23 1348     Glucose 142 mg/dL     Blood Culture - Blood, Hand, Left [[id number]]  (Normal) Collected: 10/01/23 1339    Specimen: Blood from Hand, Left Updated: 10/02/23 1345     Blood Culture No growth at 24 hours    Renal Function  Panel [[id number]]  (Abnormal) Collected: 10/02/23 0501    Specimen: Blood Updated: 10/02/23 1300     Glucose 108 mg/dL      BUN 16 mg/dL      Creatinine 1.08 mg/dL      Sodium 138 mmol/L      Potassium 5.4 mmol/L      Comment: Slight hemolysis detected by analyzer. Results may be affected.        Chloride 100 mmol/L      CO2 23.9 mmol/L      Calcium 9.6 mg/dL      Albumin 3.1 g/dL      Phosphorus 4.6 mg/dL      Anion Gap 14.1 mmol/L      BUN/Creatinine Ratio 14.8     eGFR 61.9 mL/min/1.73     Narrative:      GFR Normal >60  Chronic Kidney Disease <60  Kidney Failure <15      Blood Culture - Blood, Arm, Left [[id number]]  (Normal) Collected: 10/01/23 1231    Specimen: Blood from Arm, Left Updated: 10/02/23 1245     Blood Culture No growth at 24 hours    Narrative:      Less than seven (7) mL's of blood was collected.  Insufficient quantity may yield false negative results.    Magnesium [[id number]]  (Normal) Collected: 10/02/23 0501    Specimen: Blood Updated: 10/02/23 1216     Magnesium 2.1 mg/dL     C-reactive Protein [[id number]]  (Abnormal) Collected: 10/02/23 0501    Specimen: Blood Updated: 10/02/23 1216     C-Reactive Protein 28.05 mg/dL     POC Glucose Once [[id number]]  (Abnormal) Collected: 10/02/23 1123    Specimen: Blood Updated: 10/02/23 1124     Glucose 134 mg/dL     Scan Slide [[id number]] Collected: 10/02/23 0501    Specimen: Blood Updated: 10/02/23 0816     Anisocytosis Slight/1+     Weston Cells Slight/1+     Hypochromia Mod/2+     Ovalocytes Slight/1+     Poikilocytes Slight/1+     Polychromasia Slight/1+     WBC Morphology Normal     Platelet Estimate Adequate     Giant Platelets Slight/1+    POC Glucose Once [[id number]]  (Abnormal) Collected: 10/02/23 0745    Specimen: Blood Updated: 10/02/23 0746     Glucose 154 mg/dL     CBC & Differential [[id number]]  (Abnormal) Collected: 10/02/23 0501    Specimen: Blood Updated: 10/02/23 0646    Narrative:      The following orders were created for panel order CBC  & Differential.  Procedure                               Abnormality         Status                     ---------                               -----------         ------                     CBC Auto Differential[[id number]]        Abnormal            Final result                 Please view results for these tests on the individual orders.    CBC Auto Differential [[id number]]  (Abnormal) Collected: 10/02/23 0501    Specimen: Blood Updated: 10/02/23 0646     WBC 24.25 10*3/mm3      RBC 4.05 10*6/mm3      Hemoglobin 10.7 g/dL      Hematocrit 34.8 %      MCV 85.9 fL      MCH 26.4 pg      MCHC 30.7 g/dL      RDW 14.9 %      RDW-SD 46.8 fl      MPV 11.4 fL      Platelets 353 10*3/mm3      Neutrophil % 85.4 %      Lymphocyte % 5.0 %      Monocyte % 6.4 %      Eosinophil % 0.1 %      Basophil % 0.5 %      Neutrophils, Absolute 20.74 10*3/mm3      Lymphocytes, Absolute 1.22 10*3/mm3      Monocytes, Absolute 1.54 10*3/mm3      Eosinophils, Absolute 0.02 10*3/mm3      Basophils, Absolute 0.11 10*3/mm3     POC Glucose Once [[id number]]  (Abnormal) Collected: 10/01/23 2048    Specimen: Blood Updated: 10/01/23 2050     Glucose 296 mg/dL     MRSA Screen, PCR (Inpatient) - Swab, Nares [[id number]]  (Normal) Collected: 10/01/23 1538    Specimen: Swab from Nares Updated: 10/01/23 1832     MRSA PCR No MRSA Detected    Narrative:      The negative predictive value of this diagnostic test is high and should only be used to consider de-escalating anti-MRSA therapy. A positive result may indicate colonization with MRSA and must be correlated clinically.    POC Glucose Once [[id number]]  (Abnormal) Collected: 10/01/23 1650    Specimen: Blood Updated: 10/01/23 1651     Glucose 140 mg/dL                 Assessment & Plan       PNA (pneumonia)    Acute respiratory failure with hypoxia    Hypertensive urgency    Abscess of upper lobe of right lung with pneumonia    Sepsis    Type 2 diabetes mellitus with hyperglycemia      Assessment & Plan    I  have independently reviewed the patient's chart, labs and radiographic imaging.  CT angiogram demonstrates a large right upper lobe pulmonary abscess with extension into the anterior right chest wall.  There is a small right pleural effusion.  Mediastinal, right hilar and right axillary lymphadenopathy may be reactive.  No evidence of central pulmonary embolus.  Dilated main pulmonary artery is noted.    Right upper lobe consolidation/abscess: Appears to be tracking into the chest wall and first rib.  Patient has palpable tenderness and swelling.  We will for CT-guided chest tube and send the fluid for cultures. Chest tube to -20cm suction.    Sepsis: Antibiotics have been initiated.  ID is following.    Respiratory insufficiency: Requiring supplemental oxygen via nasal cannula.  She is quite dyspneic.  Hopefully can tolerate getting CT-guided chest tube.  Unsure if she will be able to lie flat.    Hypertension: Patient in hypertensive crisis on admission but now improved.  She has been initiated on labetalol and amlodipine.  Continue to follow closely.    I discussed the patients findings and our recommendations with patient, family, nursing staff, and Dr. Brantley.     Thank you for this consult and allowing us to participate in the care of your patient.  We will follow along with you during this hospitalization.       FERDINAND Zuniga  [facility]  10/02/23  14:19 EDT    Greater than 76 minutes was spent reviewing the patient's chart, radiographic imaging, labs, provider notes, assessing the patient and developing a plan of care which was discussed with the patient/family and other providers.    Addendum: Patient required general surgery central line placement this afternoon due to difficult IV access with IV team unable to place PICC line.  Apparently, she was quite lethargic after the procedure.  ABGs demonstrated significant respiratory acidosis with pH is 7.07.  The patient was then  "transferred to ICU for further monitoring.  She is now on BiPAP.  Discussed with patient's RN.  We will have to hold off on CT-guided chest tube until patient is either not requiring BiPAP or is intubated.      Electronically signed by Lonnie Brantley MD at 10/02/23 1556       Aakash Azul MD at 10/02/23 0862        Consult Orders    1. Inpatient Infectious Diseases Consult [[id number]] ordered by Juan Elena MD at 10/01/23 1545                 Referring Provider: Juan Elena MD  [address] [address]    Reason for Consultation: pulm abscess     History of present illness:  Nay Gonzalez is a very nice 53 y.o. who I am asked to evaluate and give opinion for \"pulm abscess.\" History is obtained from the patient and review of the old medical records which I summarize/synthesize as follows: She says she does not seek medical care for any problems very often. She has a fear of going to see doctors. She denies a past history of any serious or unusual infections.     She presented to the ER on 10/1/23 with shortness of breath and R shoulder pain that had been going on for about a week. She was coughing up thick yellow-green sputum. No recent infections. She had her teeth pulled many years ago. She does not choke on her food very often. She had not noticed fevers at home but was febrile in the ER. There were no alleviating factors to her symptoms.     In the ER she had a temp of 102.5 F with  and /130. Labs with a WBC of 22. CXR with a RUL infiltrate and CT showed a RUL abscess. Thoracic surgery, pulmonary and now ID have all been consulted. She is vancomycin and Zosyn. The current plan is for thoracic surgery to order an IR-guided chest tube to be placed and to treat w/ antibiotics.     PMH:  Super obesity BMI 54    Past Surgical History:   Procedure Laterality Date    TEETH EXTRACTION         Social History:  Works from home for [employer]    Non-smoker  No IV drug use    Antibiotic allergies and intolerances:  None    Medications:    Current Facility-Administered Medications:     acetaminophen (TYLENOL) tablet 650 mg, 650 mg, Oral, Q4H PRN, 650 mg at 10/02/23 0017 **OR** acetaminophen (TYLENOL) 160 MG/5ML oral solution 650 mg, 650 mg, Oral, Q4H PRN **OR** acetaminophen (TYLENOL) suppository 650 mg, 650 mg, Rectal, Q4H PRN, Juan Elena MD    amLODIPine (NORVASC) tablet 5 mg, 5 mg, Oral, Q24H, Juan Elena MD, 5 mg at 10/01/23 1721    sennosides-docusate (PERICOLACE) 8.6-50 MG per tablet 2 tablet, 2 tablet, Oral, BID **AND** polyethylene glycol (MIRALAX) packet 17 g, 17 g, Oral, Daily PRN **AND** bisacodyl (DULCOLAX) EC tablet 5 mg, 5 mg, Oral, Daily PRN **AND** bisacodyl (DULCOLAX) suppository 10 mg, 10 mg, Rectal, Daily PRN, Juan Elena MD    Calcium Replacement - Follow Nurse / BPA Driven Protocol, , Does not apply, PRN, Juan Elena MD    dextrose (D50W) (25 g/50 mL) IV injection 25 g, 25 g, Intravenous, Q15 Min PRN, Juan Elena MD    dextrose (GLUTOSE) oral gel 15 g, 15 g, Oral, Q15 Min PRN, Juan Elena MD    Enoxaparin Sodium (LOVENOX) syringe 40 mg, 40 mg, Subcutaneous, Q12H, Juan Elena MD, 40 mg at 10/01/23 1723    glucagon (GLUCAGEN) injection 1 mg, 1 mg, Intramuscular, Q15 Min PRN, Juan Elena MD    [START ON 10/3/2023] influenza vac split quad (FLUZONE,FLUARIX,AFLURIA,FLULAVAL) injection 0.5 mL, 0.5 mL, Intramuscular, During Hospitalization, Juan Elena MD    insulin lispro (HUMALOG/ADMELOG) injection 2-7 Units, 2-7 Units, Subcutaneous, 4x Daily AC & at Bedtime, Juan Elena MD, 4 Units at 10/01/23 2146    labetalol (NORMODYNE) tablet 100 mg, 100 mg, Oral, Q12H, Juan Elena MD, 100 mg at 10/02/23 0655    Magnesium Standard Dose Replacement - Follow Nurse / BPA Driven Protocol, , Does not apply, PRN, Juan Elena MD     nitroglycerin (NITROSTAT) SL tablet 0.4 mg, 0.4 mg, Sublingual, Q5 Min PRN, Juan Elena MD    ondansetron (ZOFRAN) tablet 4 mg, 4 mg, Oral, Q6H PRN **OR** ondansetron (ZOFRAN) injection 4 mg, 4 mg, Intravenous, Q6H PRN, Juan Elena MD    Pharmacy to dose vancomycin, , Does not apply, Continuous PRN, Juan Elena MD    Phosphorus Replacement - Follow Nurse / BPA Driven Protocol, , Does not apply, PRNUlices Stephen J, MD    piperacillin-tazobactam (ZOSYN) 4.5 g in iso-osmotic dextrose 100 mL IVPB (premix), 4.5 g, Intravenous, Q8H, Juan Elena MD, Last Rate: 0 mL/hr at 10/02/23 0214, Restarted at 10/02/23 0425    Potassium Replacement - Follow Nurse / BPA Driven Protocol, , Does not apply, PRN, Juan Elena MD    sodium chloride 0.9 % flush 10 mL, 10 mL, Intravenous, Q12H, Juna Elena MD, 10 mL at 10/01/23 2147    sodium chloride 0.9 % flush 10 mL, 10 mL, Intravenous, PRN, Juan Elena MD, 10 mL at 10/01/23 1723    sodium chloride 0.9 % infusion 40 mL, 40 mL, Intravenous, PRN, Juan Elena MD    sodium chloride 0.9 % infusion, 100 mL/hr, Intravenous, Continuous, Christian Bernal MD, Stopped at 10/02/23 0225    vancomycin (VANCOCIN) 1000 mg/200 mL dextrose 5% IVPB, 1,000 mg, Intravenous, Q12H, Juan Elena MD      Objective   Vital Signs   Temp:  [99 øF (37.2 øC)-102.7 øF (39.3 øC)] 101.5 øF (38.6 øC)  Heart Rate:  [] 100  Resp:  [18-22] 20  BP: (123-245)/() 123/70    Physical Exam:   General: awake, alert, NAD, very nice, in chair  Eyes: no scleral icterus  ENT: no thrush; edentulous  Cardiovascular: tachycardic  Respiratory: normal work of breathing on 3L NC; R rales  GI: Abdomen is soft, not tender  :  no Sauer catheter  Skin: No rashes  Neurological: Alert and oriented x 3  Psychiatric: Normal mood and affect   Vasc: PIV w/o erythema    Labs:     Lab Results   Component Value Date    WBC 24.25 (H) 10/02/2023    HGB 10.7 (L)  "10/02/2023    HCT 34.8 10/02/2023    MCV 85.9 10/02/2023     10/02/2023       Lab Results   Component Value Date    GLUCOSE 168 (H) 10/01/2023    BUN 14 10/01/2023    CREATININE 0.95 10/01/2023    BCR 14.7 10/01/2023    CO2 30.0 (H) 10/01/2023    CALCIUM 9.6 10/01/2023    ALBUMIN 3.3 (L) 10/01/2023    AST 19 10/01/2023    ALT 35 (H) 10/01/2023     No results found for: CRP    Lab Results   Component Value Date    HGBA1C 7.50 (H) 10/01/2023     Procal 0.59    Microbiology:  10/1 RPP: negative  10/1 BCx: NGTD  10/1 MRSA nares: negative      Radiology:  CXR personally reviewed and shows a RUL infiltrate    CT chest:   \"1. Large (12 cm) right upper lobe pulmonary abscess with extension into   the anterior right chest wall as detailed above.   2. Small right pleural effusion.   3. Mediastinal, right hilar and right axillary lymphadenopathy is likely   reactive.   4. No central pulmonary embolus. Contrast bolus timing limits evaluation   of the more distal pulmonary arteries.   5. Dilated main pulmonary artery (36 mm). \"    ASSESSMENT/PLAN:  Right upper lobe pulmonary abscess  Sepsis due to #1  Super obesity BMI 54  Uncontrolled diabetes type 2 A1c  7.5%  Hypertension    Unclear etiology of her pulmonary abscess. Aspiration would be most likely. Noted plans for IR-guided drainage today. Please send for cultures. Continue Zosyn. Stop vancomycin w/ negative MRSA nares screen. I can resume it if culture positive for MRSA. Check CRP. Check CBC, BMP, HIV Ab, and Hep C Ab in the AM.     ID will follow.       Electronically signed by Aakash Azul MD at 10/02/23 0916       Christian Bernal MD at 10/01/23 5433        Consult Orders    1. Inpatient Pulmonology Consult [[id number]] ordered by Juan Elena MD at 10/01/23 1510                                Referring Provider: Dr. Elena  Reason for Consultation: Pneumonia questionable need for bronchoscopy    Patient Care Team:  Provider, No Known as PCP " - General    Chief complaint:   Fever and shortness of breath    History of present illness:    Subjective   This is a 53-year-old female patient, lifelong non-smoker with no prior history of lung disease.    She presented to the hospital today for symptoms of cough and shortness of breath.  Initially she started feeling sick about 10 days ago with congestion.  This was followed by dyspnea and cough which was initially dry but later productive of yellowish phlegm.  She denies trouble swallowing or choking.  She denies sick contact.  She is not aware of fever at home but she had a temperature of 102.5 on admission.  In addition her BP was extremely elevated reaching 245/130.  She had a CXR showing RUL dense pulmonary consolidation followed by CT chest as below.    Review of Systems  Constitutional: No fever or chills.   ENMT: No sinus congestion.  Snoring.  Apnea.  Witnessed by her .  Cardiovascular: No chest pain, palpitation or legs swelling.    Respiratory: Dyspnea as above.  Gastrointestinal: No constipation, diarrhea or abdominal pain   Neurology: No headache, weakness, numbness or dizziness.   Musculoskeletal: No joints pain, stiffness or swelling.  She did feel that she has swelling in her right upper chest started about the same time she started to feel sick.  Psychiatry: No depression.  Genitourinary: No dysuria or frequent urination  Endo: No weight changes. No cold or warm intolerance.  Lymphatic: No swollen glands.  Integumentary: No rash.    History  PMH: None  PSH: None  Allergies: Only seasonal.  Family history: Positive for heart disease  Social History     Socioeconomic History    Marital status:    Tobacco Use    Smoking status: Never   Substance and Sexual Activity    Alcohol use: Yes    Drug use: Never         ,   No medications prior to admission.   , Scheduled Meds:  amLODIPine, 5 mg, Oral, Q24H  [START ON 10/2/2023] cefTRIAXone, 2,000 mg, Intravenous, Q24H  enoxaparin, 40 mg,  Subcutaneous, Q12H  insulin lispro, 2-7 Units, Subcutaneous, 4x Daily AC & at Bedtime  labetalol, 100 mg, Oral, Q12H  senna-docusate sodium, 2 tablet, Oral, BID  sodium chloride, 10 mL, Intravenous, Q12H   , Continuous Infusions:   , PRN Meds:    acetaminophen **OR** acetaminophen **OR** acetaminophen    senna-docusate sodium **AND** polyethylene glycol **AND** bisacodyl **AND** bisacodyl    Calcium Replacement - Follow Nurse / BPA Driven Protocol    dextrose    dextrose    glucagon (human recombinant)    Magnesium Standard Dose Replacement - Follow Nurse / BPA Driven Protocol    nitroglycerin    ondansetron **OR** ondansetron    Phosphorus Replacement - Follow Nurse / BPA Driven Protocol    Potassium Replacement - Follow Nurse / BPA Driven Protocol    sodium chloride    sodium chloride, and Allergies:  Patient has no known allergies.    Objective     Vital Signs   Temp:  [99 øF (37.2 øC)-102.5 øF (39.2 øC)] 99 øF (37.2 øC)  Heart Rate:  [] 98  Resp:  [18-22] 18  BP: (163-245)/() 163/87    PPE used per hospital policy    Physical Exam:  Constitutional: Not in acute distress.  Eyes: Injected conjunctivae, EOMI. pupils equal reactive to light.  ENMT: [place]anderos 3. No oral thrush. Tonsils grade  Neck: Large. Trachea midline. No thyromegaly  Heart: RRR, no murmur  Lungs/chest: Equal but diminished air entry throughout.  Coarse breath sounds mostly on the right.  In the right upper lobe..  Swelling and tenderness on palpation of the right sternoclavicular joint and medial aspect of the right clavicle.  Abdomen: Obese. Soft. No tenderness or dullness. No HSM.  Extremities: No cyanosis, clubbing or pitting edema.  Warm extremities and well-perfused.  Neuro: Conscious, alert, oriented x3.  Strength 5/5 in arms.  Psych: Appropriate mood and affect.    Integumentary: No rash.  Normal skin turgor  Lymphatic: No palpable cervical or supraclavicular lymph nodes.      Diagnostic imaging:  I personally and independently  reviewed the following images:   CTA chest 10/1/2023: Large RUL masslike consolidation.  Noted different densities with possible abscess/necrosis.  Right lower lobe consolidation in the dependent region of the lungs likely atelectasis.  Mild to moderate right pleural effusion.  Enlarged mediastinal adenopathies: Station 2R and 4R    Laboratory workup:    Results from last 7 days   Lab Units 10/01/23  1138   SODIUM mmol/L 141   POTASSIUM mmol/L 4.2   CHLORIDE mmol/L 101   CO2 mmol/L 30.0*   BUN mg/dL 14   CREATININE mg/dL 0.95   GLUCOSE mg/dL 168*   CALCIUM mg/dL 9.6     Results from last 7 days   Lab Units 10/01/23  1339 10/01/23  1138   HSTROP T ng/L 37* 30*     Results from last 7 days   Lab Units 10/01/23  1138   WBC 10*3/mm3 22.45*   HEMOGLOBIN g/dL 10.4*   HEMATOCRIT % 33.1*   PLATELETS 10*3/mm3 395         Results from last 7 days   Lab Units 10/01/23  1138   PROBNP pg/mL 678.0       Assessment   RUL masslike consolidation and appears that has necrotic component and probably abscess  Sepsis  Enlarged mediastinal adenopathies: Station 2R and 4R.  Likely reactive  Tenderness and swelling on palpation of the right medial sternoclavicular joints concerning for infection and fading the area  RLL consolidation in the dependent region of the lungs likely atelectasis  Mild to moderate right pleural effusion  Acute hypoxic respiratory failure, secondary to the above  Super morbid obesity, BMI 59  Loud snoring and apnea concerning for sleep apnea  New diagnosis of HTN  New diagnosis of DM type II    Recommendations:    Change antibiotic therapy from Rocephin to Unasyn to cover anaerobic pathogen due to suspected abscess formation  Agree with thoracic surgery consultation  Oxygen by NC and titrate keep SPO2 >90%  Pharmacological DVT prophylaxis is recommended  Would require follow-up imaging down the road regarding the mediastinal adenopathies but those are likely reactive.  HTN managed per primary.  Insulin as needed  for DM type II.  IV hydration for sepsis    Patient seems to be hemodynamically stable for now but she is at high risk of deterioration due to severe infection and abscess formation.    Christian Bernal MD  10/01/23  15:16 EDT               Electronically signed by Christian Bernal MD at 10/01/23 8957

## 2023-10-04 NOTE — PROGRESS NOTES
ID NOTE    CC: f/u pulmonary abscess     Subj: History from pt's mother and RN. She remains intubated but is awake and following commands. She is trying to speak and squeezes my hand. Fever curve better. WBC trending down. Creatinine worse. S/P IR guided CT placement. 60 cc pus removed.     Medications:    Current Facility-Administered Medications:     acetaminophen (TYLENOL) tablet 650 mg, 650 mg, Oral, Q4H PRN, 650 mg at 10/02/23 1102 **OR** acetaminophen (TYLENOL) 160 MG/5ML oral solution 650 mg, 650 mg, Oral, Q4H PRN **OR** acetaminophen (TYLENOL) suppository 650 mg, 650 mg, Rectal, Q4H PRN, Juan Elena MD, 650 mg at 10/02/23 2341    sennosides-docusate (PERICOLACE) 8.6-50 MG per tablet 2 tablet, 2 tablet, Nasogastric, BID, 2 tablet at 10/03/23 2117 **AND** polyethylene glycol (MIRALAX) packet 17 g, 17 g, Oral, Daily PRN **AND** bisacodyl (DULCOLAX) EC tablet 5 mg, 5 mg, Oral, Daily PRN **AND** bisacodyl (DULCOLAX) suppository 10 mg, 10 mg, Rectal, Daily PRN, Brannon Gonzales MD    Calcium Replacement - Follow Nurse / BPA Driven Protocol, , Does not apply, PRN, Juan Elena MD    cefTRIAXone (ROCEPHIN) 2,000 mg in sodium chloride 0.9 % 100 mL IVPB-VTB, 2,000 mg, Intravenous, Q24H, Aakash Azul MD    chlorhexidine (PERIDEX) 0.12 % solution 15 mL, 15 mL, Mouth/Throat, Q12H, Johnny Tuttle MD, 15 mL at 10/03/23 2115    dextrose (D50W) (25 g/50 mL) IV injection 25 g, 25 g, Intravenous, Q15 Min PRN, Juan Elena MD    dextrose (GLUTOSE) oral gel 15 g, 15 g, Oral, Q15 Min PRN, Juan Elena MD    Enoxaparin Sodium (LOVENOX) syringe 30 mg, 30 mg, Subcutaneous, Q24H, Quynh Choe APRN    fentaNYL citrate (PF) (SUBLIMAZE) injection 25 mcg, 25 mcg, Intravenous, Q30 Min PRN, Otto Castillo MD, 25 mcg at 10/04/23 0144    glucagon (GLUCAGEN) injection 1 mg, 1 mg, Intramuscular, Q15 Min PRN, Juan Elena MD    hydrALAZINE (APRESOLINE) injection 20 mg,  20 mg, Intravenous, Q4H PRN, Brannon Gonzales MD    hydrALAZINE (APRESOLINE) tablet 25 mg, 25 mg, Nasogastric, Q8H, Brannon Gonzales MD, 25 mg at 10/04/23 0730    influenza vac split quad (FLUZONE,FLUARIX,AFLURIA,FLULAVAL) injection 0.5 mL, 0.5 mL, Intramuscular, During Hospitalization, Juan Elena MD    insulin lispro (HUMALOG/ADMELOG) injection 2-7 Units, 2-7 Units, Subcutaneous, 4x Daily AC & at Bedtime, Juan Elena MD, 2 Units at 10/02/23 0907    ipratropium-albuterol (DUO-NEB) nebulizer solution 3 mL, 3 mL, Nebulization, Q6H PRN, Johnny Tuttle MD    labetalol (NORMODYNE) tablet 100 mg, 100 mg, Nasogastric, Q12H, Brannon Gonzales MD, 100 mg at 10/03/23 2031    Magnesium Standard Dose Replacement - Follow Nurse / BPA Driven Protocol, , Does not apply, PRN, Juan Elena MD    metroNIDAZOLE (FLAGYL) IVPB 500 mg, 500 mg, Intravenous, Q8H, Aakash Azul MD    nitroglycerin (NITROSTAT) SL tablet 0.4 mg, 0.4 mg, Sublingual, Q5 Min PRN, Juan Elena MD    ondansetron (ZOFRAN) tablet 4 mg, 4 mg, Oral, Q6H PRN **OR** ondansetron (ZOFRAN) injection 4 mg, 4 mg, Intravenous, Q6H PRN, Juan Elena MD    pantoprazole (PROTONIX) injection 40 mg, 40 mg, Intravenous, Q24H, Johnny Tuttle MD, 40 mg at 10/03/23 0933    Pharmacy to Dose enoxaparin (LOVENOX), , Does not apply, Continuous PRN, Quynh Choe APRN    Phosphorus Replacement - Follow Nurse / BPA Driven Protocol, , Does not apply, PRN, Juan Elena MD    Potassium Replacement - Follow Nurse / BPA Driven Protocol, , Does not apply, PRN, Juan Elena MD    propofol (DIPRIVAN) infusion 10 mg/mL 100 mL, 5-50 mcg/kg/min, Intravenous, Titrated, Johnny Tuttle MD, Last Rate: 33.6 mL/hr at 10/04/23 0539, 40 mcg/kg/min at 10/04/23 0539    sodium chloride 0.9 % flush 10 mL, 10 mL, Intravenous, Q12H, Juan Elena MD, 10 mL at 10/03/23 0949    sodium chloride 0.9 % flush 10 mL,  "10 mL, Intravenous, PRN, Juan Elena MD, 10 mL at 10/01/23 1723    sodium chloride 0.9 % infusion 40 mL, 40 mL, Intravenous, PRN, Juan Elena MD      Objective   Vital Signs   Temp:  [99.3 øF (37.4 øC)] 99.3 øF (37.4 øC)  Heart Rate:  [71-92] 72  Resp:  [18-30] 18  BP: (109-173)/() 122/71  FiO2 (%):  [30 %-49 %] 30 %    Physical Exam:   General: intubated, awake, calm, follows commands  Eyes: no scleral icterus  ENT: ETT in place  Cardiovascular: NR  Respiratory: R rales; no wheezing; 30% FiO2; R chest drain w/ thick brown material in tubing  GI: Abdomen is soft, not tender  :  external urinary cathete  Skin: No rashes  Vasc: LIJ CVC w/o erythema    Labs:   CBC, BMP, and blood and body fluid cultures reviewed today  Lab Results   Component Value Date    WBC 12.10 (H) 10/04/2023    HGB 7.8 (L) 10/04/2023    HCT 24.5 (L) 10/04/2023    MCV 82.8 10/04/2023     10/04/2023     Lab Results   Component Value Date    GLUCOSE 119 (H) 10/04/2023    CALCIUM 9.1 10/04/2023     10/04/2023    K 4.9 10/04/2023    CO2 27.3 10/04/2023    CL 99 10/04/2023    BUN 41 (H) 10/04/2023    CREATININE 4.64 (H) 10/04/2023    EGFR 10.8 (L) 10/04/2023    BCR 8.8 10/04/2023    ANIONGAP 13.7 10/04/2023     Lab Results   Component Value Date    CRP 28.05 (H) 10/02/2023     Lab Results   Component Value Date    HGBA1C 7.50 (H) 10/01/2023     HIV negative  Hep C negative  Procal 0.59    Microbiology:  10/1 RPP: negative  10/1 BCx: NGTD  10/1 MRSA nares: negative  10/2 BAL Cx: pending  10/3 ETT Cx: pending  10/3 Lung Abscess Cx: GPCs in chains    New Radiology:  CXR personally reviewed and shows improved RUL infiltrate    Prior Radiology:  CT chest:   \"1. Large (12 cm) right upper lobe pulmonary abscess with extension into   the anterior right chest wall as detailed above.   2. Small right pleural effusion.   3. Mediastinal, right hilar and right axillary lymphadenopathy is likely   reactive.   4. No central " "pulmonary embolus. Contrast bolus timing limits evaluation   of the more distal pulmonary arteries.   5. Dilated main pulmonary artery (36 mm). \"    ASSESSMENT/PLAN:  Right upper lobe pulmonary abscess due to GPCs in chains  Acute hypercapneic and hypoxic respiratory failure requiring mechanical ventilation  Sepsis due to #1  Super obesity BMI 54  Uncontrolled diabetes type 2 A1c  7.5%  Hypertension    She is afebrile and WBC trending down. On 10/3/23, she had an IR-guided CT chest tube placement. Gram stain w/ GPCs in chains which is likely Strep. Creatinine is worse. Stop Zosyn then start ceftriaxone 2 g IV q24h and Flagyl 500 mg IV q8h with duration TBD. I'll follow-up thoracic surgery evaluation today. I'll check a BMP daily for monitoring. Thanks to nephrologist for his recommendations.     ID will follow.   "

## 2023-10-04 NOTE — NURSING NOTE
Patient was bladder scanned multiple times during the beginning hours of the shift which revealed that she did not appear to have a bladder. Multiple RNs reassessed and came to the same conclusion. Outstanding orders for a clean catch urine were noticied, so a straight cath was performed. Successful on 1st attempt. 250mL of urine collected. Sample and outstanding labs sent down. Awaiting results.

## 2023-10-04 NOTE — PLAN OF CARE
Problem: Adult Inpatient Plan of Care  Goal: Patient-Specific Goal (Individualized)  Outcome: Ongoing, Progressing  Goal: Absence of Hospital-Acquired Illness or Injury  Outcome: Ongoing, Progressing  Intervention: Identify and Manage Fall Risk  Recent Flowsheet Documentation  Taken 10/4/2023 0000 by Bala Mendez RN  Safety Promotion/Fall Prevention:   safety round/check completed   activity supervised   clutter free environment maintained  Taken 10/3/2023 2000 by Bala Mendez RN  Safety Promotion/Fall Prevention:   safety round/check completed   activity supervised   clutter free environment maintained  Intervention: Prevent Skin Injury  Recent Flowsheet Documentation  Taken 10/4/2023 0000 by Bala Mendez RN  Body Position:   turned   left  Skin Protection: adhesive use limited  Taken 10/3/2023 2000 by Bala Mendez RN  Body Position:   turned   left  Skin Protection: adhesive use limited  Intervention: Prevent and Manage VTE (Venous Thromboembolism) Risk  Recent Flowsheet Documentation  Taken 10/4/2023 0000 by Bala Mendez RN  Activity Management: bedrest  Taken 10/3/2023 2000 by Bala Mendez RN  Activity Management: bedrest  Intervention: Prevent Infection  Recent Flowsheet Documentation  Taken 10/4/2023 0000 by Bala Mendez RN  Infection Prevention: hand hygiene promoted  Taken 10/3/2023 2000 by Bala Mendez RN  Infection Prevention: hand hygiene promoted  Goal: Optimal Comfort and Wellbeing  Outcome: Ongoing, Progressing  Intervention: Provide Person-Centered Care  Recent Flowsheet Documentation  Taken 10/3/2023 2000 by Bala Mendez RN  Trust Relationship/Rapport: care explained  Goal: Readiness for Transition of Care  Outcome: Ongoing, Progressing     Problem: Gas Exchange Impaired  Goal: Optimal Gas Exchange  Outcome: Ongoing, Progressing  Intervention: Optimize Oxygenation and Ventilation  Recent Flowsheet Documentation  Taken 10/4/2023 0000 by Bala Mendez RN  Head of Bed ([facility] [place])  Positioning: HOB at 30 degrees  Taken 10/3/2023 2000 by Bala Mendez RN  Head of Bed (HOB) Positioning: HOB at 30-45 degrees     Problem: Adjustment to Illness (Sepsis/Septic Shock)  Goal: Optimal Coping  Outcome: Ongoing, Progressing     Problem: Bleeding (Sepsis/Septic Shock)  Goal: Absence of Bleeding  Outcome: Ongoing, Progressing  Intervention: Monitor and Manage Bleeding  Recent Flowsheet Documentation  Taken 10/4/2023 0000 by Bala Mendez RN  Bleeding Precautions: blood pressure closely monitored  Bleeding Management: blood products administered  Taken 10/3/2023 2000 by Bala Mendez RN  Bleeding Management: blood products administered     Problem: Glycemic Control Impaired (Sepsis/Septic Shock)  Goal: Blood Glucose Level Within Desired Range  Outcome: Ongoing, Progressing  Intervention: Optimize Glycemic Control  Recent Flowsheet Documentation  Taken 10/4/2023 0000 by Bala Mendez RN  Glycemic Management: blood glucose monitored  Taken 10/3/2023 2000 by Bala Mendez RN  Glycemic Management: blood glucose monitored     Problem: Infection Progression (Sepsis/Septic Shock)  Goal: Absence of Infection Signs and Symptoms  Outcome: Ongoing, Progressing  Intervention: Initiate Sepsis Management  Recent Flowsheet Documentation  Taken 10/4/2023 0000 by Bala Mendez RN  Infection Prevention: hand hygiene promoted  Isolation Precautions: precautions maintained  Taken 10/3/2023 2000 by Bala Mendez RN  Infection Prevention: hand hygiene promoted  Isolation Precautions: precautions maintained  Intervention: Promote Recovery  Recent Flowsheet Documentation  Taken 10/4/2023 0000 by Bala Mendez RN  Activity Management: bedrest  Taken 10/3/2023 2000 by Bala Mendez RN  Activity Management: bedrest     Problem: Nutrition Impaired (Sepsis/Septic Shock)  Goal: Optimal Nutrition Intake  Outcome: Ongoing, Progressing     Problem: Skin Injury Risk Increased  Goal: Skin Health and Integrity  Outcome: Ongoing,  Progressing  Intervention: Optimize Skin Protection  Recent Flowsheet Documentation  Taken 10/4/2023 0000 by Bala Mendez RN  Pressure Reduction Techniques: weight shift assistance provided  Head of Bed (HOB) Positioning: HOB at 30 degrees  Pressure Reduction Devices: specialty bed utilized  Skin Protection: adhesive use limited  Taken 10/3/2023 2000 by Bala Mendez RN  Pressure Reduction Techniques: weight shift assistance provided  Head of Bed (HOB) Positioning: HOB at 30-45 degrees  Pressure Reduction Devices: specialty bed utilized  Skin Protection: adhesive use limited     Problem: Fall Injury Risk  Goal: Absence of Fall and Fall-Related Injury  Outcome: Ongoing, Progressing  Intervention: Identify and Manage Contributors  Recent Flowsheet Documentation  Taken 10/4/2023 0000 by Bala Mendez RN  Medication Review/Management: medications reviewed  Taken 10/3/2023 2000 by Bala Mendez RN  Medication Review/Management: medications reviewed  Intervention: Promote Injury-Free Environment  Recent Flowsheet Documentation  Taken 10/4/2023 0000 by Bala Mendez RN  Safety Promotion/Fall Prevention:   safety round/check completed   activity supervised   clutter free environment maintained  Taken 10/3/2023 2000 by Bala Mendez RN  Safety Promotion/Fall Prevention:   safety round/check completed   activity supervised   clutter free environment maintained     Problem: Restraint, Nonviolent  Goal: Absence of Harm or Injury  Outcome: Ongoing, Progressing  Intervention: Implement Least Restrictive Safety Strategies  Recent Flowsheet Documentation  Taken 10/4/2023 0200 by Bala Mendez RN  Medical Device Protection: IV pole/bag removed from visual field  Less Restrictive Alternative: bed alarm in use  De-Escalation Techniques: stimulation decreased  Diversional Activities: television  Taken 10/4/2023 0000 by Bala Mendez RN  Medical Device Protection: IV pole/bag removed from visual field  Less Restrictive  Alternative: bed alarm in use  De-Escalation Techniques: stimulation decreased  Diversional Activities: television  Taken 10/3/2023 2200 by Bala Mendez RN  Medical Device Protection: IV pole/bag removed from visual field  Less Restrictive Alternative: bed alarm in use  De-Escalation Techniques: stimulation decreased  Diversional Activities: television  Taken 10/3/2023 2000 by Bala Mendez RN  Medical Device Protection: IV pole/bag removed from visual field  Less Restrictive Alternative: bed alarm in use  De-Escalation Techniques: stimulation decreased  Diversional Activities: television  Intervention: Protect Dignity, Rights, and Personal Wellbeing  Recent Flowsheet Documentation  Taken 10/3/2023 2000 by Bala Mendez RN  Trust Relationship/Rapport: care explained  Intervention: Protect Skin and Joint Integrity  Recent Flowsheet Documentation  Taken 10/4/2023 0000 by Bala Mendez RN  Body Position:   turned   left  Taken 10/3/2023 2000 by Bala Mendez RN  Body Position:   turned   left   Goal Outcome Evaluation:            Patient progressing. Will plan for SAT prior to d/s arrival. Unsure of extubation planning per IR or Pulm.

## 2023-10-04 NOTE — PROGRESS NOTES
[facility] INPATIENT PROGRESS NOTE         [facility] CORONARY CARE    10/4/2023      PATIENT IDENTIFICATION:  Name: Nay Gonzalez ADMIT: 10/1/2023   : 1970  PCP: Provider, No Known    MRN: [id number] LOS: 3 days   AGE/SEX: 53 y.o. female  ROOM: [facility]                     LOS 3    Reason for visit: Respiratory failure and pneumonia      SUBJECTIVE:      Sedated on ventilator.  Down to 30% FiO2 and 7.5 of PEEP.  Discussed with family at bedside.  Chest tube stable.  No new issues overnight.    Objective   OBJECTIVE:    Vital Sign Min/Max for last 24 hours  Temp  Min: 99.3 øF (37.4 øC)  Max: 99.3 øF (37.4 øC)   BP  Min: 109/62  Max: 173/86   Pulse  Min: 71  Max: 92   Resp  Min: 18  Max: 30   SpO2  Min: 98 %  Max: 100 %   No data recorded   Weight  Min: 140 kg (307 lb 8.7 oz)  Max: 140 kg (307 lb 8.7 oz)       FiO2 (%):  [30 %-49 %] 30 %  S RR:  [18-30] 18  PEEP/CPAP (cm H2O):  [7.5 cm H20] 7.5 cm H20  MAP (cm H2O):  [14-19] 15           FiO2 (%): 30 %     Body mass index is 54.49 kg/mý.    Intake/Output Summary (Last 24 hours) at 10/4/2023 0845  Last data filed at 10/4/2023 0015  Gross per 24 hour   Intake 789.15 ml   Output 370 ml   Net 419.15 ml         Exam:  GEN:  No distress, appears stated age.  Morbidly obese.  Sedated on ventilator  EYES:   PERRL, anicteric sclerae  ENT:    External ears/nose normal, OP clear  NECK:  No adenopathy, midline trachea  LUNGS: Normal chest on inspection, palpation and diminished breath sounds on auscultation.  Chest tube stable  CV:  Normal S1S2, without murmur  ABD:  Nontender, nondistended, no hepatosplenomegaly, +BS  EXT:  No pitting edema.  No cyanosis or clubbing.  No mottling and normal cap refill.    Assessment     Scheduled meds:  cefTRIAXone, 2,000 mg, Intravenous, Q24H  chlorhexidine, 15 mL, Mouth/Throat, Q12H  enoxaparin, 30 mg, Subcutaneous, Q24H  hydrALAZINE, 25 mg, Nasogastric, Q8H  insulin lispro, 2-7 Units, Subcutaneous, 4x Daily AC & at  Bedtime  labetalol, 100 mg, Nasogastric, Q12H  metroNIDAZOLE, 500 mg, Intravenous, Q8H  pantoprazole, 40 mg, Intravenous, Q24H  senna-docusate sodium, 2 tablet, Nasogastric, BID  sodium chloride, 10 mL, Intravenous, Q12H      IV meds:                      Pharmacy to Dose enoxaparin (LOVENOX),   propofol, 5-50 mcg/kg/min, Last Rate: 40 mcg/kg/min (10/04/23 0841)      Data Review:  Results from last 7 days   Lab Units 10/04/23  0414 10/03/23  0408 10/02/23  2026 10/02/23  0501 10/01/23  1138   SODIUM mmol/L 140 142 142 138 141   POTASSIUM mmol/L 4.9 4.5 5.2 5.4* 4.2   CHLORIDE mmol/L 99 102 102 100 101   CO2 mmol/L 27.3 25.0 29.7* 23.9 30.0*   BUN mg/dL 41* 31* 27* 16 14   CREATININE mg/dL 4.64* 2.73* 1.95* 1.08* 0.95   GLUCOSE mg/dL 119* 87 123* 108* 168*   CALCIUM mg/dL 9.1 9.0 9.3 9.6 9.6         Estimated Creatinine Clearance: 19.6 mL/min (A) (by C-G formula based on SCr of 4.64 mg/dL (H)).  Results from last 7 days   Lab Units 10/04/23  0414 10/03/23  1916 10/03/23  0408 10/02/23  0501 10/01/23  1138   WBC 10*3/mm3 12.10*  --  17.47* 24.25* 22.45*   HEMOGLOBIN g/dL 7.8* 8.0* 8.0* 10.7* 10.4*   PLATELETS 10*3/mm3 285  --  319 353 395         Results from last 7 days   Lab Units 10/04/23  0414 10/03/23  0408 10/01/23  1138   ALT (SGPT) U/L 17 22 35*   AST (SGOT) U/L 10 17 19     Results from last 7 days   Lab Units 10/04/23  0357 10/03/23  0339 10/02/23  1840 10/02/23  1701 10/02/23  1459 10/02/23  1344   PH, ARTERIAL pH units 7.376 7.498* 7.309* 7.071* 7.236* 7.074*   PO2 ART mm Hg 127.2* 109.1* 174.3* 90.5 122.2* 96.8   PCO2, ARTERIAL mm Hg 44.7 34.8* 59.3* 108.4* 66.9* 112.4*   HCO3 ART mmol/L 26.2 27.0 29.8* 31.5* 28.4* 32.9*     Results from last 7 days   Lab Units 10/01/23  1138   PROCALCITONIN ng/mL 0.59*   LACTATE mmol/L 1.2         Hemoglobin A1C   Date/Time Value Ref Range Status   10/01/2023 1138 7.50 (H) 4.80 - 5.60 % Final     Glucose   Date/Time Value Ref Range Status   10/03/2023 1815 122 70 - 130  mg/dL Final   10/03/2023 1243 130 70 - 130 mg/dL Final   10/02/2023 2137 93 70 - 130 mg/dL Final   10/02/2023 1347 142 (H) 70 - 130 mg/dL Final   10/02/2023 1123 134 (H) 70 - 130 mg/dL Final   10/02/2023 0745 154 (H) 70 - 130 mg/dL Final   10/01/2023 2048 296 (H) 70 - 130 mg/dL Final     10/4 chest x-ray reviewed            2D echo 10/2 reviewed: EF 61 to 65% with grade 2 diastolic dysfunction.    CT chest 10/1 reviewed        Microbiology reviewed: No growth to date              Active Hospital Problems    Diagnosis  POA    **PNA (pneumonia) [J18.9]  Yes    ELLY (acute kidney injury) [N17.9]  Unknown    Obesity, morbid, BMI 50 or higher [E66.01]  Unknown    Anemia [D64.9]  Unknown    Sepsis [A41.9]  Unknown    Type 2 diabetes mellitus with hyperglycemia [E11.65]  Unknown    Acute respiratory failure with hypoxia [J96.01]  Unknown    Hypertensive urgency [I16.0]  Unknown    Abscess of upper lobe of right lung with pneumonia [J85.1]  Yes      Resolved Hospital Problems    Diagnosis Date Resolved POA    Hyperkalemia [E87.5] 10/03/2023 Unknown         ASSESSMENT:  Right upper lobe pneumonia/abscess  Sepsis secondary to above  Respiratory failure requiring mechanical ventilation  Moderate right pleural effusion  Acute hypoxic respiratory failure  Atelectasis  Mediastinal lymphadenopathy  Super morbid obesity  Snoring/apnea with concerns for CRYSTAL  Hypertension  Diabetes mellitus, type II  Acute kidney injury: Worsening      PLAN:  Make appropriate ventilator changes based on ABG results and continue supportive care.  We will touch base with thoracic surgery to see if they think a surgical intervention would be required.  If so we will keep her on the ventilator.  If not consider starting sedation vacation and spontaneous breathing trials.  Antibiotics per infectious disease recommendations.  Control glucose.  Control blood pressure and will increase blood pressure medications for persistent hypertension.  DVT/ulcer  prophylaxis.  Discussed with family at bedside in detail and questions answered to their satisfaction.    Discussed with multidisciplinary ICU team on rounds this morning.        CCT: 35 min    Brannon Gonzales MD  Pulmonary and Critical Care Medicine  [facility], [facility] [place]  10/4/2023    08:45 EDT

## 2023-10-04 NOTE — PROGRESS NOTES
"    Chief Complaint: Pulmonary abscess, respiratory failure, follow-up    Subjective:  Symptoms:  Worsening.    Diet:  NPO.    Patient remains intubated and sedated in CCU.        Vital Signs:  Heart Rate:  [68-92] 73  Resp:  [18] 18  BP: (109-173)/(60-86) 112/66  FiO2 (%):  [30 %-40 %] 30 %    Intake & Output (last day)         10/03 0701  10/04 0700 10/04 0701  10/05 0700    I.V. (mL/kg) 689.2 (5) 50 (0.4)    IV Piggyback 100 100    Total Intake(mL/kg) 789.2 (5.7) 150 (1.1)    Urine (mL/kg/hr) 250 (0.1)     Chest Tube 120     Total Output 370     Net +419.2 +150                  Objective:  General Appearance:  Comfortable and ill-appearing.    Vital signs: (most recent): Blood pressure 112/66, pulse 73, temperature 99.3 øF (37.4 øC), temperature source Oral, resp. rate 18, height 160 cm (62.99\"), weight (!) 140 kg (307 lb 8.7 oz), last menstrual period 10/01/2023, SpO2 98 %.  Vital signs are normal.  No fever.    Lungs:  Normal effort and tachypnea.  There are decreased breath sounds.  No rales, wheezes or rhonchi.    Heart: Normal rate.  Regular rhythm.    Chest: (Right sternoclavicular swelling updated)  Abdomen: Abdomen is soft.  Bowel sounds are normal.     Extremities: Decreased range of motion.    Skin:  Warm and dry.            Chest tube:   Site: Right, Clean, Dry, Intact, and Securement device intact  Suction: -20 cm  Air Leak: negative  24 Hour Total: 120ml, purulent       Results Review:     I reviewed the patient's new clinical results.  I reviewed the patient's new imaging results and agree with the interpretation.  I reviewed the patient's other test results and agree with the interpretation  Discussed with patient, family at bedside, RN and Dr. Ny.    Imaging Results (Last 24 Hours)       Procedure Component Value Units Date/Time    XR Chest 1 View [[id number]] Collected: 10/04/23 0548     Updated: 10/04/23 0554    Narrative:      SINGLE VIEW OF THE CHEST     HISTORY: Respiratory failure   "   COMPARISON: October 30,023     FINDINGS:  Weighted enteric feeding tube extends into the upper abdomen. Otherwise,  tubes and lines are stable. Left internal jugular vein central venous  line is again noted with the tip extending towards the right innominate  vein. A pigtail pleural drainage catheter has been placed. No  pneumothorax is seen dense consolidation within the right upper lobe  slightly improved, but overall opacification of the right hemithorax is  worsened. There is some persistent left basilar consolidation. No  definite effusion is seen.       Impression:      There has been some increased aeration within the right upper lobe when  compared to the prior study. However, overall opacification of the right  hemithorax has worsened.     This report was finalized on 10/4/2023 5:50 AM by Dr. Zulay Paredes M.D on Workstation: [id number]        Renal Bilateral [[id number]] Collected: 10/03/23 1641     Updated: 10/03/23 1647    Narrative:      RENAL ULTRASOUND     HISTORY: Acute kidney injury     COMPARISON: None     TECHNIQUE: Grayscale, color Doppler images of the kidneys and bladder  were obtained.     FINDINGS:  Grayscale and color Doppler images of the kidneys and bladder were  obtained.     The right kidney measures 14.2 cm in length.     The left kidney measures 14.1 cm in length.     The kidneys demonstrate normal echogenicity and cortical thickness.  There is no hydronephrosis. Trace bilateral perinephric fluid is  present. Irregular, bulging contour of the midpole of the left kidney  measuring up to approximately 3.5 cm.     The bladder is unremarkable.     Visualized portions of the aorta and IVC are normal in caliber and  appearance. Findings suggestive of multiple uterine fibroids on limited  evaluation of the uterus.       Impression:      1.  Trace bilateral perinephric fluid. There is prominence of the  midpole of the left kidney which has a slightly bulged contour,  measuring  approximate 3.5 cm. Underlying renal mass cannot be excluded  and further evaluation with CT versus MRI of the abdomen with and  without contrast is recommended to better characterize.  2.  There appear to be multiple fibroids in the uterus; however findings  are incompletely evaluated. Findings can be better characterized with  pelvic sonogram if clinically indicated.  3.  Other findings as above.           This report was finalized on 10/3/2023 4:44 PM by Dr. Diego Brown M.D  on Workstation: [id number]       CT Guided Chest Tube [[id number]] Collected: 10/03/23 1543     Updated: 10/03/23 1554    Narrative:      CT GUIDED CHEST TUBE     HISTORY:  Right lung abscess.     COMPARISON: CTA 10/1/2023     PROCEDURE: After informed consent was obtained and documented, the  patient was placed on the CT table in supine position. A verbal time out  was performed with appropriate identifiers confirmed. An ICU nurse was  continuously present for monitoring. A preliminary partial scan of the  thorax was obtained; in the interval there was improvement of anterior  right upper lobe aeration but increase of right pleural effusion. A  route was planned for catheter placement and an appropriate skin site  chosen. This site was then prepped and draped in the usual sterile  manner and the soft tissues anesthetized with 1% lidocaine down to the  pleura. A needle was placed into the apparent right upper lobe abscess.  A wire was advanced through the needle which was then removed. After  serial dilation, a 14 [place]ian skater drainage catheter was placed into  the fluid collection, coiled, and locked. The catheter was secured with  Dermabond reinforced suture and stay-fix dressing and attached to an  atrium device. 60 mL purulent appearing fluid were manually removed with  specimen sent to lab. The patient was stable throughout, there were no  immediate complications. Radiation dose reduction techniques were  utilized, including automated  exposure control and exposure modulation  based on body size.          Impression:      Successful 14 Latvian chest tube placement for right lung abscess as  described above.        This report was finalized on 10/3/2023 3:51 PM by Dr. Jon Singer M.D  on Workstation: [id number]               Lab Results:     Lab Results (last 24 hours)       Procedure Component Value Units Date/Time    POC Glucose Once [[id number]]  (Abnormal) Collected: 10/04/23 1216    Specimen: Blood Updated: 10/04/23 1217     Glucose 134 mg/dL     Non-gynecologic Cytology [[id number]] Collected: 10/03/23 1400    Specimen: Body Fluid from Pleural Cavity Updated: 10/04/23 0949    Respiratory Culture - Sputum, ET Suction [[id number]] Collected: 10/03/23 0945    Specimen: Sputum from ET Suction Updated: 10/04/23 0920     Respiratory Culture Rare The culture consists of normal respiratory annabel. This is a preliminary report; final report to follow.     Gram Stain No WBCs or organisms seen    Respiratory Culture - Wash, Bronchus [[id number]] Collected: 10/02/23 1756    Specimen: Wash from Bronchus Updated: 10/04/23 0915     Respiratory Culture Scant growth (1+) Normal respiratory annabel. No S. aureus or Pseudomonas aeruginosa detected. Final report.     Gram Stain Rare (1+) WBCs seen      No organisms seen    Body Fluid Culture - Body Fluid, Pleural Cavity [[id number]] Collected: 10/03/23 1400    Specimen: Body Fluid from Pleural Cavity Updated: 10/04/23 0843     Body Fluid Culture Culture in progress     Gram Stain Many (4+) WBCs seen      Few (2+) Gram positive cocci in chains    Ferritin [[id number]]  (Abnormal) Collected: 10/04/23 0414    Specimen: Blood Updated: 10/04/23 0452     Ferritin 426.00 ng/mL     Narrative:      Results may be falsely decreased if patient taking Biotin.      Phosphorus [[id number]]  (Abnormal) Collected: 10/04/23 0414    Specimen: Blood Updated: 10/04/23 0450     Phosphorus 7.0 mg/dL     Magnesium [[id number]]  (Normal)  Collected: 10/04/23 0414    Specimen: Blood Updated: 10/04/23 0450     Magnesium 2.6 mg/dL     Comprehensive Metabolic Panel [[id number]]  (Abnormal) Collected: 10/04/23 0414    Specimen: Blood Updated: 10/04/23 0450     Glucose 119 mg/dL      BUN 41 mg/dL      Creatinine 4.64 mg/dL      Sodium 140 mmol/L      Potassium 4.9 mmol/L      Chloride 99 mmol/L      CO2 27.3 mmol/L      Calcium 9.1 mg/dL      Total Protein 7.0 g/dL      Albumin 2.6 g/dL      ALT (SGPT) 17 U/L      AST (SGOT) 10 U/L      Alkaline Phosphatase 114 U/L      Total Bilirubin 0.3 mg/dL      Globulin 4.4 gm/dL      A/G Ratio 0.6 g/dL      BUN/Creatinine Ratio 8.8     Anion Gap 13.7 mmol/L      eGFR 10.8 mL/min/1.73      Comment: <15 Indicative of kidney failure       Narrative:      GFR Normal >60  Chronic Kidney Disease <60  Kidney Failure <15      Uric Acid [[id number]]  (Abnormal) Collected: 10/04/23 0414    Specimen: Blood Updated: 10/04/23 0449     Uric Acid 9.9 mg/dL     Iron Profile [[id number]]  (Abnormal) Collected: 10/04/23 0414    Specimen: Blood Updated: 10/04/23 0449     Iron 27 mcg/dL      Iron Saturation (TSAT) 13 %      Transferrin 135 mg/dL      TIBC 201 mcg/dL     Chloride, Urine, Random - Straight Cath [[id number]] Collected: 10/04/23 0257    Specimen: Urine from Straight Cath Updated: 10/04/23 0443     Chloride, Urine <20 mmol/L     Narrative:      Reference intervals for random urine have not been established.  Clinical usage is dependent upon physician's interpretation in combination with other laboratory tests.       CBC & Differential [[id number]]  (Abnormal) Collected: 10/04/23 0414    Specimen: Blood Updated: 10/04/23 0428    Narrative:      The following orders were created for panel order CBC & Differential.  Procedure                               Abnormality         Status                     ---------                               -----------         ------                     CBC Auto Differential[[id number]]         Abnormal            Final result                 Please view results for these tests on the individual orders.    CBC Auto Differential [[id number]]  (Abnormal) Collected: 10/04/23 0414    Specimen: Blood Updated: 10/04/23 0428     WBC 12.10 10*3/mm3      RBC 2.96 10*6/mm3      Hemoglobin 7.8 g/dL      Hematocrit 24.5 %      MCV 82.8 fL      MCH 26.4 pg      MCHC 31.8 g/dL      RDW 14.9 %      RDW-SD 44.7 fl      MPV 11.0 fL      Platelets 285 10*3/mm3      Neutrophil % 83.4 %      Lymphocyte % 8.9 %      Monocyte % 5.4 %      Eosinophil % 0.7 %      Basophil % 0.3 %      Immature Grans % 1.3 %      Neutrophils, Absolute 10.08 10*3/mm3      Lymphocytes, Absolute 1.08 10*3/mm3      Monocytes, Absolute 0.65 10*3/mm3      Eosinophils, Absolute 0.09 10*3/mm3      Basophils, Absolute 0.04 10*3/mm3      Immature Grans, Absolute 0.16 10*3/mm3      nRBC 0.2 /100 WBC     Eosinophil Smear - Urine, Urine, Clean Catch [[id number]]  (Normal) Collected: 10/04/23 0257    Specimen: Urine, Clean Catch Updated: 10/04/23 0409     Eosinophil Smear 0 % EOS/100 Cells     Blood Gas, Arterial - [[id number]]  (Abnormal) Collected: 10/04/23 0357    Specimen: Arterial Blood Updated: 10/04/23 0400     Site Right Radial     Cole's Test Positive     pH, Arterial 7.376 pH units      pCO2, Arterial 44.7 mm Hg      pO2, Arterial 127.2 mm Hg      HCO3, Arterial 26.2 mmol/L      Base Excess, Arterial 0.6 mmol/L      Comment: Serial Number: [id number]  [id number]        O2 Saturation, Arterial 98.8 %      A-a DO2 0.5 mmHg      CO2 Content 27.5 mmol/L      Barometric Pressure for Blood Gas 751.8000 mmHg      Modality Adult Vent     FIO2 40 %      Ventilator Mode VC     Set Tidal Volume 500     Set Mech Resp Rate 18     Rate 18 Breaths/minute      PEEP 8     Hemodilution No     Inspiratory Time 1     Device Comment Sat 100% VC+ ETCO2 41    Protein / Creatinine Ratio, Urine - Straight Cath [[id number]]  (Abnormal) Collected: 10/04/23 0257    Specimen: Urine  from Straight Cath Updated: 10/04/23 0352     Protein/Creatinine Ratio, Urine 2,111.5 mg/G Crea      Creatinine, Urine 168.6 mg/dL      Total Protein, Urine 356.0 mg/dL     Sodium, Urine, Random - Straight Cath [[id number]] Collected: 10/04/23 0257    Specimen: Urine from Straight Cath Updated: 10/04/23 0352     Sodium, Urine 20 mmol/L     Narrative:      Reference intervals for random urine have not been established.  Clinical usage is dependent upon physician's interpretation in combination with other laboratory tests.       Urinalysis, Microscopic Only - Straight Cath [[id number]]  (Abnormal) Collected: 10/04/23 0257    Specimen: Urine from Straight Cath Updated: 10/04/23 0349     RBC, UA 6-12 /HPF      WBC, UA 3-5 /HPF      Comment: Urine culture not indicated.        Bacteria, UA Trace /HPF      Squamous Epithelial Cells, UA 3-6 /HPF      Renal Epithelial Cells, UA 3-6 /HPF      Hyaline Casts, UA 0-2 /LPF      Granular Casts, UA 3-6 /LPF      Coarse Granular Casts, UA 3-6 /LPF      Amorphous Crystals, UA Moderate/2+ /HPF      Methodology Manual Light Microscopy    Sodium, Urine, Random - Urine, Clean Catch [[id number]] Collected: 10/04/23 0257    Specimen: Urine, Clean Catch Updated: 10/04/23 0340     Sodium, Urine 23 mmol/L     Narrative:      Reference intervals for random urine have not been established.  Clinical usage is dependent upon physician's interpretation in combination with other laboratory tests.       Urea Nitrogen, Urine - Urine, Clean Catch [[id number]] Collected: 10/04/23 0257    Specimen: Urine, Clean Catch Updated: 10/04/23 0340     Urea Nitrogen, Urine 378 mg/dL     Narrative:      Reference intervals for random urine have not been established.  Clinical usage is dependent upon physician's interpretation in combination with other laboratory tests.       Creatinine Urine Random (kidney function) GFR component - Urine, Clean Catch [[id number]] Collected: 10/04/23 0257    Specimen: Urine, Clean  Catch Updated: 10/04/23 0340     Creatinine, Urine 170.4 mg/dL     Narrative:      Reference intervals for random urine have not been established.  Clinical usage is dependent upon physician's interpretation in combination with other laboratory tests.       Urinalysis With Culture If Indicated - Straight Cath [[id number]]  (Abnormal) Collected: 10/04/23 0257    Specimen: Urine from Straight Cath Updated: 10/04/23 0335     Color, UA Dark Yellow     Appearance, UA Turbid     pH, UA <=5.0     Specific Gravity, UA 1.027     Glucose, UA Negative     Ketones, UA Negative     Bilirubin, UA Negative     Blood, UA Small (1+)     Protein, UA >=300 mg/dL (3+)     Leuk Esterase, UA Negative     Nitrite, UA Negative     Urobilinogen, UA 0.2 E.U./dL    Narrative:      In absence of clinical symptoms, the presence of pyuria, bacteria, and/or nitrites on the urinalysis result does not correlate with infection.    Hemoglobin & Hematocrit, Blood [[id number]]  (Abnormal) Collected: 10/03/23 1916    Specimen: Blood Updated: 10/03/23 1932     Hemoglobin 8.0 g/dL      Hematocrit 25.7 %     POC Glucose Once [[id number]]  (Normal) Collected: 10/03/23 1815    Specimen: Blood Updated: 10/03/23 1816     Glucose 122 mg/dL     Glucose, Body Fluid - Body Fluid, Pleural Cavity [[id number]] Collected: 10/03/23 1400    Specimen: Body Fluid from Pleural Cavity Updated: 10/03/23 1720     Glucose, Fluid 2 mg/dL     Narrative:      No Reference Ranges Established.    Serous fluid glucose less than 60 mg/dL or less than 30 mg/dL below serum glucose suggests an infectious or malignant exudate.     This test was developed, it performance characteristics determined and judged suitable for clinical purposes by [facility].  It has not been cleared or approved by the FDA.  The laboratory is regulated under CLIA as qualified to perform high-complexity testing.     Lactate Dehydrogenase, Body Fluid - Body Fluid, Pleural Cavity [[id number]]  Collected: 10/03/23 1400    Specimen: Body Fluid from Pleural Cavity Updated: 10/03/23 1719     Lactate Dehydrogenase (LD), Fluid >2,500 U/L     Narrative:      No Reference Ranges Established.    Serous fluid LDH greater than 60 percent of the serum LDH or serous fluid LDH two-thirds of the upper limit of normal for serum LDH suggests the fluid is an exudate.     1. Pleural TP/Serum TP >0.5  2. Pleural LD/Serum LD >0.6  3. Pleural LD >2/3 of the upper limit of normal for serum LDH    This test was developed, it performance characteristics determined and judged suitable for clinical purposes by [facility].  It has not been cleared or approved by the FDA.  The laboratory is regulated under CLIA as qualified to perform high-complexity testing.     Immunoglobulin Free LT Chains Blood [[id number]] Collected: 10/03/23 1545    Specimen: Blood Updated: 10/03/23 1603    Immunofixation, Serum [[id number]] Collected: 10/03/23 1545    Specimen: Blood Updated: 10/03/23 1602    Reticulocytes [[id number]]  (Normal) Collected: 10/03/23 0408    Specimen: Blood Updated: 10/03/23 1526     Reticulocyte % 1.06 %      Reticulocyte Absolute 0.0328 10*6/mm3     Anaerobic Culture - Body Fluid, Pleural Cavity [[id number]] Collected: 10/03/23 1400    Specimen: Body Fluid from Pleural Cavity Updated: 10/03/23 1433    AFB Culture - Body Fluid, Pleural Cavity [[id number]] Collected: 10/03/23 1400    Specimen: Body Fluid from Pleural Cavity Updated: 10/03/23 1433    Blood Culture - Blood, Hand, Left [[id number]]  (Normal) Collected: 10/01/23 1339    Specimen: Blood from Hand, Left Updated: 10/03/23 1345     Blood Culture No growth at 2 days    POC Glucose Once [[id number]]  (Normal) Collected: 10/03/23 1243    Specimen: Blood Updated: 10/03/23 1245     Glucose 130 mg/dL     Blood Culture - Blood, Arm, Left [[id number]]  (Normal) Collected: 10/01/23 1231    Specimen: Blood from Arm, Left Updated: 10/03/23 1245     Blood Culture  No growth at 2 days    Narrative:      Less than seven (7) mL's of blood was collected.  Insufficient quantity may yield false negative results.             Assessment & Plan       PNA (pneumonia)    Acute respiratory failure with hypoxia    Hypertensive urgency    Abscess of upper lobe of right lung with pneumonia    Sepsis    Type 2 diabetes mellitus with hyperglycemia    ELLY (acute kidney injury)    Obesity, morbid, BMI 50 or higher    Anemia       Assessment & Plan    Patient is currently intubated and sedated in ICU. S/p  CT-guided chest tube to right pulmonary abscess with purulent output overnight.  We will plan for a round of intrapleural lytic therapy today and likely the next several days.  Recheck chest x-ray in a.m.  Pulmonary cultures from pleural fluid are growing gram-positive cocci in chains.  Antibiotic management per ID.  Extubation per pulmonary medicine.  Discussed with Dr. Gonzales.    We will follow.    Dayna Harvey DNP, APRN  [facility]  10/04/23  12:30 EDT    Greater than 35 minutes was spent reviewing the patient's chart, radiographic imaging, labs, provider notes, assessing the patient and developing a plan of care.  This was discussed with the patient and RN.

## 2023-10-05 ENCOUNTER — APPOINTMENT (OUTPATIENT)
Dept: GENERAL RADIOLOGY | Facility: HOSPITAL | Age: 53
DRG: 871 | End: 2023-10-05
Payer: COMMERCIAL

## 2023-10-05 ENCOUNTER — APPOINTMENT (OUTPATIENT)
Dept: CT IMAGING | Facility: HOSPITAL | Age: 53
DRG: 871 | End: 2023-10-05
Payer: COMMERCIAL

## 2023-10-05 LAB
ALBUMIN SERPL-MCNC: 2.6 G/DL (ref 3.5–5.2)
ALBUMIN/GLOB SERPL: 0.5 G/DL
ALP SERPL-CCNC: 118 U/L (ref 39–117)
ALT SERPL W P-5'-P-CCNC: 16 U/L (ref 1–33)
ANION GAP SERPL CALCULATED.3IONS-SCNC: 20 MMOL/L (ref 5–15)
AST SERPL-CCNC: 15 U/L (ref 1–32)
BACTERIA SPEC RESP CULT: NORMAL
BILIRUB SERPL-MCNC: 0.3 MG/DL (ref 0–1.2)
BUN SERPL-MCNC: 58 MG/DL (ref 6–20)
BUN/CREAT SERPL: 9.8 (ref 7–25)
CALCIUM SPEC-SCNC: 9.2 MG/DL (ref 8.6–10.5)
CHLORIDE SERPL-SCNC: 101 MMOL/L (ref 98–107)
CO2 SERPL-SCNC: 21 MMOL/L (ref 22–29)
CREAT SERPL-MCNC: 5.94 MG/DL (ref 0.57–1)
CRP SERPL-MCNC: 11.66 MG/DL (ref 0–0.5)
CYTO UR: NORMAL
DEPRECATED RDW RBC AUTO: 46.8 FL (ref 37–54)
EGFRCR SERPLBLD CKD-EPI 2021: 8 ML/MIN/1.73
ERYTHROCYTE [DISTWIDTH] IN BLOOD BY AUTOMATED COUNT: 15.4 % (ref 12.3–15.4)
GLOBULIN UR ELPH-MCNC: 5 GM/DL
GLUCOSE BLDC GLUCOMTR-MCNC: 108 MG/DL (ref 70–130)
GLUCOSE BLDC GLUCOMTR-MCNC: 114 MG/DL (ref 70–130)
GLUCOSE BLDC GLUCOMTR-MCNC: 116 MG/DL (ref 70–130)
GLUCOSE BLDC GLUCOMTR-MCNC: 122 MG/DL (ref 70–130)
GLUCOSE SERPL-MCNC: 103 MG/DL (ref 65–99)
GRAM STN SPEC: NORMAL
HCT VFR BLD AUTO: 27.9 % (ref 34–46.6)
HGB BLD-MCNC: 9 G/DL (ref 12–15.9)
LAB AP CASE REPORT: NORMAL
MAGNESIUM SERPL-MCNC: 2.6 MG/DL (ref 1.6–2.6)
MCH RBC QN AUTO: 26.9 PG (ref 26.6–33)
MCHC RBC AUTO-ENTMCNC: 32.3 G/DL (ref 31.5–35.7)
MCV RBC AUTO: 83.3 FL (ref 79–97)
PATH REPORT.FINAL DX SPEC: NORMAL
PATH REPORT.GROSS SPEC: NORMAL
PHOSPHATE SERPL-MCNC: 9 MG/DL (ref 2.5–4.5)
PLATELET # BLD AUTO: 329 10*3/MM3 (ref 140–450)
PMV BLD AUTO: 11 FL (ref 6–12)
POTASSIUM SERPL-SCNC: 4.9 MMOL/L (ref 3.5–5.2)
PROT SERPL-MCNC: 7.6 G/DL (ref 6–8.5)
RBC # BLD AUTO: 3.35 10*6/MM3 (ref 3.77–5.28)
SODIUM SERPL-SCNC: 142 MMOL/L (ref 136–145)
TRIGL SERPL-MCNC: 338 MG/DL (ref 0–150)
URATE SERPL-MCNC: 10.5 MG/DL (ref 2.4–5.7)
WBC NRBC COR # BLD: 12.91 10*3/MM3 (ref 3.4–10.8)

## 2023-10-05 PROCEDURE — 94799 UNLISTED PULMONARY SVC/PX: CPT

## 2023-10-05 PROCEDURE — 71045 X-RAY EXAM CHEST 1 VIEW: CPT

## 2023-10-05 PROCEDURE — 3E0L3GC INTRODUCTION OF OTHER THERAPEUTIC SUBSTANCE INTO PLEURAL CAVITY, PERCUTANEOUS APPROACH: ICD-10-PCS | Performed by: NURSE PRACTITIONER

## 2023-10-05 PROCEDURE — 25010000002 HYDRALAZINE PER 20 MG: Performed by: INTERNAL MEDICINE

## 2023-10-05 PROCEDURE — 82948 REAGENT STRIP/BLOOD GLUCOSE: CPT

## 2023-10-05 PROCEDURE — 32562 LYSE CHEST FIBRIN SUBQ DAY: CPT | Performed by: NURSE PRACTITIONER

## 2023-10-05 PROCEDURE — 25010000002 METRONIDAZOLE 500 MG/100ML SOLUTION: Performed by: INTERNAL MEDICINE

## 2023-10-05 PROCEDURE — 99232 SBSQ HOSP IP/OBS MODERATE 35: CPT | Performed by: INTERNAL MEDICINE

## 2023-10-05 PROCEDURE — 25010000002 FENTANYL CITRATE (PF) 50 MCG/ML SOLUTION: Performed by: INTERNAL MEDICINE

## 2023-10-05 PROCEDURE — 84478 ASSAY OF TRIGLYCERIDES: CPT | Performed by: INTERNAL MEDICINE

## 2023-10-05 PROCEDURE — 80053 COMPREHEN METABOLIC PANEL: CPT | Performed by: INTERNAL MEDICINE

## 2023-10-05 PROCEDURE — 84550 ASSAY OF BLOOD/URIC ACID: CPT | Performed by: INTERNAL MEDICINE

## 2023-10-05 PROCEDURE — 85027 COMPLETE CBC AUTOMATED: CPT | Performed by: INTERNAL MEDICINE

## 2023-10-05 PROCEDURE — 99232 SBSQ HOSP IP/OBS MODERATE 35: CPT | Performed by: NURSE PRACTITIONER

## 2023-10-05 PROCEDURE — 84100 ASSAY OF PHOSPHORUS: CPT | Performed by: INTERNAL MEDICINE

## 2023-10-05 PROCEDURE — 97110 THERAPEUTIC EXERCISES: CPT

## 2023-10-05 PROCEDURE — 83735 ASSAY OF MAGNESIUM: CPT | Performed by: INTERNAL MEDICINE

## 2023-10-05 PROCEDURE — 25010000002 HEPARIN (PORCINE) PER 1000 UNITS: Performed by: INTERNAL MEDICINE

## 2023-10-05 PROCEDURE — 25010000002 CEFTRIAXONE PER 250 MG: Performed by: INTERNAL MEDICINE

## 2023-10-05 PROCEDURE — 71250 CT THORAX DX C-: CPT

## 2023-10-05 PROCEDURE — 86140 C-REACTIVE PROTEIN: CPT | Performed by: INTERNAL MEDICINE

## 2023-10-05 RX ORDER — POLYETHYLENE GLYCOL 3350 17 G/17G
17 POWDER, FOR SOLUTION ORAL DAILY PRN
Status: DISCONTINUED | OUTPATIENT
Start: 2023-10-05 | End: 2023-10-12 | Stop reason: HOSPADM

## 2023-10-05 RX ORDER — ECHINACEA PURPUREA EXTRACT 125 MG
1 TABLET ORAL AS NEEDED
Status: DISCONTINUED | OUTPATIENT
Start: 2023-10-05 | End: 2023-10-12 | Stop reason: HOSPADM

## 2023-10-05 RX ORDER — HYDRALAZINE HYDROCHLORIDE 25 MG/1
25 TABLET, FILM COATED ORAL EVERY 8 HOURS SCHEDULED
Status: DISCONTINUED | OUTPATIENT
Start: 2023-10-05 | End: 2023-10-07

## 2023-10-05 RX ORDER — METRONIDAZOLE 500 MG/1
500 TABLET ORAL EVERY 8 HOURS SCHEDULED
Status: DISCONTINUED | OUTPATIENT
Start: 2023-10-05 | End: 2023-10-10

## 2023-10-05 RX ORDER — BISACODYL 10 MG
10 SUPPOSITORY, RECTAL RECTAL DAILY PRN
Status: DISCONTINUED | OUTPATIENT
Start: 2023-10-05 | End: 2023-10-12 | Stop reason: HOSPADM

## 2023-10-05 RX ORDER — BISACODYL 5 MG/1
5 TABLET, DELAYED RELEASE ORAL DAILY PRN
Status: DISCONTINUED | OUTPATIENT
Start: 2023-10-05 | End: 2023-10-12 | Stop reason: HOSPADM

## 2023-10-05 RX ORDER — PANTOPRAZOLE SODIUM 40 MG/1
40 TABLET, DELAYED RELEASE ORAL
Status: DISCONTINUED | OUTPATIENT
Start: 2023-10-06 | End: 2023-10-12 | Stop reason: HOSPADM

## 2023-10-05 RX ORDER — LABETALOL 100 MG/1
100 TABLET, FILM COATED ORAL EVERY 12 HOURS SCHEDULED
Status: DISCONTINUED | OUTPATIENT
Start: 2023-10-05 | End: 2023-10-11

## 2023-10-05 RX ORDER — BUMETANIDE 0.25 MG/ML
4 INJECTION INTRAMUSCULAR; INTRAVENOUS EVERY 8 HOURS
Status: COMPLETED | OUTPATIENT
Start: 2023-10-05 | End: 2023-10-06

## 2023-10-05 RX ORDER — AMOXICILLIN 250 MG
2 CAPSULE ORAL 2 TIMES DAILY
Status: DISCONTINUED | OUTPATIENT
Start: 2023-10-05 | End: 2023-10-12 | Stop reason: HOSPADM

## 2023-10-05 RX ADMIN — ACETAMINOPHEN 650 MG: 325 TABLET, FILM COATED ORAL at 13:55

## 2023-10-05 RX ADMIN — DORNASE ALFA 5 MG: 1 SOLUTION RESPIRATORY (INHALATION) at 13:25

## 2023-10-05 RX ADMIN — ACETAMINOPHEN 650 MG: 325 TABLET, FILM COATED ORAL at 09:28

## 2023-10-05 RX ADMIN — ACETAMINOPHEN 650 MG: 325 TABLET, FILM COATED ORAL at 01:33

## 2023-10-05 RX ADMIN — HEPARIN SODIUM 5000 UNITS: 5000 INJECTION INTRAVENOUS; SUBCUTANEOUS at 13:55

## 2023-10-05 RX ADMIN — PANTOPRAZOLE SODIUM 40 MG: 40 INJECTION, POWDER, FOR SOLUTION INTRAVENOUS at 09:27

## 2023-10-05 RX ADMIN — LABETALOL HYDROCHLORIDE 100 MG: 100 TABLET, FILM COATED ORAL at 09:28

## 2023-10-05 RX ADMIN — HYDRALAZINE HYDROCHLORIDE 25 MG: 25 TABLET, FILM COATED ORAL at 05:29

## 2023-10-05 RX ADMIN — METRONIDAZOLE 500 MG: 500 INJECTION, SOLUTION INTRAVENOUS at 03:58

## 2023-10-05 RX ADMIN — BUMETANIDE 4 MG: 0.25 INJECTION INTRAMUSCULAR; INTRAVENOUS at 09:27

## 2023-10-05 RX ADMIN — HYDRALAZINE HYDROCHLORIDE 25 MG: 25 TABLET, FILM COATED ORAL at 13:55

## 2023-10-05 RX ADMIN — METRONIDAZOLE 500 MG: 500 TABLET, FILM COATED ORAL at 13:55

## 2023-10-05 RX ADMIN — METRONIDAZOLE 500 MG: 500 TABLET, FILM COATED ORAL at 22:35

## 2023-10-05 RX ADMIN — Medication 10 ML: at 09:28

## 2023-10-05 RX ADMIN — LABETALOL HYDROCHLORIDE 100 MG: 100 TABLET, FILM COATED ORAL at 20:29

## 2023-10-05 RX ADMIN — HEPARIN SODIUM 5000 UNITS: 5000 INJECTION INTRAVENOUS; SUBCUTANEOUS at 05:29

## 2023-10-05 RX ADMIN — FENTANYL CITRATE 25 MCG: 50 INJECTION, SOLUTION INTRAMUSCULAR; INTRAVENOUS at 16:34

## 2023-10-05 RX ADMIN — CEFTRIAXONE SODIUM 2000 MG: 2 INJECTION, POWDER, FOR SOLUTION INTRAMUSCULAR; INTRAVENOUS at 09:27

## 2023-10-05 RX ADMIN — HYDRALAZINE HYDROCHLORIDE 20 MG: 20 INJECTION, SOLUTION INTRAMUSCULAR; INTRAVENOUS at 01:08

## 2023-10-05 RX ADMIN — Medication 10 ML: at 20:29

## 2023-10-05 RX ADMIN — BUMETANIDE 4 MG: 0.25 INJECTION INTRAMUSCULAR; INTRAVENOUS at 16:34

## 2023-10-05 RX ADMIN — HYDRALAZINE HYDROCHLORIDE 25 MG: 25 TABLET, FILM COATED ORAL at 22:35

## 2023-10-05 RX ADMIN — HEPARIN SODIUM 5000 UNITS: 5000 INJECTION INTRAVENOUS; SUBCUTANEOUS at 22:35

## 2023-10-05 NOTE — PROGRESS NOTES
[facility] INPATIENT PROGRESS NOTE         [facility] CORONARY CARE    10/5/2023      PATIENT IDENTIFICATION:  Name: Nay Gonzalez ADMIT: 10/1/2023   : 1970  PCP: Provider, No Known    MRN: [id number] LOS: 4 days   AGE/SEX: 53 y.o. female  ROOM: [facility]                     LOS 4    Reason for visit: Respiratory failure and pneumonia      SUBJECTIVE:      Successfully extubated yesterday.  No new issues overnight.  Mild hoarse voice secondary to recent ET tube.  Discussed with family at bedside.    Objective   OBJECTIVE:    Vital Sign Min/Max for last 24 hours  Temp  Min: 98.6 øF (37 øC)  Max: 98.7 øF (37.1 øC)   BP  Min: 112/66  Max: 170/79   Pulse  Min: 72  Max: 99   Resp  Min: 22  Max: 22   SpO2  Min: 95 %  Max: 100 %   No data recorded   Weight  Min: 140 kg (308 lb 6.8 oz)  Max: 140 kg (308 lb 6.8 oz)       FiO2 (%):  [24 %-25 %] 25 %  PEEP/CPAP (cm H2O):  [5 cm H20] 5 cm H20  OK SUP:  [10 cm H20] 10 cm H20  MAP (cm H2O):  [9-9.8] 9.8           FiO2 (%): 25 %     Body mass index is 54.65 kg/mý.    Intake/Output Summary (Last 24 hours) at 10/5/2023 0837  Last data filed at 10/5/2023 0600  Gross per 24 hour   Intake 1619.77 ml   Output 730 ml   Net 889.77 ml         Exam:  GEN:  No distress, appears stated age.  Morbidly obese.    EYES:   PERRL, anicteric sclerae  ENT:    External ears/nose normal, OP clear  NECK:  No adenopathy, midline trachea  LUNGS: Normal chest on inspection, palpation and diminished breath sounds on auscultation.  Chest tube stable  CV:  Normal S1S2, without murmur  ABD:  Nontender, nondistended, no hepatosplenomegaly, +BS  EXT:  No pitting edema.  No cyanosis or clubbing.  No mottling and normal cap refill.    Assessment     Scheduled meds:  bumetanide, 4 mg, Intravenous, Q8H  cefTRIAXone, 2,000 mg, Intravenous, Q24H  chlorhexidine, 15 mL, Mouth/Throat, Q12H  heparin (porcine), 5,000 Units, Subcutaneous, Q8H  hydrALAZINE, 25 mg, Nasogastric, Q8H  insulin lispro, 2-7  Units, Subcutaneous, 4x Daily AC & at Bedtime  labetalol, 100 mg, Nasogastric, Q12H  metroNIDAZOLE, 500 mg, Intravenous, Q8H  pantoprazole, 40 mg, Intravenous, Q24H  senna-docusate sodium, 2 tablet, Nasogastric, BID  sodium chloride, 10 mL, Intravenous, Q12H      IV meds:                      propofol, 5-50 mcg/kg/min, Last Rate: 40 mcg/kg/min (10/04/23 0841)      Data Review:  Results from last 7 days   Lab Units 10/05/23  0406 10/04/23  0414 10/03/23  0408 10/02/23  2026 10/02/23  0501   SODIUM mmol/L 142 140 142 142 138   POTASSIUM mmol/L 4.9 4.9 4.5 5.2 5.4*   CHLORIDE mmol/L 101 99 102 102 100   CO2 mmol/L 21.0* 27.3 25.0 29.7* 23.9   BUN mg/dL 58* 41* 31* 27* 16   CREATININE mg/dL 5.94* 4.64* 2.73* 1.95* 1.08*   GLUCOSE mg/dL 103* 119* 87 123* 108*   CALCIUM mg/dL 9.2 9.1 9.0 9.3 9.6         Estimated Creatinine Clearance: 15.3 mL/min (A) (by C-G formula based on SCr of 5.94 mg/dL (H)).  Results from last 7 days   Lab Units 10/05/23  0406 10/04/23  0414 10/03/23  1916 10/03/23  0408 10/02/23  0501 10/01/23  1138   WBC 10*3/mm3 12.91* 12.10*  --  17.47* 24.25* 22.45*   HEMOGLOBIN g/dL 9.0* 7.8* 8.0* 8.0* 10.7* 10.4*   PLATELETS 10*3/mm3 329 285  --  319 353 395         Results from last 7 days   Lab Units 10/05/23  0406 10/04/23  0414 10/03/23  0408 10/01/23  1138   ALT (SGPT) U/L 16 17 22 35*   AST (SGOT) U/L 15 10 17 19     Results from last 7 days   Lab Units 10/04/23  1418 10/04/23  0357 10/03/23  0339 10/02/23  1840 10/02/23  1701 10/02/23  1459 10/02/23  1344   PH, ARTERIAL pH units 7.356 7.376 7.498* 7.309* 7.071* 7.236* 7.074*   PO2 ART mm Hg 77.8* 127.2* 109.1* 174.3* 90.5 122.2* 96.8   PCO2, ARTERIAL mm Hg 45.6* 44.7 34.8* 59.3* 108.4* 66.9* 112.4*   HCO3 ART mmol/L 25.5 26.2 27.0 29.8* 31.5* 28.4* 32.9*     Results from last 7 days   Lab Units 10/01/23  1138   PROCALCITONIN ng/mL 0.59*   LACTATE mmol/L 1.2         Glucose   Date/Time Value Ref Range Status   10/04/2023 2342 98 70 - 130 mg/dL Final    10/04/2023 2003 109 70 - 130 mg/dL Final   10/04/2023 1639 130 70 - 130 mg/dL Final   10/04/2023 1216 134 (H) 70 - 130 mg/dL Final   10/03/2023 1815 122 70 - 130 mg/dL Final   10/03/2023 1243 130 70 - 130 mg/dL Final   10/02/2023 2137 93 70 - 130 mg/dL Final     10/4 chest x-ray reviewed            2D echo 10/2 reviewed: EF 61 to 65% with grade 2 diastolic dysfunction.    CT chest 10/1 reviewed        Microbiology reviewed: No growth to date              Active Hospital Problems    Diagnosis  POA    **PNA (pneumonia) [J18.9]  Yes    ELLY (acute kidney injury) [N17.9]  Unknown    Obesity, morbid, BMI 50 or higher [E66.01]  Unknown    Anemia [D64.9]  Unknown    Sepsis [A41.9]  Unknown    Type 2 diabetes mellitus with hyperglycemia [E11.65]  Unknown    Acute respiratory failure with hypoxia [J96.01]  Unknown    Hypertensive urgency [I16.0]  Unknown    Abscess of upper lobe of right lung with pneumonia [J85.1]  Yes      Resolved Hospital Problems    Diagnosis Date Resolved POA    Hyperkalemia [E87.5] 10/03/2023 Unknown         ASSESSMENT:  Right upper lobe pneumonia/abscess  Sepsis secondary to above  Respiratory failure requiring mechanical ventilation  Moderate right pleural effusion  Acute hypoxic respiratory failure  Atelectasis  Mediastinal lymphadenopathy  Super morbid obesity  Snoring/apnea with concerns for CRYSTAL  Hypertension  Diabetes mellitus, type II  Acute kidney injury: Worsening      PLAN:  Doing well off the ventilator.  Encourage pulmonary toilet.  Thoracic surgery following.  Status post tPA and dornase per chest tube yesterday.  Antibiotics per infectious disease recommendations.  Control glucose.  Control blood pressure.  DVT/ulcer prophylaxis.  Discussed with family at bedside in detail and questions answered to their satisfaction.    Discussed with multidisciplinary ICU team on rounds this morning.          Brannon Gonzales MD  Pulmonary and Critical Care Medicine  [facility],  [facility]  10/5/2023    08:37 EDT

## 2023-10-05 NOTE — PROGRESS NOTES
[facility] Progress Note      Patient Name: Nay Gonzlaez  : 1970  MRN: [id number]  Primary Care Physician:  Provider, No Known  Date of admission: 10/1/2023    Subjective     Interval History:   Follow-up acute kidney injury    Since I saw the patient yesterday she has been extubated she is awake and alert, denies any chest pain or shortness of air, no orthopnea or PND, no nausea or vomiting, she has remained oliguric urine output in the past 24 hours was 350 cc    Review of Systems:   As noted above    Objective     Vitals:   Temp:  [98.6 øF (37 øC)-98.7 øF (37.1 øC)] 98.6 øF (37 øC)  Heart Rate:  [68-99] 87  Resp:  [18-22] 22  BP: (112-170)/(64-88) 147/76  Flow (L/min):  [2-5] 2  FiO2 (%):  [24 %-30 %] 25 %    Intake/Output Summary (Last 24 hours) at 10/5/2023 0724  Last data filed at 10/5/2023 0600  Gross per 24 hour   Intake 1619.77 ml   Output 730 ml   Net 889.77 ml         Physical Exam:    General Appearance: Morbidly obese, awake and alert, chronically ill, no acute distress  Skin: warm and dry  HEENT: She has core track in place  Neck: Difficult to assess due to body habitus  Lungs: Bilateral rhonchi, breathing effort not  Heart: RRR, normal S1 and S2, no rub  Abdomen: soft, no guarding, protuberant, normoactive bowel  : Sauer catheter anchored in  Extremities: no edema, cyanosis or clubbing  Neuro: Normal speech and mental status and moving all extremities    Scheduled Meds:     bumetanide, 4 mg, Intravenous, Q8H  cefTRIAXone, 2,000 mg, Intravenous, Q24H  chlorhexidine, 15 mL, Mouth/Throat, Q12H  heparin (porcine), 5,000 Units, Subcutaneous, Q8H  hydrALAZINE, 25 mg, Nasogastric, Q8H  insulin lispro, 2-7 Units, Subcutaneous, 4x Daily AC & at Bedtime  labetalol, 100 mg, Nasogastric, Q12H  metroNIDAZOLE, 500 mg, Intravenous, Q8H  pantoprazole, 40 mg, Intravenous, Q24H  senna-docusate sodium, 2 tablet, Nasogastric, BID  sodium chloride, 10 mL, Intravenous,  Medicare Wellness Visit, Female    The best way to live healthy is to have a healthy lifestyle by eating a well-balanced diet, exercising regularly, limiting alcohol and stopping smoking. Regular physical exams and screening tests are another way to keep healthy. Preventive exams provided by your health care provider can find health problems before they become diseases or illnesses. Preventive services including immunizations, screening tests, monitoring and exams can help you take care of your own health. All people over age 72 should have a pneumovax  and and a prevnar shot to prevent pneumonia. These are once in a lifetime unless you and your provider decide differently. All people over 65 should have a yearly flu shot and a tetanus vaccine every 10 years. A bone mass density to screen for osteoporosis or thinning of the bones should be done every 2 years after 65. Screening for diabetes mellitus with a blood sugar test should be done every year. Glaucoma is a disease of the eye due to increased ocular pressure that can lead to blindness and it should be done every year by an eye professional.    Cardiovascular screening tests that check for elevated lipids (fatty part of blood) which can lead to heart disease and strokes should be done every 5 years. Colorectal screening that evaluates for blood or polyps in your colon should be done yearly as a stool test or every five years as a flexible sigmoidoscope or every 10 years as a colonoscopy up to age 76. Breast cancer screening with a mammogram is recommended biennially  for women age 54-69. Screening for cervical cancer with a pap smear and pelvic exam is recommended for women after age 72 years every 2 years up to age 79 or when the provider and patient decide to stop. If there is a history of cervical abnormalities or other increased risk for cancer then the test is recommended yearly.     Hepatitis C screening is also recommended for Q12H      IV Meds:   propofol, 5-50 mcg/kg/min, Last Rate: 40 mcg/kg/min (10/04/23 0841)        Results Reviewed:   I have personally reviewed the results from the time of this admission to 10/5/2023 07:24 EDT     Results from last 7 days   Lab Units 10/05/23  0406 10/04/23  0414 10/03/23  0408   SODIUM mmol/L 142 140 142   POTASSIUM mmol/L 4.9 4.9 4.5   CHLORIDE mmol/L 101 99 102   CO2 mmol/L 21.0* 27.3 25.0   BUN mg/dL 58* 41* 31*   CREATININE mg/dL 5.94* 4.64* 2.73*   CALCIUM mg/dL 9.2 9.1 9.0   BILIRUBIN mg/dL 0.3 0.3 0.3   ALK PHOS U/L 118* 114 138*   ALT (SGPT) U/L 16 17 22   AST (SGOT) U/L 15 10 17   GLUCOSE mg/dL 103* 119* 87         Estimated Creatinine Clearance: 15.3 mL/min (A) (by C-G formula based on SCr of 5.94 mg/dL (H)).    Results from last 7 days   Lab Units 10/05/23  0406 10/04/23  0414 10/02/23  2026 10/02/23  0501   MAGNESIUM mg/dL 2.6 2.6  --  2.1   PHOSPHORUS mg/dL 9.0* 7.0* 5.1* 4.6*         Results from last 7 days   Lab Units 10/05/23  0406 10/04/23  0414 10/03/23  0408   URIC ACID mg/dL 10.5* 9.9* 9.4*         Results from last 7 days   Lab Units 10/05/23  0406 10/04/23  0414 10/03/23  1916 10/03/23  0408 10/02/23  0501 10/01/23  1138   WBC 10*3/mm3 12.91* 12.10*  --  17.47* 24.25* 22.45*   HEMOGLOBIN g/dL 9.0* 7.8* 8.0* 8.0* 10.7* 10.4*   PLATELETS 10*3/mm3 329 285  --  319 353 395               Assessment / Plan     ASSESSMENT:  Acute kidney injury associated with sudden correction of her hypertension initially also possible component of contrast-induced nephropathy because patient had CTA on 10/1/2023.  Creatinine is increasing up to 5.94, But her electrolyte within acceptable range.  No indication for dialysis patient remains oliguric  Acute respiratory failure, resolved currently off the ventilator  Now diagnosis of hypertension  New diagnosis of diabetes mellitus type 2  Super morbid obesity  Anemia, the etiology not very clear, most likely patient have iron deficiency iron  anyone born between [birth year] through [address] [birth year]. A shingles vaccine is also recommended once in a lifetime after age 61. Your Medicare Wellness Exam is recommended annually.     Here is a list of your current Health Maintenance items with a due date:  Health Maintenance Due   Topic Date Due    Glaucoma Screening   02/07/2016 saturation 13% but her TIBC is on the low side, suggestive of anemia of chronic disease too.    PLAN:  Continue the same treatment  No indication for dialysis today  Surveillance labs    I discussed the case with the patient's mother at the bedside.  I reviewed the chart and other providers notes, I reviewed imaging and lab data.  Copied text in this note has been reviewed and is accurate as of 10/05/23.       Thank you for involving us in the care of Nay JEFFRIES Anushkageovany.  Please feel free to call with any questions.    Iain Owens MD  10/05/23  07:24 EDT    [facility]  [phone]    Please note that portions of this note were completed with a voice recognition program.

## 2023-10-05 NOTE — PROCEDURES
----- Message from [facility] [facility]  Garth Baig Pre-op Diagnosis: Loculated Pleural Effusion, right     Post-Op Diagnosis: Loculated Pleural Effusion, right     Procedure performed: Irrigation pleural cavity with TPA and dornase    Anesthesia: None    Summary of procedure: The right pleural tube was accessed. 10 mg of TPA (reconstituted in 30cc of sterile water) and 5 mg of dornase (reconstituted in 30cc normal saline) was instilled into the pleural cavity. The pleural tube then was clamped.      The patient's position is to be changed to every 15 minutes for 2 hours.  The tube will then be unclamped and placed back to suction.  Patient tolerated the procedure well. We will re-evaluate with a CXR in the morning.      Appreciate assistance from PAOLA Castorena.    FERDINAND Zuniga

## 2023-10-05 NOTE — CASE MANAGEMENT/SOCIAL WORK
Continued Stay Note  [facility]     Patient Name: Nay Gonzalez  MRN: [id number]  Today's Date: 10/5/2023    Admit Date: 10/1/2023    Plan: Home w/ spouse pending PT progress.   Discharge Plan       Row Name 10/05/23 1036       Plan    Plan TBD    Plan Comments S/w pt and her mother Hoa at bedside.  Pt plans to return home upon DC. Following ID notes for possible need for IV abx.  CCP discussed w/ pt the options for IV abx including ACU.  Pt states she needs a PCP and is interested in a [facility] provider.  [facility] provider list and appointment liaison contact 3 given.  PT is ordered and will followup.  CCP will monitor PT progress and will continue to follow to assist w/ DC plans. ........Sunita GARCIA/ RADAMES                   Discharge Codes    No documentation.                 Expected Discharge Date and Time       Expected Discharge Date Expected Discharge Time    Oct 6, 2023               Sunita Adams RN

## 2023-10-05 NOTE — PROGRESS NOTES
"    Chief Complaint: Pulmonary abscess, respiratory failure, follow-up    Subjective:  Symptoms:  Improved.  She reports shortness of breath.    Diet:  Adequate intake.    Activity level: Impaired due to weakness.    Pain:  She complains of pain that is mild.    Extubated. Eating lunch. On room air. Had a nosebleed.      Vital Signs:  Temp:  [98 øF (36.7 øC)-98.7 øF (37.1 øC)] 98 øF (36.7 øC)  Heart Rate:  [79-99] 84  Resp:  [22] 22  BP: (141-174)/(73-88) 152/85  FiO2 (%):  [25 %] 25 %    Intake & Output (last day)         10/04 0701  10/05 0700 10/05 0701  10/06 0700    I.V. (mL/kg) 1519.8 (10.9)     IV Piggyback 100     Total Intake(mL/kg) 1619.8 (11.6)     Urine (mL/kg/hr) 350 (0.1) 250 (0.3)    Stool 0     Chest Tube 380     Total Output 730 250    Net +889.8 -250          Stool Unmeasured Occurrence 1 x             Objective:  General Appearance:  Comfortable, ill-appearing and in no acute distress.    Vital signs: (most recent): Blood pressure 152/85, pulse 84, temperature 98 øF (36.7 øC), temperature source Oral, resp. rate 22, height 160 cm (62.99\"), weight (!) 140 kg (308 lb 6.8 oz), last menstrual period 10/01/2023, SpO2 97 %.  Vital signs are normal.  No fever.    Output: Minimal urine output.    Lungs:  Normal effort and tachypnea.  There are decreased breath sounds.  No rales, wheezes or rhonchi.    Heart: Normal rate.  Regular rhythm.    Chest: (Right chest tube site tender to palpation. No drainage around the incision.)  Abdomen: Abdomen is soft.  Bowel sounds are normal.     Extremities: Decreased range of motion.    Neurological: Patient is alert and oriented to person, place and time.    Skin:  Warm and dry.            Chest tube:   Site: Right, Clean, Dry, Intact, and Securement device intact  Suction: -20 cm  Air Leak: negative  24 Hour Total: 380ml, purulent       Results Review:     I reviewed the patient's new clinical results.  I reviewed the patient's new imaging results and agree with the " interpretation.  I reviewed the patient's other test results and agree with the interpretation  Discussed with patient, family at bedside, RN and Dr. Brantley.    Imaging Results (Last 24 Hours)       Procedure Component Value Units Date/Time    CT Chest Without Contrast Diagnostic [[id number]] Resulted: 10/05/23 1326     Updated: 10/05/23 1242    XR Chest 1 View [[id number]] Collected: 10/05/23 0945     Updated: 10/05/23 0956    Narrative:      XR CHEST 1 VW-     Clinical: Evaluate for pleural effusion     COMPARISON 10/4/2023     FINDINGS: There has been interval removal of the endotracheal tube.  Feeding tube, central venous catheter and right-sided chest tube in  position as before. No pneumothorax. There is cardiac enlargement.  Mediastinum stable. The left lung appears clear. Vague opacity right  upper lung zone which probably represents residual loculated pleural  fluid. There is vague opacity noted at the right lung base which could  represent infiltrate/consolidation and/or pleural fluid. The remainder  is unremarkable.     This report was finalized on 10/5/2023 9:53 AM by Dr. Marc Vaughn M.D  on Workstation: [id number]               Lab Results:     Lab Results (last 24 hours)       Procedure Component Value Units Date/Time    Blood Culture - Blood, Arm, Left [[id number]]  (Normal) Collected: 10/01/23 1231    Specimen: Blood from Arm, Left Updated: 10/05/23 1245     Blood Culture No growth at 4 days    Narrative:      Less than seven (7) mL's of blood was collected.  Insufficient quantity may yield false negative results.    Non-gynecologic Cytology [[id number]] Collected: 10/03/23 1400    Specimen: Body Fluid from Pleural Cavity Updated: 10/05/23 1211     Case Report --     Non-gynecologic Cytology                          Case: [id number]                                  Authorizing Provider:  Dayna Harvey DNP,     Collected:           10/03/2023 02:00 PM                                 APRN                                                                          Ordering Location:     [facility]  Received:            10/04/2023 09:49 AM                                 [facility]                                                                Pathologist:           Sunita Wade MD                                                    Specimen:    Pleural Cavity, Right Chest Tube                                                            Final Diagnosis --     Pleural Fluid, Right Chest Tube:   A. Negative for malignant cells.   B. Abundant acute inflammation present.    [facility]        Gross Description --     1. Pleural Cavity.  Right: 1 syringe received containing 14 ml of turbid, pink fluid. ThinPrep(1), cell block. 10 ml of remaining fluid. Cellblock in formalin @ 10:05 on 10/4/23.         Microscopic Description --     Screened by S. Sacksteder      POC Glucose Once [[id number]]  (Normal) Collected: 10/05/23 1133    Specimen: Blood Updated: 10/05/23 1135     Glucose 122 mg/dL     Respiratory Culture - Sputum, ET Suction [[id number]] Collected: 10/03/23 0945    Specimen: Sputum from ET Suction Updated: 10/05/23 0949     Respiratory Culture Rare Normal respiratory annabel. No S. aureus or Pseudomonas aeruginosa detected. Final report.     Gram Stain No WBCs or organisms seen    POC Glucose Once [[id number]]  (Normal) Collected: 10/05/23 0910    Specimen: Blood Updated: 10/05/23 0911     Glucose 108 mg/dL     Body Fluid Culture - Body Fluid, Pleural Cavity [[id number]]  (Abnormal) Collected: 10/03/23 1400    Specimen: Body Fluid from Pleural Cavity Updated: 10/05/23 0656     Body Fluid Culture Scant growth (1+) Streptococcus, Beta Hemolytic, Group G     Comment:   This organism is considered to be universally susceptible to penicillin.  No further antibiotic testing will be performed.        Gram Stain Many (4+) WBCs seen      Few (2+) Gram positive cocci in chains    Phosphorus [[id number]]  (Abnormal)  Collected: 10/05/23 0406    Specimen: Blood Updated: 10/05/23 0459     Phosphorus 9.0 mg/dL     Magnesium [[id number]]  (Normal) Collected: 10/05/23 0406    Specimen: Blood Updated: 10/05/23 0444     Magnesium 2.6 mg/dL     Comprehensive Metabolic Panel [[id number]]  (Abnormal) Collected: 10/05/23 0406    Specimen: Blood Updated: 10/05/23 0444     Glucose 103 mg/dL      BUN 58 mg/dL      Creatinine 5.94 mg/dL      Sodium 142 mmol/L      Potassium 4.9 mmol/L      Chloride 101 mmol/L      CO2 21.0 mmol/L      Calcium 9.2 mg/dL      Total Protein 7.6 g/dL      Albumin 2.6 g/dL      ALT (SGPT) 16 U/L      AST (SGOT) 15 U/L      Alkaline Phosphatase 118 U/L      Total Bilirubin 0.3 mg/dL      Globulin 5.0 gm/dL      A/G Ratio 0.5 g/dL      BUN/Creatinine Ratio 9.8     Anion Gap 20.0 mmol/L      eGFR 8.0 mL/min/1.73      Comment: <15 Indicative of kidney failure       Narrative:      GFR Normal >60  Chronic Kidney Disease <60  Kidney Failure <15      Triglycerides [[id number]]  (Abnormal) Collected: 10/05/23 0406    Specimen: Blood Updated: 10/05/23 0444     Triglycerides 338 mg/dL     C-reactive Protein [[id number]]  (Abnormal) Collected: 10/05/23 0406    Specimen: Blood Updated: 10/05/23 0444     C-Reactive Protein 11.66 mg/dL     Uric Acid [[id number]]  (Abnormal) Collected: 10/05/23 0406    Specimen: Blood Updated: 10/05/23 0444     Uric Acid 10.5 mg/dL     CBC (No Diff) [[id number]]  (Abnormal) Collected: 10/05/23 0406    Specimen: Blood Updated: 10/05/23 0421     WBC 12.91 10*3/mm3      RBC 3.35 10*6/mm3      Hemoglobin 9.0 g/dL      Hematocrit 27.9 %      MCV 83.3 fL      MCH 26.9 pg      MCHC 32.3 g/dL      RDW 15.4 %      RDW-SD 46.8 fl      MPV 11.0 fL      Platelets 329 10*3/mm3     POC Glucose Once [[id number]]  (Normal) Collected: 10/04/23 2342    Specimen: Blood Updated: 10/04/23 2345     Glucose 98 mg/dL     POC Glucose Once [[id number]]  (Normal) Collected: 10/04/23 2003    Specimen: Blood Updated: 10/04/23 2005      Glucose 109 mg/dL     POC Glucose Once [[id number]]  (Normal) Collected: 10/04/23 1639    Specimen: Blood Updated: 10/04/23 1641     Glucose 130 mg/dL     Immunofixation, Serum [[id number]]  (Abnormal) Collected: 10/03/23 1545    Specimen: Blood Updated: 10/04/23 1511     Immunofixation Result, Serum Comment:     Comment: Presence of monoclonal protein is unclear at this time. Suggest  repeat in 3 to 6 months if clinically indicated.        IgG 1612 mg/dL      IgA 509 mg/dL      IgM 74 mg/dL     Narrative:      Performed at:  [address] [place] [address]  [id number]  : Nate Malave PhD, Phone:  [phone]    Immunoglobulin Free LT Chains Blood [[id number]]  (Abnormal) Collected: 10/03/23 1545    Specimen: Blood Updated: 10/04/23 1511     Free Light Chain, Kappa 157.8 mg/L      Free Lambda Light Chains 125.4 mg/L      Kappa/Lambda Ratio 1.26    Narrative:      Performed at:   - [facility]  [id number]  : Nate Malave PhD, Phone:  [phone]    AFB Culture - Body Fluid, Pleural Cavity [[id number]] Collected: 10/03/23 1400    Specimen: Body Fluid from Pleural Cavity Updated: 10/04/23 1423     AFB Stain No acid fast bacilli seen on direct smear    Blood Gas, Arterial - [[id number]]  (Abnormal) Collected: 10/04/23 1418    Specimen: Arterial Blood Updated: 10/04/23 1422     Site Right Radial     Cole's Test Positive     pH, Arterial 7.356 pH units      pCO2, Arterial 45.6 mm Hg      pO2, Arterial 77.8 mm Hg      HCO3, Arterial 25.5 mmol/L      Base Excess, Arterial -0.2 mmol/L      Comment: Serial Number: [id number]:  [id number]        O2 Saturation, Arterial 94.7 %      A-a DO2 0.6 mmHg      CO2 Content 26.9 mmol/L      Barometric Pressure for Blood Gas 751.2000 mmHg      Modality Adult Vent     FIO2 25 %      Ventilator Mode PS     Rate 38 Breaths/minute      PEEP 5     PSV 10 cmH2O      Hemodilution No     Device Comment sat 96     Blood Culture - Blood, Hand, Left [[id number]]  (Normal) Collected: 10/01/23 1339    Specimen: Blood from Hand, Left Updated: 10/04/23 1345     Blood Culture No growth at 3 days             Assessment & Plan       PNA (pneumonia)    Acute respiratory failure with hypoxia    Hypertensive urgency    Abscess of upper lobe of right lung with pneumonia    Sepsis    Type 2 diabetes mellitus with hyperglycemia    ELLY (acute kidney injury)    Obesity, morbid, BMI 50 or higher    Anemia       Assessment & Plan    She is improved and has been extubated. Eating lunch but has a cortrak in place, as well.  Chest x-ray this morning was independently reviewed by me.  There is improvement in the right upper lung opacity with chest tube in stable position.  There appears to be some increase in the right pleural effusion.  Left lung is clear.  We will get a CT of the chest today to reevaluate the pulmonary abscess/fluid collection.  Plan for another dose of lytics today.  Chest tube to -20 cm suction after 2 hours of being clamped post lytic therapy.    FERDINAND Zuniga  [facility]  10/05/23  13:41 EDT    Greater than 35 minutes was spent reviewing the patient's chart, radiographic imaging, labs, provider notes, assessing the patient and developing a plan of care.  This was discussed with the patient and RN.

## 2023-10-05 NOTE — PLAN OF CARE
Goal Outcome Evaluation:  Plan of Care Reviewed With: (P) patient        Progress: (P) no change  Outcome Evaluation: (P) Pt seen in CCU following transfer from other floor, intubated from 10/2-10/4 following worsening respiratory status. Pt pleasant and agreeable to PT despite fatigue. Bed mobility completed with Ivette and verbal cues for scooting, maxA x 2 for supine<->sit. Pt able to complete 3 reps STS with CGA, fatigues very quickly and needed to rest UEs on walker and then sit back down. Pt attempted small side steps to R but fatigued very quickly. Pt placed back in bed, attempt to transfer to chair tomorrow. PT will progress mobility as tolerated by pt's fatigue and symptoms.

## 2023-10-05 NOTE — PROGRESS NOTES
ID NOTE    CC: f/u pulmonary abscess due to Strep    Subj: Fever resolved. Extubated. Feeling better. Culture with Strep    Medications:    Current Facility-Administered Medications:     acetaminophen (TYLENOL) tablet 650 mg, 650 mg, Oral, Q4H PRN, 650 mg at 10/05/23 0133 **OR** acetaminophen (TYLENOL) 160 MG/5ML oral solution 650 mg, 650 mg, Oral, Q4H PRN **OR** acetaminophen (TYLENOL) suppository 650 mg, 650 mg, Rectal, Q4H PRN, Juan Elena MD, 650 mg at 10/02/23 2341    sennosides-docusate (PERICOLACE) 8.6-50 MG per tablet 2 tablet, 2 tablet, Nasogastric, BID, 2 tablet at 10/04/23 0851 **AND** polyethylene glycol (MIRALAX) packet 17 g, 17 g, Oral, Daily PRN **AND** bisacodyl (DULCOLAX) EC tablet 5 mg, 5 mg, Oral, Daily PRN **AND** bisacodyl (DULCOLAX) suppository 10 mg, 10 mg, Rectal, Daily PRN, Brannon Gonzales MD    bumetanide (BUMEX) injection 4 mg, 4 mg, Intravenous, Q8H, Iain Owens MD    Calcium Replacement - Follow Nurse / BPA Driven Protocol, , Does not apply, PRN, Juan Elena MD    cefTRIAXone (ROCEPHIN) 2,000 mg in sodium chloride 0.9 % 100 mL IVPB-VTB, 2,000 mg, Intravenous, Q24H, Aakash Azul MD, Last Rate: 200 mL/hr at 10/04/23 1204, 2,000 mg at 10/04/23 1204    chlorhexidine (PERIDEX) 0.12 % solution 15 mL, 15 mL, Mouth/Throat, Q12H, Johnny Tuttle MD, 15 mL at 10/04/23 2007    dextrose (D50W) (25 g/50 mL) IV injection 25 g, 25 g, Intravenous, Q15 Min PRN, Juan Elena MD    dextrose (GLUTOSE) oral gel 15 g, 15 g, Oral, Q15 Min PRN, Juan Elena MD    fentaNYL citrate (PF) (SUBLIMAZE) injection 25 mcg, 25 mcg, Intravenous, Q30 Min PRN, Otto Castillo MD, 25 mcg at 10/04/23 0144    glucagon (GLUCAGEN) injection 1 mg, 1 mg, Intramuscular, Q15 Min PRN, Juan Elena MD    heparin (porcine) 5000 UNIT/ML injection 5,000 Units, 5,000 Units, Subcutaneous, Q8H, Brannon Gonzales MD, 5,000 Units at 10/05/23 0529     hydrALAZINE (APRESOLINE) injection 20 mg, 20 mg, Intravenous, Q4H PRN, Brannon Gonzales MD, 20 mg at 10/05/23 0108    hydrALAZINE (APRESOLINE) tablet 25 mg, 25 mg, Nasogastric, Q8H, Brannon Gonzales MD, 25 mg at 10/05/23 0529    influenza vac split quad (FLUZONE,FLUARIX,AFLURIA,FLULAVAL) injection 0.5 mL, 0.5 mL, Intramuscular, During Hospitalization, Juan Elena MD    insulin lispro (HUMALOG/ADMELOG) injection 2-7 Units, 2-7 Units, Subcutaneous, 4x Daily AC & at Bedtime, Juan Elena MD, 2 Units at 10/02/23 0907    ipratropium-albuterol (DUO-NEB) nebulizer solution 3 mL, 3 mL, Nebulization, Q6H PRN, Johnny Tuttle MD    labetalol (NORMODYNE) tablet 100 mg, 100 mg, Nasogastric, Q12H, Brannon Gonzales MD, 100 mg at 10/04/23 2005    Magnesium Standard Dose Replacement - Follow Nurse / BPA Driven Protocol, , Does not apply, PRN, Juan Elena MD    metroNIDAZOLE (FLAGYL) IVPB 500 mg, 500 mg, Intravenous, Q8H, Aakash Azul MD, Last Rate: 100 mL/hr at 10/05/23 0358, 500 mg at 10/05/23 0358    nitroglycerin (NITROSTAT) SL tablet 0.4 mg, 0.4 mg, Sublingual, Q5 Min PRN, Juan Elena MD    ondansetron (ZOFRAN) tablet 4 mg, 4 mg, Oral, Q6H PRN **OR** ondansetron (ZOFRAN) injection 4 mg, 4 mg, Intravenous, Q6H PRN, Juan Elena MD    pantoprazole (PROTONIX) injection 40 mg, 40 mg, Intravenous, Q24H, Johnny Tuttle MD, 40 mg at 10/04/23 0846    Phosphorus Replacement - Follow Nurse / BPA Driven Protocol, , Does not apply, PRUlices SALCEDO Stephen J, MD    Potassium Replacement - Follow Nurse / BPA Driven Protocol, , Does not apply, PRN, Juan Elena MD    propofol (DIPRIVAN) infusion 10 mg/mL 100 mL, 5-50 mcg/kg/min, Intravenous, Titrated, Johnny Tuttle MD, Last Rate: 33.6 mL/hr at 10/04/23 0841, 40 mcg/kg/min at 10/04/23 0841    sodium chloride 0.9 % flush 10 mL, 10 mL, Intravenous, Q12H, Juan Elena MD, 10 mL at 10/04/23 2007     "sodium chloride 0.9 % flush 10 mL, 10 mL, Intravenous, PRN, Juan Elena MD, 10 mL at 10/01/23 1723    sodium chloride 0.9 % infusion 40 mL, 40 mL, Intravenous, PRN, Juan Elena MD      Objective   Vital Signs   Temp:  [98.6 øF (37 øC)-98.7 øF (37.1 øC)] 98.6 øF (37 øC)  Heart Rate:  [72-99] 87  Resp:  [22] 22  BP: (112-170)/(66-88) 147/76  FiO2 (%):  [24 %-25 %] 25 %    Physical Exam:   General: intubated, awake, calm, follows commands  Eyes: no scleral icterus  ENT: ETT in place  Cardiovascular: NR  Respiratory: R rales; no wheezing; 30% FiO2; R chest drain w/ thick brown material in tubing  GI: Abdomen is soft, not tender  :  external urinary cathete  Skin: No rashes  Vasc: LIJ CVC w/o erythema    Labs:   CBC, BMP, and blood and body fluid cultures reviewed today  Lab Results   Component Value Date    WBC 12.91 (H) 10/05/2023    HGB 9.0 (L) 10/05/2023    HCT 27.9 (L) 10/05/2023    MCV 83.3 10/05/2023     10/05/2023     Lab Results   Component Value Date    GLUCOSE 103 (H) 10/05/2023    CALCIUM 9.2 10/05/2023     10/05/2023    K 4.9 10/05/2023    CO2 21.0 (L) 10/05/2023     10/05/2023    BUN 58 (H) 10/05/2023    CREATININE 5.94 (H) 10/05/2023    EGFR 8.0 (L) 10/05/2023    BCR 9.8 10/05/2023    ANIONGAP 20.0 (H) 10/05/2023     Lab Results   Component Value Date    CRP 11.66 (H) 10/05/2023     Lab Results   Component Value Date    HGBA1C 7.50 (H) 10/01/2023     Urine Eos negative  HIV negative  Hep C negative  Procal 0.59    Microbiology:  10/1 RPP: negative  10/1 BCx: NGTD  10/1 MRSA nares: negative  10/2 BAL Cx: normal annabel  10/3 ETT Cx: normal annabel  10/3 Lung Abscess Cx: [place] G Strep    Prior Radiology:  CT chest:   \"1. Large (12 cm) right upper lobe pulmonary abscess with extension into   the anterior right chest wall as detailed above.   2. Small right pleural effusion.   3. Mediastinal, right hilar and right axillary lymphadenopathy is likely   reactive.   4. No central " "pulmonary embolus. Contrast bolus timing limits evaluation   of the more distal pulmonary arteries.   5. Dilated main pulmonary artery (36 mm). \"    ASSESSMENT/PLAN:  Right upper lobe pulmonary abscess due to GPCs in chains  Acute hypercapneic and hypoxic respiratory failure requiring mechanical ventilation  Sepsis due to #1  Super obesity BMI 54  Uncontrolled diabetes type 2 A1c  7.5%  Hypertension    She is afebrile and WBC is trending down. On 10/3/23, she had an IR-guided CT chest tube placement. The culture is growing Group G Strep. The anaerobic culture is pending. Continue ceftriaxone 2 g IV q24h and Flagyl 500 mg IV q8h with duration TBD.     I'll follow-up thoracic surgery evaluation today. They are working to get out as much drainage from the tube as possible. No current surgical plans.     I'll check a BMP daily for monitoring. Thanks to nephrologist for his recommendations.     ID will follow.   "

## 2023-10-05 NOTE — PROGRESS NOTES
"Nutrition Services    Patient Name:  Nay Gonzalez  YOB: 1970  MRN: [id number]  Admit Date:  10/1/2023  Nutrition Follow Up    Assessment Date:  10/05/23    Encounter Information         Current Summary Pt now extubated, RN to do bedside swallow eval and work on advancing diet     Current Nutrition Orders & Evaluation of Intake       Oral Nutrition     Current PO Diet NPO Diet NPO Type: Strict NPO   Supplement n/a   PO Evaluation     PO Intake %     Factors Affecting Intake  No factors at this time   --  Anthropometrics          Height    Weight Height: 160 cm (62.99\")  Weight: (!) 140 kg (308 lb 6.8 oz) (10/05/23 0505)    BMI kg/m2 Body mass index is 54.65 kg/mý.  Obese, Class III (40 or higher)    Weight trend Stable     Labs        Pertinent Labs Reviewed, listed below     Results from last 7 days   Lab Units 10/05/23  0406 10/04/23  0414 10/03/23  0408   SODIUM mmol/L 142 140 142   POTASSIUM mmol/L 4.9 4.9 4.5   CHLORIDE mmol/L 101 99 102   CO2 mmol/L 21.0* 27.3 25.0   BUN mg/dL 58* 41* 31*   CREATININE mg/dL 5.94* 4.64* 2.73*   CALCIUM mg/dL 9.2 9.1 9.0   BILIRUBIN mg/dL 0.3 0.3 0.3   ALK PHOS U/L 118* 114 138*   ALT (SGPT) U/L 16 17 22   AST (SGOT) U/L 15 10 17   GLUCOSE mg/dL 103* 119* 87     Results from last 7 days   Lab Units 10/05/23  0406 10/04/23  0414 10/02/23  2026 10/02/23  0501   MAGNESIUM mg/dL 2.6 2.6  --  2.1   PHOSPHORUS mg/dL 9.0* 7.0*   < > 4.6*   HEMOGLOBIN g/dL 9.0* 7.8*   < > 10.7*   HEMATOCRIT % 27.9* 24.5*   < > 34.8   WBC 10*3/mm3 12.91* 12.10*   < > 24.25*   TRIGLYCERIDES mg/dL 338*  --   --   --    ALBUMIN g/dL 2.6* 2.6*   < > 3.1*    < > = values in this interval not displayed.     Results from last 7 days   Lab Units 10/05/23  0406 10/04/23  0414 10/03/23  0408 10/02/23  0501 10/01/23  1138   PLATELETS 10*3/mm3 329 285 319 353 395     COVID19   Date Value Ref Range Status   10/01/2023 Not Detected Not Detected - Ref. Range Final     Lab Results   Component Value " Date    HGBA1C 7.50 (H) 10/01/2023          Medications            Scheduled Medications bumetanide, 4 mg, Intravenous, Q8H  cefTRIAXone, 2,000 mg, Intravenous, Q24H  chlorhexidine, 15 mL, Mouth/Throat, Q12H  heparin (porcine), 5,000 Units, Subcutaneous, Q8H  hydrALAZINE, 25 mg, Nasogastric, Q8H  insulin lispro, 2-7 Units, Subcutaneous, 4x Daily AC & at Bedtime  labetalol, 100 mg, Nasogastric, Q12H  metroNIDAZOLE, 500 mg, Oral, Q8H  pantoprazole, 40 mg, Intravenous, Q24H  senna-docusate sodium, 2 tablet, Nasogastric, BID  sodium chloride, 10 mL, Intravenous, Q12H        Infusions propofol, 5-50 mcg/kg/min, Last Rate: 40 mcg/kg/min (10/04/23 0841)        PRN Medications   acetaminophen **OR** acetaminophen **OR** acetaminophen    senna-docusate sodium **AND** polyethylene glycol **AND** bisacodyl **AND** bisacodyl    Calcium Replacement - Follow Nurse / BPA Driven Protocol    dextrose    dextrose    fentaNYL citrate (PF)    glucagon (human recombinant)    hydrALAZINE    influenza vaccine    ipratropium-albuterol    Magnesium Standard Dose Replacement - Follow Nurse / BPA Driven Protocol    nitroglycerin    ondansetron **OR** ondansetron    Phosphorus Replacement - Follow Nurse / BPA Driven Protocol    Potassium Replacement - Follow Nurse / BPA Driven Protocol    sodium chloride    sodium chloride     Physical Findings          General Appearance obese   Oral/Mouth Cavity WNL   Edema  2+ (mild)   Gastrointestinal last bowel movement: 10/4   Skin  skin intact   Tubes/Drains/Lines chest tube   NFPE No clinical signs of muscle wasting or fat loss   --  NUTRITION INTERVENTION / PLAN OF CARE  Intervention Goal         Intervention Goal(s) Maintain nutrition status, Reduce/improve symptoms, Disease management/therapy, Initiate feeding/diet, Establish PO intake, and No significant weight loss     Nutrition Intervention         RD Action Await initiation/advancement of PO diet and Continue to monitor     Nutrition  Prescription         Diet Prescription     Supplement Prescription n/a   EN/PN Prescription    New Prescription Ordered?    --  Monitor/Evaluation        Monitor Per protocol   Discharge Needs Pending clinical course     RD to follow up per protocol.    Electronically signed by:  Maura Gann RD  10/05/23 10:05 EDT

## 2023-10-05 NOTE — NURSING NOTE
Patient a/ox4. Started on diet, swallows without any issues. Cortrak was discontinued during the shift. Central line was also discontinued along with enciso catheter. Nephrology asks that patient be bladder scanned every 6 hours after removal of enciso catheter. Catheter removal at 1708.   Patient worked w/PT. Per patient, she sat at edge of bed, stood up, and took a few steps. Patient complains of pain to right chest, near CT insertion site, along with some upper back pain, at times on same side.    Patient cleared to transfer to floor, on 5E.     Total chest tube output, 100cc for shift after receiving 60cc of medication instillation.

## 2023-10-05 NOTE — THERAPY TREATMENT NOTE
Patient Name: Nay Gonzalez  : 1970    MRN: [id number]                              Today's Date: 10/5/2023       Admit Date: 10/1/2023    Visit Dx:     ICD-10-CM ICD-9-CM   1. Pneumonia of right upper lobe due to infectious organism  J18.9 486   2. Hypoxia  R09.02 799.02   3. Hypertension, unspecified type  I10 401.9     Patient Active Problem List   Diagnosis    PNA (pneumonia)    Acute respiratory failure with hypoxia    Hypertensive urgency    Abscess of upper lobe of right lung with pneumonia    Sepsis    Type 2 diabetes mellitus with hyperglycemia    ELLY (acute kidney injury)    Obesity, morbid, BMI 50 or higher    Anemia     History reviewed. No pertinent past medical history.  Past Surgical History:   Procedure Laterality Date    TEETH EXTRACTION        General Information       Row Name 10/05/23 1603          Physical Therapy Time and Intention    Document Type therapy note (daily note) (P)   -     Mode of Treatment physical therapy (P)   -       Row Name 10/05/23 1603          General Information    Patient Profile Reviewed yes (P)   -     Existing Precautions/Restrictions fall (P)   Chest tube  -       Row Name 10/05/23 1603          Cognition    Orientation Status (Cognition) oriented x 4 (P)   -       Row Name 10/05/23 1603          Safety Issues, Functional Mobility    Impairments Affecting Function (Mobility) endurance/activity tolerance;strength;balance;pain;range of motion (ROM) (P)   -               User Key  (r) = Recorded By, (t) = Taken By, (c) = Cosigned By      Initials Name Provider Type     Echo Mathur PT Student PT Student                   Mobility       Row Name 10/05/23 1600          Bed Mobility    Bed Mobility scooting/bridging;supine-sit;sit-supine (P)   -     Scooting/Bridging [place] (Bed Mobility) minimum assist (75% patient effort);verbal cues (P)   -     Supine-Sit [place] (Bed Mobility) maximum assist (25% patient effort);2 person  assist (P)   -KH     Sit-Supine [place] (Bed Mobility) maximum assist (25% patient effort);2 person assist (P)   -     Assistive Device (Bed Mobility) head of bed elevated;bed rails;draw sheet (P)   -       Row Name 10/05/23 1604          Sit-Stand Transfer    Sit-Stand [place] (Transfers) contact guard (P)   -KH     Assistive Device (Sit-Stand Transfers) walker, front-wheeled (P)   -KH     Comment, (Sit-Stand Transfer) STS x 3 completed, pt fatigues quickly in standing. (P)   -KH       Row Name 10/05/23 1604          Gait/Stairs (Locomotion)    [place] Level (Gait) contact guard (P)   -     Distance in Feet (Gait) A few steps to R, pt fatigued and needed to sit back down. (P)   -KH     Deviations/Abnormal Patterns (Gait) weight shifting decreased;gait speed decreased;stride length decreased;base of support, wide (P)   -KH     Bilateral Gait Deviations forward flexed posture;heel strike decreased (P)   -KH     Comment, (Gait/Stairs) Unable to take more than a few side steps to R d/t fatigue and weakness. (P)   -               User Key  (r) = Recorded By, (t) = Taken By, (c) = Cosigned By      Initials Name Provider Type    Echo King, PT Student PT Student                   Obj/Interventions       Row Name 10/05/23 1606          Balance    Comment, Balance Pt steady sitting EOB with CGA, steady in standing with support of AD and CGA but fatigues quickly and leans onto walker with UEs. Pt unsteady with weight shifting in order to attempt small side steps. (P)   -               User Key  (r) = Recorded By, (t) = Taken By, (c) = Cosigned By      Initials Name Provider Type    Echo King, PT Student PT Student                   Goals/Plan    No documentation.                  Clinical Impression       Row Name 10/05/23 1607          Pain    Pre/Posttreatment Pain Comment Pt reports some pain in R shoulder, did not rate pain. (P)   -JESU     Pain Intervention(s)  Repositioned;Ambulation/increased activity (P)   -KH       Row Name 10/05/23 1607          Plan of Care Review    Plan of Care Reviewed With patient (P)   -JESU     Progress no change (P)   -KH     Outcome Evaluation Pt seen in CCU following transfer from other floor, intubated from 10/2-10/4 following worsening respiratory status. Pt pleasant and agreeable to PT despite fatigue. Bed mobility completed with [place] and verbal cues for scooting, maxA x 2 for supine<->sit. Pt able to complete 3 reps STS with CGA, fatigues very quickly and needed to rest UEs on walker and then sit back down. Pt attempted small side steps to R but fatigued very quickly. Pt placed back in bed, attempt to transfer to chair tomorrow. PT will progress mobility as tolerated by pt's fatigue and symptoms. (P)   -KH       Row Name 10/05/23 1607          Positioning and Restraints    Pre-Treatment Position in bed (P)   -KH     Post Treatment Position bed (P)   -KH     In Bed fowlers;call light within reach;encouraged to call for assist;with family/caregiver (P)   -KH               User Key  (r) = Recorded By, (t) = Taken By, (c) = Cosigned By      Initials Name Provider Type    Echo King, PT Student PT Student                   Outcome Measures       Row Name 10/05/23 1612          How much help from another person do you currently need...    Turning from your back to your side while in flat bed without using bedrails? 2 (P)   -KH     Moving from lying on back to sitting on the side of a flat bed without bedrails? 1 (P)   -KH     Moving to and from a bed to a chair (including a wheelchair)? 1 (P)   -KH     Standing up from a chair using your arms (e.g., wheelchair, bedside chair)? 3 (P)   -KH     Climbing 3-5 steps with a railing? 1 (P)   -KH     To walk in hospital room? 1 (P)   -KH     AM-PAC 6 Clicks Score (PT) 9 (P)   -KH     Highest level of mobility 3 --> Sat at edge of bed (P)   -KH               User Key  (r) = Recorded By, (t) = Taken  By, (c) = Cosigned By      Initials Name Provider Type     Echo Mathur, PT Student PT Student                                 Physical Therapy Education       Title: PT OT SLP Therapies (Done)       Topic: Physical Therapy (Done)       Point: Mobility training (Done)       Learning Progress Summary             Patient Acceptance, E,D, VU,DU by  at 10/5/2023 1612    Acceptance, E,D, VU,NR by MS at 10/2/2023 1110                         Point: Home exercise program (Done)       Learning Progress Summary             Patient Acceptance, E,D, VU,NR by MS at 10/2/2023 1110                         Point: Body mechanics (Done)       Learning Progress Summary             Patient Acceptance, E,D, VU,DU by  at 10/5/2023 1612    Acceptance, E,D, VU,NR by MS at 10/2/2023 1110                         Point: Precautions (Done)       Learning Progress Summary             Patient Acceptance, E,D, VU,DU by  at 10/5/2023 1612    Acceptance, E,D, VU,NR by MS at 10/2/2023 1110                                         User Key       Initials Effective Dates Name Provider Type Discipline    MS 06/16/21 -  Aniceto Gandara, PT Physical Therapist PT     08/10/23 -  Echo Mathur, PT Student PT Student PT                  PT Recommendation and Plan     Plan of Care Reviewed With: (P) patient  Progress: (P) no change  Outcome Evaluation: (P) Pt seen in CCU following transfer from other floor, intubated from 10/2-10/4 following worsening respiratory status. Pt pleasant and agreeable to PT despite fatigue. Bed mobility completed with [place] and verbal cues for scooting, maxA x 2 for supine<->sit. Pt able to complete 3 reps STS with CGA, fatigues very quickly and needed to rest UEs on walker and then sit back down. Pt attempted small side steps to R but fatigued very quickly. Pt placed back in bed, attempt to transfer to chair tomorrow. PT will progress mobility as tolerated by pt's fatigue and symptoms.     Time Calculation:          PT Charges       Row Name 10/05/23 1614             Time Calculation    Start Time 1536 (P)   -      Stop Time 1549 (P)   -      Time Calculation (min) 13 min (P)   -      PT Received On 10/05/23 (P)   -      PT - Next Appointment 10/06/23 (P)   -         Time Calculation- PT    Total Timed Code Minutes- PT 13 minute(s) (P)   -                User Key  (r) = Recorded By, (t) = Taken By, (c) = Cosigned By      Initials Name Provider Type    Echo King, PT Student PT Student                  Therapy Charges for Today       Code Description Service Date Service Provider Modifiers Qty    07896550929 HC PT THER PROC EA 15 MIN 10/5/2023 Echo Mathur, PT Student GP 1            PT G-Codes  Outcome Measure Options: AM-PAC 6 Clicks Basic Mobility (PT)  AM-PAC 6 Clicks Score (PT): (P) 9       Echo Mathur PT Student  10/5/2023

## 2023-10-06 ENCOUNTER — APPOINTMENT (OUTPATIENT)
Dept: GENERAL RADIOLOGY | Facility: HOSPITAL | Age: 53
DRG: 871 | End: 2023-10-06
Payer: COMMERCIAL

## 2023-10-06 LAB
ALBUMIN SERPL-MCNC: 2.9 G/DL (ref 3.5–5.2)
ALBUMIN/GLOB SERPL: 0.6 G/DL
ALP SERPL-CCNC: 97 U/L (ref 39–117)
ALT SERPL W P-5'-P-CCNC: 18 U/L (ref 1–33)
ANION GAP SERPL CALCULATED.3IONS-SCNC: 15.5 MMOL/L (ref 5–15)
AST SERPL-CCNC: 20 U/L (ref 1–32)
BACTERIA FLD CULT: ABNORMAL
BACTERIA SPEC AEROBE CULT: NORMAL
BACTERIA SPEC AEROBE CULT: NORMAL
BASOPHILS # BLD AUTO: 0.06 10*3/MM3 (ref 0–0.2)
BASOPHILS NFR BLD AUTO: 0.5 % (ref 0–1.5)
BILIRUB SERPL-MCNC: 0.2 MG/DL (ref 0–1.2)
BUN SERPL-MCNC: 66 MG/DL (ref 6–20)
BUN/CREAT SERPL: 10.6 (ref 7–25)
CALCIUM SPEC-SCNC: 9.3 MG/DL (ref 8.6–10.5)
CHLORIDE SERPL-SCNC: 98 MMOL/L (ref 98–107)
CO2 SERPL-SCNC: 22.5 MMOL/L (ref 22–29)
CREAT SERPL-MCNC: 6.22 MG/DL (ref 0.57–1)
DEPRECATED RDW RBC AUTO: 45.1 FL (ref 37–54)
EGFRCR SERPLBLD CKD-EPI 2021: 7.6 ML/MIN/1.73
EOSINOPHIL # BLD AUTO: 0.05 10*3/MM3 (ref 0–0.4)
EOSINOPHIL NFR BLD AUTO: 0.5 % (ref 0.3–6.2)
ERYTHROCYTE [DISTWIDTH] IN BLOOD BY AUTOMATED COUNT: 15.1 % (ref 12.3–15.4)
GLOBULIN UR ELPH-MCNC: 4.7 GM/DL
GLUCOSE BLDC GLUCOMTR-MCNC: 114 MG/DL (ref 70–130)
GLUCOSE BLDC GLUCOMTR-MCNC: 124 MG/DL (ref 70–130)
GLUCOSE BLDC GLUCOMTR-MCNC: 126 MG/DL (ref 70–130)
GLUCOSE BLDC GLUCOMTR-MCNC: 127 MG/DL (ref 70–130)
GLUCOSE SERPL-MCNC: 120 MG/DL (ref 65–99)
GRAM STN SPEC: ABNORMAL
GRAM STN SPEC: ABNORMAL
HCT VFR BLD AUTO: 30 % (ref 34–46.6)
HGB BLD-MCNC: 9.6 G/DL (ref 12–15.9)
IMM GRANULOCYTES # BLD AUTO: 0.14 10*3/MM3 (ref 0–0.05)
IMM GRANULOCYTES NFR BLD AUTO: 1.3 % (ref 0–0.5)
LYMPHOCYTES # BLD AUTO: 0.76 10*3/MM3 (ref 0.7–3.1)
LYMPHOCYTES NFR BLD AUTO: 7 % (ref 19.6–45.3)
MAGNESIUM SERPL-MCNC: 2.8 MG/DL (ref 1.6–2.6)
MCH RBC QN AUTO: 26.5 PG (ref 26.6–33)
MCHC RBC AUTO-ENTMCNC: 32 G/DL (ref 31.5–35.7)
MCV RBC AUTO: 82.9 FL (ref 79–97)
MONOCYTES # BLD AUTO: 0.57 10*3/MM3 (ref 0.1–0.9)
MONOCYTES NFR BLD AUTO: 5.2 % (ref 5–12)
NEUTROPHILS NFR BLD AUTO: 85.5 % (ref 42.7–76)
NEUTROPHILS NFR BLD AUTO: 9.35 10*3/MM3 (ref 1.7–7)
NRBC BLD AUTO-RTO: 0 /100 WBC (ref 0–0.2)
PHOSPHATE SERPL-MCNC: 12.2 MG/DL (ref 2.5–4.5)
PLATELET # BLD AUTO: 331 10*3/MM3 (ref 140–450)
PMV BLD AUTO: 10.9 FL (ref 6–12)
POTASSIUM SERPL-SCNC: 4.8 MMOL/L (ref 3.5–5.2)
PROT SERPL-MCNC: 7.6 G/DL (ref 6–8.5)
RBC # BLD AUTO: 3.62 10*6/MM3 (ref 3.77–5.28)
SODIUM SERPL-SCNC: 136 MMOL/L (ref 136–145)
URATE SERPL-MCNC: 11.9 MG/DL (ref 2.4–5.7)
WBC NRBC COR # BLD: 10.93 10*3/MM3 (ref 3.4–10.8)

## 2023-10-06 PROCEDURE — 82948 REAGENT STRIP/BLOOD GLUCOSE: CPT

## 2023-10-06 PROCEDURE — 94799 UNLISTED PULMONARY SVC/PX: CPT

## 2023-10-06 PROCEDURE — 25010000002 ALTEPLASE PER 1 MG: Performed by: NURSE PRACTITIONER

## 2023-10-06 PROCEDURE — 80053 COMPREHEN METABOLIC PANEL: CPT | Performed by: INTERNAL MEDICINE

## 2023-10-06 PROCEDURE — 32562 LYSE CHEST FIBRIN SUBQ DAY: CPT | Performed by: NURSE PRACTITIONER

## 2023-10-06 PROCEDURE — 25010000002 HEPARIN (PORCINE) PER 1000 UNITS: Performed by: INTERNAL MEDICINE

## 2023-10-06 PROCEDURE — 25010000002 CEFTRIAXONE PER 250 MG: Performed by: INTERNAL MEDICINE

## 2023-10-06 PROCEDURE — 99232 SBSQ HOSP IP/OBS MODERATE 35: CPT | Performed by: NURSE PRACTITIONER

## 2023-10-06 PROCEDURE — 84550 ASSAY OF BLOOD/URIC ACID: CPT | Performed by: INTERNAL MEDICINE

## 2023-10-06 PROCEDURE — 83735 ASSAY OF MAGNESIUM: CPT | Performed by: INTERNAL MEDICINE

## 2023-10-06 PROCEDURE — 84100 ASSAY OF PHOSPHORUS: CPT | Performed by: INTERNAL MEDICINE

## 2023-10-06 PROCEDURE — 71045 X-RAY EXAM CHEST 1 VIEW: CPT

## 2023-10-06 PROCEDURE — 3E0L3GC INTRODUCTION OF OTHER THERAPEUTIC SUBSTANCE INTO PLEURAL CAVITY, PERCUTANEOUS APPROACH: ICD-10-PCS | Performed by: NURSE PRACTITIONER

## 2023-10-06 PROCEDURE — 94660 CPAP INITIATION&MGMT: CPT

## 2023-10-06 PROCEDURE — 85025 COMPLETE CBC W/AUTO DIFF WBC: CPT | Performed by: INTERNAL MEDICINE

## 2023-10-06 PROCEDURE — 97530 THERAPEUTIC ACTIVITIES: CPT

## 2023-10-06 PROCEDURE — 99232 SBSQ HOSP IP/OBS MODERATE 35: CPT | Performed by: INTERNAL MEDICINE

## 2023-10-06 RX ORDER — SACCHAROMYCES BOULARDII 250 MG
500 CAPSULE ORAL 2 TIMES DAILY
Status: DISCONTINUED | OUTPATIENT
Start: 2023-10-06 | End: 2023-10-12 | Stop reason: HOSPADM

## 2023-10-06 RX ADMIN — ACETAMINOPHEN 650 MG: 325 TABLET, FILM COATED ORAL at 06:30

## 2023-10-06 RX ADMIN — BUMETANIDE 4 MG: 0.25 INJECTION INTRAMUSCULAR; INTRAVENOUS at 01:40

## 2023-10-06 RX ADMIN — METRONIDAZOLE 500 MG: 500 TABLET, FILM COATED ORAL at 22:23

## 2023-10-06 RX ADMIN — CEFTRIAXONE SODIUM 2000 MG: 2 INJECTION, POWDER, FOR SOLUTION INTRAMUSCULAR; INTRAVENOUS at 10:06

## 2023-10-06 RX ADMIN — HYDRALAZINE HYDROCHLORIDE 25 MG: 25 TABLET, FILM COATED ORAL at 21:02

## 2023-10-06 RX ADMIN — METRONIDAZOLE 500 MG: 500 TABLET, FILM COATED ORAL at 06:30

## 2023-10-06 RX ADMIN — ALTEPLASE 10 MG: KIT at 13:11

## 2023-10-06 RX ADMIN — HEPARIN SODIUM 5000 UNITS: 5000 INJECTION INTRAVENOUS; SUBCUTANEOUS at 21:02

## 2023-10-06 RX ADMIN — HYDRALAZINE HYDROCHLORIDE 25 MG: 25 TABLET, FILM COATED ORAL at 15:07

## 2023-10-06 RX ADMIN — HEPARIN SODIUM 5000 UNITS: 5000 INJECTION INTRAVENOUS; SUBCUTANEOUS at 15:07

## 2023-10-06 RX ADMIN — Medication 500 MG: at 12:34

## 2023-10-06 RX ADMIN — DORNASE ALFA 5 MG: 1 SOLUTION RESPIRATORY (INHALATION) at 13:11

## 2023-10-06 RX ADMIN — HEPARIN SODIUM 5000 UNITS: 5000 INJECTION INTRAVENOUS; SUBCUTANEOUS at 06:31

## 2023-10-06 RX ADMIN — ACETAMINOPHEN 650 MG: 325 TABLET, FILM COATED ORAL at 21:02

## 2023-10-06 RX ADMIN — Medication 500 MG: at 22:23

## 2023-10-06 RX ADMIN — LABETALOL HYDROCHLORIDE 100 MG: 100 TABLET, FILM COATED ORAL at 22:23

## 2023-10-06 RX ADMIN — ACETAMINOPHEN 650 MG: 325 TABLET, FILM COATED ORAL at 12:34

## 2023-10-06 RX ADMIN — METRONIDAZOLE 500 MG: 500 TABLET, FILM COATED ORAL at 15:07

## 2023-10-06 RX ADMIN — PANTOPRAZOLE SODIUM 40 MG: 40 TABLET, DELAYED RELEASE ORAL at 06:30

## 2023-10-06 RX ADMIN — HYDRALAZINE HYDROCHLORIDE 25 MG: 25 TABLET, FILM COATED ORAL at 06:30

## 2023-10-06 RX ADMIN — Medication 10 ML: at 21:03

## 2023-10-06 RX ADMIN — LABETALOL HYDROCHLORIDE 100 MG: 100 TABLET, FILM COATED ORAL at 10:06

## 2023-10-06 NOTE — PROGRESS NOTES
"    Chief Complaint: Pulmonary abscess, respiratory failure, follow-up    Subjective:  Symptoms:  Improved.  She reports shortness of breath.    Diet:  Adequate intake.    Activity level: Impaired due to weakness.    Pain:  She complains of pain that is mild.    Sitting up in bed.  Requiring minimal supplemental oxygen.      Vital Signs:  Temp:  [97.9 øF (36.6 øC)-98.6 øF (37 øC)] 98.2 øF (36.8 øC)  Heart Rate:  [80-93] 80  Resp:  [16-18] 16  BP: (145-167)/(71-87) 145/74    Intake & Output (last day)         10/05 0701  10/06 0700 10/06 0701  10/07 0700    P.O. 540 240    I.V. (mL/kg)      IV Piggyback      Total Intake(mL/kg) 540 (3.9) 240 (1.7)    Urine (mL/kg/hr) 750 (0.2)     Stool 1     Chest Tube 170     Total Output 921     Net -381 +240          Urine Unmeasured Occurrence 2 x     Stool Unmeasured Occurrence 4 x             Objective:  General Appearance:  Comfortable, ill-appearing, in no acute distress and not in pain.    Vital signs: (most recent): Blood pressure 145/74, pulse 80, temperature 98.2 øF (36.8 øC), temperature source Oral, resp. rate 16, height 160 cm (62.99\"), weight (!) 140 kg (308 lb 6.8 oz), last menstrual period 10/01/2023, SpO2 99 %.  Vital signs are normal.  No fever.    Output: Minimal urine output.    Lungs:  Normal effort and normal respiratory rate.  There are decreased breath sounds.  No rales, wheezes or rhonchi.    Heart: Normal rate.  Regular rhythm.    Chest: (Right chest tube site tender to palpation. No drainage around the incision.)  Abdomen: Abdomen is soft.  Bowel sounds are normal.     Extremities: Decreased range of motion.    Neurological: Patient is alert and oriented to person, place and time.    Skin:  Warm and dry.            Chest tube:   Site: Right, Clean, Dry, Intact, and Securement device intact  Suction: -20 cm  Air Leak: negative  24 Hour Total: 170ml      Results Review:     I reviewed the patient's new clinical results.  I reviewed the patient's new " imaging results and agree with the interpretation.  I reviewed the patient's other test results and agree with the interpretation  Discussed with patient, family at bedside, RN and Dr. Ibrahim.    Imaging Results (Last 24 Hours)       Procedure Component Value Units Date/Time    XR Chest 1 View [[id number]] Collected: 10/06/23 0720     Updated: 10/06/23 0725    Narrative:      XR CHEST 1 VW-10/6/2023     HISTORY: Evaluate for effusion.     Heart size is mildly enlarged. There is moderate ill-defined increased  density in the right hemithorax which is likely combination of pleural  fluid a portion of which appears loculated in the right upper hemithorax  as well as some atelectasis and infiltrate. Lungs are slightly  underinflated. No significant pneumothorax is seen.     Pigtail catheter terminates over the right upper to mid hemithorax.  There has been removal of a left central venous catheter since  yesterday's study.       Impression:      1. There has been apparent removal of the left central line since  yesterday.  2. Chest otherwise appears largely unchanged.  3. Please see additional dictation for the CT of the chest from  yesterday.     This report was finalized on 10/6/2023 7:22 AM by Dr. Jeramy Marquez M.D on Workstation: [id number]       CT Chest Without Contrast Diagnostic [[id number]] Collected: 10/05/23 1441     Updated: 10/05/23 1448    Narrative:      CT CHEST WITHOUT CONTRAST     HISTORY: Percutaneous drainage right upper lobe abscess.        TECHNIQUE: Radiation dose reduction techniques were utilized, including  automated exposure control and exposure modulation based on body size.   3 mm images were obtained through the chest without the administration  of IV contrast. Lack of IV contrast limits evaluation of mediastinal,  hilar, and vascular structures. Sensitivity for underlying lesions and  infection decreased. Artifact from motion/overlying soft  tissue/monitors.     COMPARISON: Chest x-ray  10/05/2023, 10/03/2023, 10/01/2023     FINDINGS:     Thoracic inlet: Subcutaneous soft tissue edema/stranding in the ventral  chest similar in appearance to the prior.     Heart and great vessels: The heart size is stable. No significant  pericardial effusion. Vascular evaluation limited without IV contrast.  Enlargement of the main pulmonary artery which can be seen with  pulmonary arterial  hypertension.     Lymphatics: Mediastinal adenopathy similar to the prior. Hilar  evaluation limited without contrast. Conglomerate of right paratracheal  adenopathy approximates 2.3 cm in short axis. Recommend attention on  follow-up after treatment.     Lung parenchyma and pleural space: Interval decrease in size of a  loculated collection in the anterior right upper lobe status post  percutaneous drainage. The residual collection approximates 2.9 x 9.5 cm  (previously 6.3 x 13.1 cm when remeasured in the same plane). Persistent  right lower lobe airspace disease favoring atelectasis. Improved  aeration of the right upper lobe with patchy reticulonodular opacities.  Increasing left lower lobe atelectasis.     Upper abdomen: Enteric tube in the stomach incompletely imaged.  Morphologic changes of chronic liver disease. Mild splenomegaly.  Visualized bowel loops are normal in caliber. Follow-up imaging of the  abdomen can be performed as clinically indicated.     Bone windows: Multilevel degenerative changes.          Impression:      Impression:     1.  Pigtail catheter within a smaller loculated fluid collection in the  anterior right hemithorax approximating 2.9 x 9.5 cm (previously 6.3 x  13.1 cm). Recommend correlation with drain output and continued  conservative surveillance.  2.  Improved aeration of the right upper lobe with patchy  reticulonodular and groundglass opacities likely infectious or  inflammatory. Increasing left lower lobe airspace disease.  3.  Please see above for additional findings/recommendations.      This report was finalized on 10/5/2023 2:45 PM by Dr. Chelsey Warner M.D on  Workstation: [id number]               Lab Results:     Lab Results (last 24 hours)       Procedure Component Value Units Date/Time    POC Glucose Once [[id number]]  (Normal) Collected: 10/06/23 1053    Specimen: Blood Updated: 10/06/23 1054     Glucose 127 mg/dL     Body Fluid Culture - Body Fluid, Pleural Cavity [[id number]]  (Abnormal)  (Susceptibility) Collected: 10/03/23 1400    Specimen: Body Fluid from Pleural Cavity Updated: 10/06/23 0855     Body Fluid Culture Scant growth (1+) Streptococcus dysgalactiae ssp equisimilis     Gram Stain Many (4+) WBCs seen      Few (2+) Gram positive cocci in chains    Susceptibility        Streptococcus dysgalactiae ssp equisimilis      ABELARDO      Ceftriaxone Susceptible      Clindamycin Susceptible      Levofloxacin Susceptible      Penicillin G Susceptible      Vancomycin Susceptible                           Anaerobic Culture - Body Fluid, Pleural Cavity [[id number]]  (Normal) Collected: 10/03/23 1400    Specimen: Body Fluid from Pleural Cavity Updated: 10/06/23 0704     Anaerobic Culture No anaerobes isolated at 3 days    Phosphorus [[id number]]  (Abnormal) Collected: 10/06/23 0519    Specimen: Blood Updated: 10/06/23 0612     Phosphorus 12.2 mg/dL     POC Glucose Once [[id number]]  (Normal) Collected: 10/06/23 0609    Specimen: Blood Updated: 10/06/23 0610     Glucose 124 mg/dL     Magnesium [[id number]]  (Abnormal) Collected: 10/06/23 0519    Specimen: Blood Updated: 10/06/23 0605     Magnesium 2.8 mg/dL     Comprehensive Metabolic Panel [[id number]]  (Abnormal) Collected: 10/06/23 0519    Specimen: Blood Updated: 10/06/23 0605     Glucose 120 mg/dL      BUN 66 mg/dL      Creatinine 6.22 mg/dL      Sodium 136 mmol/L      Potassium 4.8 mmol/L      Chloride 98 mmol/L      CO2 22.5 mmol/L      Calcium 9.3 mg/dL      Total Protein 7.6 g/dL      Albumin 2.9 g/dL      ALT (SGPT) 18 U/L      AST (SGOT) 20 U/L       Alkaline Phosphatase 97 U/L      Total Bilirubin 0.2 mg/dL      Globulin 4.7 gm/dL      A/G Ratio 0.6 g/dL      BUN/Creatinine Ratio 10.6     Anion Gap 15.5 mmol/L      eGFR 7.6 mL/min/1.73      Comment: <15 Indicative of kidney failure       Narrative:      GFR Normal >60  Chronic Kidney Disease <60  Kidney Failure <15      Uric Acid [[id number]]  (Abnormal) Collected: 10/06/23 0519    Specimen: Blood Updated: 10/06/23 0605     Uric Acid 11.9 mg/dL     CBC & Differential [[id number]]  (Abnormal) Collected: 10/06/23 0519    Specimen: Blood Updated: 10/06/23 0546    Narrative:      The following orders were created for panel order CBC & Differential.  Procedure                               Abnormality         Status                     ---------                               -----------         ------                     CBC Auto Differential[[id number]]        Abnormal            Final result                 Please view results for these tests on the individual orders.    CBC Auto Differential [[id number]]  (Abnormal) Collected: 10/06/23 0519    Specimen: Blood Updated: 10/06/23 0546     WBC 10.93 10*3/mm3      RBC 3.62 10*6/mm3      Hemoglobin 9.6 g/dL      Hematocrit 30.0 %      MCV 82.9 fL      MCH 26.5 pg      MCHC 32.0 g/dL      RDW 15.1 %      RDW-SD 45.1 fl      MPV 10.9 fL      Platelets 331 10*3/mm3      Neutrophil % 85.5 %      Lymphocyte % 7.0 %      Monocyte % 5.2 %      Eosinophil % 0.5 %      Basophil % 0.5 %      Immature Grans % 1.3 %      Neutrophils, Absolute 9.35 10*3/mm3      Lymphocytes, Absolute 0.76 10*3/mm3      Monocytes, Absolute 0.57 10*3/mm3      Eosinophils, Absolute 0.05 10*3/mm3      Basophils, Absolute 0.06 10*3/mm3      Immature Grans, Absolute 0.14 10*3/mm3      nRBC 0.0 /100 WBC     POC Glucose Once [[id number]]  (Normal) Collected: 10/05/23 2035    Specimen: Blood Updated: 10/05/23 2038     Glucose 116 mg/dL     POC Glucose Once [[id number]]  (Normal) Collected: 10/05/23 1726     Specimen: Blood Updated: 10/05/23 1737     Glucose 114 mg/dL     Blood Culture - Blood, Hand, Left [[id number]]  (Normal) Collected: 10/01/23 1339    Specimen: Blood from Hand, Left Updated: 10/05/23 1345     Blood Culture No growth at 4 days    Blood Culture - Blood, Arm, Left [[id number]]  (Normal) Collected: 10/01/23 1231    Specimen: Blood from Arm, Left Updated: 10/05/23 1245     Blood Culture No growth at 4 days    Narrative:      Less than seven (7) mL's of blood was collected.  Insufficient quantity may yield false negative results.    Non-gynecologic Cytology [[id number]] Collected: 10/03/23 1400    Specimen: Body Fluid from Pleural Cavity Updated: 10/05/23 1211     Case Report --     Non-gynecologic Cytology                          Case: [id number]                                  Authorizing Provider:  Dayna Harvey DNP,     Collected:           10/03/2023 02:00 PM                                 APRN                                                                         Ordering Location:     [facility]  Received:            10/04/2023 09:49 AM                                 [facility] CARE                                                                Pathologist:           Sunita Wade MD                                                    Specimen:    Pleural Cavity, Right Chest Tube                                                            Final Diagnosis --     Pleural Fluid, Right Chest Tube:   A. Negative for malignant cells.   B. Abundant acute inflammation present.    swm        Gross Description --     1. Pleural Cavity.  Right: 1 syringe received containing 14 ml of turbid, pink fluid. ThinPrep(1), cell block. 10 ml of remaining fluid. Cellblock in formalin @ 10:05 on 10/4/23.         Microscopic Description --     Screened by S. Sacksteder      POC Glucose Once [[id number]]  (Normal) Collected: 10/05/23 1133    Specimen: Blood Updated: 10/05/23 1135     Glucose 122  mg/dL              Assessment & Plan       PNA (pneumonia)    Acute respiratory failure with hypoxia    Hypertensive urgency    Abscess of upper lobe of right lung with pneumonia    Sepsis    Type 2 diabetes mellitus with hyperglycemia    ELLY (acute kidney injury)    Obesity, morbid, BMI 50 or higher    Anemia       Assessment & Plan    Continues to slowly improve from a pneumonia standpoint.  She has been weaned to 1 L supplemental oxygen.  This morning's chest x-ray demonstrates some persistent opacity in the right upper chest.  We will go ahead and do 1 more dose of lytic therapy today.  Chest tube to -20 cm suction after 2 hours of being clamped post lytics.  WBC is trending down.    FERDINAND Zuniga  [facility]  10/06/23  11:13 EDT    Greater than 35 minutes was spent reviewing the patient's chart, radiographic imaging, labs, provider notes, assessing the patient and developing a plan of care.  This was discussed with the patient and RN.

## 2023-10-06 NOTE — PROGRESS NOTES
ID NOTE    CC: f/u pulmonary abscess due to Strep    Subj: Fever resolved. Out of the ICU. On 1L NC. Feeling better. Tolerating ceftriaxone. Crt still elevated. Nephrology following. I reviewed their note.     Medications:    Current Facility-Administered Medications:     acetaminophen (TYLENOL) tablet 650 mg, 650 mg, Oral, Q4H PRN, 650 mg at 10/06/23 0630 **OR** acetaminophen (TYLENOL) 160 MG/5ML oral solution 650 mg, 650 mg, Oral, Q4H PRN **OR** acetaminophen (TYLENOL) suppository 650 mg, 650 mg, Rectal, Q4H PRN, Brannon Gonzales MD, 650 mg at 10/02/23 2341    alteplase (ACTIVASE) 10 mg in sodium chloride 0.9 % 30 mL Syringe, 10 mg, Intrapleural, Once **AND** dornase alpha (PULMOZYME) 5 mg in sterile water (preservative free) 30 mL intrapleural syringe, 5 mg, Intrapleural, Once, Quynh Choe APRN    sennosides-docusate (PERICOLACE) 8.6-50 MG per tablet 2 tablet, 2 tablet, Oral, BID **AND** polyethylene glycol (MIRALAX) packet 17 g, 17 g, Oral, Daily PRN **AND** bisacodyl (DULCOLAX) EC tablet 5 mg, 5 mg, Oral, Daily PRN **AND** bisacodyl (DULCOLAX) suppository 10 mg, 10 mg, Rectal, Daily PRN, Brannon Gonzales MD    Calcium Replacement - Follow Nurse / BPA Driven Protocol, , Does not apply, PRN, Brannon Gonzales MD    cefTRIAXone (ROCEPHIN) 2,000 mg in sodium chloride 0.9 % 100 mL IVPB-VTB, 2,000 mg, Intravenous, Q24H, Brannon Gonzales MD, Last Rate: 200 mL/hr at 10/06/23 1006, 2,000 mg at 10/06/23 1006    dextrose (D50W) (25 g/50 mL) IV injection 25 g, 25 g, Intravenous, Q15 Min PRN, Brannon Gonzales MD    dextrose (GLUTOSE) oral gel 15 g, 15 g, Oral, Q15 Min PRN, Brannon Gonzales MD    fentaNYL citrate (PF) (SUBLIMAZE) injection 25 mcg, 25 mcg, Intravenous, Q30 Min PRN, Brannon Gonzales MD, 25 mcg at 10/05/23 1634    glucagon (GLUCAGEN) injection 1 mg, 1 mg, Intramuscular, Q15 Min PRN, Brannon Gonzales MD    heparin (porcine) 5000 UNIT/ML injection 5,000 Units, 5,000 Units,  Subcutaneous, Q8H, Brannon Gonzales MD, 5,000 Units at 10/06/23 0631    hydrALAZINE (APRESOLINE) injection 20 mg, 20 mg, Intravenous, Q4H PRN, Brannon Gonzales MD, 20 mg at 10/05/23 0108    hydrALAZINE (APRESOLINE) tablet 25 mg, 25 mg, Oral, Q8H, Brannon Gonzales MD, 25 mg at 10/06/23 0630    influenza vac split quad (FLUZONE,FLUARIX,AFLURIA,FLULAVAL) injection 0.5 mL, 0.5 mL, Intramuscular, During Hospitalization, Brannon Gonzales MD    insulin lispro (HUMALOG/ADMELOG) injection 2-7 Units, 2-7 Units, Subcutaneous, 4x Daily AC & at Bedtime, Brannon Gonzales MD, 2 Units at 10/02/23 0907    ipratropium-albuterol (DUO-NEB) nebulizer solution 3 mL, 3 mL, Nebulization, Q6H PRN, Brannon Gonzales MD    labetalol (NORMODYNE) tablet 100 mg, 100 mg, Oral, Q12H, Brannon Gonzales MD, 100 mg at 10/06/23 1006    Magnesium Standard Dose Replacement - Follow Nurse / BPA Driven Protocol, , Does not apply, PRN, Brannon Gonzales MD    metroNIDAZOLE (FLAGYL) tablet 500 mg, 500 mg, Oral, Q8H, Brannon Gonzales MD, 500 mg at 10/06/23 0630    nitroglycerin (NITROSTAT) SL tablet 0.4 mg, 0.4 mg, Sublingual, Q5 Min PRN, Brannon Gonzales MD    ondansetron (ZOFRAN) tablet 4 mg, 4 mg, Oral, Q6H PRN **OR** ondansetron (ZOFRAN) injection 4 mg, 4 mg, Intravenous, Q6H PRN, Brannon Gonzales MD    pantoprazole (PROTONIX) EC tablet 40 mg, 40 mg, Oral, Q AM, Johnny Tuttle MD, 40 mg at 10/06/23 0630    Phosphorus Replacement - Follow Nurse / BPA Driven Protocol, , Does not apply, PRN, Christian Brannon Marcos, MD    Potassium Replacement - Follow Nurse / BPA Driven Protocol, , Does not apply, Christian PALOMARES Mark Edwin, MD    sodium chloride 0.9 % flush 10 mL, 10 mL, Intravenous, Q12H, Brannon Gonzales MD, 10 mL at 10/05/23 2029    sodium chloride 0.9 % flush 10 mL, 10 mL, Intravenous, PRN, Brannon Gonzales MD, 10 mL at 10/01/23 1723    sodium chloride 0.9 % infusion 40 mL, 40 mL, Intravenous, PRN,  "Brannon Gonzales MD    sodium chloride nasal spray 1 spray, 1 spray, Each Nare, PRN, Quynh Choe, APRN      Objective   Vital Signs   Temp:  [97.9 øF (36.6 øC)-98.6 øF (37 øC)] 98.1 øF (36.7 øC)  Heart Rate:  [82-93] 84  Resp:  [16-18] 18  BP: (149-167)/(71-87) 150/81    Physical Exam:   General: awake, alert, very nice, in chair  Eyes: no scleral icterus  ENT: ETT in place  Cardiovascular: NR  Respiratory: R rales are better; R chest drain w/ thick brown material in tubing  GI: Abdomen is soft, not tender  Skin: No rashes  Vasc: LIJ CVC removed    Labs:   CBC, BMP, CRP, and blood and body fluid cultures reviewed today  Lab Results   Component Value Date    WBC 10.93 (H) 10/06/2023    HGB 9.6 (L) 10/06/2023    HCT 30.0 (L) 10/06/2023    MCV 82.9 10/06/2023     10/06/2023     Lab Results   Component Value Date    GLUCOSE 120 (H) 10/06/2023    CALCIUM 9.3 10/06/2023     10/06/2023    K 4.8 10/06/2023    CO2 22.5 10/06/2023    CL 98 10/06/2023    BUN 66 (H) 10/06/2023    CREATININE 6.22 (H) 10/06/2023    EGFR 7.6 (L) 10/06/2023    BCR 10.6 10/06/2023    ANIONGAP 15.5 (H) 10/06/2023     Lab Results   Component Value Date    CRP 11.66 (H) 10/05/2023     Lab Results   Component Value Date    HGBA1C 7.50 (H) 10/01/2023     Urine Eos negative  HIV negative  Hep C negative  Procal 0.59    Microbiology:  10/1 RPP: negative  10/1 BCx: negative  10/1 MRSA nares: negative  10/2 BAL Cx: normal annabel  10/3 ETT Cx: normal annabel  10/3 Lung Abscess Cx: [place] G Strep    Prior Radiology:  CT chest:   \"1. Large (12 cm) right upper lobe pulmonary abscess with extension into   the anterior right chest wall as detailed above.   2. Small right pleural effusion.   3. Mediastinal, right hilar and right axillary lymphadenopathy is likely   reactive.   4. No central pulmonary embolus. Contrast bolus timing limits evaluation   of the more distal pulmonary arteries.   5. Dilated main pulmonary artery (36 mm). " "\"    ASSESSMENT/PLAN:  Right upper lobe pulmonary abscess due to GPCs in chains  Acute hypercapneic and hypoxic respiratory failure requiring mechanical ventilation  Sepsis due to #1  Super obesity BMI 54  Uncontrolled diabetes type 2 A1c  7.5%  Hypertension    She is afebrile and WBC is trending down. She is out of the ICU and only on 1L NC.     On 10/3/23, she had an IR-guided CT chest tube placement. The culture is growing Group G Strep. The anaerobic culture is pending. Continue ceftriaxone 2 g IV q24h and Flagyl 500 mg IV q8h for now. I do anticipate switching her to an oral antibiotic at discharge with plans to complete a 6-week course w/ stop date ~11/14/23.       I'll follow-up thoracic surgery evaluation today. They are working to get out as much drainage from the tube as possible. No current surgical plans.     I'll check a BMP daily for monitoring. Thanks to nephrologist for his recommendations.     I will see her again on 10/10/23 so please call me or ID MD on call at [phone] if any questions or concerns in the interim.   "

## 2023-10-06 NOTE — PROGRESS NOTES
" LOS: 5 days     Name: Nay Gonzalez  Age: 53 y.o.  Sex: female  :  1970  MRN: [id number]         Primary Care Physician: Provider, No Known    Subjective   Subjective  States \"I continue to feel better every day\".  Denies any shortness of breath or fever.    Objective   Vital Signs  Temp:  [97.9 øF (36.6 øC)-98.6 øF (37 øC)] 98.2 øF (36.8 øC)  Heart Rate:  [80-93] 80  Resp:  [16-18] 16  BP: (145-167)/(71-87) 145/74  Body mass index is 54.65 kg/mý.    Objective:  General Appearance:  Comfortable and in no acute distress.    Vital signs: (most recent): Blood pressure 145/74, pulse 80, temperature 98.2 øF (36.8 øC), temperature source Oral, resp. rate 16, height 160 cm (62.99\"), weight (!) 140 kg (308 lb 6.8 oz), last menstrual period 10/01/2023, SpO2 99 %.    Lungs:  Normal effort and normal respiratory rate.  She is not in respiratory distress.  There are decreased breath sounds.    Heart: Normal rate.  Regular rhythm.    Chest: (Right anterior chest tube in place)  Abdomen: Abdomen is soft.  Bowel sounds are normal.   There is no abdominal tenderness.     Extremities: There is no dependent edema or local swelling.    Neurological: Patient is alert and oriented to person, place and time.    Skin:  Warm and dry.              Results Review:       I reviewed the patient's new clinical results.    Results from last 7 days   Lab Units 10/06/23  0519 10/05/23  0406 10/04/23  0414 10/03/23  1916 10/03/23  0408 10/02/23  0501 10/01/23  1138   WBC 10*3/mm3 10.93* 12.91* 12.10*  --  17.47* 24.25* 22.45*   HEMOGLOBIN g/dL 9.6* 9.0* 7.8* 8.0* 8.0* 10.7* 10.4*   PLATELETS 10*3/mm3 331 329 285  --  319 353 395     Results from last 7 days   Lab Units 10/06/23  0519 10/05/23  0406 10/04/23  0414 10/03/23  0408 10/02/23  2026 10/02/23  0501 10/01/23  1138   SODIUM mmol/L 136 142 140 142 142 138 141   POTASSIUM mmol/L 4.8 4.9 4.9 4.5 5.2 5.4* 4.2   CHLORIDE mmol/L 98 101 99 102 102 100 101   CO2 mmol/L 22.5 21.0* 27.3 " 25.0 29.7* 23.9 30.0*   BUN mg/dL 66* 58* 41* 31* 27* 16 14   CREATININE mg/dL 6.22* 5.94* 4.64* 2.73* 1.95* 1.08* 0.95   CALCIUM mg/dL 9.3 9.2 9.1 9.0 9.3 9.6 9.6   GLUCOSE mg/dL 120* 103* 119* 87 123* 108* 168*                 Scheduled Meds:   alteplase (ACTIVASE) 10 mg in 0.9% NaCl 30 mL, 10 mg, Intrapleural, Once   And  dornase alpha (PULMOZYME) 5 mg in SWFI intrapleural syringe, 5 mg, Intrapleural, Once  cefTRIAXone, 2,000 mg, Intravenous, Q24H  heparin (porcine), 5,000 Units, Subcutaneous, Q8H  hydrALAZINE, 25 mg, Oral, Q8H  insulin lispro, 2-7 Units, Subcutaneous, 4x Daily AC & at Bedtime  labetalol, 100 mg, Oral, Q12H  metroNIDAZOLE, 500 mg, Oral, Q8H  pantoprazole, 40 mg, Oral, Q AM  senna-docusate sodium, 2 tablet, Oral, BID  sodium chloride, 10 mL, Intravenous, Q12H      PRN Meds:     acetaminophen **OR** acetaminophen **OR** acetaminophen    senna-docusate sodium **AND** polyethylene glycol **AND** bisacodyl **AND** bisacodyl    Calcium Replacement - Follow Nurse / BPA Driven Protocol    dextrose    dextrose    fentaNYL citrate (PF)    glucagon (human recombinant)    hydrALAZINE    influenza vaccine    ipratropium-albuterol    Magnesium Standard Dose Replacement - Follow Nurse / BPA Driven Protocol    nitroglycerin    ondansetron **OR** ondansetron    Phosphorus Replacement - Follow Nurse / BPA Driven Protocol    Potassium Replacement - Follow Nurse / BPA Driven Protocol    sodium chloride    sodium chloride    sodium chloride  Continuous Infusions:       Assessment & Plan   Active Hospital Problems    Diagnosis  POA    **PNA (pneumonia) [J18.9]  Yes    ELLY (acute kidney injury) [N17.9]  Unknown    Obesity, morbid, BMI 50 or higher [E66.01]  Unknown    Anemia [D64.9]  Unknown    Sepsis [A41.9]  Unknown    Type 2 diabetes mellitus with hyperglycemia [E11.65]  Unknown    Acute respiratory failure with hypoxia [J96.01]  Unknown    Hypertensive urgency [I16.0]  Unknown    Abscess of upper lobe of right lung  with pneumonia [J85.1]  Yes      Resolved Hospital Problems    Diagnosis Date Resolved POA    Hyperkalemia [E87.5] 10/03/2023 Unknown       Assessment & Plan    This is a 53-year-old female with a history of morbid obesity, new diabetes of type 2 diabetes and hypertension who presents to the hospital with cough shortness of breath and fevers and is found to have a right upper lobe pulmonary abscess   -She has transferred back out of the ICU after being liberated from the ventilator  -Chest tube remains in place with management as per thoracic surgery  -Antibiotics ongoing in the form of Rocephin as per ID  -Still with acute kidney injury.  Nephrology following closely.  No indication for dialysis right now  -aprreciate pulm assistance  -Full code  -Heparin for DVT prophylaxis      Expected Discharge Date: 10/6/2023; Expected Discharge Time:      Santo Monte MD  [facility]  10/06/23  11:12 EDT

## 2023-10-06 NOTE — PLAN OF CARE
Problem: Adult Inpatient Plan of Care  Goal: Plan of Care Review  Recent Flowsheet Documentation  Taken 10/6/2023 1754 by Tracee Becker RN  Progress: improving  Plan of Care Reviewed With: patient  Goal: Patient-Specific Goal (Individualized)  Outcome: Ongoing, Progressing  Goal: Absence of Hospital-Acquired Illness or Injury  Outcome: Ongoing, Not Progressing  Intervention: Identify and Manage Fall Risk  Recent Flowsheet Documentation  Taken 10/6/2023 1600 by Tracee Becker RN  Safety Promotion/Fall Prevention:   activity supervised   assistive device/personal items within reach   clutter free environment maintained   fall prevention program maintained   nonskid shoes/slippers when out of bed   safety round/check completed  Taken 10/6/2023 1200 by Tracee Becker RN  Safety Promotion/Fall Prevention:   activity supervised   clutter free environment maintained   assistive device/personal items within reach   fall prevention program maintained   nonskid shoes/slippers when out of bed  Taken 10/6/2023 1000 by Tracee Becker RN  Safety Promotion/Fall Prevention:   assistive device/personal items within reach   activity supervised   clutter free environment maintained   fall prevention program maintained   nonskid shoes/slippers when out of bed  Taken 10/6/2023 0800 by Tracee Becker RN  Safety Promotion/Fall Prevention:   activity supervised   assistive device/personal items within reach   clutter free environment maintained   fall prevention program maintained   nonskid shoes/slippers when out of bed   safety round/check completed  Intervention: Prevent Skin Injury  Recent Flowsheet Documentation  Taken 10/6/2023 1400 by Tracee Becker RN  Skin Protection:   adhesive use limited   tubing/devices free from skin contact  Taken 10/6/2023 0800 by Tracee Becker RN  Skin Protection:   adhesive use limited   tubing/devices free from skin contact  Intervention: Prevent and Manage VTE (Venous Thromboembolism)  Risk  Recent Flowsheet Documentation  Taken 10/6/2023 1600 by Tracee Becker RN  Activity Management: activity encouraged  Taken 10/6/2023 1200 by Tracee Becker RN  Activity Management: activity encouraged  Taken 10/6/2023 1000 by Tracee Becker RN  Activity Management: activity encouraged  Taken 10/6/2023 0800 by Tracee Becker RN  Activity Management: activity encouraged  VTE Prevention/Management: (heperin subq) --  Intervention: Prevent Infection  Recent Flowsheet Documentation  Taken 10/6/2023 1600 by Tracee Becker RN  Infection Prevention: single patient room provided  Taken 10/6/2023 1400 by Tracee Becker RN  Infection Prevention: single patient room provided  Taken 10/6/2023 1000 by Tracee Becker RN  Infection Prevention: single patient room provided  Taken 10/6/2023 0800 by Tracee Becker RN  Infection Prevention: single patient room provided  Goal: Optimal Comfort and Wellbeing  Outcome: Ongoing, Progressing  Intervention: Monitor Pain and Promote Comfort  Recent Flowsheet Documentation  Taken 10/6/2023 1400 by Tracee Becker RN  Pain Management Interventions: see MAR  Taken 10/6/2023 0800 by Tracee Becker RN  Pain Management Interventions: see MAR  Intervention: Provide Person-Centered Care  Recent Flowsheet Documentation  Taken 10/6/2023 1400 by Tracee Becker RN  Trust Relationship/Rapport:   care explained   choices provided  Taken 10/6/2023 0800 by Tracee Becker RN  Trust Relationship/Rapport:   care explained   choices provided  Goal: Readiness for Transition of Care  Outcome: Ongoing, Progressing  Goal: Plan of Care Review  Outcome: Ongoing, Not Progressing  Flowsheets (Taken 10/6/2023 1754)  Progress: improving  Plan of Care Reviewed With: patient  Goal: Patient-Specific Goal (Individualized)  Outcome: Ongoing, Progressing  Goal: Absence of Hospital-Acquired Illness or Injury  Outcome: Ongoing, Progressing  Intervention: Identify and Manage Fall Risk  Recent Flowsheet  Documentation  Taken 10/6/2023 1600 by Tracee Becker RN  Safety Promotion/Fall Prevention:   activity supervised   assistive device/personal items within reach   clutter free environment maintained   fall prevention program maintained   nonskid shoes/slippers when out of bed   safety round/check completed  Taken 10/6/2023 1200 by Tracee Becker RN  Safety Promotion/Fall Prevention:   activity supervised   clutter free environment maintained   assistive device/personal items within reach   fall prevention program maintained   nonskid shoes/slippers when out of bed  Taken 10/6/2023 1000 by Tracee Becker RN  Safety Promotion/Fall Prevention:   assistive device/personal items within reach   activity supervised   clutter free environment maintained   fall prevention program maintained   nonskid shoes/slippers when out of bed  Taken 10/6/2023 0800 by Tracee Becker RN  Safety Promotion/Fall Prevention:   activity supervised   assistive device/personal items within reach   clutter free environment maintained   fall prevention program maintained   nonskid shoes/slippers when out of bed   safety round/check completed  Intervention: Prevent Skin Injury  Recent Flowsheet Documentation  Taken 10/6/2023 1400 by Tracee Becker RN  Skin Protection:   adhesive use limited   tubing/devices free from skin contact  Taken 10/6/2023 0800 by Tracee Becker RN  Skin Protection:   adhesive use limited   tubing/devices free from skin contact  Intervention: Prevent and Manage VTE (Venous Thromboembolism) Risk  Recent Flowsheet Documentation  Taken 10/6/2023 1600 by Tracee Becker RN  Activity Management: activity encouraged  Taken 10/6/2023 1200 by Tracee Becker RN  Activity Management: activity encouraged  Taken 10/6/2023 1000 by Tracee Becker RN  Activity Management: activity encouraged  Taken 10/6/2023 0800 by Tracee Becker RN  Activity Management: activity encouraged  VTE Prevention/Management: (heperin subq)  --  Intervention: Prevent Infection  Recent Flowsheet Documentation  Taken 10/6/2023 1600 by Tracee Becker RN  Infection Prevention: single patient room provided  Taken 10/6/2023 1400 by Tracee Becker RN  Infection Prevention: single patient room provided  Taken 10/6/2023 1000 by Tracee Becker RN  Infection Prevention: single patient room provided  Taken 10/6/2023 0800 by Tracee Becker RN  Infection Prevention: single patient room provided  Goal: Optimal Comfort and Wellbeing  Outcome: Ongoing, Progressing  Intervention: Monitor Pain and Promote Comfort  Recent Flowsheet Documentation  Taken 10/6/2023 1400 by Tracee Becker RN  Pain Management Interventions: see MAR  Taken 10/6/2023 0800 by Tracee Becker RN  Pain Management Interventions: see MAR  Intervention: Provide Person-Centered Care  Recent Flowsheet Documentation  Taken 10/6/2023 1400 by Tracee Becker RN  Trust Relationship/Rapport:   care explained   choices provided  Taken 10/6/2023 0800 by Tracee Becker RN  Trust Relationship/Rapport:   care explained   choices provided  Goal: Readiness for Transition of Care  Outcome: Ongoing, Progressing   Goal Outcome Evaluation:  Plan of Care Reviewed With: patient        Progress: improving

## 2023-10-06 NOTE — PROGRESS NOTES
[facility] Progress Note      Patient Name: Nay Gonzalez  : 1970  MRN: [id number]  Primary Care Physician:  Provider, No Known  Date of admission: 10/1/2023    Subjective     Interval History:   Follow-up acute kidney injury    The patient was transferred out of the ICU Sauer catheter was removed she is voiding without any difficulties, urine output in the past 24 hours was 750 cc.  Denies dysuria or gross hematuria, no nausea or vomiting, no abdominal pain, no lightheadedness.    Review of Systems:   As noted above    Objective     Vitals:   Temp:  [97.9 øF (36.6 øC)-98.6 øF (37 øC)] 98.1 øF (36.7 øC)  Heart Rate:  [82-93] 84  Resp:  [16-18] 18  BP: (149-167)/(71-87) 150/81  Flow (L/min):  [1-2] 1    Intake/Output Summary (Last 24 hours) at 10/6/2023 1021  Last data filed at 10/6/2023 0630  Gross per 24 hour   Intake 540 ml   Output 920 ml   Net -380 ml         Physical Exam:    General Appearance: Morbidly obese, awake and alert, chronically ill, no acute distress  Skin: warm and dry  HEENT: She has core track in place  Neck: Difficult to assess due to body habitus  Lungs: Bilateral rhonchi, breathing effort not she had right-sided chest tube  Heart: RRR, normal S1 and S2, no rub  Abdomen: soft, no guarding, protuberant, normoactive bowel  : Sauer catheter anchored in  Extremities: no edema, cyanosis or clubbing  Neuro: Normal speech and mental status and moving all extremities    Scheduled Meds:     alteplase (ACTIVASE) 10 mg in 0.9% NaCl 30 mL, 10 mg, Intrapleural, Once   And  dornase alpha (PULMOZYME) 5 mg in SWFI intrapleural syringe, 5 mg, Intrapleural, Once  cefTRIAXone, 2,000 mg, Intravenous, Q24H  heparin (porcine), 5,000 Units, Subcutaneous, Q8H  hydrALAZINE, 25 mg, Oral, Q8H  insulin lispro, 2-7 Units, Subcutaneous, 4x Daily AC & at Bedtime  labetalol, 100 mg, Oral, Q12H  metroNIDAZOLE, 500 mg, Oral, Q8H  pantoprazole, 40 mg, Oral, Q AM  senna-docusate sodium,  2 tablet, Oral, BID  sodium chloride, 10 mL, Intravenous, Q12H      IV Meds:          Results Reviewed:   I have personally reviewed the results from the time of this admission to 10/6/2023 10:21 EDT     Results from last 7 days   Lab Units 10/06/23  0519 10/05/23  0406 10/04/23  0414   SODIUM mmol/L 136 142 140   POTASSIUM mmol/L 4.8 4.9 4.9   CHLORIDE mmol/L 98 101 99   CO2 mmol/L 22.5 21.0* 27.3   BUN mg/dL 66* 58* 41*   CREATININE mg/dL 6.22* 5.94* 4.64*   CALCIUM mg/dL 9.3 9.2 9.1   BILIRUBIN mg/dL 0.2 0.3 0.3   ALK PHOS U/L 97 118* 114   ALT (SGPT) U/L 18 16 17   AST (SGOT) U/L 20 15 10   GLUCOSE mg/dL 120* 103* 119*         Estimated Creatinine Clearance: 14.6 mL/min (A) (by C-G formula based on SCr of 6.22 mg/dL (H)).    Results from last 7 days   Lab Units 10/06/23  0519 10/05/23  0406 10/04/23  0414   MAGNESIUM mg/dL 2.8* 2.6 2.6   PHOSPHORUS mg/dL 12.2* 9.0* 7.0*         Results from last 7 days   Lab Units 10/06/23  0519 10/05/23  0406 10/04/23  0414 10/03/23  0408   URIC ACID mg/dL 11.9* 10.5* 9.9* 9.4*         Results from last 7 days   Lab Units 10/06/23  0519 10/05/23  0406 10/04/23  0414 10/03/23  1916 10/03/23  0408 10/02/23  0501   WBC 10*3/mm3 10.93* 12.91* 12.10*  --  17.47* 24.25*   HEMOGLOBIN g/dL 9.6* 9.0* 7.8* 8.0* 8.0* 10.7*   PLATELETS 10*3/mm3 331 329 285  --  319 353               Assessment / Plan     ASSESSMENT:  Acute kidney injury associated with sudden correction of her hypertension initially also possible component of contrast-induced nephropathy because patient had CTA on 10/1/2023.  Creatinine today up to 6.22 but the rate of rise of the creatinine is slowing down and the urine output is improving it is suggestive of impending recovery, electrolyte within normal range and volume status appears to be euvolemic  Now diagnosis of hypertension, blood pressure with acceptable control  New diagnosis of diabetes mellitus type 2  Super morbid obesity  Anemia, the etiology not very clear,  most likely patient have iron deficiency iron saturation 13% but her TIBC is on the low side, suggestive of anemia of chronic disease too.  Hemoglobin today 9.6 improved since yesterday    PLAN:  Continue the same treatment  No indication for dialysis today  Surveillance labs    I discussed the case with the patient's mother at the bedside.  I reviewed the chart and other providers notes, I reviewed imaging and lab data.  Copied text in this note has been reviewed and is accurate as of 10/06/23.       Thank you for involving us in the care of Nay Gonzalez.  Please feel free to call with any questions.    Iain Owens MD  10/06/23  10:21 EDT    [facility]  [phone]    Please note that portions of this note were completed with a voice recognition program.

## 2023-10-06 NOTE — THERAPY TREATMENT NOTE
Patient Name: Nay Gonzalez  : 1970    MRN: [id number]                              Today's Date: 10/6/2023       Admit Date: 10/1/2023    Visit Dx:     ICD-10-CM ICD-9-CM   1. Pneumonia of right upper lobe due to infectious organism  J18.9 486   2. Hypoxia  R09.02 799.02   3. Hypertension, unspecified type  I10 401.9     Patient Active Problem List   Diagnosis    PNA (pneumonia)    Acute respiratory failure with hypoxia    Hypertensive urgency    Abscess of upper lobe of right lung with pneumonia    Sepsis    Type 2 diabetes mellitus with hyperglycemia    ELLY (acute kidney injury)    Obesity, morbid, BMI 50 or higher    Anemia     History reviewed. No pertinent past medical history.  Past Surgical History:   Procedure Laterality Date    TEETH EXTRACTION        General Information       Row Name 10/06/23 1627          Physical Therapy Time and Intention    Document Type therapy note (daily note)  -     Mode of Treatment physical therapy  -       Row Name 10/06/23 1627          General Information    Existing Precautions/Restrictions fall  -       Row Name 10/06/23 162          Cognition    Orientation Status (Cognition) oriented x 4  -       Row Name 10/06/23 1627          Safety Issues, Functional Mobility    Impairments Affecting Function (Mobility) endurance/activity tolerance;strength;balance;pain;range of motion (ROM)  -               User Key  (r) = Recorded By, (t) = Taken By, (c) = Cosigned By      Initials Name Provider Type     Capri Shanks, PT Physical Therapist                   Mobility       Row Name 10/06/23 1628          Bed Mobility    Supine-Sit [place] (Bed Mobility) not tested  -     Sit-Supine [place] (Bed Mobility) not tested  -     Comment, (Bed Mobility) sitting in chair  -       Row Name 10/06/23 1628          Sit-Stand Transfer    Sit-Stand [place] (Transfers) verbal cues;nonverbal cues (demo/gesture);minimum assist (75% patient effort)   -     Assistive Device (Sit-Stand Transfers) walker, front-wheeled  -     Comment, (Sit-Stand Transfer) pt performed sit to stand x2  -       Row Name 10/06/23 1628          Gait/Stairs (Locomotion)    [place] Level (Gait) verbal cues;nonverbal cues (demo/gesture);contact guard  -     Assistive Device (Gait) walker, front-wheeled  -     Distance in Feet (Gait) 3 steps forward and back then 6 reps standing marches  -     Deviations/Abnormal Patterns (Gait) weight shifting decreased;gait speed decreased;stride length decreased;base of support, wide  -     Bilateral Gait Deviations forward flexed posture;heel strike decreased  -     Comment, (Gait/Stairs) pt quickly fatigues  -               User Key  (r) = Recorded By, (t) = Taken By, (c) = Cosigned By      Initials Name Provider Type    Capri Vidal, JULIAN Physical Therapist                   Obj/Interventions       Row Name 10/06/23 1629          Motor Skills    Therapeutic Exercise --  10 reps B LE AP, LAQ, and seated marches  -               User Key  (r) = Recorded By, (t) = Taken By, (c) = Cosigned By      Initials Name Provider Type    Capri Vidal, PT Physical Therapist                   Goals/Plan    No documentation.                  Clinical Impression       Row Name 10/06/23 1630          Pain    Pretreatment Pain Rating 6/10  -     Posttreatment Pain Rating 6/10  -     Pain Location - Side/Orientation Right  -     Pain Location - shoulder  -     Pain Intervention(s) Repositioned  -       Row Name 10/06/23 1630          Plan of Care Review    Plan of Care Reviewed With patient  -     Outcome Evaluation Pt required some increased assistance this date secondary to some increased fatigue. Pt was able to stand x2 with a walker and take several steps or march in place on each attempt. PT will continue to follow to address strength, mobility, and gait.  -       Row Name 10/06/23 1634          Therapy  Assessment/Plan (PT)    Therapy Frequency (PT) 5 times/wk  -       Row Name 10/06/23 1630          Positioning and Restraints    Pre-Treatment Position sitting in chair/recliner  -     Post Treatment Position chair  -CH     In Chair reclined;call light within reach;encouraged to call for assist;exit alarm on;with family/caregiver  -               User Key  (r) = Recorded By, (t) = Taken By, (c) = Cosigned By      Initials Name Provider Type     Capri Shanks, PT Physical Therapist                   Outcome Measures       Row Name 10/06/23 1632          How much help from another person do you currently need...    Turning from your back to your side while in flat bed without using bedrails? 2  -CH     Moving from lying on back to sitting on the side of a flat bed without bedrails? 2  -CH     Moving to and from a bed to a chair (including a wheelchair)? 3  -CH     Standing up from a chair using your arms (e.g., wheelchair, bedside chair)? 2  -CH     Climbing 3-5 steps with a railing? 1  -CH     To walk in hospital room? 2  -CH     AM-PAC 6 Clicks Score (PT) 12  -CH     Highest level of mobility 4 --> Transferred to chair/commode  -       Row Name 10/06/23 1632          Functional Assessment    Outcome Measure Options AM-PAC 6 Clicks Basic Mobility (PT)  -               User Key  (r) = Recorded By, (t) = Taken By, (c) = Cosigned By      Initials Name Provider Type     Capri Shanks, PT Physical Therapist                                 Physical Therapy Education       Title: PT OT SLP Therapies (Done)       Topic: Physical Therapy (Done)       Point: Mobility training (Done)       Learning Progress Summary             Patient Acceptance, E,TB,D, VU,NR by  at 10/6/2023 1633    Acceptance, E,D, VU,DU by  at 10/5/2023 1612    Acceptance, E,D, VU,NR by MS at 10/2/2023 1110                         Point: Home exercise program (Done)       Learning Progress Summary             Patient Acceptance,  E,TB,D, VU,NR by  at 10/6/2023 1633    Acceptance, E,D, VU,NR by MS at 10/2/2023 1110                         Point: Body mechanics (Done)       Learning Progress Summary             Patient Acceptance, E,TB,D, VU,NR by  at 10/6/2023 1633    Acceptance, E,D, VU,DU by  at 10/5/2023 1612    Acceptance, E,D, VU,NR by MS at 10/2/2023 1110                         Point: Precautions (Done)       Learning Progress Summary             Patient Acceptance, E,TB,D, VU,NR by  at 10/6/2023 1633    Acceptance, E,D, VU,DU by  at 10/5/2023 1612    Acceptance, E,D, VU,NR by MS at 10/2/2023 1110                                         User Key       Initials Effective Dates Name Provider Type Discipline     06/16/21 -  Capri Shanks, PT Physical Therapist PT    MS 06/16/21 -  Aniceto Gandara PT Physical Therapist PT     08/10/23 -  Echo Mathur PT Student PT Student PT                  PT Recommendation and Plan     Plan of Care Reviewed With: patient  Outcome Evaluation: Pt required some increased assistance this date secondary to some increased fatigue. Pt was able to stand x2 with a walker and take several steps or march in place on each attempt. PT will continue to follow to address strength, mobility, and gait.     Time Calculation:         PT Charges       Row Name 10/06/23 1633             Time Calculation    Start Time 1440  -      Stop Time 1454  -      Time Calculation (min) 14 min  -      PT Received On 10/06/23  -      PT - Next Appointment 10/09/23  -         Time Calculation- PT    Total Timed Code Minutes- PT 14 minute(s)  -         Timed Charges    02178 - PT Therapeutic Activity Minutes 14  -         Total Minutes    Timed Charges Total Minutes 14  -CH       Total Minutes 14  -CH                User Key  (r) = Recorded By, (t) = Taken By, (c) = Cosigned By      Initials Name Provider Type     Capri Shanks, PT Physical Therapist                  Therapy Charges for Today        Code Description Service Date Service Provider Modifiers Qty    [id number]  PT THERAPEUTIC ACT EA 15 MIN 10/6/2023 Capri Shanks, PT GP 1            PT G-Codes  Outcome Measure Options: AM-PAC 6 Clicks Basic Mobility (PT)  AM-PAC 6 Clicks Score (PT): 12  PT Discharge Summary  Anticipated Discharge Disposition (PT): home with assist, home with home health, home with outpatient therapy services    Capri Shanks, PT  10/6/2023

## 2023-10-06 NOTE — NURSING NOTE
Care plan as follows:    All needs met tonight. Bedrest, plans to continue working with PT. Refused turns. 2 BMs tonight. Purewick in place. Urinating  and bladder scansCC diet. Pills whole w/ thins. R chest tube to -20 suction. VSS. Oriented x 4. Remains on 2L NC. SR on the monitor. No complaints. Blood glucose monitoring.

## 2023-10-06 NOTE — PROGRESS NOTES
[facility] INPATIENT PROGRESS NOTE         [address]    10/6/2023      PATIENT IDENTIFICATION:  Name: Nay Gonzalez ADMIT: 10/1/2023   : 1970  PCP: Provider, No Known    MRN: [id number] LOS: 5 days   AGE/SEX: 53 y.o. female  ROOM: [facility]                     LOS 5    Reason for visit: Respiratory failure and pneumonia      SUBJECTIVE:      Resting comfortably.     Objective   OBJECTIVE:    Vital Sign Min/Max for last 24 hours  Temp  Min: 97.9 øF (36.6 øC)  Max: 98.6 øF (37 øC)   BP  Min: 148/77  Max: 167/87   Pulse  Min: 82  Max: 93   Resp  Min: 16  Max: 18   SpO2  Min: 94 %  Max: 98 %   No data recorded   No data recorded                   FiO2 (%): 25 %     Body mass index is 54.65 kg/mý.    Intake/Output Summary (Last 24 hours) at 10/6/2023 0734  Last data filed at 10/6/2023 0630  Gross per 24 hour   Intake 540 ml   Output 921 ml   Net -381 ml         Exam:  GEN:  No distress, appears stated age.  Morbidly obese.    EYES:   PERRL, anicteric sclerae  ENT:    External ears/nose normal, OP clear  NECK:  No adenopathy, midline trachea  LUNGS: Normal chest on inspection, palpation and diminished breath sounds on auscultation.  Chest tube stable  CV:  Normal S1S2, without murmur  ABD:  Nontender, nondistended, no hepatosplenomegaly, +BS  EXT:  No pitting edema.  No cyanosis or clubbing.  No mottling and normal cap refill.    Assessment     Scheduled meds:  cefTRIAXone, 2,000 mg, Intravenous, Q24H  heparin (porcine), 5,000 Units, Subcutaneous, Q8H  hydrALAZINE, 25 mg, Oral, Q8H  insulin lispro, 2-7 Units, Subcutaneous, 4x Daily AC & at Bedtime  labetalol, 100 mg, Oral, Q12H  metroNIDAZOLE, 500 mg, Oral, Q8H  pantoprazole, 40 mg, Oral, Q AM  senna-docusate sodium, 2 tablet, Oral, BID  sodium chloride, 10 mL, Intravenous, Q12H      IV meds:                           Data Review:  Results from last 7 days   Lab Units 10/06/23  0519 10/05/23  0406 10/04/23  0414 10/03/23  0408 10/02/23  2026    SODIUM mmol/L 136 142 140 142 142   POTASSIUM mmol/L 4.8 4.9 4.9 4.5 5.2   CHLORIDE mmol/L 98 101 99 102 102   CO2 mmol/L 22.5 21.0* 27.3 25.0 29.7*   BUN mg/dL 66* 58* 41* 31* 27*   CREATININE mg/dL 6.22* 5.94* 4.64* 2.73* 1.95*   GLUCOSE mg/dL 120* 103* 119* 87 123*   CALCIUM mg/dL 9.3 9.2 9.1 9.0 9.3         Estimated Creatinine Clearance: 14.6 mL/min (A) (by C-G formula based on SCr of 6.22 mg/dL (H)).  Results from last 7 days   Lab Units 10/06/23  0519 10/05/23  0406 10/04/23  0414 10/03/23  1916 10/03/23  0408 10/02/23  0501   WBC 10*3/mm3 10.93* 12.91* 12.10*  --  17.47* 24.25*   HEMOGLOBIN g/dL 9.6* 9.0* 7.8* 8.0* 8.0* 10.7*   PLATELETS 10*3/mm3 331 329 285  --  319 353         Results from last 7 days   Lab Units 10/06/23  0519 10/05/23  0406 10/04/23  0414 10/03/23  0408 10/01/23  1138   ALT (SGPT) U/L 18 16 17 22 35*   AST (SGOT) U/L 20 15 10 17 19     Results from last 7 days   Lab Units 10/04/23  1418 10/04/23  0357 10/03/23  0339 10/02/23  1840 10/02/23  1701 10/02/23  1459 10/02/23  1344   PH, ARTERIAL pH units 7.356 7.376 7.498* 7.309* 7.071* 7.236* 7.074*   PO2 ART mm Hg 77.8* 127.2* 109.1* 174.3* 90.5 122.2* 96.8   PCO2, ARTERIAL mm Hg 45.6* 44.7 34.8* 59.3* 108.4* 66.9* 112.4*   HCO3 ART mmol/L 25.5 26.2 27.0 29.8* 31.5* 28.4* 32.9*     Results from last 7 days   Lab Units 10/01/23  1138   PROCALCITONIN ng/mL 0.59*   LACTATE mmol/L 1.2         Glucose   Date/Time Value Ref Range Status   10/06/2023 0609 124 70 - 130 mg/dL Final   10/05/2023 2035 116 70 - 130 mg/dL Final   10/05/2023 1726 114 70 - 130 mg/dL Final   10/05/2023 1133 122 70 - 130 mg/dL Final   10/05/2023 0910 108 70 - 130 mg/dL Final   10/04/2023 2342 98 70 - 130 mg/dL Final   10/04/2023 2003 109 70 - 130 mg/dL Final     10/6 chest x-ray reviewed            2D echo 10/2 reviewed: EF 61 to 65% with grade 2 diastolic dysfunction.    CT chest 10/1 reviewed        Microbiology reviewed: No growth to date              Active Hospital  Problems    Diagnosis  POA    **PNA (pneumonia) [J18.9]  Yes    ELLY (acute kidney injury) [N17.9]  Unknown    Obesity, morbid, BMI 50 or higher [E66.01]  Unknown    Anemia [D64.9]  Unknown    Sepsis [A41.9]  Unknown    Type 2 diabetes mellitus with hyperglycemia [E11.65]  Unknown    Acute respiratory failure with hypoxia [J96.01]  Unknown    Hypertensive urgency [I16.0]  Unknown    Abscess of upper lobe of right lung with pneumonia [J85.1]  Yes      Resolved Hospital Problems    Diagnosis Date Resolved POA    Hyperkalemia [E87.5] 10/03/2023 Unknown         ASSESSMENT:  Right upper lobe pneumonia/abscess  Sepsis secondary to above  Respiratory failure requiring mechanical ventilation  Moderate right pleural effusion  Acute hypoxic respiratory failure  Atelectasis  Mediastinal lymphadenopathy  Super morbid obesity  Snoring/apnea with concerns for CRYSTAL  Hypertension  Diabetes mellitus, type II  Acute kidney injury: Worsening      PLAN:  Doing well out of ICU.  Thoracic surgery following.  Status post tPA and dornase per chest tube yesterday again.  Antibiotics per infectious disease recommendations.  Wean oxygen as able.  Encourage BiPAP for signs of CRYSTAL and recommend formal sleep study as out patient.           Brannon Gonzales MD  Pulmonary and Critical Care Medicine  [facility], [facility]  10/6/2023    07:34 EDT

## 2023-10-06 NOTE — PROCEDURES
Pre-op Diagnosis: Loculated Pleural Effusion, right     Post-Op Diagnosis: Loculated Pleural Effusion, right     Procedure performed: Irrigation pleural cavity with TPA and dornase    Anesthesia: None    Summary of procedure: The right pleural tube was accessed. 10 mg of TPA (reconstituted in 30cc of sterile water) and 5 mg of dornase (reconstituted in 30cc normal saline) was instilled into the pleural cavity. The pleural tube then was clamped.      The patient's position is to be changed to every 15 minutes for 2 hours.  The tube will then be unclamped and placed back to suction.  Patient tolerated the procedure well. We will re-evaluate with a CXR in the morning.      Appreciate assistance from PAOLA Easley.    FERDINAND Zuniga

## 2023-10-06 NOTE — PLAN OF CARE
Goal Outcome Evaluation:  Plan of Care Reviewed With: patient           Outcome Evaluation: Pt required some increased assistance this date secondary to some increased fatigue. Pt was able to stand x2 with a walker and take several steps or march in place on each attempt. PT will continue to follow to address strength, mobility, and gait.      Anticipated Discharge Disposition (PT): home with assist, home with home health, home with outpatient therapy services

## 2023-10-07 ENCOUNTER — APPOINTMENT (OUTPATIENT)
Dept: GENERAL RADIOLOGY | Facility: HOSPITAL | Age: 53
DRG: 871 | End: 2023-10-07
Payer: COMMERCIAL

## 2023-10-07 LAB
ALBUMIN SERPL-MCNC: 2.9 G/DL (ref 3.5–5.2)
ANION GAP SERPL CALCULATED.3IONS-SCNC: 17 MMOL/L (ref 5–15)
BASOPHILS # BLD AUTO: 0.06 10*3/MM3 (ref 0–0.2)
BASOPHILS NFR BLD AUTO: 0.6 % (ref 0–1.5)
BUN SERPL-MCNC: 78 MG/DL (ref 6–20)
BUN/CREAT SERPL: 10.8 (ref 7–25)
CALCIUM SPEC-SCNC: 8.9 MG/DL (ref 8.6–10.5)
CHLORIDE SERPL-SCNC: 98 MMOL/L (ref 98–107)
CO2 SERPL-SCNC: 22 MMOL/L (ref 22–29)
CREAT SERPL-MCNC: 7.25 MG/DL (ref 0.57–1)
CREAT UR-MCNC: 108.7 MG/DL
DEPRECATED RDW RBC AUTO: 46.2 FL (ref 37–54)
EGFRCR SERPLBLD CKD-EPI 2021: 6.3 ML/MIN/1.73
EOSINOPHIL # BLD AUTO: 0.05 10*3/MM3 (ref 0–0.4)
EOSINOPHIL NFR BLD AUTO: 0.5 % (ref 0.3–6.2)
ERYTHROCYTE [DISTWIDTH] IN BLOOD BY AUTOMATED COUNT: 15.2 % (ref 12.3–15.4)
GLUCOSE BLDC GLUCOMTR-MCNC: 103 MG/DL (ref 70–130)
GLUCOSE BLDC GLUCOMTR-MCNC: 128 MG/DL (ref 70–130)
GLUCOSE BLDC GLUCOMTR-MCNC: 130 MG/DL (ref 70–130)
GLUCOSE BLDC GLUCOMTR-MCNC: 142 MG/DL (ref 70–130)
GLUCOSE SERPL-MCNC: 104 MG/DL (ref 65–99)
HCT VFR BLD AUTO: 30.9 % (ref 34–46.6)
HGB BLD-MCNC: 9.8 G/DL (ref 12–15.9)
IMM GRANULOCYTES # BLD AUTO: 0.1 10*3/MM3 (ref 0–0.05)
IMM GRANULOCYTES NFR BLD AUTO: 1 % (ref 0–0.5)
LYMPHOCYTES # BLD AUTO: 0.69 10*3/MM3 (ref 0.7–3.1)
LYMPHOCYTES NFR BLD AUTO: 7.2 % (ref 19.6–45.3)
MAGNESIUM SERPL-MCNC: 3 MG/DL (ref 1.6–2.6)
MCH RBC QN AUTO: 26.7 PG (ref 26.6–33)
MCHC RBC AUTO-ENTMCNC: 31.7 G/DL (ref 31.5–35.7)
MCV RBC AUTO: 84.2 FL (ref 79–97)
MONOCYTES # BLD AUTO: 0.5 10*3/MM3 (ref 0.1–0.9)
MONOCYTES NFR BLD AUTO: 5.2 % (ref 5–12)
NEUTROPHILS NFR BLD AUTO: 8.22 10*3/MM3 (ref 1.7–7)
NEUTROPHILS NFR BLD AUTO: 85.5 % (ref 42.7–76)
NRBC BLD AUTO-RTO: 0.1 /100 WBC (ref 0–0.2)
PHOSPHATE SERPL-MCNC: 12.1 MG/DL (ref 2.5–4.5)
PLATELET # BLD AUTO: 342 10*3/MM3 (ref 140–450)
PMV BLD AUTO: 11.1 FL (ref 6–12)
POTASSIUM SERPL-SCNC: 4.7 MMOL/L (ref 3.5–5.2)
PROT ?TM UR-MCNC: 63.2 MG/DL
PROT/CREAT UR: 581.4 MG/G CREA (ref 0–200)
RBC # BLD AUTO: 3.67 10*6/MM3 (ref 3.77–5.28)
SODIUM SERPL-SCNC: 137 MMOL/L (ref 136–145)
URATE SERPL-MCNC: 11.5 MG/DL (ref 2.4–5.7)
WBC NRBC COR # BLD: 9.62 10*3/MM3 (ref 3.4–10.8)

## 2023-10-07 PROCEDURE — 83735 ASSAY OF MAGNESIUM: CPT | Performed by: INTERNAL MEDICINE

## 2023-10-07 PROCEDURE — 82948 REAGENT STRIP/BLOOD GLUCOSE: CPT

## 2023-10-07 PROCEDURE — 82570 ASSAY OF URINE CREATININE: CPT | Performed by: INTERNAL MEDICINE

## 2023-10-07 PROCEDURE — 85025 COMPLETE CBC W/AUTO DIFF WBC: CPT | Performed by: INTERNAL MEDICINE

## 2023-10-07 PROCEDURE — 99232 SBSQ HOSP IP/OBS MODERATE 35: CPT | Performed by: SURGERY

## 2023-10-07 PROCEDURE — 84550 ASSAY OF BLOOD/URIC ACID: CPT | Performed by: INTERNAL MEDICINE

## 2023-10-07 PROCEDURE — 80069 RENAL FUNCTION PANEL: CPT | Performed by: INTERNAL MEDICINE

## 2023-10-07 PROCEDURE — 99233 SBSQ HOSP IP/OBS HIGH 50: CPT | Performed by: INTERNAL MEDICINE

## 2023-10-07 PROCEDURE — 71045 X-RAY EXAM CHEST 1 VIEW: CPT

## 2023-10-07 PROCEDURE — 84156 ASSAY OF PROTEIN URINE: CPT | Performed by: INTERNAL MEDICINE

## 2023-10-07 PROCEDURE — 25010000002 HEPARIN (PORCINE) PER 1000 UNITS: Performed by: INTERNAL MEDICINE

## 2023-10-07 PROCEDURE — 94799 UNLISTED PULMONARY SVC/PX: CPT

## 2023-10-07 PROCEDURE — 25010000002 CEFTRIAXONE PER 250 MG: Performed by: INTERNAL MEDICINE

## 2023-10-07 RX ORDER — SEVELAMER CARBONATE 800 MG/1
1600 TABLET, FILM COATED ORAL
Status: DISCONTINUED | OUTPATIENT
Start: 2023-10-07 | End: 2023-10-12 | Stop reason: HOSPADM

## 2023-10-07 RX ADMIN — Medication 500 MG: at 21:18

## 2023-10-07 RX ADMIN — CEFTRIAXONE SODIUM 2000 MG: 2 INJECTION, POWDER, FOR SOLUTION INTRAMUSCULAR; INTRAVENOUS at 08:30

## 2023-10-07 RX ADMIN — ACETAMINOPHEN 650 MG: 325 TABLET, FILM COATED ORAL at 21:18

## 2023-10-07 RX ADMIN — METRONIDAZOLE 500 MG: 500 TABLET, FILM COATED ORAL at 15:21

## 2023-10-07 RX ADMIN — LABETALOL HYDROCHLORIDE 100 MG: 100 TABLET, FILM COATED ORAL at 21:17

## 2023-10-07 RX ADMIN — PANTOPRAZOLE SODIUM 40 MG: 40 TABLET, DELAYED RELEASE ORAL at 06:25

## 2023-10-07 RX ADMIN — METRONIDAZOLE 500 MG: 500 TABLET, FILM COATED ORAL at 06:25

## 2023-10-07 RX ADMIN — SEVELAMER CARBONATE 1600 MG: 800 TABLET, FILM COATED ORAL at 18:02

## 2023-10-07 RX ADMIN — HEPARIN SODIUM 5000 UNITS: 5000 INJECTION INTRAVENOUS; SUBCUTANEOUS at 21:18

## 2023-10-07 RX ADMIN — Medication 10 ML: at 21:30

## 2023-10-07 RX ADMIN — METRONIDAZOLE 500 MG: 500 TABLET, FILM COATED ORAL at 21:17

## 2023-10-07 RX ADMIN — HEPARIN SODIUM 5000 UNITS: 5000 INJECTION INTRAVENOUS; SUBCUTANEOUS at 15:21

## 2023-10-07 RX ADMIN — SEVELAMER CARBONATE 1600 MG: 800 TABLET, FILM COATED ORAL at 12:40

## 2023-10-07 RX ADMIN — Medication 10 ML: at 08:31

## 2023-10-07 RX ADMIN — Medication 500 MG: at 08:29

## 2023-10-07 RX ADMIN — LABETALOL HYDROCHLORIDE 100 MG: 100 TABLET, FILM COATED ORAL at 08:29

## 2023-10-07 RX ADMIN — HYDRALAZINE HYDROCHLORIDE 25 MG: 25 TABLET, FILM COATED ORAL at 06:25

## 2023-10-07 RX ADMIN — HEPARIN SODIUM 5000 UNITS: 5000 INJECTION INTRAVENOUS; SUBCUTANEOUS at 06:25

## 2023-10-07 NOTE — PROGRESS NOTES
Progress note    Reason for consult: Right empyema thoracis    No acute issues overnight.  Pain well controlled.  In good spirits.  Feeling better.  Denies resting shortness of breath.    Afebrile.  Hemodynamically stable.  Nonlabored breathing.  On 2 LPM nasal cannula.  Chest tube with serous drainage, no air leak.  Extremities warm.  Lower extremity edematous.  Alert, oriented x3.    Imaging reviewed.  X-ray showed adequate lung expansion.    Patient presented with empyema thoracis that responded well to chest tube placement and tPA x3.  Chest tube transition to waterseal today.  If x-ray showed adequate lung expansion tomorrow, we will plan for chest tube removal.  She will need 6 weeks antibiotics for empyema thoracis.  Rest of care per primary team.    Lonnie Brantley MD  Thoracic surgeon

## 2023-10-07 NOTE — PROGRESS NOTES
ID NOTE    CC: f/u pulmonary abscess due to Strep    Subj: She says she is feeling okay.  Tolerating antibiotics.  She did have a little bit of GI upset but on a probiotic and tolerating them nicely.  She is afebrile.  On 2 L.      Objective   Vital Signs   Temp:  [97.5 øF (36.4 øC)-99.3 øF (37.4 øC)] 98.3 øF (36.8 øC)  Heart Rate:  [71-91] 81  Resp:  [16-18] 18  BP: (126-145)/(66-74) 141/71    Physical Exam:   General: awake, alert, very nice, in chair  Eyes: no scleral icterus  ENT: ETT in place  Cardiovascular: NR  Respiratory: R rales are better; R chest drain w/ thick brown material in tubing  GI: Abdomen is soft, not tender  Skin: No rashes  Vasc: LIJ CVC removed    Labs:   White count 9.62, hemoglobin 9.8, platelet 342  And creatinine 7.25      Urine Eos negative  HIV negative  Hep C negative  Procal 0.59    Microbiology:  10/1 RPP: negative  10/1 BCx: negative  10/1 MRSA nares: negative  10/2 BAL Cx: normal annabel  10/3 ETT Cx: normal annabel  10/3 Lung Abscess Cx: Group G Strep    Chest x-ray independently interpreted: Chest tube in place with density in  right hemothorax    ASSESSMENT/PLAN:  Right upper lobe pulmonary abscess due to strep  Acute hypercapneic and hypoxic respiratory failure, improving  Sepsis due to #1  Super obesity BMI 54  Uncontrolled diabetes type 2 A1c  7.5%  Hypertension  7.  Acute kidney injury      On 10/3/23, she had an IR-guided CT chest tube placement. The culture is growing Group G Strep. The anaerobic culture is no growth to date. Continue ceftriaxone 2 g IV q24h and Flagyl 500 mg IV q8h for now. I do anticipate switching her to an oral antibiotic at discharge with plans to complete a 6-week course w/ stop date ~11/14/23.     Creatinine continues to climb.  Nephrology is following.  Antibiotics are dosed appropriately for renal dysfunction    I'll check a BMP daily for monitoring. Thanks to nephrologist for their assistance.    I will see her again on 10/10/23 so please call me or  ID MD on call at [phone] if any questions or concerns in the interim.

## 2023-10-07 NOTE — PROGRESS NOTES
[facility] Progress Note      Patient Name: Nay Gonzalez  : 1970  MRN: [id number]  Primary Care Physician:  Provider, No Known  Date of admission: 10/1/2023    Subjective     Interval History:   Follow-up acute kidney injury    The patient is feeling better, denies any chest pain or shortness of air, no orthopnea or PND, no nausea or vomiting, no metallic taste, no dysuria or gross hematuria, she is voiding without difficulties.  Urine output is not measured    Review of Systems:   As noted above    Objective     Vitals:   Temp:  [97.5 øF (36.4 øC)-99.3 øF (37.4 øC)] 98.3 øF (36.8 øC)  Heart Rate:  [71-91] 81  Resp:  [16-18] 18  BP: (126-145)/(66-74) 141/71  Flow (L/min):  [1-2] 2    Intake/Output Summary (Last 24 hours) at 10/7/2023 0910  Last data filed at 10/7/2023 0631  Gross per 24 hour   Intake 960 ml   Output 430 ml   Net 530 ml       Physical Exam:    General Appearance: Morbidly obese, awake and alert, chronically ill, no acute distress  Skin: warm and dry  HEENT: She has core track in place  Neck: Difficult to assess due to body habitus  Lungs: Bilateral rhonchi, breathing effort not she had right-sided chest tube  Heart: RRR, normal S1 and S2, no rub  Abdomen: soft, no guarding, protuberant, normoactive bowel  : No palpable bladder  Extremities: no edema, cyanosis or clubbing  Neuro: Normal speech and mental status and moving all extremities    Scheduled Meds:     cefTRIAXone, 2,000 mg, Intravenous, Q24H  heparin (porcine), 5,000 Units, Subcutaneous, Q8H  hydrALAZINE, 25 mg, Oral, Q8H  insulin lispro, 2-7 Units, Subcutaneous, 4x Daily AC & at Bedtime  labetalol, 100 mg, Oral, Q12H  metroNIDAZOLE, 500 mg, Oral, Q8H  pantoprazole, 40 mg, Oral, Q AM  saccharomyces boulardii, 500 mg, Oral, BID  senna-docusate sodium, 2 tablet, Oral, BID  sodium chloride, 10 mL, Intravenous, Q12H      IV Meds:          Results Reviewed:   I have personally reviewed the results from  the time of this admission to 10/7/2023 09:10 EDT     Results from last 7 days   Lab Units 10/07/23  0620 10/06/23  0519 10/05/23  0406 10/04/23  0414   SODIUM mmol/L 137 136 142 140   POTASSIUM mmol/L 4.7 4.8 4.9 4.9   CHLORIDE mmol/L 98 98 101 99   CO2 mmol/L 22.0 22.5 21.0* 27.3   BUN mg/dL 78* 66* 58* 41*   CREATININE mg/dL 7.25* 6.22* 5.94* 4.64*   CALCIUM mg/dL 8.9 9.3 9.2 9.1   BILIRUBIN mg/dL  --  0.2 0.3 0.3   ALK PHOS U/L  --  97 118* 114   ALT (SGPT) U/L  --  18 16 17   AST (SGOT) U/L  --  20 15 10   GLUCOSE mg/dL 104* 120* 103* 119*       Estimated Creatinine Clearance: 12.5 mL/min (A) (by C-G formula based on SCr of 7.25 mg/dL (H)).    Results from last 7 days   Lab Units 10/07/23  0620 10/06/23  0519 10/05/23  0406   MAGNESIUM mg/dL 3.0* 2.8* 2.6   PHOSPHORUS mg/dL 12.1* 12.2* 9.0*       Results from last 7 days   Lab Units 10/07/23  0620 10/06/23  0519 10/05/23  0406 10/04/23  0414 10/03/23  0408   URIC ACID mg/dL 11.5* 11.9* 10.5* 9.9* 9.4*       Results from last 7 days   Lab Units 10/07/23  0620 10/06/23  0519 10/05/23  0406 10/04/23  0414 10/03/23  1916 10/03/23  0408   WBC 10*3/mm3 9.62 10.93* 12.91* 12.10*  --  17.47*   HEMOGLOBIN g/dL 9.8* 9.6* 9.0* 7.8* 8.0* 8.0*   PLATELETS 10*3/mm3 342 331 329 285  --  319             Assessment / Plan     ASSESSMENT:  Acute kidney injury associated with sudden correction of her hypertension initially also possible component of contrast-induced nephropathy because patient had CTA on 10/1/2023.  Creatinine today up to 7.25, appears to be euvolemic, electrolyte within acceptable range, no uremic symptoms patient has a proteinuria on admission protein to creatinine ratio was 2111 mg/g but the urine was very concentrated at that time.  New diagnosis of hypertension, blood pressure with acceptable control  New diagnosis of diabetes mellitus type 2  Super morbid obesity  Anemia, the etiology not very clear, most likely patient have iron deficiency iron saturation  13% but her TIBC is on the low side, suggestive of anemia of chronic disease too.  Hemoglobin today 9.8 improved since yesterday  Severe hyperphosphatemia associated with acute kidney injury phosphorus 12.1    PLAN:  Continue the same treatment  Recheck the urine protein to creatinine ratio today and decide if patient will need to have a kidney biopsy done  No indication for dialysis today  I will add phosphate binders  Surveillance labs    I discussed the case with the patient and her nurse at the bedside  I reviewed the chart and other providers notes, I reviewed imaging and lab data.  Copied text in this note has been reviewed and is accurate as of 10/07/23.       Thank you for involving us in the care of Nay Gonzalez.  Please feel free to call with any questions.    Iain Owens MD  10/07/23  09:10 EDT    [facility]  [phone]    Please note that portions of this note were completed with a voice recognition program.

## 2023-10-07 NOTE — PLAN OF CARE
Goal Outcome Evaluation:      VSS. 1-2L O2 NC. Ax1 to BSC. R CT to -20 sxn, no airleak, no fluctuation, no subQ air. Dressing CDI. PRN tylenol x1. L IJ removal dressing CDI.

## 2023-10-07 NOTE — PROGRESS NOTES
" LOS: 6 days     Name: Nay Gonzalez  Age: 53 y.o.  Sex: female  :  1970  MRN: [id number]         Primary Care Physician: Provider, No Known    Subjective   Subjective    Patient is lying on the bed and does not appear to be any distress.  Denies nausea, vomiting, abdominal pain, chest pain.    Objective   Vital Signs  Temp:  [97.6 øF (36.4 øC)-99.3 øF (37.4 øC)] 98.4 øF (36.9 øC)  Heart Rate:  [81-91] 81  Resp:  [18] 18  BP: (114-143)/(66-73) 114/68  Body mass index is 54.65 kg/mý.    Objective:  General Appearance:  Comfortable and in no acute distress.    Vital signs: (most recent): Blood pressure 114/68, pulse 81, temperature 98.4 øF (36.9 øC), temperature source Oral, resp. rate 18, height 160 cm (62.99\"), weight (!) 140 kg (308 lb 6.8 oz), last menstrual period 10/01/2023, SpO2 99%.    Lungs:  Normal effort and normal respiratory rate.  She is not in respiratory distress.  There are decreased breath sounds.    Heart: Normal rate.  Regular rhythm.    Chest: (Right anterior chest tube in place)  Abdomen: Abdomen is soft.  Bowel sounds are normal.   There is no abdominal tenderness.     Extremities: There is no dependent edema or local swelling.    Neurological: Patient is alert and oriented to person, place and time.    Skin:  Warm and dry.                Results Review:       I reviewed the patient's new clinical results.    Results from last 7 days   Lab Units 10/07/23  0620 10/06/23  0519 10/05/23  0406 10/04/23  0414 10/03/23  1916 10/03/23  0408 10/02/23  0501 10/01/23  1138   WBC 10*3/mm3 9.62 10.93* 12.91* 12.10*  --  17.47* 24.25* 22.45*   HEMOGLOBIN g/dL 9.8* 9.6* 9.0* 7.8* 8.0* 8.0* 10.7* 10.4*   PLATELETS 10*3/mm3 342 331 329 285  --  319 353 395     Results from last 7 days   Lab Units 10/07/23  0620 10/06/23  0519 10/05/23  0406 10/04/23  0414 10/03/23  0408 10/02/23  2026 10/02/23  0501   SODIUM mmol/L 137 136 142 140 142 142 138   POTASSIUM mmol/L 4.7 4.8 4.9 4.9 4.5 5.2 5.4* "   CHLORIDE mmol/L 98 98 101 99 102 102 100   CO2 mmol/L 22.0 22.5 21.0* 27.3 25.0 29.7* 23.9   BUN mg/dL 78* 66* 58* 41* 31* 27* 16   CREATININE mg/dL 7.25* 6.22* 5.94* 4.64* 2.73* 1.95* 1.08*   CALCIUM mg/dL 8.9 9.3 9.2 9.1 9.0 9.3 9.6   GLUCOSE mg/dL 104* 120* 103* 119* 87 123* 108*                 Scheduled Meds:   cefTRIAXone, 2,000 mg, Intravenous, Q24H  heparin (porcine), 5,000 Units, Subcutaneous, Q8H  hydrALAZINE, 25 mg, Oral, Q8H  insulin lispro, 2-7 Units, Subcutaneous, 4x Daily AC & at Bedtime  labetalol, 100 mg, Oral, Q12H  metroNIDAZOLE, 500 mg, Oral, Q8H  pantoprazole, 40 mg, Oral, Q AM  saccharomyces boulardii, 500 mg, Oral, BID  senna-docusate sodium, 2 tablet, Oral, BID  sevelamer, 1,600 mg, Oral, TID With Meals  sodium chloride, 10 mL, Intravenous, Q12H      PRN Meds:     acetaminophen **OR** acetaminophen **OR** acetaminophen    senna-docusate sodium **AND** polyethylene glycol **AND** bisacodyl **AND** bisacodyl    Calcium Replacement - Follow Nurse / BPA Driven Protocol    dextrose    dextrose    fentaNYL citrate (PF)    glucagon (human recombinant)    hydrALAZINE    influenza vaccine    ipratropium-albuterol    Magnesium Standard Dose Replacement - Follow Nurse / BPA Driven Protocol    nitroglycerin    ondansetron **OR** ondansetron    Phosphorus Replacement - Follow Nurse / BPA Driven Protocol    Potassium Replacement - Follow Nurse / BPA Driven Protocol    sodium chloride    sodium chloride    sodium chloride  Continuous Infusions:       Assessment & Plan   Active Hospital Problems    Diagnosis  POA    **PNA (pneumonia) [J18.9]  Yes    ELLY (acute kidney injury) [N17.9]  Unknown    Obesity, morbid, BMI 50 or higher [E66.01]  Unknown    Anemia [D64.9]  Unknown    Sepsis [A41.9]  Unknown    Type 2 diabetes mellitus with hyperglycemia [E11.65]  Unknown    Acute respiratory failure with hypoxia [J96.01]  Unknown    Hypertensive urgency [I16.0]  Unknown    Abscess of upper lobe of right lung with  pneumonia [J85.1]  Yes      Resolved Hospital Problems    Diagnosis Date Resolved POA    Hyperkalemia [E87.5] 10/03/2023 Unknown       Assessment & Plan    This is a 53-year-old female with a history of morbid obesity, new diabetes of type 2 diabetes and hypertension who presents to the hospital with cough shortness of breath and fevers and is found to have a right upper lobe pulmonary abscess     1.Acute hypoxic respiratory failure with right upper lobe abscess, now off the ventilator and does have a chest tube in place.  Cultures grew Streptococcus and currently patient is on Rocephin/Flagyl.  On 2 L of oxygen and have encouraged her to use incentive spirometry.  2.  Hypertension, on labetalol and currently blood pressure is in systolics of 110s therefore hydralazine is being discontinued.  3.  Diabetes mellitus, hemoglobin A1c 7.5 and currently on corrective dose insulin and blood sugars are reasonably well controlled.  4.  Morbid obesity, counseled to lose weight.  5.  Acute kidney injury, felt to be multifactorial etiology and continues to rise slowly.  Creatinine is 7.25 today and nephrology is following along.  6.  Anemia, iron deficient and will have her follow-up with hematology and GI as an outpatient basis.  No evidence of any active bleeding.  7.  On heparin for DVT prophylaxis.  8.  CODE STATUS is full code.    Estimated discharge date, to be determined.    Rm Riddle MD  [facility]  10/07/23  11:12 EDT

## 2023-10-07 NOTE — PROGRESS NOTES
LOS: 6 days   Patient Care Team:  Provider, No Known as PCP - General    Chief Complaint:  F/up respiratory failure, lung abscess    Subjective   Interval History  I reviewed the admission note, progress notes, PMH, PSH, Family hx, social history, imagings and prior records on this admission, summarized the finding in my note and formulated a transition of care plan.      On 2L O2. She reported significantly improved dyspnea and cough .  SHe did not ise the hospital CPAP last night due to discomfort from the mask.       REVIEW OF SYSTEMS:   CARDIOVASCULAR: No chest pain, chest pressure or chest discomfort. No palpitations or edema.     GASTROINTESTINAL: No anorexia, nausea, vomiting or diarrhea. No abdominal pain.  CONSTITUTIONAL: No fever or chills.     Ventilator/Non-Invasive Ventilation Settings (From admission, onward)       Start     Ordered    10/04/23 1454  NIPPV - Provider Settings  (CPAP / BiPAP)  At Bedtime & As Needed-RT        Question Answer Comment   Indication Sleep Apnea    Type AVAPS/PC/PS    Backup Rate 12    Target VT (mL) 400    EPAP/PEEP (cm H2O) 6    Min Pressure (cm H2O) 10    Max Pressure (cm H2O) 25    Titrate Oxygen for SpO2 90% or Greater        10/04/23 1454    10/03/23 1749  Ventilator - Vent Mode: AC/VC+; Rate: Other; Rate: 18; FiO2: Titrate Per SpO2; Titrate Oxygen for SpO2: 92% or Greater; PEEP: 7.5; Tidal Volume: mL; TV: 500  Continuous,   Status:  Canceled        Question Answer Comment   Vent Mode AC/VC+    Rate Other    Rate 18    FiO2 Titrate Per SpO2    Titrate Oxygen for SpO2 92% or Greater    PEEP 7.5    Tidal Volume mL            10/03/23 1749    10/02/23 1758  Ventilator - Vent Mode: AC/VC+; Rate: Other; Rate: 30; FiO2: Titrate Per SpO2; Titrate Oxygen for SpO2: 92% or Greater; PEEP: 7.5; Tidal Volume: mL; TV: 500  Continuous,   Status:  Canceled        Question Answer Comment   Vent Mode AC/VC+    Rate Other    Rate 30   "  FiO2 Titrate Per SpO2    Titrate Oxygen for SpO2 92% or Greater    PEEP 7.5    Tidal Volume mL            10/02/23 1758    10/02/23 1352  NIPPV (CPAP or BIPAP)  Until Discontinued,   Status:  Canceled        Question Answer Comment   Indication Acute Respiratory Failure    Type BIPAP    IPAP 15    EPAP 8    Titrate Oxygen for SpO2 90% or Greater        10/02/23 1352                          Physical Exam:     Vital Signs  Temp:  [97.6 øF (36.4 øC)-99.3 øF (37.4 øC)] 98.5 øF (36.9 øC)  Heart Rate:  [81-91] 81  Resp:  [18] 18  BP: (111-143)/(64-73) 111/64    Intake/Output Summary (Last 24 hours) at 10/7/2023 1648  Last data filed at 10/7/2023 1520  Gross per 24 hour   Intake 720 ml   Output 1030 ml   Net -310 ml     Flowsheet Rows      Flowsheet Row First Filed Value   Admission Height 160 cm (63\") Documented at 10/01/2023 1047   Admission Weight 152 kg (335 lb) Documented at 10/01/2023 1047            PPE used per hospital policy    General Appearance:   Alert, cooperative, in no acute distress   ENMT:  Mallampati score 3, moist mucous membrane   Eyes:  Pupils equal and reactive to light. EOMI   Neck:   Large. Trachea midline. No thyromegaly.   Lungs:    Diminished air entry bilaterally especially on the RLL. NO wheezing or rales.     Heart:   Regular rhythm and normal rate, normal S1 and S2, no         murmur   Skin:   No rash or ecchymosis   Abdomen:    Obese. Soft. No tenderness. No HSM.   Neuro/psych:  Conscious, alert, oriented x3. Strength 5/5 in upper and lower  ext.  Appropriate mood and affect   Extremities:  No cyanosis, clubbing or edema.  Warm extremities and well-perfused          Results Review:        Results from last 7 days   Lab Units 10/07/23  0620 10/06/23  0519 10/05/23  0406   SODIUM mmol/L 137 136 142   POTASSIUM mmol/L 4.7 4.8 4.9   CHLORIDE mmol/L 98 98 101   CO2 mmol/L 22.0 22.5 21.0*   BUN mg/dL 78* 66* 58*   CREATININE mg/dL 7.25* 6.22* 5.94*   GLUCOSE mg/dL 104* 120* 103* "   CALCIUM mg/dL 8.9 9.3 9.2     Results from last 7 days   Lab Units 10/01/23  1339 10/01/23  1138   HSTROP T ng/L 37* 30*     Results from last 7 days   Lab Units 10/07/23  0620 10/06/23  0519 10/05/23  0406   WBC 10*3/mm3 9.62 10.93* 12.91*   HEMOGLOBIN g/dL 9.8* 9.6* 9.0*   HEMATOCRIT % 30.9* 30.0* 27.9*   PLATELETS 10*3/mm3 342 331 329         Results from last 7 days   Lab Units 10/01/23  1138   PROBNP pg/mL 678.0             Results from last 7 days   Lab Units 10/05/23  0406 10/02/23  0501   CRP mg/dL 11.66* 28.05*       Results from last 7 days   Lab Units 10/04/23  1418 10/04/23  0357 10/03/23  0339 10/02/23  1840 10/02/23  1701 10/02/23  1459 10/02/23  1344   PH, ARTERIAL pH units 7.356 7.376 7.498* 7.309* 7.071* 7.236* 7.074*   PCO2, ARTERIAL mm Hg 45.6* 44.7 34.8* 59.3* 108.4* 66.9* 112.4*   PO2 ART mm Hg 77.8* 127.2* 109.1* 174.3* 90.5 122.2* 96.8   O2 SATURATION ART % 94.7 98.8* 98.7* 99.4* 91.2* 97.8 92.7   FLOW RATE lpm  --   --   --   --   --   --  6.0000   MODALITY  Adult Vent Adult Vent Adult Vent Adult Vent BiPap BiPap Cannula         I reviewed the patient's new clinical results.        Medication Review:   cefTRIAXone, 2,000 mg, Intravenous, Q24H  heparin (porcine), 5,000 Units, Subcutaneous, Q8H  insulin lispro, 2-7 Units, Subcutaneous, 4x Daily AC & at Bedtime  labetalol, 100 mg, Oral, Q12H  metroNIDAZOLE, 500 mg, Oral, Q8H  pantoprazole, 40 mg, Oral, Q AM  saccharomyces boulardii, 500 mg, Oral, BID  senna-docusate sodium, 2 tablet, Oral, BID  sevelamer, 1,600 mg, Oral, TID With Meals  sodium chloride, 10 mL, Intravenous, Q12H             Diagnostic imaging:  I personally and independently reviewed the following images:  CXR 10/7/2023: Loculated right pleural effusion.  Chest tube noted.    Assessment     RUL pulmonary abscess, culture consistent with strep  Loculated right pleural effusion/empyema, s/p chest tube placement 10/3 tPA infusion  Acute hypercapneic and hypoxic respiratory  failure, required mechanical ventilation.  Sepsis due to #1  Super obesity BMI 54  Newly diagnosed uncontrolled diabetes type 2 A1c  7.5%  Newly diagnosed hypertension  7.  Acute kidney injury    Several problems are new to me.    Plan       Chest tube to waterseal per thoracic surgery.  Antibiotics for 6 weeks.  ID following  DVT prophylaxis with heparin subcu.  Bowel regimen.  Avoid narcotics if possible due to hypercapnia.  BiPAP at night for respiratory failure and highly suspected sleep apnea  O2 by NC and titrate to keep SpO2 >90%      Christian Bernal MD  10/07/23  16:48 EDT          This note was dictated utilizing Dragon dictation

## 2023-10-08 ENCOUNTER — APPOINTMENT (OUTPATIENT)
Dept: GENERAL RADIOLOGY | Facility: HOSPITAL | Age: 53
DRG: 871 | End: 2023-10-08
Payer: COMMERCIAL

## 2023-10-08 LAB
ALBUMIN SERPL-MCNC: 2.7 G/DL (ref 3.5–5.2)
ANION GAP SERPL CALCULATED.3IONS-SCNC: 14 MMOL/L (ref 5–15)
BACTERIA SPEC ANAEROBE CULT: NORMAL
BASOPHILS # BLD AUTO: 0.06 10*3/MM3 (ref 0–0.2)
BASOPHILS NFR BLD AUTO: 0.7 % (ref 0–1.5)
BUN SERPL-MCNC: 82 MG/DL (ref 6–20)
BUN/CREAT SERPL: 11.6 (ref 7–25)
CALCIUM SPEC-SCNC: 8.8 MG/DL (ref 8.6–10.5)
CHLORIDE SERPL-SCNC: 99 MMOL/L (ref 98–107)
CO2 SERPL-SCNC: 24 MMOL/L (ref 22–29)
CREAT SERPL-MCNC: 7.05 MG/DL (ref 0.57–1)
DEPRECATED RDW RBC AUTO: 45.5 FL (ref 37–54)
EGFRCR SERPLBLD CKD-EPI 2021: 6.5 ML/MIN/1.73
EOSINOPHIL # BLD AUTO: 0.06 10*3/MM3 (ref 0–0.4)
EOSINOPHIL NFR BLD AUTO: 0.7 % (ref 0.3–6.2)
ERYTHROCYTE [DISTWIDTH] IN BLOOD BY AUTOMATED COUNT: 15 % (ref 12.3–15.4)
GLUCOSE BLDC GLUCOMTR-MCNC: 117 MG/DL (ref 70–130)
GLUCOSE BLDC GLUCOMTR-MCNC: 154 MG/DL (ref 70–130)
GLUCOSE BLDC GLUCOMTR-MCNC: 161 MG/DL (ref 70–130)
GLUCOSE BLDC GLUCOMTR-MCNC: 89 MG/DL (ref 70–130)
GLUCOSE SERPL-MCNC: 112 MG/DL (ref 65–99)
HCT VFR BLD AUTO: 28 % (ref 34–46.6)
HGB BLD-MCNC: 8.9 G/DL (ref 12–15.9)
IMM GRANULOCYTES # BLD AUTO: 0.08 10*3/MM3 (ref 0–0.05)
IMM GRANULOCYTES NFR BLD AUTO: 0.9 % (ref 0–0.5)
LYMPHOCYTES # BLD AUTO: 0.56 10*3/MM3 (ref 0.7–3.1)
LYMPHOCYTES NFR BLD AUTO: 6.4 % (ref 19.6–45.3)
MAGNESIUM SERPL-MCNC: 2.6 MG/DL (ref 1.6–2.6)
MCH RBC QN AUTO: 26.6 PG (ref 26.6–33)
MCHC RBC AUTO-ENTMCNC: 31.8 G/DL (ref 31.5–35.7)
MCV RBC AUTO: 83.8 FL (ref 79–97)
MONOCYTES # BLD AUTO: 0.5 10*3/MM3 (ref 0.1–0.9)
MONOCYTES NFR BLD AUTO: 5.7 % (ref 5–12)
NEUTROPHILS NFR BLD AUTO: 7.54 10*3/MM3 (ref 1.7–7)
NEUTROPHILS NFR BLD AUTO: 85.6 % (ref 42.7–76)
NRBC BLD AUTO-RTO: 0 /100 WBC (ref 0–0.2)
PHOSPHATE SERPL-MCNC: 11.6 MG/DL (ref 2.5–4.5)
PLATELET # BLD AUTO: 303 10*3/MM3 (ref 140–450)
PMV BLD AUTO: 10.9 FL (ref 6–12)
POTASSIUM SERPL-SCNC: 4.7 MMOL/L (ref 3.5–5.2)
RBC # BLD AUTO: 3.34 10*6/MM3 (ref 3.77–5.28)
SODIUM SERPL-SCNC: 137 MMOL/L (ref 136–145)
URATE SERPL-MCNC: 11.9 MG/DL (ref 2.4–5.7)
WBC NRBC COR # BLD: 8.8 10*3/MM3 (ref 3.4–10.8)

## 2023-10-08 PROCEDURE — 25010000002 CEFTRIAXONE PER 250 MG: Performed by: INTERNAL MEDICINE

## 2023-10-08 PROCEDURE — 25010000002 HEPARIN (PORCINE) PER 1000 UNITS: Performed by: INTERNAL MEDICINE

## 2023-10-08 PROCEDURE — 85025 COMPLETE CBC W/AUTO DIFF WBC: CPT | Performed by: INTERNAL MEDICINE

## 2023-10-08 PROCEDURE — 99233 SBSQ HOSP IP/OBS HIGH 50: CPT | Performed by: INTERNAL MEDICINE

## 2023-10-08 PROCEDURE — 63710000001 INSULIN LISPRO (HUMAN) PER 5 UNITS: Performed by: INTERNAL MEDICINE

## 2023-10-08 PROCEDURE — 71045 X-RAY EXAM CHEST 1 VIEW: CPT

## 2023-10-08 PROCEDURE — 82948 REAGENT STRIP/BLOOD GLUCOSE: CPT

## 2023-10-08 PROCEDURE — 99232 SBSQ HOSP IP/OBS MODERATE 35: CPT | Performed by: SURGERY

## 2023-10-08 PROCEDURE — 80069 RENAL FUNCTION PANEL: CPT | Performed by: INTERNAL MEDICINE

## 2023-10-08 PROCEDURE — 83735 ASSAY OF MAGNESIUM: CPT | Performed by: INTERNAL MEDICINE

## 2023-10-08 PROCEDURE — 84550 ASSAY OF BLOOD/URIC ACID: CPT | Performed by: INTERNAL MEDICINE

## 2023-10-08 RX ADMIN — SEVELAMER CARBONATE 1600 MG: 800 TABLET, FILM COATED ORAL at 08:00

## 2023-10-08 RX ADMIN — SEVELAMER CARBONATE 1600 MG: 800 TABLET, FILM COATED ORAL at 18:00

## 2023-10-08 RX ADMIN — ACETAMINOPHEN 650 MG: 325 TABLET, FILM COATED ORAL at 21:40

## 2023-10-08 RX ADMIN — Medication 10 ML: at 10:00

## 2023-10-08 RX ADMIN — CEFTRIAXONE SODIUM 2000 MG: 2 INJECTION, POWDER, FOR SOLUTION INTRAMUSCULAR; INTRAVENOUS at 10:14

## 2023-10-08 RX ADMIN — INSULIN LISPRO 2 UNITS: 100 INJECTION, SOLUTION INTRAVENOUS; SUBCUTANEOUS at 12:00

## 2023-10-08 RX ADMIN — HEPARIN SODIUM 5000 UNITS: 5000 INJECTION INTRAVENOUS; SUBCUTANEOUS at 06:53

## 2023-10-08 RX ADMIN — Medication 500 MG: at 21:40

## 2023-10-08 RX ADMIN — METRONIDAZOLE 500 MG: 500 TABLET, FILM COATED ORAL at 06:53

## 2023-10-08 RX ADMIN — SEVELAMER CARBONATE 1600 MG: 800 TABLET, FILM COATED ORAL at 12:00

## 2023-10-08 RX ADMIN — HEPARIN SODIUM 5000 UNITS: 5000 INJECTION INTRAVENOUS; SUBCUTANEOUS at 21:41

## 2023-10-08 RX ADMIN — PANTOPRAZOLE SODIUM 40 MG: 40 TABLET, DELAYED RELEASE ORAL at 06:53

## 2023-10-08 RX ADMIN — LABETALOL HYDROCHLORIDE 100 MG: 100 TABLET, FILM COATED ORAL at 10:00

## 2023-10-08 RX ADMIN — LABETALOL HYDROCHLORIDE 100 MG: 100 TABLET, FILM COATED ORAL at 21:40

## 2023-10-08 RX ADMIN — Medication 500 MG: at 10:00

## 2023-10-08 RX ADMIN — METRONIDAZOLE 500 MG: 500 TABLET, FILM COATED ORAL at 21:40

## 2023-10-08 RX ADMIN — INSULIN LISPRO 2 UNITS: 100 INJECTION, SOLUTION INTRAVENOUS; SUBCUTANEOUS at 18:00

## 2023-10-08 RX ADMIN — HEPARIN SODIUM 5000 UNITS: 5000 INJECTION INTRAVENOUS; SUBCUTANEOUS at 14:54

## 2023-10-08 RX ADMIN — Medication 10 ML: at 21:41

## 2023-10-08 RX ADMIN — ACETAMINOPHEN 650 MG: 325 TABLET, FILM COATED ORAL at 14:56

## 2023-10-08 RX ADMIN — METRONIDAZOLE 500 MG: 500 TABLET, FILM COATED ORAL at 14:54

## 2023-10-08 NOTE — PLAN OF CARE
Goal Outcome Evaluation:      VSS. 2L O2 NC. Ax1 to BSC. R CT to waterseal, no airleak, no fluctuation, no SubQ air. Strict I&Os. PRN tylenol x1. Pt up in chair, more comfortable than bed per pt.

## 2023-10-08 NOTE — PLAN OF CARE
Problem: Adult Inpatient Plan of Care  Goal: Patient-Specific Goal (Individualized)  Outcome: Ongoing, Progressing  Goal: Absence of Hospital-Acquired Illness or Injury  Outcome: Ongoing, Progressing  Intervention: Identify and Manage Fall Risk  Recent Flowsheet Documentation  Taken 10/7/2023 1800 by Tracee Becker RN  Safety Promotion/Fall Prevention:   activity supervised   assistive device/personal items within reach   clutter free environment maintained   fall prevention program maintained   nonskid shoes/slippers when out of bed   safety round/check completed  Taken 10/7/2023 1600 by Tracee Becker RN  Safety Promotion/Fall Prevention:   activity supervised   assistive device/personal items within reach   clutter free environment maintained   fall prevention program maintained   nonskid shoes/slippers when out of bed  Taken 10/7/2023 1400 by Tracee Becker RN  Safety Promotion/Fall Prevention:   activity supervised   assistive device/personal items within reach   clutter free environment maintained   fall prevention program maintained   nonskid shoes/slippers when out of bed   safety round/check completed  Taken 10/7/2023 1200 by Tracee Becker RN  Safety Promotion/Fall Prevention:   assistive device/personal items within reach   activity supervised   clutter free environment maintained   fall prevention program maintained   nonskid shoes/slippers when out of bed  Taken 10/7/2023 1000 by Tracee Becker RN  Safety Promotion/Fall Prevention:   activity supervised   assistive device/personal items within reach   clutter free environment maintained   fall prevention program maintained   nonskid shoes/slippers when out of bed   safety round/check completed  Intervention: Prevent Skin Injury  Recent Flowsheet Documentation  Taken 10/7/2023 1400 by Tracee Becker RN  Skin Protection:   adhesive use limited   tubing/devices free from skin contact  Intervention: Prevent and Manage VTE (Venous  Thromboembolism) Risk  Recent Flowsheet Documentation  Taken 10/7/2023 1800 by Tracee Becker RN  Activity Management: activity encouraged  Taken 10/7/2023 1600 by Tracee Becker RN  Activity Management: activity encouraged  Taken 10/7/2023 1200 by Tracee Becker RN  Activity Management: activity encouraged  Taken 10/7/2023 1000 by Tracee Becker RN  Activity Management: activity encouraged  Intervention: Prevent Infection  Recent Flowsheet Documentation  Taken 10/7/2023 1800 by Tracee Becker RN  Infection Prevention: single patient room provided  Taken 10/7/2023 1600 by Tracee Becker RN  Infection Prevention: single patient room provided  Taken 10/7/2023 1400 by Tracee Becker RN  Infection Prevention: single patient room provided  Taken 10/7/2023 1200 by Tracee Becker RN  Infection Prevention: single patient room provided  Taken 10/7/2023 1000 by Tracee Becker RN  Infection Prevention: single patient room provided  Taken 10/7/2023 0800 by Tracee Becker RN  Infection Prevention: single patient room provided  Goal: Optimal Comfort and Wellbeing  Outcome: Ongoing, Progressing  Intervention: Provide Person-Centered Care  Recent Flowsheet Documentation  Taken 10/7/2023 1400 by Tracee Becker RN  Trust Relationship/Rapport:   care explained   choices provided  Taken 10/7/2023 0800 by Tracee Becker RN  Trust Relationship/Rapport:   care explained   choices provided  Goal: Readiness for Transition of Care  Outcome: Ongoing, Progressing  Goal: Plan of Care Review  Outcome: Ongoing, Progressing  Goal: Patient-Specific Goal (Individualized)  Outcome: Ongoing, Progressing  Goal: Absence of Hospital-Acquired Illness or Injury  Outcome: Ongoing, Progressing  Intervention: Identify and Manage Fall Risk  Recent Flowsheet Documentation  Taken 10/7/2023 1800 by Tracee Becker RN  Safety Promotion/Fall Prevention:   activity supervised   assistive device/personal items within reach   clutter free  environment maintained   fall prevention program maintained   nonskid shoes/slippers when out of bed   safety round/check completed  Taken 10/7/2023 1600 by Tracee Becker RN  Safety Promotion/Fall Prevention:   activity supervised   assistive device/personal items within reach   clutter free environment maintained   fall prevention program maintained   nonskid shoes/slippers when out of bed  Taken 10/7/2023 1400 by Tracee Becker RN  Safety Promotion/Fall Prevention:   activity supervised   assistive device/personal items within reach   clutter free environment maintained   fall prevention program maintained   nonskid shoes/slippers when out of bed   safety round/check completed  Taken 10/7/2023 1200 by Tracee Becker RN  Safety Promotion/Fall Prevention:   assistive device/personal items within reach   activity supervised   clutter free environment maintained   fall prevention program maintained   nonskid shoes/slippers when out of bed  Taken 10/7/2023 1000 by Tracee Becker RN  Safety Promotion/Fall Prevention:   activity supervised   assistive device/personal items within reach   clutter free environment maintained   fall prevention program maintained   nonskid shoes/slippers when out of bed   safety round/check completed  Intervention: Prevent Skin Injury  Recent Flowsheet Documentation  Taken 10/7/2023 1400 by Tracee Becker RN  Skin Protection:   adhesive use limited   tubing/devices free from skin contact  Intervention: Prevent and Manage VTE (Venous Thromboembolism) Risk  Recent Flowsheet Documentation  Taken 10/7/2023 1800 by Tracee Becker RN  Activity Management: activity encouraged  Taken 10/7/2023 1600 by Tracee Becker RN  Activity Management: activity encouraged  Taken 10/7/2023 1200 by Tracee Becker RN  Activity Management: activity encouraged  Taken 10/7/2023 1000 by Tracee Becker RN  Activity Management: activity encouraged  Intervention: Prevent Infection  Recent Flowsheet  Documentation  Taken 10/7/2023 1800 by Tracee Becker RN  Infection Prevention: single patient room provided  Taken 10/7/2023 1600 by Tracee Becker RN  Infection Prevention: single patient room provided  Taken 10/7/2023 1400 by Tracee Becker RN  Infection Prevention: single patient room provided  Taken 10/7/2023 1200 by Tracee Becker RN  Infection Prevention: single patient room provided  Taken 10/7/2023 1000 by Tracee Becker RN  Infection Prevention: single patient room provided  Taken 10/7/2023 0800 by Tracee Becker RN  Infection Prevention: single patient room provided  Goal: Optimal Comfort and Wellbeing  Outcome: Ongoing, Progressing  Intervention: Provide Person-Centered Care  Recent Flowsheet Documentation  Taken 10/7/2023 1400 by Tracee Becker RN  Trust Relationship/Rapport:   care explained   choices provided  Taken 10/7/2023 0800 by Tracee Becker RN  Trust Relationship/Rapport:   care explained   choices provided  Goal: Readiness for Transition of Care  Outcome: Ongoing, Progressing   Goal Outcome Evaluation:  Plan of Care Reviewed With: patient        Progress: improving

## 2023-10-08 NOTE — PROGRESS NOTES
ID NOTE    CC: f/u pulmonary abscess due to Strep    Subj: Overall she seems to doing well.  Still having fevers chills or night sweats.  No significant pain some tenderness from the drain.      Objective   Vital Signs   Temp:  [98.2 øF (36.8 øC)-98.6 øF (37 øC)] 98.6 øF (37 øC)  Heart Rate:  [83-88] 83  Resp:  [18-20] 18  BP: (111-150)/(59-80) 129/80    Physical Exam:   General: awake, alert, very nice, in chair  Eyes: no scleral icterus  ENT: ETT in place  Cardiovascular: NR  Respiratory: R rales are better; R chest drain in place  GI: Abdomen is soft, not tender  Skin: No rashes  Vasc: LIJ CVC removed    Labs:   White count 8.8, hemoglobin 8.9, platelet 303  Creatinine 7.05  Glucose 112 through 142      Urine Eos negative  HIV negative  Hep C negative  Procal 0.59    Microbiology:  10/1 RPP: negative  10/1 BCx: negative  10/1 MRSA nares: negative  10/2 BAL Cx: normal annabel  10/3 ETT Cx: normal annabel  10/3 Lung Abscess Cx: Group G Strep    Chest x-ray independently interpreted: Chest tube in place with stable to improved right-sided density    ASSESSMENT/PLAN:  Right upper lobe pulmonary abscess due to strep  Acute hypercapneic and hypoxic respiratory failure, improving  Sepsis due to #1  Super obesity BMI 54  Uncontrolled diabetes type 2 A1c  7.5%  Hypertension  7.  Acute kidney injury      On 10/3/23, she had an IR-guided CT chest tube placement. The culture is growing Group G Strep. The anaerobic culture is no growth to date. Continue ceftriaxone 2 g IV q24h and Flagyl 500 mg IV q8h for now. I do anticipate switching her to an oral antibiotic at discharge with plans to complete a 6-week course w/ stop date ~11/14/23.     Creatinine stable.  Nephrology is following, no indication of dialysis.  Antibiotics are dosed appropriately for renal dysfunction    I'll check a BMP daily for monitoring. Thanks to nephrologist for their assistance.

## 2023-10-08 NOTE — PROGRESS NOTES
"                                              LOS: 7 days   Patient Care Team:  Provider, No Known as PCP - General    Chief Complaint:  F/up respiratory failure, lung abscess    Subjective   Interval History      On 2L O2. She reported minimal cough without sputum production.  No dyspnea at rest.      REVIEW OF SYSTEMS:   CARDIOVASCULAR: No chest pain, chest pressure or chest discomfort. No palpitations or edema.     GASTROINTESTINAL: No anorexia, nausea, vomiting or diarrhea. No abdominal pain.  CONSTITUTIONAL: No fever or chills.     Ventilator/Non-Invasive Ventilation Settings (From admission, onward)             Physical Exam:     Vital Signs  Temp:  [98.2 øF (36.8 øC)-98.6 øF (37 øC)] 98.6 øF (37 øC)  Heart Rate:  [80-88] 82  Resp:  [18-20] 18  BP: (120-150)/(59-80) 133/78    Intake/Output Summary (Last 24 hours) at 10/8/2023 1631  Last data filed at 10/8/2023 1300  Gross per 24 hour   Intake 580 ml   Output 1410 ml   Net -830 ml     Flowsheet Rows      Flowsheet Row First Filed Value   Admission Height 160 cm (63\") Documented at 10/01/2023 1047   Admission Weight 152 kg (335 lb) Documented at 10/01/2023 1047            PPE used per hospital policy    General Appearance:   Alert, cooperative, in no acute distress   ENMT:  Mallampati score 3, moist mucous membrane   Eyes:  Pupils equal and reactive to light. EOMI   Neck:   Large. Trachea midline. No thyromegaly.   Lungs:    Diminished air entry bilaterally especially on the RLL. NO wheezing or rales.     Heart:   Regular rhythm and normal rate, normal S1 and S2, no         murmur   Skin:   No rash or ecchymosis   Abdomen:    Obese. Soft. No tenderness. No HSM.   Neuro/psych:  Conscious, alert, oriented x3. Strength 5/5 in upper and lower  ext.  Appropriate mood and affect   Extremities:  No cyanosis, clubbing or edema.  Warm extremities and well-perfused          Results Review:        Results from last 7 days   Lab Units 10/08/23  0628 10/07/23  0620 " 10/06/23  0519   SODIUM mmol/L 137 137 136   POTASSIUM mmol/L 4.7 4.7 4.8   CHLORIDE mmol/L 99 98 98   CO2 mmol/L 24.0 22.0 22.5   BUN mg/dL 82* 78* 66*   CREATININE mg/dL 7.05* 7.25* 6.22*   GLUCOSE mg/dL 112* 104* 120*   CALCIUM mg/dL 8.8 8.9 9.3           Results from last 7 days   Lab Units 10/08/23  0628 10/07/23  0620 10/06/23  0519   WBC 10*3/mm3 8.80 9.62 10.93*   HEMOGLOBIN g/dL 8.9* 9.8* 9.6*   HEMATOCRIT % 28.0* 30.9* 30.0*   PLATELETS 10*3/mm3 303 342 331                       Results from last 7 days   Lab Units 10/05/23  0406 10/02/23  0501   CRP mg/dL 11.66* 28.05*       Results from last 7 days   Lab Units 10/04/23  1418 10/04/23  0357 10/03/23  0339 10/02/23  1840 10/02/23  1701 10/02/23  1459 10/02/23  1344   PH, ARTERIAL pH units 7.356 7.376 7.498* 7.309* 7.071* 7.236* 7.074*   PCO2, ARTERIAL mm Hg 45.6* 44.7 34.8* 59.3* 108.4* 66.9* 112.4*   PO2 ART mm Hg 77.8* 127.2* 109.1* 174.3* 90.5 122.2* 96.8   O2 SATURATION ART % 94.7 98.8* 98.7* 99.4* 91.2* 97.8 92.7   FLOW RATE lpm  --   --   --   --   --   --  6.0000   MODALITY  Adult Vent Adult Vent Adult Vent Adult Vent BiPap BiPap Cannula         I reviewed the patient's new clinical results.        Medication Review:   cefTRIAXone, 2,000 mg, Intravenous, Q24H  heparin (porcine), 5,000 Units, Subcutaneous, Q8H  insulin lispro, 2-7 Units, Subcutaneous, 4x Daily AC & at Bedtime  labetalol, 100 mg, Oral, Q12H  metroNIDAZOLE, 500 mg, Oral, Q8H  pantoprazole, 40 mg, Oral, Q AM  saccharomyces boulardii, 500 mg, Oral, BID  senna-docusate sodium, 2 tablet, Oral, BID  sevelamer, 1,600 mg, Oral, TID With Meals  sodium chloride, 10 mL, Intravenous, Q12H             Diagnostic imaging:  I personally and independently reviewed the following images:  CXR 10/7/2023: Loculated right pleural effusion.  Chest tube noted.  CXR 10/8/2023: Cardiomegaly.  Possible pulmonary vascular congestion but difficult to interpret due to body habitus.    Assessment     RUL pulmonary  abscess, culture consistent with strep  Loculated right pleural effusion/empyema, s/p chest tube placement 10/3 tPA infusion  Acute hypercapneic and hypoxic respiratory failure, required mechanical ventilation.  Sepsis due to #1  Super obesity BMI 54  Newly diagnosed uncontrolled diabetes type 2 A1c  7.5%  Newly diagnosed hypertension  Acute kidney injury  Probable CRYSTAL: Snoring, apnea and desaturation at night.      Plan       Chest tube discontinued.  Antibiotics for 6 weeks.  ID following  DVT prophylaxis with heparin subcu.  Bowel regimen.  Avoid narcotics if possible due to hypercapnia.  BiPAP at night for respiratory failure and highly suspected sleep apnea.  Not being used consistently at the hospital.  We will ordered outpatient PSG for probable CRYSTAL.  Not a candidate for HST due to morbid obesity.  O2 by NC and titrate to keep SpO2 >90%    Dispo: Okay to discharge from pulmonary perspective.    Christian Bernal MD  10/08/23  16:31 EDT          This note was dictated utilizing [employer] dictation

## 2023-10-08 NOTE — PROGRESS NOTES
[facility] Progress Note      Patient Name: Nay Gonzalez  : 1970  MRN: [id number]  Primary Care Physician:  Provider, No Known  Date of admission: 10/1/2023    Subjective     Interval History:   Follow-up acute kidney injury    Urine output up to 1910 cc, patient is feeling much better, no nausea or vomiting, no chest pain, no shortness of air, no orthopnea or PND, no lightheadedness, blood pressure reasonably controlled.    Review of Systems:   As noted above    Objective     Vitals:   Temp:  [98.2 øF (36.8 øC)-98.6 øF (37 øC)] 98.6 øF (37 øC)  Heart Rate:  [83-88] 83  Resp:  [18-20] 18  BP: (111-150)/(59-80) 129/80  Flow (L/min):  [2] 2    Intake/Output Summary (Last 24 hours) at 10/8/2023 0826  Last data filed at 10/8/2023 0655  Gross per 24 hour   Intake 240 ml   Output 1910 ml   Net -1670 ml       Physical Exam:    General Appearance: Morbidly obese, awake and alert, chronically ill, no acute distress  Skin: warm and dry  HEENT: She has core track in place  Neck: Difficult to assess due to body habitus  Lungs: Bilateral rhonchi, breathing effort not she had right-sided chest tube  Heart: RRR, normal S1 and S2, no rub  Abdomen: soft, no guarding, protuberant, normoactive bowel  : No palpable bladder  Extremities: no edema, cyanosis or clubbing  Neuro: Normal speech and mental status and moving all extremities    Scheduled Meds:     cefTRIAXone, 2,000 mg, Intravenous, Q24H  heparin (porcine), 5,000 Units, Subcutaneous, Q8H  insulin lispro, 2-7 Units, Subcutaneous, 4x Daily AC & at Bedtime  labetalol, 100 mg, Oral, Q12H  metroNIDAZOLE, 500 mg, Oral, Q8H  pantoprazole, 40 mg, Oral, Q AM  saccharomyces boulardii, 500 mg, Oral, BID  senna-docusate sodium, 2 tablet, Oral, BID  sevelamer, 1,600 mg, Oral, TID With Meals  sodium chloride, 10 mL, Intravenous, Q12H      IV Meds:          Results Reviewed:   I have personally reviewed the results from the time of this admission  to 10/8/2023 08:26 EDT     Results from last 7 days   Lab Units 10/08/23  0628 10/07/23  0620 10/06/23  0519 10/05/23  0406 10/04/23  0414   SODIUM mmol/L 137 137 136 142 140   POTASSIUM mmol/L 4.7 4.7 4.8 4.9 4.9   CHLORIDE mmol/L 99 98 98 101 99   CO2 mmol/L 24.0 22.0 22.5 21.0* 27.3   BUN mg/dL 82* 78* 66* 58* 41*   CREATININE mg/dL 7.05* 7.25* 6.22* 5.94* 4.64*   CALCIUM mg/dL 8.8 8.9 9.3 9.2 9.1   BILIRUBIN mg/dL  --   --  0.2 0.3 0.3   ALK PHOS U/L  --   --  97 118* 114   ALT (SGPT) U/L  --   --  18 16 17   AST (SGOT) U/L  --   --  20 15 10   GLUCOSE mg/dL 112* 104* 120* 103* 119*       Estimated Creatinine Clearance: 12.9 mL/min (A) (by C-G formula based on SCr of 7.05 mg/dL (H)).    Results from last 7 days   Lab Units 10/08/23  0628 10/07/23  0620 10/06/23  0519   MAGNESIUM mg/dL 2.6 3.0* 2.8*   PHOSPHORUS mg/dL 11.6* 12.1* 12.2*       Results from last 7 days   Lab Units 10/08/23  0628 10/07/23  0620 10/06/23  0519 10/05/23  0406 10/04/23  0414 10/03/23  0408   URIC ACID mg/dL 11.9* 11.5* 11.9* 10.5* 9.9* 9.4*       Results from last 7 days   Lab Units 10/08/23  0628 10/07/23  0620 10/06/23  0519 10/05/23  0406 10/04/23  0414   WBC 10*3/mm3 8.80 9.62 10.93* 12.91* 12.10*   HEMOGLOBIN g/dL 8.9* 9.8* 9.6* 9.0* 7.8*   PLATELETS 10*3/mm3 303 342 331 329 285             Assessment / Plan     ASSESSMENT:  Acute kidney injury associated with sudden correction of her hypertension initially also possible component of contrast-induced nephropathy because patient had CTA on 10/1/2023.  Creatinine today is 7.05 patient is euvolemic, electrolyte within acceptable range and has increased urine output I believe she is entering in the recovery phase of her ATN, also her proteinuria improved down from 2111 mg/g to 581.4 mg/g  New diagnosis of hypertension, blood pressure with acceptable control  New diagnosis of diabetes mellitus type 2  Super morbid obesity  Anemia, the etiology not very clear, most likely patient have  iron deficiency iron saturation 13% but her TIBC is on the low side, suggestive of anemia of chronic disease too.  Hemoglobin today 8.9   Severe hyperphosphatemia associated with acute kidney injury phosphorus 11.9, was started on sevelamer    PLAN:  Continue the same treatment  No indication for dialysis today  Surveillance labs    I discussed the case with the patient and she voiced good understanding  I reviewed the chart and other providers notes, I reviewed imaging and lab data.  Copied text in this note has been reviewed and is accurate as of 10/08/23.       Thank you for involving us in the care of Nay Gonzalez.  Please feel free to call with any questions.    Iain Owens MD  10/08/23  08:26 EDT    [facility]  [phone]    Please note that portions of this note were completed with a voice recognition program.

## 2023-10-08 NOTE — PROGRESS NOTES
Progress note    Reason for consult: Right empyema thoracis    No acute issues overnight.  Pain well controlled.  In good spirits.  Feeling better.  Denies resting shortness of breath.  Sitting in chair comfortably.    Afebrile.  Hemodynamically stable.  Nonlabored breathing.  On 2 LPM nasal cannula, saturating 100%.  Chest tube with serous drainage, no air leak.  Extremities warm.  Lower extremity edematous.  Alert, oriented x3.    Imaging reviewed.  X-ray showed adequate lung expansion.    Patient presented with empyema thoracis that responded well to chest tube placement and tPA x3.  Chest tube transition to waterseal yesterday.  X-ray showed adequate lung expansion and chest tube was removed today.  She will need 6 weeks antibiotics for empyema thoracis.  She can be discharged from thoracic surgery standpoint.  She will follow-up in our clinic in 2 to 3 weeks.  Rest of care per primary team.    Please call thoracic surgery with questions/concerns.    Lonnie Brantley MD  Thoracic surgeon

## 2023-10-08 NOTE — PROGRESS NOTES
" LOS: 7 days     Name: Nay Gonzalez  Age: 53 y.o.  Sex: female  :  1970  MRN: [id number]         Primary Care Physician: Provider, No Known    Subjective   Subjective    Patient is lying on the bed and does not appear to be any distress.  Denies nausea, vomiting, abdominal pain, chest pain, shortness of breath.    Objective   Vital Signs  Temp:  [98.2 øF (36.8 øC)-98.6 øF (37 øC)] 98.5 øF (36.9 øC)  Heart Rate:  [83-88] 84  Resp:  [18-20] 18  BP: (111-150)/(59-80) 120/72  Body mass index is 54.65 kg/mý.    Objective:  General Appearance:  Comfortable and in no acute distress.    Vital signs: (most recent): Blood pressure 120/72, pulse 84, temperature 98.5 øF (36.9 øC), temperature source Oral, resp. rate 18, height 160 cm (62.99\"), weight (!) 140 kg (308 lb 6.8 oz), last menstrual period 10/01/2023, SpO2 98%.    Lungs:  Normal effort and normal respiratory rate.  She is not in respiratory distress.  There are decreased breath sounds.    Heart: Normal rate.  Regular rhythm.    Chest: (Right anterior chest tube in place)  Abdomen: Abdomen is soft.  Bowel sounds are normal.   There is no abdominal tenderness.     Extremities: There is no dependent edema or local swelling.    Neurological: Patient is alert and oriented to person, place and time.    Skin:  Warm and dry.                Results Review:       I reviewed the patient's new clinical results.    Results from last 7 days   Lab Units 10/08/23  0628 10/07/23  0620 10/06/23  0519 10/05/23  0406 10/04/23  0414 10/03/23  1916 10/03/23  0408 10/02/23  0501   WBC 10*3/mm3 8.80 9.62 10.93* 12.91* 12.10*  --  17.47* 24.25*   HEMOGLOBIN g/dL 8.9* 9.8* 9.6* 9.0* 7.8* 8.0* 8.0* 10.7*   PLATELETS 10*3/mm3 303 342 331 329 285  --  319 353     Results from last 7 days   Lab Units 10/08/23  0628 10/07/23  0620 10/06/23  0519 10/05/23  0406 10/04/23  0414 10/03/23  0408 10/02/23  2026   SODIUM mmol/L 137 137 136 142 140 142 142   POTASSIUM mmol/L 4.7 4.7 4.8 4.9 4.9 " 4.5 5.2   CHLORIDE mmol/L 99 98 98 101 99 102 102   CO2 mmol/L 24.0 22.0 22.5 21.0* 27.3 25.0 29.7*   BUN mg/dL 82* 78* 66* 58* 41* 31* 27*   CREATININE mg/dL 7.05* 7.25* 6.22* 5.94* 4.64* 2.73* 1.95*   CALCIUM mg/dL 8.8 8.9 9.3 9.2 9.1 9.0 9.3   GLUCOSE mg/dL 112* 104* 120* 103* 119* 87 123*                 Scheduled Meds:   cefTRIAXone, 2,000 mg, Intravenous, Q24H  heparin (porcine), 5,000 Units, Subcutaneous, Q8H  insulin lispro, 2-7 Units, Subcutaneous, 4x Daily AC & at Bedtime  labetalol, 100 mg, Oral, Q12H  metroNIDAZOLE, 500 mg, Oral, Q8H  pantoprazole, 40 mg, Oral, Q AM  saccharomyces boulardii, 500 mg, Oral, BID  senna-docusate sodium, 2 tablet, Oral, BID  sevelamer, 1,600 mg, Oral, TID With Meals  sodium chloride, 10 mL, Intravenous, Q12H      PRN Meds:     acetaminophen **OR** acetaminophen **OR** acetaminophen    senna-docusate sodium **AND** polyethylene glycol **AND** bisacodyl **AND** bisacodyl    Calcium Replacement - Follow Nurse / BPA Driven Protocol    dextrose    dextrose    fentaNYL citrate (PF)    glucagon (human recombinant)    hydrALAZINE    influenza vaccine    ipratropium-albuterol    Magnesium Standard Dose Replacement - Follow Nurse / BPA Driven Protocol    nitroglycerin    ondansetron **OR** ondansetron    Phosphorus Replacement - Follow Nurse / BPA Driven Protocol    Potassium Replacement - Follow Nurse / BPA Driven Protocol    sodium chloride    sodium chloride    sodium chloride  Continuous Infusions:       Assessment & Plan   Active Hospital Problems    Diagnosis  POA    **PNA (pneumonia) [J18.9]  Yes    ELLY (acute kidney injury) [N17.9]  Unknown    Obesity, morbid, BMI 50 or higher [E66.01]  Unknown    Anemia [D64.9]  Unknown    Sepsis [A41.9]  Unknown    Type 2 diabetes mellitus with hyperglycemia [E11.65]  Unknown    Acute respiratory failure with hypoxia [J96.01]  Unknown    Hypertensive urgency [I16.0]  Unknown    Abscess of upper lobe of right lung with pneumonia [J85.1]  Yes       Resolved Hospital Problems    Diagnosis Date Resolved POA    Hyperkalemia [E87.5] 10/03/2023 Unknown       Assessment & Plan    This is a 53-year-old female with a history of morbid obesity, new diabetes of type 2 diabetes and hypertension who presents to the hospital with cough shortness of breath and fevers and is found to have a right upper lobe pulmonary abscess     1.Acute hypoxic respiratory failure with right upper lobe abscess, now off the ventilator and chest tube has been removed today 10/8/2023.  Cultures grew Streptococcus and currently patient is on Rocephin/Flagyl.  On 2 L of oxygen and have encouraged her to use incentive spirometry.    2.  Hypertension, on labetalol and currently blood pressure is in systolics of 110s therefore hydralazine is being discontinued.    3.  Diabetes mellitus, hemoglobin A1c 7.5 and currently on corrective dose insulin and blood sugars are reasonably well controlled.    4.  Morbid obesity, counseled to lose weight.    5.  Acute kidney injury, felt to be multifactorial etiology and did peak at 7.25 and appears to have plateaued.  Hopefully renal function is starting to recover and nephrology is following along as well.    6.  Anemia, iron deficient and will have her follow-up with hematology and GI as an outpatient basis.  No evidence of any active bleeding.    7.  On heparin for DVT prophylaxis.    8.  CODE STATUS is full code.    Estimated discharge date, to be determined.      Copied text on this note has been reviewed by me on 10/8/2023        Rm Riddle MD  [facility]  10/08/23  11:12 EDT

## 2023-10-09 ENCOUNTER — APPOINTMENT (OUTPATIENT)
Dept: GENERAL RADIOLOGY | Facility: HOSPITAL | Age: 53
DRG: 871 | End: 2023-10-09
Payer: COMMERCIAL

## 2023-10-09 LAB
ALBUMIN SERPL-MCNC: 2.8 G/DL (ref 3.5–5.2)
ANION GAP SERPL CALCULATED.3IONS-SCNC: 14.7 MMOL/L (ref 5–15)
ARTERIAL PATENCY WRIST A: POSITIVE
ARTERIAL PATENCY WRIST A: POSITIVE
ATMOSPHERIC PRESS: 753 MMHG
ATMOSPHERIC PRESS: 753.8 MMHG
BASE EXCESS BLDA CALC-SCNC: -0.1 MMOL/L (ref 0–2)
BASE EXCESS BLDA CALC-SCNC: -2.7 MMOL/L (ref 0–2)
BASOPHILS # BLD AUTO: 0.05 10*3/MM3 (ref 0–0.2)
BASOPHILS NFR BLD AUTO: 0.6 % (ref 0–1.5)
BDY SITE: ABNORMAL
BDY SITE: ABNORMAL
BUN SERPL-MCNC: 73 MG/DL (ref 6–20)
BUN/CREAT SERPL: 11.6 (ref 7–25)
CALCIUM SPEC-SCNC: 8.7 MG/DL (ref 8.6–10.5)
CHLORIDE SERPL-SCNC: 97 MMOL/L (ref 98–107)
CO2 BLDA-SCNC: 33.3 MMOL/L (ref 23–27)
CO2 BLDA-SCNC: >36.08 MMOL/L (ref 23–27)
CO2 SERPL-SCNC: 22.3 MMOL/L (ref 22–29)
CREAT SERPL-MCNC: 6.31 MG/DL (ref 0.57–1)
DEPRECATED RDW RBC AUTO: 44.9 FL (ref 37–54)
DEVICE COMMENT: ABNORMAL
DEVICE COMMENT: ABNORMAL
EGFRCR SERPLBLD CKD-EPI 2021: 7.4 ML/MIN/1.73
EOSINOPHIL # BLD AUTO: 0.11 10*3/MM3 (ref 0–0.4)
EOSINOPHIL NFR BLD AUTO: 1.3 % (ref 0.3–6.2)
ERYTHROCYTE [DISTWIDTH] IN BLOOD BY AUTOMATED COUNT: 15.1 % (ref 12.3–15.4)
GLUCOSE BLDC GLUCOMTR-MCNC: 125 MG/DL (ref 70–130)
GLUCOSE BLDC GLUCOMTR-MCNC: 135 MG/DL (ref 70–130)
GLUCOSE BLDC GLUCOMTR-MCNC: 140 MG/DL (ref 70–130)
GLUCOSE BLDC GLUCOMTR-MCNC: 94 MG/DL (ref 70–130)
GLUCOSE SERPL-MCNC: 95 MG/DL (ref 65–99)
HCO3 BLDA-SCNC: 30.1 MMOL/L (ref 22–28)
HCO3 BLDA-SCNC: 33 MMOL/L (ref 22–28)
HCT VFR BLD AUTO: 27.4 % (ref 34–46.6)
HEMODILUTION: NO
HEMODILUTION: NO
HGB BLD-MCNC: 8.8 G/DL (ref 12–15.9)
IMM GRANULOCYTES # BLD AUTO: 0.05 10*3/MM3 (ref 0–0.05)
IMM GRANULOCYTES NFR BLD AUTO: 0.6 % (ref 0–0.5)
INHALED O2 CONCENTRATION: 40 %
INHALED O2 CONCENTRATION: 60 %
LYMPHOCYTES # BLD AUTO: 0.6 10*3/MM3 (ref 0.7–3.1)
LYMPHOCYTES NFR BLD AUTO: 7.1 % (ref 19.6–45.3)
Lab: ABNORMAL
Lab: ABNORMAL
MAGNESIUM SERPL-MCNC: 2.6 MG/DL (ref 1.6–2.6)
MCH RBC QN AUTO: 26.6 PG (ref 26.6–33)
MCHC RBC AUTO-ENTMCNC: 32.1 G/DL (ref 31.5–35.7)
MCV RBC AUTO: 82.8 FL (ref 79–97)
MODALITY: ABNORMAL
MODALITY: ABNORMAL
MONOCYTES # BLD AUTO: 0.54 10*3/MM3 (ref 0.1–0.9)
MONOCYTES NFR BLD AUTO: 6.4 % (ref 5–12)
NEUTROPHILS NFR BLD AUTO: 7.15 10*3/MM3 (ref 1.7–7)
NEUTROPHILS NFR BLD AUTO: 84 % (ref 42.7–76)
NOTIFIED WHO: ABNORMAL
NOTIFIED WHO: ABNORMAL
NRBC BLD AUTO-RTO: 0 /100 WBC (ref 0–0.2)
O2 A-A PPRESDIFF RESPIRATORY: 0.1 MMHG
O2 A-A PPRESDIFF RESPIRATORY: 0.3 MMHG
PCO2 BLDA: 104.8 MM HG (ref 35–45)
PCO2 BLDA: 115.4 MM HG (ref 35–45)
PH BLDA: 7.07 PH UNITS (ref 7.35–7.45)
PH BLDA: 7.07 PH UNITS (ref 7.35–7.45)
PHOSPHATE SERPL-MCNC: 10 MG/DL (ref 2.5–4.5)
PLATELET # BLD AUTO: 321 10*3/MM3 (ref 140–450)
PMV BLD AUTO: 11.2 FL (ref 6–12)
PO2 BLDA: 37.5 MM HG (ref 80–100)
PO2 BLDA: 49.2 MM HG (ref 80–100)
POTASSIUM SERPL-SCNC: 5.1 MMOL/L (ref 3.5–5.2)
RBC # BLD AUTO: 3.31 10*6/MM3 (ref 3.77–5.28)
READ BACK: ABNORMAL
READ BACK: ABNORMAL
SAO2 % BLDCOA: 46.7 % (ref 92–98.5)
SAO2 % BLDCOA: 63.9 % (ref 92–98.5)
SET MECH RESP RATE: 28
SET MECH RESP RATE: 28
SODIUM SERPL-SCNC: 134 MMOL/L (ref 136–145)
TOTAL RATE: 28 BREATHS/MINUTE
TOTAL RATE: 29 BREATHS/MINUTE
URATE SERPL-MCNC: 12.5 MG/DL (ref 2.4–5.7)
VT ON VENT VENT: 480 ML
VT ON VENT VENT: 526 ML
WBC NRBC COR # BLD: 8.5 10*3/MM3 (ref 3.4–10.8)

## 2023-10-09 PROCEDURE — 85025 COMPLETE CBC W/AUTO DIFF WBC: CPT | Performed by: INTERNAL MEDICINE

## 2023-10-09 PROCEDURE — 82948 REAGENT STRIP/BLOOD GLUCOSE: CPT

## 2023-10-09 PROCEDURE — 84550 ASSAY OF BLOOD/URIC ACID: CPT | Performed by: INTERNAL MEDICINE

## 2023-10-09 PROCEDURE — 25010000002 CEFTRIAXONE PER 250 MG: Performed by: INTERNAL MEDICINE

## 2023-10-09 PROCEDURE — 99232 SBSQ HOSP IP/OBS MODERATE 35: CPT | Performed by: INTERNAL MEDICINE

## 2023-10-09 PROCEDURE — 83735 ASSAY OF MAGNESIUM: CPT | Performed by: INTERNAL MEDICINE

## 2023-10-09 PROCEDURE — 80069 RENAL FUNCTION PANEL: CPT | Performed by: INTERNAL MEDICINE

## 2023-10-09 PROCEDURE — 71045 X-RAY EXAM CHEST 1 VIEW: CPT

## 2023-10-09 PROCEDURE — 25010000002 HEPARIN (PORCINE) PER 1000 UNITS: Performed by: INTERNAL MEDICINE

## 2023-10-09 PROCEDURE — 97110 THERAPEUTIC EXERCISES: CPT

## 2023-10-09 RX ADMIN — Medication 500 MG: at 21:17

## 2023-10-09 RX ADMIN — LABETALOL HYDROCHLORIDE 100 MG: 100 TABLET, FILM COATED ORAL at 21:17

## 2023-10-09 RX ADMIN — SEVELAMER CARBONATE 1600 MG: 800 TABLET, FILM COATED ORAL at 08:12

## 2023-10-09 RX ADMIN — HEPARIN SODIUM 5000 UNITS: 5000 INJECTION INTRAVENOUS; SUBCUTANEOUS at 13:49

## 2023-10-09 RX ADMIN — HEPARIN SODIUM 5000 UNITS: 5000 INJECTION INTRAVENOUS; SUBCUTANEOUS at 06:21

## 2023-10-09 RX ADMIN — SEVELAMER CARBONATE 1600 MG: 800 TABLET, FILM COATED ORAL at 17:05

## 2023-10-09 RX ADMIN — LABETALOL HYDROCHLORIDE 100 MG: 100 TABLET, FILM COATED ORAL at 08:12

## 2023-10-09 RX ADMIN — METRONIDAZOLE 500 MG: 500 TABLET, FILM COATED ORAL at 21:17

## 2023-10-09 RX ADMIN — METRONIDAZOLE 500 MG: 500 TABLET, FILM COATED ORAL at 06:20

## 2023-10-09 RX ADMIN — SEVELAMER CARBONATE 1600 MG: 800 TABLET, FILM COATED ORAL at 12:05

## 2023-10-09 RX ADMIN — PANTOPRAZOLE SODIUM 40 MG: 40 TABLET, DELAYED RELEASE ORAL at 06:20

## 2023-10-09 RX ADMIN — Medication 10 ML: at 08:12

## 2023-10-09 RX ADMIN — ACETAMINOPHEN 650 MG: 325 TABLET, FILM COATED ORAL at 21:16

## 2023-10-09 RX ADMIN — METRONIDAZOLE 500 MG: 500 TABLET, FILM COATED ORAL at 13:49

## 2023-10-09 RX ADMIN — Medication 500 MG: at 08:12

## 2023-10-09 RX ADMIN — CEFTRIAXONE SODIUM 2000 MG: 2 INJECTION, POWDER, FOR SOLUTION INTRAMUSCULAR; INTRAVENOUS at 10:30

## 2023-10-09 RX ADMIN — HEPARIN SODIUM 5000 UNITS: 5000 INJECTION INTRAVENOUS; SUBCUTANEOUS at 21:16

## 2023-10-09 RX ADMIN — Medication 10 ML: at 21:32

## 2023-10-09 NOTE — DISCHARGE PLACEMENT REQUEST
"Paigeалександр Nay M \"BOBY\" (53 y.o. Female)       Date of Birth   1970    Social Security Number       Address   [address] [place] [address]    Home Phone   [phone]    MRN   [id number]       [facility]    Marital Status                               Admission Date   10/1/23    Admission Type   Emergency    Admitting Provider   Juan Elena MD    Attending Provider   Rm Riddle MD    Department, Room/Bed   [address], E567/1       Discharge Date       Discharge Disposition       Discharge Destination                                 Attending Provider: Rm Riddle MD    Allergies: No Known Allergies    Isolation: None   Infection: None   Code Status: CPR    Ht: 160 cm (62.99\")   Wt: 141 kg (310 lb 13.6 oz)    Admission Cmt: None   Principal Problem: PNA (pneumonia) [J18.9]                   Active Insurance as of 10/1/2023       Primary Coverage       Payor Plan Insurance Group Employer/Plan Group    [facility] [employer] [facility] [employer] [facility] PPO [id number]       Payor Plan Address Payor Plan Phone Number Payor Plan Fax Number Effective Dates    [address] [phone]  12/11/2022 - None Entered    [facility] [id number]         Subscriber Name Subscriber Birth Date Member ID       GODWIN FAGAN 5/31/1972 [id number]                     Emergency Contacts        (Rel.) Home Phone Work Phone Mobile Phone    Godwin Fagan (Spouse) -- -- [phone]    Hoa Duncan (Mother) -- -- [phone]              Emergency Contact Information       Name Relation Home Work Mobile    Godwin Fagan Spouse   [phone]    Hoa Duncan Mother   [phone]          "

## 2023-10-09 NOTE — PLAN OF CARE
Goal Outcome Evaluation:      VSS. 2L O2 NC. [place] [facility] Still on [place] cycle. Walked in room to standing scale, 10 ft. PRN tylenol x1. R CT spot covered w/ duoderm dressing, CDI. L Neck dressing CDI. L leg edema +3 > R leg edema +1-2. Pt said Physical therapy had not seen her for past two days, and would like to see PT today, Will translate to dayshift nurse. Strict I&Os.

## 2023-10-09 NOTE — THERAPY TREATMENT NOTE
Patient Name: Nay Gonzalez  : 1970    MRN: [id number]                              Today's Date: 10/9/2023       Admit Date: 10/1/2023    Visit Dx:     ICD-10-CM ICD-9-CM   1. Pneumonia of right upper lobe due to infectious organism  J18.9 486   2. Hypoxia  R09.02 799.02   3. Hypertension, unspecified type  I10 401.9   4. CRYSTAL (obstructive sleep apnea)  G47.33 327.23     Patient Active Problem List   Diagnosis    PNA (pneumonia)    Acute respiratory failure with hypoxia    Hypertensive urgency    Abscess of upper lobe of right lung with pneumonia    Sepsis    Type 2 diabetes mellitus with hyperglycemia    ELLY (acute kidney injury)    Obesity, morbid, BMI 50 or higher    Anemia     History reviewed. No pertinent past medical history.  Past Surgical History:   Procedure Laterality Date    TEETH EXTRACTION        General Information       Row Name 10/09/23 0952          Physical Therapy Time and Intention    Document Type therapy note (daily note)  -MS     Mode of Treatment physical therapy  -MS       Row Name 10/09/23 0952          General Information    Existing Precautions/Restrictions fall;oxygen therapy device and L/min  -MS       Row Name 10/09/23 0952          Cognition    Orientation Status (Cognition) oriented x 4  -MS       Row Name 10/09/23 0952          Safety Issues, Functional Mobility    Impairments Affecting Function (Mobility) endurance/activity tolerance;strength;balance;pain;range of motion (ROM)  -MS     Comment, Safety Issues/Impairments (Mobility) Gait belt and non skid socks donned.  -MS               User Key  (r) = Recorded By, (t) = Taken By, (c) = Cosigned By      Initials Name Provider Type    MS Estela Klein PT Physical Therapist                   Mobility       Row Name 10/09/23 1010          Bed Mobility    Comment, (Bed Mobility) NT- [facility]  -MS       Row Name 10/09/23 1010          Sit-Stand Transfer    Sit-Stand [place] (Transfers) contact guard;verbal cues  -MS      Assistive Device (Sit-Stand Transfers) walker, front-wheeled  -MS       Row Name 10/09/23 1010          Gait/Stairs (Locomotion)    Hand Level (Gait) contact guard;verbal cues  -MS     Assistive Device (Gait) walker, front-wheeled  -MS     Distance in Feet (Gait) 12  -MS     Deviations/Abnormal Patterns (Gait) weight shifting decreased;gait speed decreased;stride length decreased;base of support, wide  -MS     Bilateral Gait Deviations forward flexed posture;heel strike decreased  -MS     Comment, (Gait/Stairs) No overt LOB or veering noted. Noted fatigue post ambulation- agreeable to continue sitting UIC.  -MS               User Key  (r) = Recorded By, (t) = Taken By, (c) = Cosigned By      Initials Name Provider Type    Estela Luke PT Physical Therapist                   Obj/Interventions       Row Name 10/09/23 1011          Balance    Balance Assessment sitting static balance;standing static balance  -MS     Static Sitting Balance standby assist  -MS     Position, Sitting Balance sitting edge of bed  -MS     Static Standing Balance contact guard  -MS     Position/Device Used, Standing Balance supported;walker, rolling  -MS               User Key  (r) = Recorded By, (t) = Taken By, (c) = Cosigned By      Initials Name Provider Type    Estela Luke, PT Physical Therapist                   Goals/Plan    No documentation.                  Clinical Impression       Row Name 10/09/23 1011          Plan of Care Review    Plan of Care Reviewed With patient  -MS     Progress improving  -MS     Outcome Evaluation Less assist needed for all mobility this date. Patient sitting UIC upon arrival, stood with CGA to a RW and ambulated 12' CGA with a RW. Room air for therapy session- noted fatigue and mild SOA post activity though O2 sats WFL. Patient hopeful to return home with her spouse upon DC. Patient appears motivated and encouraged patient to continue ambulating to the BR with nsg. PT will  continue to follow.  -MS       Row Name 10/09/23 1011          Vital Signs    Pre SpO2 (%) 95  -MS     O2 Delivery Pre Treatment supplemental O2  -MS     Intra SpO2 (%) 91  -MS     O2 Delivery Intra Treatment room air  -MS     Post SpO2 (%) 96  -MS     O2 Delivery Post Treatment supplemental O2  -MS       Row Name 10/09/23 1011          Positioning and Restraints    Pre-Treatment Position sitting in chair/recliner  -MS     Post Treatment Position chair  -MS     In Chair sitting;call light within reach;exit alarm on;encouraged to call for assist  -MS               User Key  (r) = Recorded By, (t) = Taken By, (c) = Cosigned By      Initials Name Provider Type    MS KleinEstela, PT Physical Therapist                   Outcome Measures       Row Name 10/09/23 1013 10/09/23 0600       How much help from another person do you currently need...    Turning from your back to your side while in flat bed without using bedrails? 3  -MS 3  -LO    Moving from lying on back to sitting on the side of a flat bed without bedrails? 3  -MS 3  -LO    Moving to and from a bed to a chair (including a wheelchair)? 3  -MS 3  -LO    Standing up from a chair using your arms (e.g., wheelchair, bedside chair)? 3  -MS 3  -LO    Climbing 3-5 steps with a railing? 2  -MS 2  -LO    To walk in hospital room? 3  -MS 3  -LO    AM-PAC 6 Clicks Score (PT) 17  -MS 17  -LO    Highest level of mobility 5 --> Static standing  -MS 5 --> Static standing  -LO              User Key  (r) = Recorded By, (t) = Taken By, (c) = Cosigned By      Initials Name Provider Type    Estela Luke, PT Physical Therapist    Sia Carmona, RN Registered Nurse                                 Physical Therapy Education       Title: PT OT SLP Therapies (Done)       Topic: Physical Therapy (Done)       Point: Mobility training (Done)       Learning Progress Summary             Patient Acceptance, E,TB, VU by MS at 10/9/2023 1013    Acceptance, E,TB, VU by NOLAN at  10/9/2023 0603    Acceptance, E, VU by KT at 10/8/2023 1446    Acceptance, E,TB,D, VU,NR by  at 10/6/2023 1633    Acceptance, E,D, VU,DU by  at 10/5/2023 1612    Acceptance, E,D, VU,NR by MS1 at 10/2/2023 1110                         Point: Home exercise program (Done)       Learning Progress Summary             Patient Acceptance, E,TB, VU by LO at 10/9/2023 0603    Acceptance, E, VU by KT at 10/8/2023 1446    Acceptance, E,TB,D, VU,NR by  at 10/6/2023 1633    Acceptance, E,D, VU,NR by MS1 at 10/2/2023 1110                         Point: Body mechanics (Done)       Learning Progress Summary             Patient Acceptance, E,TB, VU by MS at 10/9/2023 1013    Acceptance, E,TB, VU by LO at 10/9/2023 0603    Acceptance, E, VU by KT at 10/8/2023 1446    Acceptance, E,TB,D, VU,NR by  at 10/6/2023 1633    Acceptance, E,D, VU,DU by  at 10/5/2023 1612    Acceptance, E,D, VU,NR by MS1 at 10/2/2023 1110                         Point: Precautions (Done)       Learning Progress Summary             Patient Acceptance, E,TB, VU by LO at 10/9/2023 0603    Acceptance, E, VU by KT at 10/8/2023 1446    Acceptance, E,TB,D, VU,NR by  at 10/6/2023 1633    Acceptance, E,D, VU,DU by  at 10/5/2023 1612    Acceptance, E,D, VU,NR by MS at 10/2/2023 1110                                         User Key       Initials Effective Dates Name Provider Type Discipline     06/16/21 -  Capri Shanks, PT Physical Therapist PT    MS1 06/16/21 -  Aniceto Gandara, PT Physical Therapist PT    KT 06/16/21 -  Jessica Whitfield, RN Registered Nurse Nurse    MS 06/16/21 -  Estela Klein, PT Physical Therapist PT     06/27/22 -  O'Nan, Sia C, RN Registered Nurse Nurse     08/10/23 -  Echo Mathur, PT Student PT Student PT                  PT Recommendation and Plan     Plan of Care Reviewed With: patient  Progress: improving  Outcome Evaluation: Less assist needed for all mobility this date. Patient sitting UIC upon  arrival, stood with CGA to a RW and ambulated 12' CGA with a RW. Room air for therapy session- noted fatigue and mild SOA post activity though O2 sats WFL. Patient hopeful to return home with her spouse upon DC. Patient appears motivated and encouraged patient to continue ambulating to the BR with nsg. PT will continue to follow.     Time Calculation:         PT Charges       Row Name 10/09/23 0943             Time Calculation    Start Time 0928  -MS      Stop Time 0942  -MS      Time Calculation (min) 14 min  -MS      PT Received On 10/09/23  -MS      PT - Next Appointment 10/10/23  -MS      PT Goal Re-Cert Due Date 10/16/23  -MS                User Key  (r) = Recorded By, (t) = Taken By, (c) = Cosigned By      Initials Name Provider Type    Estela Luke, PT Physical Therapist                  Therapy Charges for Today       Code Description Service Date Service Provider Modifiers Qty    28027998416 HC PT THER PROC EA 15 MIN 10/9/2023 Estela Klein, PT GP 1            PT G-Codes  Outcome Measure Options: AM-PAC 6 Clicks Basic Mobility (PT)  AM-PAC 6 Clicks Score (PT): 17  PT Discharge Summary  Anticipated Discharge Disposition (PT): home with assist, home with home health    Estela Klein, JULIAN  10/9/2023

## 2023-10-09 NOTE — PROGRESS NOTES
[facility] Progress Note      Patient Name: Nay Gonzalez  : 1970  MRN: [id number]  Primary Care Physician:  Provider, No Known  Date of admission: 10/1/2023    Subjective     Interval History:   Follow-up acute kidney injury    The chest tube was removed, patient is feeling much better, denies any chest pain or shortness of air, no orthopnea or PND, no nausea or vomiting, no dysuria or gross hematuria and she has good urine output.  Review of Systems:   As noted above    Objective     Vitals:   Temp:  [97.3 øF (36.3 øC)-98.6 øF (37 øC)] 97.3 øF (36.3 øC)  Heart Rate:  [80-88] 88  Resp:  [16-20] 16  BP: (120-173)/(72-82) 173/82  Flow (L/min):  [2] 2    Intake/Output Summary (Last 24 hours) at 10/9/2023 1038  Last data filed at 10/9/2023 1018  Gross per 24 hour   Intake 1440 ml   Output 2750 ml   Net -1310 ml       Physical Exam:    General Appearance: Morbidly obese, awake and alert, chronically ill, no acute distress  Skin: warm and dry  HEENT: She has core track in place  Neck: Difficult to assess due to body habitus  Lungs: Bilateral rhonchi, breathing effort not labored  Heart: RRR, normal S1 and S2, no rub  Abdomen: soft, no guarding, protuberant, normoactive bowel  : No palpable bladder  Extremities: no edema, cyanosis or clubbing  Neuro: Normal speech and mental status and moving all extremities    Scheduled Meds:     cefTRIAXone, 2,000 mg, Intravenous, Q24H  heparin (porcine), 5,000 Units, Subcutaneous, Q8H  insulin lispro, 2-7 Units, Subcutaneous, 4x Daily AC & at Bedtime  labetalol, 100 mg, Oral, Q12H  metroNIDAZOLE, 500 mg, Oral, Q8H  pantoprazole, 40 mg, Oral, Q AM  saccharomyces boulardii, 500 mg, Oral, BID  senna-docusate sodium, 2 tablet, Oral, BID  sevelamer, 1,600 mg, Oral, TID With Meals  sodium chloride, 10 mL, Intravenous, Q12H      IV Meds:          Results Reviewed:   I have personally reviewed the results from the time of this admission to 10/9/2023  10:38 EDT     Results from last 7 days   Lab Units 10/09/23  0512 10/08/23  0628 10/07/23  0620 10/06/23  0519 10/05/23  0406 10/04/23  0414   SODIUM mmol/L 134* 137 137 136 142 140   POTASSIUM mmol/L 5.1 4.7 4.7 4.8 4.9 4.9   CHLORIDE mmol/L 97* 99 98 98 101 99   CO2 mmol/L 22.3 24.0 22.0 22.5 21.0* 27.3   BUN mg/dL 73* 82* 78* 66* 58* 41*   CREATININE mg/dL 6.31* 7.05* 7.25* 6.22* 5.94* 4.64*   CALCIUM mg/dL 8.7 8.8 8.9 9.3 9.2 9.1   BILIRUBIN mg/dL  --   --   --  0.2 0.3 0.3   ALK PHOS U/L  --   --   --  97 118* 114   ALT (SGPT) U/L  --   --   --  18 16 17   AST (SGOT) U/L  --   --   --  20 15 10   GLUCOSE mg/dL 95 112* 104* 120* 103* 119*       Estimated Creatinine Clearance: 14.5 mL/min (A) (by C-G formula based on SCr of 6.31 mg/dL (H)).    Results from last 7 days   Lab Units 10/09/23  0512 10/08/23  0628 10/07/23  0620   MAGNESIUM mg/dL 2.6 2.6 3.0*   PHOSPHORUS mg/dL 10.0* 11.6* 12.1*       Results from last 7 days   Lab Units 10/09/23  0512 10/08/23  0628 10/07/23  0620 10/06/23  0519 10/05/23  0406 10/04/23  0414 10/03/23  0408   URIC ACID mg/dL 12.5* 11.9* 11.5* 11.9* 10.5* 9.9* 9.4*       Results from last 7 days   Lab Units 10/09/23  0512 10/08/23  0628 10/07/23  0620 10/06/23  0519 10/05/23  0406   WBC 10*3/mm3 8.50 8.80 9.62 10.93* 12.91*   HEMOGLOBIN g/dL 8.8* 8.9* 9.8* 9.6* 9.0*   PLATELETS 10*3/mm3 321 303 342 331 329             Assessment / Plan     ASSESSMENT:  Acute kidney injury associated with sudden correction of her hypertension initially also possible component of contrast-induced nephropathy because patient had CTA on 10/1/2023.  Creatinine today is down to 6.31 patient is euvolemic, electrolyte within acceptable range and has increased urine output, she is entering in the recovery phase of her ATN, also her proteinuria improved down from 2111 mg/g to 581.4 mg/g  New diagnosis of hypertension, blood pressure with acceptable control  New diagnosis of diabetes mellitus type 2  Super morbid  obesity  Anemia, the etiology not very clear, most likely patient have iron deficiency iron saturation 13% but her TIBC is on the low side, suggestive of anemia of chronic disease too.  Hemoglobin today 8.8  Severe hyperphosphatemia associated with acute kidney injury phosphorus 10, was started on sevelamer  Hyperuricemia, uric acid 12.5, associate with acute kidney, will continue to monitor    PLAN:  Continue the same treatment  No indication for dialysis today  Surveillance labs    I discussed the case with the patient and she voiced good understanding  I reviewed the chart and other providers notes, I reviewed imaging and lab data.  Copied text in this note has been reviewed and is accurate as of 10/09/23.       Thank you for involving us in the care of Nay Gonzalez.  Please feel free to call with any questions.    Iain Owens MD  10/09/23  10:38 EDT    [facility]  [phone]    Please note that portions of this note were completed with a voice recognition program.

## 2023-10-09 NOTE — PAYOR COMM NOTE
"Jackelyn Fagancelmoisés JEFFRIES \"BOBY\" (53 y.o. Female)     PLEASE SEE ATTACHED FOR CONTINUED INPT STAY DAYS    REF #   [id number]    PLEASE CALL JOCELYN GEORGE RN/ DEPT @ [phone]   OR [phone]    THANK YOU  JOCELYN GEORGE RN  [facility]        Date of Birth   1970    Social Security Number       Address   [address] [address]    Home Phone   [phone]    MRN   [id number]       [facility]    Marital Status                               Admission Date   10/1/23    Admission Type   Emergency    Admitting Provider   Juan Elena MD    Attending Provider   Rm Riddle MD    Department, Room/Bed   [address], E567/1       Discharge Date       Discharge Disposition       Discharge Destination                                 Attending Provider: Rm Riddle MD    Allergies: No Known Allergies    Isolation: None   Infection: None   Code Status: CPR    Ht: 160 cm (62.99\")   Wt: 141 kg (310 lb 13.6 oz)    Admission Cmt: None   Principal Problem: PNA (pneumonia) [J18.9]                   Active Insurance as of 10/1/2023       Primary Coverage       Payor Plan Insurance Group Employer/Plan Group    [facility] [employer] [facility] [employer] [facility] PPO [id number]       Payor Plan Address Payor Plan Phone Number Payor Plan Fax Number Effective Dates    [address] [phone]  12/11/2022 - None Entered    [facility] [id number]         Subscriber Name Subscriber Birth Date Member ID       GODWIN FAGAN 5/[birth year]/1972 [id number]                     Emergency Contacts        (Rel.) Home Phone Work Phone Mobile Phone    Godwin Fagan (Spouse) -- -- [phone]    Hoa Duncan (Mother) -- -- [phone]              [place]: RUST [id number]  Tax ID [id number]  Medication Administration Report for Nay Fagan \"BOBY\" as of 10/09/23 4161     Legend:    Given Hold Not Given Due Canceled Entry " Other Actions    Time Time (Time) Time Time-Action         Discontinued     Completed     Future     MAR Hold     Linked             Medications 10/08/23 10/09/23      acetaminophen (TYLENOL) tablet 650 mg  Dose: 650 mg  Freq: Every 4 Hours PRN Route: PO  PRN Reason: Mild Pain  Start: 10/01/23 1524   Admin Instructions:   If given for fever, use fever parameter: fever greater than 100.4 øF  Based on patient request - if ordered for moderate or severe pain, provider allows for administration of a medication prescribed for a lower pain scale.    Do not exceed 4 grams of acetaminophen in a 24 hr period. Max dose of 2gm for AST/ALT greater than 120 units/L.    If given for pain, use the following pain scale:   Mild Pain = Pain Score of 1-3, CPOT 1-2  Moderate Pain = Pain Score of 4-6, CPOT 3-4  Severe Pain = Pain Score of 7-10, CPOT 5-8    1456-Given     2140-Given              Or  acetaminophen (TYLENOL) 160 MG/5ML oral solution 650 mg  Dose: 650 mg  Freq: Every 4 Hours PRN Route: PO  PRN Reason: Mild Pain  Start: 10/01/23 1524   Admin Instructions:   If given for fever, use fever parameter: fever greater than 100.4 øF  Based on patient request - if ordered for moderate or severe pain, provider allows for administration of a medication prescribed for a lower pain scale.    Do not exceed 4 grams of acetaminophen in a 24 hr period. Max dose of 2gm for AST/ALT greater than 120 units/L.    If given for pain, use the following pain scale:   Mild Pain = Pain Score of 1-3, CPOT 1-2  Moderate Pain = Pain Score of 4-6, CPOT 3-4  Severe Pain = Pain Score of 7-10, CPOT 5-8    1456-Not Given:  See Alt     2140-Not Given:  See Alt              Or  acetaminophen (TYLENOL) suppository 650 mg  Dose: 650 mg  Freq: Every 4 Hours PRN Route: RE  PRN Reason: Mild Pain  Start: 10/01/23 1524   Admin Instructions:   If given for fever, use fever parameter: fever greater than 100.4 øF  Based on patient request - if ordered for moderate or  severe pain, provider allows for administration of a medication prescribed for a lower pain scale.    Do not exceed 4 grams of acetaminophen in a 24 hr period. Max dose of 2gm for AST/ALT greater than 120 units/L.    If given for pain, use the following pain scale:   Mild Pain = Pain Score of 1-3, CPOT 1-2  Moderate Pain = Pain Score of 4-6, CPOT 3-4  Severe Pain = Pain Score of 7-10, CPOT 5-8    1456-Not Given:  See Alt     2140-Not Given:  See Alt               sennosides-docusate (PERICOLACE) 8.6-50 MG per tablet 2 tablet  Dose: 2 tablet  Freq: 2 Times Daily Route: PO  Start: 10/05/23 2100   Admin Instructions:   HOLD MEDICATION IF PATIENT HAS HAD BOWEL MOVEMENT. Start bowel management regimen if patient has not had a bowel movement after 12 hours.    (1000)-Not Given     (2153)-Not Given           (0850)-Not Given     2100             And  polyethylene glycol (MIRALAX) packet 17 g  Dose: 17 g  Freq: Daily PRN Route: PO  PRN Reason: Constipation  PRN Comment: Use if senna-docusate is ineffective  Start: 10/05/23 1215   Admin Instructions:   Use if no bowel movement after 12 hours. Mix in 6-8 ounces of water.  Use 4-8 ounces of water, tea, or juice for each 17 gram dose.        And  bisacodyl (DULCOLAX) EC tablet 5 mg  Dose: 5 mg  Freq: Daily PRN Route: PO  PRN Reason: Constipation  PRN Comment: Use if polyethylene glycol is ineffective  Start: 10/05/23 1215   Admin Instructions:   Use if no bowel movement after 12 hours.  Swallow whole. Do not crush, split, or chew tablet.        And  bisacodyl (DULCOLAX) suppository 10 mg  Dose: 10 mg  Freq: Daily PRN Route: RE  PRN Reason: Constipation  PRN Comment: Use if bisacodyl oral is ineffective  Start: 10/05/23 1215   Admin Instructions:   Use if no bowel movement after 12 hours.  Hold for diarrhea         Calcium Replacement - Follow Nurse / BPA Driven Protocol  Freq: As Needed Route: XX  PRN Reason: Other  Start: 10/01/23 1524   Admin Instructions:   Open Order &  "Select \"[facility] Electrolyte Replacement Protocol Algorithm\" to View Details         cefTRIAXone (ROCEPHIN) 2,000 mg in sodium chloride 0.9 % 100 mL IVPB-VTB  Dose: 2,000 mg  Freq: Every 24 Hours Route: IV  Indications of Use: PNEUMONIA  Start: 10/04/23 1000   End: 11/01/23 0959   Admin Instructions:   LR should be paused and flushing of the line with NS is recommended prior to and after completion of ceftriaxone infusion due to incompatibility. Do not co-adminster with calcium-containing solutions.  Caution: Look alike/sound alike drug alert    1014-New Bag            1030-New Bag               dextrose (D50W) (25 g/50 mL) IV injection 25 g  Dose: 25 g  Freq: Every 15 Minutes PRN Route: IV  PRN Reason: Low Blood Sugar  PRN Comment: Blood Sugar Less Than 70  Start: 10/01/23 1531   Admin Instructions:   Blood sugar less than 70; patient has IV access - Unresponsive, NPO or Unable To Safely Swallow         dextrose (GLUTOSE) oral gel 15 g  Dose: 15 g  Freq: Every 15 Minutes PRN Route: PO  PRN Reason: Low Blood Sugar  PRN Comment: Blood sugar less than 70  Start: 10/01/23 1531   Admin Instructions:   BS<70, Patient Alert, Is not NPO, Can safely swallow.         fentaNYL citrate (PF) (SUBLIMAZE) injection 25 mcg  Dose: 25 mcg  Freq: Every 30 Minutes PRN Route: IV  PRN Reason: Severe Pain  Start: 10/02/23 2131   End: 10/09/23 2130   Admin Instructions:   Use filter needle to withdraw dose from ampule. Based on patient request - if ordered for moderate or severe pain, provider allows for administration of a medication prescribed for a lower pain scale.  If given for pain, use the following pain scale:  Mild Pain = Pain Score of 1-3, CPOT 1-2  Moderate Pain = Pain Score of 4-6, CPOT 3-4  Severe Pain = Pain Score of 7-10, CPOT 5-8         glucagon (GLUCAGEN) injection 1 mg  Dose: 1 mg  Freq: Every 15 Minutes PRN Route: IM  PRN Reason: Low Blood Sugar  PRN Comment: Blood Glucose Less Than 70  Start: 10/01/23 1531   Admin " Instructions:   Blood Glucose Less Than 70 - Patient Without IV Access - Unresponsive, NPO or Unable To Safely Swallow  Reconstitute powder for injection by adding 1 mL of -supplied sterile diluent or sterile water for injection to a vial containing 1 mg of the drug, to provide solutions containing 1 mg/mL. Shake vial gently to dissolve.         heparin (porcine) 5000 UNIT/ML injection 5,000 Units  Dose: 5,000 Units  Freq: Every 8 Hours Scheduled Route: SC  Indications of Use: PROPHYLAXIS OF VENOUS THROMBOEMBOLISM  Start: 10/04/23 2200    0653-Given     1454-Given     2141-Given          0621-Given     1349-Given     2200             hydrALAZINE (APRESOLINE) injection 20 mg  Dose: 20 mg  Freq: Every 4 Hours PRN Route: IV  PRN Reason: High Blood Pressure  PRN Comment: htn  Start: 10/03/23 0953   Admin Instructions:   For Systolic >165 or Diastolic >100  Caution: Look alike/sound alike drug alert         influenza vac split quad (FLUZONE,FLUARIX,AFLURIA,FLULAVAL) injection 0.5 mL  Dose: 0.5 mL  Freq: During Hospitalization Route: IM  PRN Reason: Immunization  Start: 10/03/23 0000   Admin Instructions:   **Do Not Administer if Temperature Greater Than 102F & Notify Pharmacy**  Pneumococcal & Influenza Vaccines May Be Given At The Same Time in SEPARATE Injections.  Do not administer if temperature greater than 102F & notify pharmacy. Pneumococcal and influenza vaccines may be given at the same time in SEPARATE injections.         insulin lispro (HUMALOG/ADMELOG) injection 2-7 Units  Dose: 2-7 Units  Freq: 4 Times Daily Before Meals & Nightly Route: SC  Start: 10/01/23 1730   Admin Instructions:   Correction Insulin - Low Dose - Total Insulin Dose Less Than 40 units/day (Lean, Elderly or Renal Patients)    Blood Glucose 150-199 mg/dL - 2 units  Blood Glucose 200-249 mg/dL - 3 units  Blood Glucose 250-299 mg/dL - 4 units  Blood Glucose 300-349 mg/dL - 5 units  Blood Glucose 350-400 mg/dL - 6 units  Blood  "Glucose Greater Than 400 mg/dL - 7 units & Call Provider   Caution: Look alike/sound alike drug alert    (0800)-Not Given     1200-Given     1800-Given     (2141)-Not Given         (0744)-Not Given     (1113)-Not Given     1730     2100            ipratropium-albuterol (DUO-NEB) nebulizer solution 3 mL  Dose: 3 mL  Freq: Every 6 Hours PRN Route: NEBULIZATION  PRN Reasons: Shortness of Air,Wheezing  Start: 10/02/23 1115   Admin Instructions:   Include Respiratory Treatment Education         labetalol (NORMODYNE) tablet 100 mg  Dose: 100 mg  Freq: Every 12 Hours Scheduled Route: PO  Start: 10/05/23 2100   Admin Instructions:   Hold for SBP less than 100, DBP less than 60, or heart rate less than 50    1000-Given     2140-Given           0812-Given     2100              Magnesium Standard Dose Replacement - Follow Nurse / BPA Driven Protocol  Freq: As Needed Route: XX  PRN Reason: Other  Start: 10/01/23 1524   Admin Instructions:   Open Order & Select \"[facility] Electrolyte Replacement Protocol Algorithm\" to View Details         metroNIDAZOLE (FLAGYL) tablet 500 mg  Dose: 500 mg  Freq: Every 8 Hours Scheduled Route: PO  Indications of Use: PNEUMONIA  Indications Comment: lung abscess  Start: 10/05/23 1400   End: 10/12/23 1359   Admin Instructions:   Caution: Look alike/sound alike drug alert    0653-Given     1454-Given     2140-Given          0620-Given     1349-Given     2200             nitroglycerin (NITROSTAT) SL tablet 0.4 mg  Dose: 0.4 mg  Freq: Every 5 Minutes PRN Route: SL  PRN Reason: Chest Pain  PRN Comment: Only if SBP Greater Than 100  Start: 10/01/23 1524   Admin Instructions:   If Pain Unrelieved After 3 Doses Notify MD  May administer up to 3 doses per episode.         ondansetron (ZOFRAN) tablet 4 mg  Dose: 4 mg  Freq: Every 6 Hours PRN Route: PO  PRN Reasons: Nausea,Vomiting  Start: 10/01/23 1524   Admin Instructions:   If BOTH ondansetron (ZOFRAN) and promethazine (PHENERGAN) are ordered use ondansetron " "first and THEN promethazine IF ondansetron is ineffective.        Or  ondansetron (ZOFRAN) injection 4 mg  Dose: 4 mg  Freq: Every 6 Hours PRN Route: IV  PRN Reasons: Nausea,Vomiting  Start: 10/01/23 1524   Admin Instructions:   If BOTH ondansetron (ZOFRAN) and promethazine (PHENERGAN) are ordered use ondansetron first and THEN promethazine IF ondansetron is ineffective.         pantoprazole (PROTONIX) EC tablet 40 mg  Dose: 40 mg  Freq: Every Early Morning Route: PO  Start: 10/06/23 0600   Admin Instructions:   Swallow whole; do not crush, split, or chew.    0653-Given            0620-Given               Phosphorus Replacement - Follow Nurse / BPA Driven Protocol  Freq: As Needed Route: XX  PRN Reason: Other  Start: 10/01/23 1524   Admin Instructions:   Open Order & Select \"[facility] Electrolyte Replacement Protocol Algorithm\" to View Details         Potassium Replacement - Follow Nurse / BPA Driven Protocol  Freq: As Needed Route: XX  PRN Reason: Other  Start: 10/01/23 1524   Admin Instructions:   Open Order & Select \"[facility] Electrolyte Replacement Protocol Algorithm\" to View Details         saccharomyces boulardii (FLORASTOR) capsule 500 mg  Dose: 500 mg  Freq: 2 Times Daily Route: PO  Start: 10/06/23 1330    1000-Given     2140-Given           0812-Given     2100              sevelamer (RENVELA) tablet 1,600 mg  Dose: 1,600 mg  Freq: 3 Times Daily With Meals Route: PO  Start: 10/07/23 1200   Admin Instructions:   Swallow whole; do not crush, split or chew tablet.    0800-Given     1200-Given     1800-Given          0812-Given     1205-Given     1800             sodium chloride 0.9 % flush 10 mL  Dose: 10 mL  Freq: As Needed Route: IV  PRN Reason: Line Care  Start: 10/01/23 1524         sodium chloride 0.9 % flush 10 mL  Dose: 10 mL  Freq: Every 12 Hours Scheduled Route: IV  Start: 10/01/23 2100    1000-Given     2141-Given           0812-Given     2100              sodium chloride 0.9 % infusion 40 mL  Dose: 40 mL  Freq: " As Needed Route: IV  PRN Reason: Line Care  Start: 10/01/23 1524   Admin Instructions:   Following administration of an IV intermittent medication, flush line with 40mL NS at 100mL/hr.         sodium chloride nasal spray 1 spray  Dose: 1 spray  Freq: As Needed Route: EACH NARE  PRN Reason: Congestion  Start: 10/05/23 1327   Admin Instructions:           Completed Medications  Medications 10/08/23 10/09/23       acetaminophen (TYLENOL) tablet 1,000 mg  Dose: 1,000 mg  Freq: Once Route: PO  Start: 10/01/23 1112   End: 10/01/23 1144   Admin Instructions:   If given for fever, use fever parameter: fever greater than 100.4 øF  Based on patient request - if ordered for moderate or severe pain, provider allows for administration of a medication prescribed for a lower pain scale.    Do not exceed 4 grams of acetaminophen in a 24 hr period. Max dose of 2gm for AST/ALT greater than 120 units/L.    If given for pain, use the following pain scale:   Mild Pain = Pain Score of 1-3, CPOT 1-2  Moderate Pain = Pain Score of 4-6, CPOT 3-4  Severe Pain = Pain Score of 7-10, CPOT 5-8         alteplase (ACTIVASE) 10 mg in sodium chloride 0.9 % 30 mL Syringe  Dose: 10 mg  Freq: Once Route: PL  Start: 10/06/23 1100   End: 10/06/23 1311   Admin Instructions:   Administer alteplase (ACTIVASE) intrapleurally via chest tube then immediately follow with dornase samir (Pulmozyme).  Clamp chest tube and allow to dwell for 120 minutes.  Release clamp and allow to drain for 1 hour.  If alteplase induced angioedema is suspected, activate the TPA induced angioedema order set. These symptoms include orolingual, hemifacial swelling, often contralateral to ischemic cerebral hemisphere. Onset may range from 5 - 180 minutes following infusion initiation.        And  dornase alpha (PULMOZYME) 5 mg in sterile water (preservative free) 30 mL intrapleural syringe  Dose: 5 mg  Freq: Once Route: PL  Start: 10/06/23 1100   End: 10/06/23 1311   Admin  Instructions:   Administer alteplase (ACTIVASE) intrapleurally via chest tube then immediately follow with dornase samir (Pulmozyme).  Clamp chest tube and allow to dwell for 120 minutes.  Release clamp and allow to drain for 1 hour.         alteplase (ACTIVASE) 10 mg in sodium chloride 0.9 % 30 mL Syringe  Dose: 10 mg  Freq: Once Route: PL  Start: 10/05/23 1245   End: 10/05/23 1325   Admin Instructions:   Administer alteplase (ACTIVASE) intrapleurally via chest tube then immediately follow with dornase samir (Pulmozyme).  Clamp chest tube and allow to dwell for 120 minutes.  Release clamp and allow to drain for 1 hour.  If alteplase induced angioedema is suspected, activate the TPA induced angioedema order set. These symptoms include orolingual, hemifacial swelling, often contralateral to ischemic cerebral hemisphere. Onset may range from 5 - 180 minutes following infusion initiation.        And  dornase alpha (PULMOZYME) 5 mg in sterile water (preservative free) 30 mL intrapleural syringe  Dose: 5 mg  Freq: Once Route: PL  Start: 10/05/23 1245   End: 10/05/23 1325   Admin Instructions:   Administer alteplase (ACTIVASE) intrapleurally via chest tube then immediately follow with dornase samir (Pulmozyme).  Clamp chest tube and allow to dwell for 120 minutes.  Release clamp and allow to drain for 1 hour.         alteplase (ACTIVASE) 10 mg in sodium chloride 0.9 % 30 mL Syringe  Dose: 10 mg  Freq: Once Route: PL  Start: 10/04/23 1100   End: 10/04/23 1206   Admin Instructions:   Administer alteplase (ACTIVASE) intrapleurally via chest tube then immediately follow with dornase samir (Pulmozyme).  Clamp chest tube and allow to dwell for 120 minutes.  Release clamp and allow to drain for 1 hour.  If alteplase induced angioedema is suspected, activate the TPA induced angioedema order set. These symptoms include orolingual, hemifacial swelling, often contralateral to ischemic cerebral hemisphere. Onset may range from 5 - 180  minutes following infusion initiation.        And  dornase alpha (PULMOZYME) 5 mg in sterile water (preservative free) 30 mL intrapleural syringe  Dose: 5 mg  Freq: Once Route: PL  Start: 10/04/23 1100   End: 10/04/23 1207   Admin Instructions:   Administer alteplase (ACTIVASE) intrapleurally via chest tube then immediately follow with dornase samir (Pulmozyme).  Clamp chest tube and allow to dwell for 120 minutes.  Release clamp and allow to drain for 1 hour.         azithromycin (ZITHROMAX) 500 mg in sodium chloride 0.9 % 250 mL IVPB-VTB  Dose: 500 mg  Freq: Once Route: IV  Indications of Use: PNEUMONIA  Start: 10/01/23 1130   End: 10/01/23 1504         bumetanide (BUMEX) injection 4 mg  Dose: 4 mg  Freq: Every 8 Hours Route: IV  Start: 10/05/23 0800   End: 10/06/23 0140   Admin Instructions:   Give slow IV push over 1-2 minutes.         cefTRIAXone (ROCEPHIN) 2,000 mg in sodium chloride 0.9 % 100 mL IVPB-VTB  Dose: 2,000 mg  Freq: Once Route: IV  Indications of Use: PNEUMONIA  Start: 10/01/23 1130   End: 10/01/23 1343   Admin Instructions:   LR should be paused and flushing of the line with NS is recommended prior to and after completion of ceftriaxone infusion due to incompatibility. Do not co-adminster with calcium-containing solutions.  Caution: Look alike/sound alike drug alert         fentaNYL citrate (PF) (SUBLIMAZE) injection 50 mcg  Dose: 50 mcg  Freq: Once Route: IV  Start: 10/02/23 1730   End: 10/02/23 1754   Admin Instructions:   Use filter needle to withdraw dose from ampule. Based on patient request - if ordered for moderate or severe pain, provider allows for administration of a medication prescribed for a lower pain scale.  If given for pain, use the following pain scale:  Mild Pain = Pain Score of 1-3, CPOT 1-2  Moderate Pain = Pain Score of 4-6, CPOT 3-4  Severe Pain = Pain Score of 7-10, CPOT 5-8         iopamidol (ISOVUE-370) 76 % injection 100 mL  Dose: 100 mL  Freq: Once in Imaging Route:  IV  Start: 10/01/23 1515   End: 10/01/23 1500         labetalol (NORMODYNE,TRANDATE) injection 10 mg  Dose: 10 mg  Freq: Once Route: IV  Start: 10/01/23 1237   End: 10/01/23 1303   Admin Instructions:   As ordered  Give IV Push over 2 minutes.         lactated ringers bolus 1,000 mL  Dose: 1,000 mL  Freq: Once Route: IV  Start: 10/01/23 1112   End: 10/01/23 1209         lidocaine (XYLOCAINE) 1 % injection 25 mL  Dose: 25 mL  Freq: Once Route: INFILTRATION  Start: 10/03/23 1400   End: 10/03/23 1345         perflutren (DEFINITY) 8.476 mg in Sodium Chloride (PF) 0.9 % 10 mL injection  Dose: 3 mL  Freq: Once in Imaging Route: IV  Start: 10/02/23 1815   End: 10/02/23 1725   Admin Instructions:   Mix 1.3 mL of mechanically activated Definity with 8.7 mL of normal saline. A total of 3 mL of the resulting Definity solution was administered.         piperacillin-tazobactam (ZOSYN) 4.5 g in iso-osmotic dextrose 100 mL IVPB (premix)  Dose: 4.5 g  Freq: Once Route: IV  Start: 10/01/23 1745   End: 10/01/23 1911   Admin Instructions:   Refrigerate        Discontinued Medications  Medications 10/08/23 10/09/23       amLODIPine (NORVASC) tablet 5 mg  Dose: 5 mg  Freq: Every 24 Hours Scheduled Route: PO  Start: 10/01/23 1316   End: 10/02/23 1758   Admin Instructions:   Hold for SBP less than 100, DBP less than 60  Caution: Look alike/sound alike drug alert. Avoid grapefruit juice.         ampicillin-sulbactam (UNASYN) 3 g in sodium chloride 0.9 % 100 mL IVPB-VTB  Dose: 3 g  Freq: Every 6 Hours Route: IV  Indications of Use: PNEUMONIA  Start: 10/01/23 1700   End: 10/01/23 1625   Admin Instructions:   Activate vial before using.         sennosides-docusate (PERICOLACE) 8.6-50 MG per tablet 2 tablet  Dose: 2 tablet  Freq: 2 Times Daily Route: NG  Start: 10/03/23 2100   End: 10/05/23 1216   Admin Instructions:   HOLD MEDICATION IF PATIENT HAS HAD BOWEL MOVEMENT. Start bowel management regimen if patient has not had a bowel movement  after 12 hours.        And  polyethylene glycol (MIRALAX) packet 17 g  Dose: 17 g  Freq: Daily PRN Route: PO  PRN Reason: Constipation  PRN Comment: Use if senna-docusate is ineffective  Start: 10/03/23 1502   End: 10/05/23 1216   Admin Instructions:   Use if no bowel movement after 12 hours. Mix in 6-8 ounces of water.  Use 4-8 ounces of water, tea, or juice for each 17 gram dose.        And  bisacodyl (DULCOLAX) EC tablet 5 mg  Dose: 5 mg  Freq: Daily PRN Route: PO  PRN Reason: Constipation  PRN Comment: Use if polyethylene glycol is ineffective  Start: 10/03/23 1502   End: 10/05/23 1216   Admin Instructions:   Use if no bowel movement after 12 hours.  Swallow whole. Do not crush, split, or chew tablet.        And  bisacodyl (DULCOLAX) suppository 10 mg  Dose: 10 mg  Freq: Daily PRN Route: RE  PRN Reason: Constipation  PRN Comment: Use if bisacodyl oral is ineffective  Start: 10/03/23 1502   End: 10/05/23 1216   Admin Instructions:   Use if no bowel movement after 12 hours.  Hold for diarrhea         sennosides-docusate (PERICOLACE) 8.6-50 MG per tablet 2 tablet  Dose: 2 tablet  Freq: 2 Times Daily Route: PO  Start: 10/01/23 2100   End: 10/03/23 1502   Admin Instructions:   HOLD MEDICATION IF PATIENT HAS HAD BOWEL MOVEMENT. Start bowel management regimen if patient has not had a bowel movement after 12 hours.        And  polyethylene glycol (MIRALAX) packet 17 g  Dose: 17 g  Freq: Daily PRN Route: PO  PRN Reason: Constipation  PRN Comment: Use if senna-docusate is ineffective  Start: 10/01/23 1524   End: 10/03/23 1502   Admin Instructions:   Use if no bowel movement after 12 hours. Mix in 6-8 ounces of water.  Use 4-8 ounces of water, tea, or juice for each 17 gram dose.        And  bisacodyl (DULCOLAX) EC tablet 5 mg  Dose: 5 mg  Freq: Daily PRN Route: PO  PRN Reason: Constipation  PRN Comment: Use if polyethylene glycol is ineffective  Start: 10/01/23 1524   End: 10/03/23 1502   Admin Instructions:   Use if no  bowel movement after 12 hours.  Swallow whole. Do not crush, split, or chew tablet.        And  bisacodyl (DULCOLAX) suppository 10 mg  Dose: 10 mg  Freq: Daily PRN Route: RE  PRN Reason: Constipation  PRN Comment: Use if bisacodyl oral is ineffective  Start: 10/01/23 1524   End: 10/03/23 1502   Admin Instructions:   Use if no bowel movement after 12 hours.  Hold for diarrhea         cefTRIAXone (ROCEPHIN) 1,000 mg in sodium chloride 0.9 % 100 mL IVPB-VTB  Dose: 1,000 mg  Freq: Every 24 Hours Route: IV  Indications of Use: PNEUMONIA  Start: 10/02/23 1200   End: 10/01/23 1300   Admin Instructions:   LR should be paused and flushing of the line with NS is recommended prior to and after completion of ceftriaxone infusion due to incompatibility. Do not co-adminster with calcium-containing solutions.  Caution: Look alike/sound alike drug alert         cefTRIAXone (ROCEPHIN) 2,000 mg in sodium chloride 0.9 % 100 mL IVPB-VTB  Dose: 2,000 mg  Freq: Every 24 Hours Route: IV  Indications of Use: PNEUMONIA  Start: 10/02/23 1200   End: 10/01/23 1553   Admin Instructions:   LR should be paused and flushing of the line with NS is recommended prior to and after completion of ceftriaxone infusion due to incompatibility. Do not co-adminster with calcium-containing solutions.  Caution: Look alike/sound alike drug alert         chlorhexidine (PERIDEX) 0.12 % solution 15 mL  Dose: 15 mL  Freq: Every 12 Hours Scheduled Route: MT  Start: 10/02/23 2100   End: 10/05/23 1055   Admin Instructions:   Use Chlorhexidine Provided in Oral Care Kit & Swab Mouth As Directed   Do not swallow.         Enoxaparin Sodium (LOVENOX) syringe 30 mg  Dose: 30 mg  Freq: Every 24 Hours Route: SC  Indications of Use: PROPHYLAXIS OF VENOUS THROMBOEMBOLISM  Start: 10/04/23 2115   End: 10/04/23 1130   Admin Instructions:   Give subcutaneous in abdomen only. Do not massage site after injection.         Enoxaparin Sodium (LOVENOX) syringe 40 mg  Dose: 40 mg  Freq:  Every 12 Hours Route: SC  Indications of Use: PROPHYLAXIS OF VENOUS THROMBOEMBOLISM  Start: 10/01/23 1700   End: 10/03/23 1426   Admin Instructions:   Give subcutaneous in abdomen only. Do not massage site after injection.         Enoxaparin Sodium (LOVENOX) syringe 40 mg  Dose: 40 mg  Freq: Daily Route: SC  Indications of Use: PROPHYLAXIS OF VENOUS THROMBOEMBOLISM  Start: 10/01/23 1615   End: 10/01/23 1527   Admin Instructions:   Give subcutaneous in abdomen only. Do not massage site after injection.         Enoxaparin Sodium (LOVENOX) syringe 60 mg  Dose: 60 mg  Freq: Every 12 Hours Route: SC  Indications of Use: PROPHYLAXIS OF VENOUS THROMBOEMBOLISM  Start: 10/03/23 2100   End: 10/04/23 0759   Admin Instructions:   Give subcutaneous in abdomen only. Do not massage site after injection.         hydrALAZINE (APRESOLINE) tablet 25 mg  Dose: 25 mg  Freq: Every 8 Hours Scheduled Route: PO  Start: 10/05/23 1400   End: 10/07/23 1530   Admin Instructions:   Caution: Look alike/sound alike drug alert         hydrALAZINE (APRESOLINE) tablet 25 mg  Dose: 25 mg  Freq: Every 8 Hours Scheduled Route: NG  Start: 10/03/23 1600   End: 10/05/23 1216   Admin Instructions:   Caution: Look alike/sound alike drug alert         hydrALAZINE (APRESOLINE) tablet 25 mg  Dose: 25 mg  Freq: Every 8 Hours Scheduled Route: PO  Start: 10/03/23 1400   End: 10/03/23 1502   Admin Instructions:   Caution: Look alike/sound alike drug alert         labetalol (NORMODYNE) tablet 100 mg  Dose: 100 mg  Freq: Every 12 Hours Scheduled Route: NG  Start: 10/03/23 2100   End: 10/05/23 1216   Admin Instructions:   Hold for SBP less than 100, DBP less than 60, or heart rate less than 50         labetalol (NORMODYNE) tablet 100 mg  Dose: 100 mg  Freq: Every 12 Hours Scheduled Route: PO  Start: 10/01/23 1316   End: 10/03/23 1502   Admin Instructions:   Hold for SBP less than 100, DBP less than 60, or heart rate less than 50         metroNIDAZOLE (FLAGYL) IVPB 500  mg  Dose: 500 mg  Freq: Every 8 Hours Route: IV  Indications of Use: PNEUMONIA  Start: 10/04/23 0900   End: 10/05/23 0918   Admin Instructions:   Caution: Look alike/sound alike drug alert.  Do not refrigerate.         pantoprazole (PROTONIX) injection 40 mg  Dose: 40 mg  Freq: Every 24 Hours Scheduled Route: IV  Indications of Use: STRESS ULCER PROPHYLAXIS  Start: 10/02/23 1845   End: 10/05/23 1217   Admin Instructions:   Dilute with 10 mL of 0.9% NaCl and give IV push over 2 minutes.         Pharmacy Consult - Pharmacy to dose  Freq: Continuous PRN Route: XX  PRN Reason: Consult  Start: 10/01/23 1551   End: 10/02/23 0748   Order specific questions:   Pharmacy to Dose: Vancomycin and Zosyn  Indication of Use Pulm abscess           Pharmacy to Dose enoxaparin (LOVENOX)  Freq: Continuous PRN Route: XX  PRN Reason: Consult  Indications of Use: PROPHYLAXIS OF VENOUS THROMBOEMBOLISM  Start: 10/03/23 1340   End: 10/04/23 1130         Pharmacy to dose vancomycin  Freq: Continuous PRN Route: XX  PRN Reason: Consult  Indications Comment: Pulm abscess  Start: 10/01/23 1551   End: 10/02/23 0841         piperacillin-tazobactam (ZOSYN) 4.5 g in iso-osmotic dextrose 100 mL IVPB (premix)  Dose: 4.5 g  Freq: Every 8 Hours Route: IV  Indications Comment: Pulmonary Abscess  Start: 10/01/23 2345   End: 10/04/23 0802   Admin Instructions:   Refrigerate         propofol (DIPRIVAN) infusion 10 mg/mL 100 mL  Rate: 4.2-42 mL/hr Dose: 5-50 mcg/kg/min  Weight Dosing Info: 140 kg  Freq: Titrated Route: IV  Start: 10/02/23 1845   End: 10/05/23 1018   Admin Instructions:   Do not administer through the same IV catheter with blood or plasma. Begin infusion at 5 mcg/kg/min and titrate up or down by 5-10 mcg/kg/min every 3-5 minutes to maintain RASS goal. Maximum rate 50 mcg/kg/min. Notify MD if not at RASS goal and requiring more than 50 mcg/kg/min. Infuse into dedicated line, change vial and tube every 12 hours. Patient must be intubated.  "  Order specific questions:   Titration Indication: Sedation           sodium chloride 0.9 % infusion  Rate: 100 mL/hr Dose: 100 mL/hr  Freq: Continuous Route: IV  Start: 10/01/23 1700   End: 10/02/23 0917         vancomycin (VANCOCIN) 1000 mg/200 mL dextrose 5% IVPB  Dose: 1,000 mg  Freq: Every 12 Hours Route: IV  Indications Comment: Pulmonary Abscess  Start: 10/02/23 0830   End: 10/02/23 0841         vancomycin (VANCOCIN) 1000 mg/200 mL dextrose 5% IVPB  Dose: 1,000 mg  Freq: Every 12 Hours Route: IV  Indications Comment: Pulmonary Abscess  Start: 10/01/23 1800   End: 10/02/23 0749                       Physician Progress Notes (last 72 hours)        Rm Riddle MD at 10/09/23 1341           LOS: 8 days     Name: Nay Gonzalez  Age: 53 y.o.  Sex: female  :  1970  MRN: [id number]         Primary Care Physician: Provider, No Known    Subjective   Subjective    Patient is lying on the bed and does not appear to be any distress.  Denies nausea, vomiting, abdominal pain, chest pain, shortness of breath.    Objective   Vital Signs  Temp:  [97.3 øF (36.3 øC)-98.6 øF (37 øC)] 97.3 øF (36.3 øC)  Heart Rate:  [82-88] 88  Resp:  [16-20] 16  BP: (127-173)/(73-82) 127/74  Body mass index is 55.08 kg/mý.    Objective:  General Appearance:  Comfortable and in no acute distress.    Vital signs: (most recent): Blood pressure 127/74, pulse 88, temperature 97.3 øF (36.3 øC), temperature source Oral, resp. rate 16, height 160 cm (62.99\"), weight (!) 141 kg (310 lb 13.6 oz), last menstrual period 10/01/2023, SpO2 98%.    Lungs:  Normal effort and normal respiratory rate.  She is not in respiratory distress.  There are decreased breath sounds.    Heart: Normal rate.  Regular rhythm.    Chest: (Right anterior chest tube in place)  Abdomen: Abdomen is soft.  Bowel sounds are normal.   There is no abdominal tenderness.     Extremities: There is no dependent edema or local swelling.    Neurological: Patient is alert " and oriented to person, place and time.    Skin:  Warm and dry.                Results Review:       I reviewed the patient's new clinical results.    Results from last 7 days   Lab Units 10/09/23  0512 10/08/23  0628 10/07/23  0620 10/06/23  0519 10/05/23  0406 10/04/23  0414 10/03/23  1916 10/03/23  0408   WBC 10*3/mm3 8.50 8.80 9.62 10.93* 12.91* 12.10*  --  17.47*   HEMOGLOBIN g/dL 8.8* 8.9* 9.8* 9.6* 9.0* 7.8* 8.0* 8.0*   PLATELETS 10*3/mm3 321 303 342 331 329 285  --  319     Results from last 7 days   Lab Units 10/09/23  0512 10/08/23  0628 10/07/23  0620 10/06/23  0519 10/05/23  0406 10/04/23  0414 10/03/23  0408   SODIUM mmol/L 134* 137 137 136 142 140 142   POTASSIUM mmol/L 5.1 4.7 4.7 4.8 4.9 4.9 4.5   CHLORIDE mmol/L 97* 99 98 98 101 99 102   CO2 mmol/L 22.3 24.0 22.0 22.5 21.0* 27.3 25.0   BUN mg/dL 73* 82* 78* 66* 58* 41* 31*   CREATININE mg/dL 6.31* 7.05* 7.25* 6.22* 5.94* 4.64* 2.73*   CALCIUM mg/dL 8.7 8.8 8.9 9.3 9.2 9.1 9.0   GLUCOSE mg/dL 95 112* 104* 120* 103* 119* 87                 Scheduled Meds:   cefTRIAXone, 2,000 mg, Intravenous, Q24H  heparin (porcine), 5,000 Units, Subcutaneous, Q8H  insulin lispro, 2-7 Units, Subcutaneous, 4x Daily AC & at Bedtime  labetalol, 100 mg, Oral, Q12H  metroNIDAZOLE, 500 mg, Oral, Q8H  pantoprazole, 40 mg, Oral, Q AM  saccharomyces boulardii, 500 mg, Oral, BID  senna-docusate sodium, 2 tablet, Oral, BID  sevelamer, 1,600 mg, Oral, TID With Meals  sodium chloride, 10 mL, Intravenous, Q12H      PRN Meds:     acetaminophen **OR** acetaminophen **OR** acetaminophen    senna-docusate sodium **AND** polyethylene glycol **AND** bisacodyl **AND** bisacodyl    Calcium Replacement - Follow Nurse / BPA Driven Protocol    dextrose    dextrose    fentaNYL citrate (PF)    glucagon (human recombinant)    hydrALAZINE    influenza vaccine    ipratropium-albuterol    Magnesium Standard Dose Replacement - Follow Nurse / BPA Driven Protocol    nitroglycerin    ondansetron **OR**  ondansetron    Phosphorus Replacement - Follow Nurse / BPA Driven Protocol    Potassium Replacement - Follow Nurse / BPA Driven Protocol    sodium chloride    sodium chloride    sodium chloride  Continuous Infusions:       Assessment & Plan   Active Hospital Problems    Diagnosis  POA    **PNA (pneumonia) [J18.9]  Yes    ELLY (acute kidney injury) [N17.9]  Unknown    Obesity, morbid, BMI 50 or higher [E66.01]  Unknown    Anemia [D64.9]  Unknown    Sepsis [A41.9]  Unknown    Type 2 diabetes mellitus with hyperglycemia [E11.65]  Unknown    Acute respiratory failure with hypoxia [J96.01]  Unknown    Hypertensive urgency [I16.0]  Unknown    Abscess of upper lobe of right lung with pneumonia [J85.1]  Yes      Resolved Hospital Problems    Diagnosis Date Resolved POA    Hyperkalemia [E87.5] 10/03/2023 Unknown       Assessment & Plan    This is a 53-year-old female with a history of morbid obesity, new diabetes of type 2 diabetes and hypertension who presents to the hospital with cough shortness of breath and fevers and is found to have a right upper lobe pulmonary abscess     1.Acute hypoxic respiratory failure with right upper lobe abscess, now off the ventilator and chest tube has been removed on 10/8/2023.  Cultures grew Streptococcus and currently patient is on Rocephin/Flagyl and stop date is 11/14/2023.  On 2 L of oxygen and have encouraged her to use incentive spirometry.    2.  Hypertension, on labetalol and currently blood pressure is in systolics of 110s therefore hydralazine is being discontinued.    3.  Diabetes mellitus, hemoglobin A1c 7.5 and currently on corrective dose insulin and blood sugars are reasonably well controlled.    4.  Morbid obesity, counseled to lose weight.    5.  Acute kidney injury, felt to be multifactorial etiology and did peak at 7.25 and appears to have plateaued.  Creatinine started trending down and it is at 6.31 today and nephrology is following along.    6.  Anemia, iron deficient  and will have her follow-up with hematology and GI as an outpatient basis.  No evidence of any active bleeding.    7.  On heparin for DVT prophylaxis.    8.  CODE STATUS is full code.    Estimated discharge date, to be determined.      Copied text on this note has been reviewed by me on 10/9/2023        Rm Riddle MD  [facility]  10/09/23  11:12 EDT     Electronically signed by Rm Riddle MD at 10/09/23 1342       Georgie Jean MD at 10/09/23 1042          ID NOTE    CC: f/u pulmonary abscess due to Strep    Subj: Afebrile, she states she feels a lot better.  Tolerating antibiotics without abdominal pain vomiting diarrhea or rash    Objective   Vital Signs   Temp:  [97.3 øF (36.3 øC)-98.6 øF (37 øC)] 97.3 øF (36.3 øC)  Heart Rate:  [80-88] 88  Resp:  [16-20] 16  BP: (120-173)/(72-82) 173/82    Physical Exam:   General: in NAD  Cardiovascular: NR  Respiratory: R rales are better; R chest drain in place  GI: Abdomen is soft, not tender  Skin: No rashes    Abx:  Ceftriaxone 2 g IV every 24 hours  Flagyl 500 mg p.o. every 8 hours    Labs:   Lab Results   Component Value Date    WBC 8.50 10/09/2023    HGB 8.8 (L) 10/09/2023    HCT 27.4 (L) 10/09/2023    MCV 82.8 10/09/2023     10/09/2023     Lab Results   Component Value Date    GLUCOSE 95 10/09/2023    BUN 73 (H) 10/09/2023    CREATININE 6.31 (H) 10/09/2023    EGFR 7.4 (L) 10/09/2023    BCR 11.6 10/09/2023    K 5.1 10/09/2023    CO2 22.3 10/09/2023    CALCIUM 8.7 10/09/2023    ALBUMIN 2.8 (L) 10/09/2023    BILITOT 0.2 10/06/2023    AST 20 10/06/2023    ALT 18 10/06/2023     Urine Eos negative  HIV negative  Hep C negative    Microbiology:  10/1 RPP: negative  10/1 BCx: negative  10/1 MRSA nares: negative  10/2 BAL Cx: normal annabel  10/3 ETT Cx: normal annabel  10/3 Lung Abscess Cx: Group G Strep      ASSESSMENT/PLAN:  Right upper lobe pulmonary abscess due to strep  Acute hypercapneic and hypoxic respiratory failure,  improving  Sepsis due to #1  Super obesity BMI 54  Uncontrolled diabetes type 2 A1c  7.5%  Hypertension  7.  Acute kidney injury    Continue ceftriaxone 2 g IV q24h and Flagyl 500 mg IV q8h for now. Plan to complete a 6-week course w/ stop date ~23.  Anticipate discharge on oral antibiotics  Antibiotics are dosed appropriately for renal dysfunction      Electronically signed by Georgie Jean MD at 10/09/23 1331       Iain Owens MD at 10/09/23 1038              [facility] Progress Note      Patient Name: Nay Gonzalez  : 1970  MRN: [id number]  Primary Care Physician:  Provider, No Known  Date of admission: 10/1/2023    Subjective     Interval History:   Follow-up acute kidney injury    The chest tube was removed, patient is feeling much better, denies any chest pain or shortness of air, no orthopnea or PND, no nausea or vomiting, no dysuria or gross hematuria and she has good urine output.  Review of Systems:   As noted above    Objective     Vitals:   Temp:  [97.3 øF (36.3 øC)-98.6 øF (37 øC)] 97.3 øF (36.3 øC)  Heart Rate:  [80-88] 88  Resp:  [16-20] 16  BP: (120-173)/(72-82) 173/82  Flow (L/min):  [2] 2    Intake/Output Summary (Last 24 hours) at 10/9/2023 1038  Last data filed at 10/9/2023 1018  Gross per 24 hour   Intake 1440 ml   Output 2750 ml   Net -1310 ml       Physical Exam:    General Appearance: Morbidly obese, awake and alert, chronically ill, no acute distress  Skin: warm and dry  HEENT: She has core track in place  Neck: Difficult to assess due to body habitus  Lungs: Bilateral rhonchi, breathing effort not labored  Heart: RRR, normal S1 and S2, no rub  Abdomen: soft, no guarding, protuberant, normoactive bowel  : No palpable bladder  Extremities: no edema, cyanosis or clubbing  Neuro: Normal speech and mental status and moving all extremities    Scheduled Meds:     cefTRIAXone, 2,000 mg, Intravenous, Q24H  heparin (porcine), 5,000 Units,  Subcutaneous, Q8H  insulin lispro, 2-7 Units, Subcutaneous, 4x Daily AC & at Bedtime  labetalol, 100 mg, Oral, Q12H  metroNIDAZOLE, 500 mg, Oral, Q8H  pantoprazole, 40 mg, Oral, Q AM  saccharomyces boulardii, 500 mg, Oral, BID  senna-docusate sodium, 2 tablet, Oral, BID  sevelamer, 1,600 mg, Oral, TID With Meals  sodium chloride, 10 mL, Intravenous, Q12H      IV Meds:          Results Reviewed:   I have personally reviewed the results from the time of this admission to 10/9/2023 10:38 EDT     Results from last 7 days   Lab Units 10/09/23  0512 10/08/23  0628 10/07/23  0620 10/06/23  0519 10/05/23  0406 10/04/23  0414   SODIUM mmol/L 134* 137 137 136 142 140   POTASSIUM mmol/L 5.1 4.7 4.7 4.8 4.9 4.9   CHLORIDE mmol/L 97* 99 98 98 101 99   CO2 mmol/L 22.3 24.0 22.0 22.5 21.0* 27.3   BUN mg/dL 73* 82* 78* 66* 58* 41*   CREATININE mg/dL 6.31* 7.05* 7.25* 6.22* 5.94* 4.64*   CALCIUM mg/dL 8.7 8.8 8.9 9.3 9.2 9.1   BILIRUBIN mg/dL  --   --   --  0.2 0.3 0.3   ALK PHOS U/L  --   --   --  97 118* 114   ALT (SGPT) U/L  --   --   --  18 16 17   AST (SGOT) U/L  --   --   --  20 15 10   GLUCOSE mg/dL 95 112* 104* 120* 103* 119*       Estimated Creatinine Clearance: 14.5 mL/min (A) (by C-G formula based on SCr of 6.31 mg/dL (H)).    Results from last 7 days   Lab Units 10/09/23  0512 10/08/23  0628 10/07/23  0620   MAGNESIUM mg/dL 2.6 2.6 3.0*   PHOSPHORUS mg/dL 10.0* 11.6* 12.1*       Results from last 7 days   Lab Units 10/09/23  0512 10/08/23  0628 10/07/23  0620 10/06/23  0519 10/05/23  0406 10/04/23  0414 10/03/23  0408   URIC ACID mg/dL 12.5* 11.9* 11.5* 11.9* 10.5* 9.9* 9.4*       Results from last 7 days   Lab Units 10/09/23  0512 10/08/23  0628 10/07/23  0620 10/06/23  0519 10/05/23  0406   WBC 10*3/mm3 8.50 8.80 9.62 10.93* 12.91*   HEMOGLOBIN g/dL 8.8* 8.9* 9.8* 9.6* 9.0*   PLATELETS 10*3/mm3 321 303 342 331 329             Assessment / Plan     ASSESSMENT:  Acute kidney injury associated with sudden correction of  "her hypertension initially also possible component of contrast-induced nephropathy because patient had CTA on 10/1/2023.  Creatinine today is down to 6.31 patient is euvolemic, electrolyte within acceptable range and has increased urine output, she is entering in the recovery phase of her ATN, also her proteinuria improved down from 2111 mg/g to 581.4 mg/g  New diagnosis of hypertension, blood pressure with acceptable control  New diagnosis of diabetes mellitus type 2  Super morbid obesity  Anemia, the etiology not very clear, most likely patient have iron deficiency iron saturation 13% but her TIBC is on the low side, suggestive of anemia of chronic disease too.  Hemoglobin today 8.8  Severe hyperphosphatemia associated with acute kidney injury phosphorus 10, was started on sevelamer  Hyperuricemia, uric acid 12.5, associate with acute kidney, will continue to monitor    PLAN:  Continue the same treatment  No indication for dialysis today  Surveillance labs    I discussed the case with the patient and she voiced good understanding  I reviewed the chart and other providers notes, I reviewed imaging and lab data.  Copied text in this note has been reviewed and is accurate as of 10/09/23.       Thank you for involving us in the care of Nay Gonzalez.  Please feel free to call with any questions.    Iain Owens MD  10/09/23  10:38 EDT    [facility]  [phone]    Please note that portions of this note were completed with a voice recognition program.    Electronically signed by Iain Owens MD at 10/09/23 1041       Brannon Gonzales MD at 10/09/23 5622            Enter Query Response Below      Query Response:     Yes, all secondary to sepsis          If applicable, please update the problem list.   Patient: Nay Gonzalez \"Graciela\"        : 1970  Account: [id number]           Admit Date: 10/1/23        How to Respond to this query:       a. Click New " Note     b. Answer query within the yellow box.                c. Update the Problem List, if applicable.      If you have any questions about this query contact me at: [email or web address]     ,  Patient admitted with sepsis, pneumonia, ELLY and acute respiratory failure. Treated with Zosyn and ceftriaxone. She was also on the vent. but is off now.     Are any of this patient's organ dysfunctions ELLY and acute respiratory failure secondary to sepsis?     -Yes, all secondary to sepsis   -Yes, but not all, please specify____________   -No   -Other- specify____________   -Unable to determine          By submitting this query, we are merely seeking further clarification of documentation to accurately reflect all conditions that you are monitoring, evaluating, treating or that extend the hospitalization or utilize additional resources of care. Please utilize your independent clinical judgment when addressing the question(s) above.     This query and your response, once completed, will be entered into the legal medical record.    Sincerely,  Leena Escamilla  Clinical Documentation Integrity Program       Electronically signed by Brannon Gonzales MD at 10/09/23 0762       Christian Bernal MD at 10/08/23 1631                                                        LOS: 7 days   Patient Care Team:  Provider, No Known as PCP - General    Chief Complaint:  F/up respiratory failure, lung abscess    Subjective   Interval History      On 2L O2. She reported minimal cough without sputum production.  No dyspnea at rest.      REVIEW OF SYSTEMS:   CARDIOVASCULAR: No chest pain, chest pressure or chest discomfort. No palpitations or edema.     GASTROINTESTINAL: No anorexia, nausea, vomiting or diarrhea. No abdominal pain.  CONSTITUTIONAL: No fever or chills.     Ventilator/Non-Invasive Ventilation Settings (From admission, onward)             Physical Exam:     Vital Signs  Temp:  [98.2 øF (36.8 øC)-98.6 øF (37 øC)] 98.6 øF  "(37 øC)  Heart Rate:  [80-88] 82  Resp:  [18-20] 18  BP: (120-150)/(59-80) 133/78    Intake/Output Summary (Last 24 hours) at 10/8/2023 1631  Last data filed at 10/8/2023 1300  Gross per 24 hour   Intake 580 ml   Output 1410 ml   Net -830 ml     Flowsheet Rows      Flowsheet Row First Filed Value   Admission Height 160 cm (63\") Documented at 10/01/2023 1047   Admission Weight 152 kg (335 lb) Documented at 10/01/2023 1047            PPE used per hospital policy    General Appearance:   Alert, cooperative, in no acute distress   ENMT:  Mallampati score 3, moist mucous membrane   Eyes:  Pupils equal and reactive to light. EOMI   Neck:   Large. Trachea midline. No thyromegaly.   Lungs:    Diminished air entry bilaterally especially on the RLL. NO wheezing or rales.     Heart:   Regular rhythm and normal rate, normal S1 and S2, no         murmur   Skin:   No rash or ecchymosis   Abdomen:    Obese. Soft. No tenderness. No HSM.   Neuro/psych:  Conscious, alert, oriented x3. Strength 5/5 in upper and lower  ext.  Appropriate mood and affect   Extremities:  No cyanosis, clubbing or edema.  Warm extremities and well-perfused          Results Review:        Results from last 7 days   Lab Units 10/08/23  0628 10/07/23  0620 10/06/23  0519   SODIUM mmol/L 137 137 136   POTASSIUM mmol/L 4.7 4.7 4.8   CHLORIDE mmol/L 99 98 98   CO2 mmol/L 24.0 22.0 22.5   BUN mg/dL 82* 78* 66*   CREATININE mg/dL 7.05* 7.25* 6.22*   GLUCOSE mg/dL 112* 104* 120*   CALCIUM mg/dL 8.8 8.9 9.3           Results from last 7 days   Lab Units 10/08/23  0628 10/07/23  0620 10/06/23  0519   WBC 10*3/mm3 8.80 9.62 10.93*   HEMOGLOBIN g/dL 8.9* 9.8* 9.6*   HEMATOCRIT % 28.0* 30.9* 30.0*   PLATELETS 10*3/mm3 303 342 331                       Results from last 7 days   Lab Units 10/05/23  0406 10/02/23  0501   CRP mg/dL 11.66* 28.05*       Results from last 7 days   Lab Units 10/04/23  1418 10/04/23  0357 10/03/23  0339 10/02/23  1840 10/02/23  1701 " 10/02/23  1459 10/02/23  1344   PH, ARTERIAL pH units 7.356 7.376 7.498* 7.309* 7.071* 7.236* 7.074*   PCO2, ARTERIAL mm Hg 45.6* 44.7 34.8* 59.3* 108.4* 66.9* 112.4*   PO2 ART mm Hg 77.8* 127.2* 109.1* 174.3* 90.5 122.2* 96.8   O2 SATURATION ART % 94.7 98.8* 98.7* 99.4* 91.2* 97.8 92.7   FLOW RATE lpm  --   --   --   --   --   --  6.0000   MODALITY  Adult Vent Adult Vent Adult Vent Adult Vent BiPap BiPap Cannula         I reviewed the patient's new clinical results.        Medication Review:   cefTRIAXone, 2,000 mg, Intravenous, Q24H  heparin (porcine), 5,000 Units, Subcutaneous, Q8H  insulin lispro, 2-7 Units, Subcutaneous, 4x Daily AC & at Bedtime  labetalol, 100 mg, Oral, Q12H  metroNIDAZOLE, 500 mg, Oral, Q8H  pantoprazole, 40 mg, Oral, Q AM  saccharomyces boulardii, 500 mg, Oral, BID  senna-docusate sodium, 2 tablet, Oral, BID  sevelamer, 1,600 mg, Oral, TID With Meals  sodium chloride, 10 mL, Intravenous, Q12H             Diagnostic imaging:  I personally and independently reviewed the following images:  CXR 10/7/2023: Loculated right pleural effusion.  Chest tube noted.  CXR 10/8/2023: Cardiomegaly.  Possible pulmonary vascular congestion but difficult to interpret due to body habitus.    Assessment     RUL pulmonary abscess, culture consistent with strep  Loculated right pleural effusion/empyema, s/p chest tube placement 10/3 tPA infusion  Acute hypercapneic and hypoxic respiratory failure, required mechanical ventilation.  Sepsis due to #1  Super obesity BMI 54  Newly diagnosed uncontrolled diabetes type 2 A1c  7.5%  Newly diagnosed hypertension  Acute kidney injury  Probable CRYSTAL: Snoring, apnea and desaturation at night.      Plan       Chest tube discontinued.  Antibiotics for 6 weeks.  ID following  DVT prophylaxis with heparin subcu.  Bowel regimen.  Avoid narcotics if possible due to hypercapnia.  BiPAP at night for respiratory failure and highly suspected sleep apnea.  Not being used consistently at  "the hospital.  We will ordered outpatient PSG for probable CRYSTAL.  Not a candidate for HST due to morbid obesity.  O2 by NC and titrate to keep SpO2 >90%    Dispo: Okay to discharge from pulmonary perspective.    Christian Bernal MD  10/08/23  16:31 EDT          This note was dictated utilizing Dragon dictation     Electronically signed by Christian Bernal MD at 10/08/23 1633       Rm Riddle MD at 10/08/23 1331           LOS: 7 days     Name: Nay Gonzalez  Age: 53 y.o.  Sex: female  :  1970  MRN: [id number]         Primary Care Physician: Provider, No Known    Subjective   Subjective    Patient is lying on the bed and does not appear to be any distress.  Denies nausea, vomiting, abdominal pain, chest pain, shortness of breath.    Objective   Vital Signs  Temp:  [98.2 øF (36.8 øC)-98.6 øF (37 øC)] 98.5 øF (36.9 øC)  Heart Rate:  [83-88] 84  Resp:  [18-20] 18  BP: (111-150)/(59-80) 120/72  Body mass index is 54.65 kg/mý.    Objective:  General Appearance:  Comfortable and in no acute distress.    Vital signs: (most recent): Blood pressure 120/72, pulse 84, temperature 98.5 øF (36.9 øC), temperature source Oral, resp. rate 18, height 160 cm (62.99\"), weight (!) 140 kg (308 lb 6.8 oz), last menstrual period 10/01/2023, SpO2 98%.    Lungs:  Normal effort and normal respiratory rate.  She is not in respiratory distress.  There are decreased breath sounds.    Heart: Normal rate.  Regular rhythm.    Chest: (Right anterior chest tube in place)  Abdomen: Abdomen is soft.  Bowel sounds are normal.   There is no abdominal tenderness.     Extremities: There is no dependent edema or local swelling.    Neurological: Patient is alert and oriented to person, place and time.    Skin:  Warm and dry.                Results Review:       I reviewed the patient's new clinical results.    Results from last 7 days   Lab Units 10/08/23  0628 10/07/23  0620 10/06/23  0519 10/05/23  0406 10/04/23  0414 10/03/23  1916 " 10/03/23  0408 10/02/23  0501   WBC 10*3/mm3 8.80 9.62 10.93* 12.91* 12.10*  --  17.47* 24.25*   HEMOGLOBIN g/dL 8.9* 9.8* 9.6* 9.0* 7.8* 8.0* 8.0* 10.7*   PLATELETS 10*3/mm3 303 342 331 329 285  --  319 353     Results from last 7 days   Lab Units 10/08/23  0628 10/07/23  0620 10/06/23  0519 10/05/23  0406 10/04/23  0414 10/03/23  0408 10/02/23  2026   SODIUM mmol/L 137 137 136 142 140 142 142   POTASSIUM mmol/L 4.7 4.7 4.8 4.9 4.9 4.5 5.2   CHLORIDE mmol/L 99 98 98 101 99 102 102   CO2 mmol/L 24.0 22.0 22.5 21.0* 27.3 25.0 29.7*   BUN mg/dL 82* 78* 66* 58* 41* 31* 27*   CREATININE mg/dL 7.05* 7.25* 6.22* 5.94* 4.64* 2.73* 1.95*   CALCIUM mg/dL 8.8 8.9 9.3 9.2 9.1 9.0 9.3   GLUCOSE mg/dL 112* 104* 120* 103* 119* 87 123*                 Scheduled Meds:   cefTRIAXone, 2,000 mg, Intravenous, Q24H  heparin (porcine), 5,000 Units, Subcutaneous, Q8H  insulin lispro, 2-7 Units, Subcutaneous, 4x Daily AC & at Bedtime  labetalol, 100 mg, Oral, Q12H  metroNIDAZOLE, 500 mg, Oral, Q8H  pantoprazole, 40 mg, Oral, Q AM  saccharomyces boulardii, 500 mg, Oral, BID  senna-docusate sodium, 2 tablet, Oral, BID  sevelamer, 1,600 mg, Oral, TID With Meals  sodium chloride, 10 mL, Intravenous, Q12H      PRN Meds:     acetaminophen **OR** acetaminophen **OR** acetaminophen    senna-docusate sodium **AND** polyethylene glycol **AND** bisacodyl **AND** bisacodyl    Calcium Replacement - Follow Nurse / BPA Driven Protocol    dextrose    dextrose    fentaNYL citrate (PF)    glucagon (human recombinant)    hydrALAZINE    influenza vaccine    ipratropium-albuterol    Magnesium Standard Dose Replacement - Follow Nurse / BPA Driven Protocol    nitroglycerin    ondansetron **OR** ondansetron    Phosphorus Replacement - Follow Nurse / BPA Driven Protocol    Potassium Replacement - Follow Nurse / BPA Driven Protocol    sodium chloride    sodium chloride    sodium chloride  Continuous Infusions:       Assessment & Plan   Active Hospital Problems     Diagnosis  POA    **PNA (pneumonia) [J18.9]  Yes    ELLY (acute kidney injury) [N17.9]  Unknown    Obesity, morbid, BMI 50 or higher [E66.01]  Unknown    Anemia [D64.9]  Unknown    Sepsis [A41.9]  Unknown    Type 2 diabetes mellitus with hyperglycemia [E11.65]  Unknown    Acute respiratory failure with hypoxia [J96.01]  Unknown    Hypertensive urgency [I16.0]  Unknown    Abscess of upper lobe of right lung with pneumonia [J85.1]  Yes      Resolved Hospital Problems    Diagnosis Date Resolved POA    Hyperkalemia [E87.5] 10/03/2023 Unknown       Assessment & Plan    This is a 53-year-old female with a history of morbid obesity, new diabetes of type 2 diabetes and hypertension who presents to the hospital with cough shortness of breath and fevers and is found to have a right upper lobe pulmonary abscess     1.Acute hypoxic respiratory failure with right upper lobe abscess, now off the ventilator and chest tube has been removed today 10/8/2023.  Cultures grew Streptococcus and currently patient is on Rocephin/Flagyl.  On 2 L of oxygen and have encouraged her to use incentive spirometry.    2.  Hypertension, on labetalol and currently blood pressure is in systolics of 110s therefore hydralazine is being discontinued.    3.  Diabetes mellitus, hemoglobin A1c 7.5 and currently on corrective dose insulin and blood sugars are reasonably well controlled.    4.  Morbid obesity, counseled to lose weight.    5.  Acute kidney injury, felt to be multifactorial etiology and did peak at 7.25 and appears to have plateaued.  Hopefully renal function is starting to recover and nephrology is following along as well.    6.  Anemia, iron deficient and will have her follow-up with hematology and GI as an outpatient basis.  No evidence of any active bleeding.    7.  On heparin for DVT prophylaxis.    8.  CODE STATUS is full code.    Estimated discharge date, to be determined.      Copied text on this note has been reviewed by me on  10/8/2023        Rm Riddle MD  [facility]  10/08/23  11:12 EDT     Electronically signed by Rm Riddle MD at 10/08/23 1332       Lonnie Brantley MD at 10/08/23 1028          Progress note    Reason for consult: Right empyema thoracis    No acute issues overnight.  Pain well controlled.  In good spirits.  Feeling better.  Denies resting shortness of breath.  Sitting in chair comfortably.    Afebrile.  Hemodynamically stable.  Nonlabored breathing.  On 2 LPM nasal cannula, saturating 100%.  Chest tube with serous drainage, no air leak.  Extremities warm.  Lower extremity edematous.  Alert, oriented x3.    Imaging reviewed.  X-ray showed adequate lung expansion.    Patient presented with empyema thoracis that responded well to chest tube placement and tPA x3.  Chest tube transition to waterseal yesterday.  X-ray showed adequate lung expansion and chest tube was removed today.  She will need 6 weeks antibiotics for empyema thoracis.  She can be discharged from thoracic surgery standpoint.  She will follow-up in our clinic in 2 to 3 weeks.  Rest of care per primary team.    Please call thoracic surgery with questions/concerns.    Lonnie Brantley MD  Thoracic surgeon    Electronically signed by Lonnie Brantley MD at 10/08/23 1030       Larry Turk MD at 10/08/23 0899          ID NOTE    CC: f/u pulmonary abscess due to Strep    Subj: Overall she seems to doing well.  Still having fevers chills or night sweats.  No significant pain some tenderness from the drain.      Objective   Vital Signs   Temp:  [98.2 øF (36.8 øC)-98.6 øF (37 øC)] 98.6 øF (37 øC)  Heart Rate:  [83-88] 83  Resp:  [18-20] 18  BP: (111-150)/(59-80) 129/80    Physical Exam:   General: awake, alert, very nice, in chair  Eyes: no scleral icterus  ENT: ETT in place  Cardiovascular: NR  Respiratory: R rales are better; R chest drain in place  GI: Abdomen is soft, not tender  Skin: No rashes  Vasc: LIJ CVC  removed    Labs:   White count 8.8, hemoglobin 8.9, platelet 303  Creatinine 7.05  Glucose 112 through 142      Urine Eos negative  HIV negative  Hep C negative  Procal 0.59    Microbiology:  10/1 RPP: negative  10/1 BCx: negative  10/1 MRSA nares: negative  10/2 BAL Cx: normal annabel  10/3 ETT Cx: normal annabel  10/3 Lung Abscess Cx: Group G Strep    Chest x-ray independently interpreted: Chest tube in place with stable to improved right-sided density    ASSESSMENT/PLAN:  Right upper lobe pulmonary abscess due to strep  Acute hypercapneic and hypoxic respiratory failure, improving  Sepsis due to #1  Super obesity BMI 54  Uncontrolled diabetes type 2 A1c  7.5%  Hypertension  7.  Acute kidney injury      On 10/3/23, she had an IR-guided CT chest tube placement. The culture is growing Group G Strep. The anaerobic culture is no growth to date. Continue ceftriaxone 2 g IV q24h and Flagyl 500 mg IV q8h for now. I do anticipate switching her to an oral antibiotic at discharge with plans to complete a 6-week course w/ stop date ~23.     Creatinine stable.  Nephrology is following, no indication of dialysis.  Antibiotics are dosed appropriately for renal dysfunction    I'll check a BMP daily for monitoring. Thanks to nephrologist for their assistance.    Electronically signed by Larry Turk MD at 10/08/23 0831       Iain Owens MD at 10/08/23 0826              [facility] Progress Note      Patient Name: Nay Gonzalez  : 1970  MRN: [id number]  Primary Care Physician:  Provider, No Known  Date of admission: 10/1/2023    Subjective     Interval History:   Follow-up acute kidney injury    Urine output up to 1910 cc, patient is feeling much better, no nausea or vomiting, no chest pain, no shortness of air, no orthopnea or PND, no lightheadedness, blood pressure reasonably controlled.    Review of Systems:   As noted above    Objective     Vitals:   Temp:  [98.2  øF (36.8 øC)-98.6 øF (37 øC)] 98.6 øF (37 øC)  Heart Rate:  [83-88] 83  Resp:  [18-20] 18  BP: (111-150)/(59-80) 129/80  Flow (L/min):  [2] 2    Intake/Output Summary (Last 24 hours) at 10/8/2023 0826  Last data filed at 10/8/2023 0655  Gross per 24 hour   Intake 240 ml   Output 1910 ml   Net -1670 ml       Physical Exam:    General Appearance: Morbidly obese, awake and alert, chronically ill, no acute distress  Skin: warm and dry  HEENT: She has core track in place  Neck: Difficult to assess due to body habitus  Lungs: Bilateral rhonchi, breathing effort not she had right-sided chest tube  Heart: RRR, normal S1 and S2, no rub  Abdomen: soft, no guarding, protuberant, normoactive bowel  : No palpable bladder  Extremities: no edema, cyanosis or clubbing  Neuro: Normal speech and mental status and moving all extremities    Scheduled Meds:     cefTRIAXone, 2,000 mg, Intravenous, Q24H  heparin (porcine), 5,000 Units, Subcutaneous, Q8H  insulin lispro, 2-7 Units, Subcutaneous, 4x Daily AC & at Bedtime  labetalol, 100 mg, Oral, Q12H  metroNIDAZOLE, 500 mg, Oral, Q8H  pantoprazole, 40 mg, Oral, Q AM  saccharomyces boulardii, 500 mg, Oral, BID  senna-docusate sodium, 2 tablet, Oral, BID  sevelamer, 1,600 mg, Oral, TID With Meals  sodium chloride, 10 mL, Intravenous, Q12H      IV Meds:          Results Reviewed:   I have personally reviewed the results from the time of this admission to 10/8/2023 08:26 EDT     Results from last 7 days   Lab Units 10/08/23  0628 10/07/23  0620 10/06/23  0519 10/05/23  0406 10/04/23  0414   SODIUM mmol/L 137 137 136 142 140   POTASSIUM mmol/L 4.7 4.7 4.8 4.9 4.9   CHLORIDE mmol/L 99 98 98 101 99   CO2 mmol/L 24.0 22.0 22.5 21.0* 27.3   BUN mg/dL 82* 78* 66* 58* 41*   CREATININE mg/dL 7.05* 7.25* 6.22* 5.94* 4.64*   CALCIUM mg/dL 8.8 8.9 9.3 9.2 9.1   BILIRUBIN mg/dL  --   --  0.2 0.3 0.3   ALK PHOS U/L  --   --  97 118* 114   ALT (SGPT) U/L  --   --  18 16 17   AST (SGOT) U/L  --   --  20 15  10   GLUCOSE mg/dL 112* 104* 120* 103* 119*       Estimated Creatinine Clearance: 12.9 mL/min (A) (by C-G formula based on SCr of 7.05 mg/dL (H)).    Results from last 7 days   Lab Units 10/08/23  0628 10/07/23  0620 10/06/23  0519   MAGNESIUM mg/dL 2.6 3.0* 2.8*   PHOSPHORUS mg/dL 11.6* 12.1* 12.2*       Results from last 7 days   Lab Units 10/08/23  0628 10/07/23  0620 10/06/23  0519 10/05/23  0406 10/04/23  0414 10/03/23  0408   URIC ACID mg/dL 11.9* 11.5* 11.9* 10.5* 9.9* 9.4*       Results from last 7 days   Lab Units 10/08/23  0628 10/07/23  0620 10/06/23  0519 10/05/23  0406 10/04/23  0414   WBC 10*3/mm3 8.80 9.62 10.93* 12.91* 12.10*   HEMOGLOBIN g/dL 8.9* 9.8* 9.6* 9.0* 7.8*   PLATELETS 10*3/mm3 303 342 331 329 285             Assessment / Plan     ASSESSMENT:  Acute kidney injury associated with sudden correction of her hypertension initially also possible component of contrast-induced nephropathy because patient had CTA on 10/1/2023.  Creatinine today is 7.05 patient is euvolemic, electrolyte within acceptable range and has increased urine output I believe she is entering in the recovery phase of her ATN, also her proteinuria improved down from 2111 mg/g to 581.4 mg/g  New diagnosis of hypertension, blood pressure with acceptable control  New diagnosis of diabetes mellitus type 2  Super morbid obesity  Anemia, the etiology not very clear, most likely patient have iron deficiency iron saturation 13% but her TIBC is on the low side, suggestive of anemia of chronic disease too.  Hemoglobin today 8.9   Severe hyperphosphatemia associated with acute kidney injury phosphorus 11.9, was started on sevelamer    PLAN:  Continue the same treatment  No indication for dialysis today  Surveillance labs    I discussed the case with the patient and she voiced good understanding  I reviewed the chart and other providers notes, I reviewed imaging and lab data.  Copied text in this note has been reviewed and is accurate as  of 10/08/23.       Thank you for involving us in the care of Nay Gonzalez.  Please feel free to call with any questions.    Iain Owens MD  10/08/23  08:26 EDT    [facility]  [phone]    Please note that portions of this note were completed with a voice recognition program.    Electronically signed by Iain Owens MD at 10/08/23 0829       Christian Bernal MD at 10/07/23 0625                                                        LOS: 6 days   Patient Care Team:  Provider, No Known as PCP - General    Chief Complaint:  F/up respiratory failure, lung abscess    Subjective   Interval History  I reviewed the admission note, progress notes, PMH, PSH, Family hx, social history, imagings and prior records on this admission, summarized the finding in my note and formulated a transition of care plan.      On 2L O2. She reported significantly improved dyspnea and cough .  SHe did not ise the hospital CPAP last night due to discomfort from the mask.       REVIEW OF SYSTEMS:   CARDIOVASCULAR: No chest pain, chest pressure or chest discomfort. No palpitations or edema.     GASTROINTESTINAL: No anorexia, nausea, vomiting or diarrhea. No abdominal pain.  CONSTITUTIONAL: No fever or chills.     Ventilator/Non-Invasive Ventilation Settings (From admission, onward)       Start     Ordered    10/04/23 1454  NIPPV - Provider Settings  (CPAP / BiPAP)  At Bedtime & As Needed-RT        Question Answer Comment   Indication Sleep Apnea    Type AVAPS/PC/PS    Backup Rate 12    Target VT (mL) 400    EPAP/PEEP (cm H2O) 6    Min Pressure (cm H2O) 10    Max Pressure (cm H2O) 25    Titrate Oxygen for SpO2 90% or Greater        10/04/23 1454    10/03/23 1749  Ventilator - Vent Mode: AC/VC+; Rate: Other; Rate: 18; FiO2: Titrate Per SpO2; Titrate Oxygen for SpO2: 92% or Greater; PEEP: 7.5; Tidal Volume: mL; TV: 500  Continuous,   Status:  Canceled        Question Answer Comment   Vent Mode AC/VC+   "  Rate Other    Rate 18    FiO2 Titrate Per SpO2    Titrate Oxygen for SpO2 92% or Greater    PEEP 7.5    Tidal Volume mL            10/03/23 1749    10/02/23 1758  Ventilator - Vent Mode: AC/VC+; Rate: Other; Rate: 30; FiO2: Titrate Per SpO2; Titrate Oxygen for SpO2: 92% or Greater; PEEP: 7.5; Tidal Volume: mL; TV: 500  Continuous,   Status:  Canceled        Question Answer Comment   Vent Mode AC/VC+    Rate Other    Rate 30    FiO2 Titrate Per SpO2    Titrate Oxygen for SpO2 92% or Greater    PEEP 7.5    Tidal Volume mL            10/02/23 1758    10/02/23 1352  NIPPV (CPAP or BIPAP)  Until Discontinued,   Status:  Canceled        Question Answer Comment   Indication Acute Respiratory Failure    Type BIPAP    IPAP 15    EPAP 8    Titrate Oxygen for SpO2 90% or Greater        10/02/23 1352                          Physical Exam:     Vital Signs  Temp:  [97.6 øF (36.4 øC)-99.3 øF (37.4 øC)] 98.5 øF (36.9 øC)  Heart Rate:  [81-91] 81  Resp:  [18] 18  BP: (111-143)/(64-73) 111/64    Intake/Output Summary (Last 24 hours) at 10/7/2023 1648  Last data filed at 10/7/2023 1520  Gross per 24 hour   Intake 720 ml   Output 1030 ml   Net -310 ml     Flowsheet Rows      Flowsheet Row First Filed Value   Admission Height 160 cm (63\") Documented at 10/01/2023 1047   Admission Weight 152 kg (335 lb) Documented at 10/01/2023 1047            PPE used per hospital policy    General Appearance:   Alert, cooperative, in no acute distress   ENMT:  Mallampati score 3, moist mucous membrane   Eyes:  Pupils equal and reactive to light. EOMI   Neck:   Large. Trachea midline. No thyromegaly.   Lungs:    Diminished air entry bilaterally especially on the RLL. NO wheezing or rales.     Heart:   Regular rhythm and normal rate, normal S1 and S2, no         murmur   Skin:   No rash or ecchymosis   Abdomen:    Obese. Soft. No tenderness. No HSM.   Neuro/psych:  Conscious, alert, oriented x3. Strength 5/5 in upper and lower  ext.  " Appropriate mood and affect   Extremities:  No cyanosis, clubbing or edema.  Warm extremities and well-perfused          Results Review:        Results from last 7 days   Lab Units 10/07/23  0620 10/06/23  0519 10/05/23  0406   SODIUM mmol/L 137 136 142   POTASSIUM mmol/L 4.7 4.8 4.9   CHLORIDE mmol/L 98 98 101   CO2 mmol/L 22.0 22.5 21.0*   BUN mg/dL 78* 66* 58*   CREATININE mg/dL 7.25* 6.22* 5.94*   GLUCOSE mg/dL 104* 120* 103*   CALCIUM mg/dL 8.9 9.3 9.2     Results from last 7 days   Lab Units 10/01/23  1339 10/01/23  1138   HSTROP T ng/L 37* 30*     Results from last 7 days   Lab Units 10/07/23  0620 10/06/23  0519 10/05/23  0406   WBC 10*3/mm3 9.62 10.93* 12.91*   HEMOGLOBIN g/dL 9.8* 9.6* 9.0*   HEMATOCRIT % 30.9* 30.0* 27.9*   PLATELETS 10*3/mm3 342 331 329         Results from last 7 days   Lab Units 10/01/23  1138   PROBNP pg/mL 678.0             Results from last 7 days   Lab Units 10/05/23  0406 10/02/23  0501   CRP mg/dL 11.66* 28.05*       Results from last 7 days   Lab Units 10/04/23  1418 10/04/23  0357 10/03/23  0339 10/02/23  1840 10/02/23  1701 10/02/23  1459 10/02/23  1344   PH, ARTERIAL pH units 7.356 7.376 7.498* 7.309* 7.071* 7.236* 7.074*   PCO2, ARTERIAL mm Hg 45.6* 44.7 34.8* 59.3* 108.4* 66.9* 112.4*   PO2 ART mm Hg 77.8* 127.2* 109.1* 174.3* 90.5 122.2* 96.8   O2 SATURATION ART % 94.7 98.8* 98.7* 99.4* 91.2* 97.8 92.7   FLOW RATE lpm  --   --   --   --   --   --  6.0000   MODALITY  Adult Vent Adult Vent Adult Vent Adult Vent BiPap BiPap Cannula         I reviewed the patient's new clinical results.        Medication Review:   cefTRIAXone, 2,000 mg, Intravenous, Q24H  heparin (porcine), 5,000 Units, Subcutaneous, Q8H  insulin lispro, 2-7 Units, Subcutaneous, 4x Daily AC & at Bedtime  labetalol, 100 mg, Oral, Q12H  metroNIDAZOLE, 500 mg, Oral, Q8H  pantoprazole, 40 mg, Oral, Q AM  saccharomyces boulardii, 500 mg, Oral, BID  senna-docusate sodium, 2 tablet, Oral, BID  sevelamer, 1,600 mg,  "Oral, TID With Meals  sodium chloride, 10 mL, Intravenous, Q12H             Diagnostic imaging:  I personally and independently reviewed the following images:  CXR 10/7/2023: Loculated right pleural effusion.  Chest tube noted.    Assessment     RUL pulmonary abscess, culture consistent with strep  Loculated right pleural effusion/empyema, s/p chest tube placement 10/3 tPA infusion  Acute hypercapneic and hypoxic respiratory failure, required mechanical ventilation.  Sepsis due to #1  Super obesity BMI 54  Newly diagnosed uncontrolled diabetes type 2 A1c  7.5%  Newly diagnosed hypertension  7.  Acute kidney injury    Several problems are new to me.    Plan       Chest tube to waterseal per thoracic surgery.  Antibiotics for 6 weeks.  ID following  DVT prophylaxis with heparin subcu.  Bowel regimen.  Avoid narcotics if possible due to hypercapnia.  BiPAP at night for respiratory failure and highly suspected sleep apnea  O2 by NC and titrate to keep SpO2 >90%      Christian Bernal MD  10/07/23  16:48 EDT          This note was dictated utilizing Dragon dictation     Electronically signed by Christian Bernal MD at 10/07/23 1665       Rm Riddle MD at 10/07/23 1517           LOS: 6 days     Name: Nay Gonzalez  Age: 53 y.o.  Sex: female  :  1970  MRN: [id number]         Primary Care Physician: Provider, No Known    Subjective   Subjective    Patient is lying on the bed and does not appear to be any distress.  Denies nausea, vomiting, abdominal pain, chest pain.    Objective   Vital Signs  Temp:  [97.6 øF (36.4 øC)-99.3 øF (37.4 øC)] 98.4 øF (36.9 øC)  Heart Rate:  [81-91] 81  Resp:  [18] 18  BP: (114-143)/(66-73) 114/68  Body mass index is 54.65 kg/mý.    Objective:  General Appearance:  Comfortable and in no acute distress.    Vital signs: (most recent): Blood pressure 114/68, pulse 81, temperature 98.4 øF (36.9 øC), temperature source Oral, resp. rate 18, height 160 cm (62.99\"), weight (!) 140 kg " (308 lb 6.8 oz), last menstrual period 10/01/2023, SpO2 99%.    Lungs:  Normal effort and normal respiratory rate.  She is not in respiratory distress.  There are decreased breath sounds.    Heart: Normal rate.  Regular rhythm.    Chest: (Right anterior chest tube in place)  Abdomen: Abdomen is soft.  Bowel sounds are normal.   There is no abdominal tenderness.     Extremities: There is no dependent edema or local swelling.    Neurological: Patient is alert and oriented to person, place and time.    Skin:  Warm and dry.                Results Review:       I reviewed the patient's new clinical results.    Results from last 7 days   Lab Units 10/07/23  0620 10/06/23  0519 10/05/23  0406 10/04/23  0414 10/03/23  1916 10/03/23  0408 10/02/23  0501 10/01/23  1138   WBC 10*3/mm3 9.62 10.93* 12.91* 12.10*  --  17.47* 24.25* 22.45*   HEMOGLOBIN g/dL 9.8* 9.6* 9.0* 7.8* 8.0* 8.0* 10.7* 10.4*   PLATELETS 10*3/mm3 342 331 329 285  --  319 353 395     Results from last 7 days   Lab Units 10/07/23  0620 10/06/23  0519 10/05/23  0406 10/04/23  0414 10/03/23  0408 10/02/23  2026 10/02/23  0501   SODIUM mmol/L 137 136 142 140 142 142 138   POTASSIUM mmol/L 4.7 4.8 4.9 4.9 4.5 5.2 5.4*   CHLORIDE mmol/L 98 98 101 99 102 102 100   CO2 mmol/L 22.0 22.5 21.0* 27.3 25.0 29.7* 23.9   BUN mg/dL 78* 66* 58* 41* 31* 27* 16   CREATININE mg/dL 7.25* 6.22* 5.94* 4.64* 2.73* 1.95* 1.08*   CALCIUM mg/dL 8.9 9.3 9.2 9.1 9.0 9.3 9.6   GLUCOSE mg/dL 104* 120* 103* 119* 87 123* 108*                 Scheduled Meds:   cefTRIAXone, 2,000 mg, Intravenous, Q24H  heparin (porcine), 5,000 Units, Subcutaneous, Q8H  hydrALAZINE, 25 mg, Oral, Q8H  insulin lispro, 2-7 Units, Subcutaneous, 4x Daily AC & at Bedtime  labetalol, 100 mg, Oral, Q12H  metroNIDAZOLE, 500 mg, Oral, Q8H  pantoprazole, 40 mg, Oral, Q AM  saccharomyces boulardii, 500 mg, Oral, BID  senna-docusate sodium, 2 tablet, Oral, BID  sevelamer, 1,600 mg, Oral, TID With Meals  sodium chloride, 10  mL, Intravenous, Q12H      PRN Meds:     acetaminophen **OR** acetaminophen **OR** acetaminophen    senna-docusate sodium **AND** polyethylene glycol **AND** bisacodyl **AND** bisacodyl    Calcium Replacement - Follow Nurse / BPA Driven Protocol    dextrose    dextrose    fentaNYL citrate (PF)    glucagon (human recombinant)    hydrALAZINE    influenza vaccine    ipratropium-albuterol    Magnesium Standard Dose Replacement - Follow Nurse / BPA Driven Protocol    nitroglycerin    ondansetron **OR** ondansetron    Phosphorus Replacement - Follow Nurse / BPA Driven Protocol    Potassium Replacement - Follow Nurse / BPA Driven Protocol    sodium chloride    sodium chloride    sodium chloride  Continuous Infusions:       Assessment & Plan   Active Hospital Problems    Diagnosis  POA    **PNA (pneumonia) [J18.9]  Yes    ELLY (acute kidney injury) [N17.9]  Unknown    Obesity, morbid, BMI 50 or higher [E66.01]  Unknown    Anemia [D64.9]  Unknown    Sepsis [A41.9]  Unknown    Type 2 diabetes mellitus with hyperglycemia [E11.65]  Unknown    Acute respiratory failure with hypoxia [J96.01]  Unknown    Hypertensive urgency [I16.0]  Unknown    Abscess of upper lobe of right lung with pneumonia [J85.1]  Yes      Resolved Hospital Problems    Diagnosis Date Resolved POA    Hyperkalemia [E87.5] 10/03/2023 Unknown       Assessment & Plan    This is a 53-year-old female with a history of morbid obesity, new diabetes of type 2 diabetes and hypertension who presents to the hospital with cough shortness of breath and fevers and is found to have a right upper lobe pulmonary abscess     1.Acute hypoxic respiratory failure with right upper lobe abscess, now off the ventilator and does have a chest tube in place.  Cultures grew Streptococcus and currently patient is on Rocephin/Flagyl.  On 2 L of oxygen and have encouraged her to use incentive spirometry.  2.  Hypertension, on labetalol and currently blood pressure is in systolics of 110s  therefore hydralazine is being discontinued.  3.  Diabetes mellitus, hemoglobin A1c 7.5 and currently on corrective dose insulin and blood sugars are reasonably well controlled.  4.  Morbid obesity, counseled to lose weight.  5.  Acute kidney injury, felt to be multifactorial etiology and continues to rise slowly.  Creatinine is 7.25 today and nephrology is following along.  6.  Anemia, iron deficient and will have her follow-up with hematology and GI as an outpatient basis.  No evidence of any active bleeding.  7.  On heparin for DVT prophylaxis.  8.  CODE STATUS is full code.    Estimated discharge date, to be determined.    Rm Riddle MD  [facility]  10/07/23  11:12 EDT     Electronically signed by Rm Riddle MD at 10/07/23 1525       Lonnie Brantley MD at 10/07/23 1009          Progress note    Reason for consult: Right empyema thoracis    No acute issues overnight.  Pain well controlled.  In good spirits.  Feeling better.  Denies resting shortness of breath.    Afebrile.  Hemodynamically stable.  Nonlabored breathing.  On 2 LPM nasal cannula.  Chest tube with serous drainage, no air leak.  Extremities warm.  Lower extremity edematous.  Alert, oriented x3.    Imaging reviewed.  X-ray showed adequate lung expansion.    Patient presented with empyema thoracis that responded well to chest tube placement and tPA x3.  Chest tube transition to waterseal today.  If x-ray showed adequate lung expansion tomorrow, we will plan for chest tube removal.  She will need 6 weeks antibiotics for empyema thoracis.  Rest of care per primary team.    Lonnie Brantley MD  Thoracic surgeon    Electronically signed by Lonnie Brantley MD at 10/07/23 1012       Iain Owens MD at 10/07/23 0910              [facility] Progress Note      Patient Name: Nay Gonzalez  : 1970  MRN: [id number]  Primary Care Physician:  Provider, No Known  Date of admission:  10/1/2023    Subjective     Interval History:   Follow-up acute kidney injury    The patient is feeling better, denies any chest pain or shortness of air, no orthopnea or PND, no nausea or vomiting, no metallic taste, no dysuria or gross hematuria, she is voiding without difficulties.  Urine output is not measured    Review of Systems:   As noted above    Objective     Vitals:   Temp:  [97.5 øF (36.4 øC)-99.3 øF (37.4 øC)] 98.3 øF (36.8 øC)  Heart Rate:  [71-91] 81  Resp:  [16-18] 18  BP: (126-145)/(66-74) 141/71  Flow (L/min):  [1-2] 2    Intake/Output Summary (Last 24 hours) at 10/7/2023 0910  Last data filed at 10/7/2023 0631  Gross per 24 hour   Intake 960 ml   Output 430 ml   Net 530 ml       Physical Exam:    General Appearance: Morbidly obese, awake and alert, chronically ill, no acute distress  Skin: warm and dry  HEENT: She has core track in place  Neck: Difficult to assess due to body habitus  Lungs: Bilateral rhonchi, breathing effort not she had right-sided chest tube  Heart: RRR, normal S1 and S2, no rub  Abdomen: soft, no guarding, protuberant, normoactive bowel  : No palpable bladder  Extremities: no edema, cyanosis or clubbing  Neuro: Normal speech and mental status and moving all extremities    Scheduled Meds:     cefTRIAXone, 2,000 mg, Intravenous, Q24H  heparin (porcine), 5,000 Units, Subcutaneous, Q8H  hydrALAZINE, 25 mg, Oral, Q8H  insulin lispro, 2-7 Units, Subcutaneous, 4x Daily AC & at Bedtime  labetalol, 100 mg, Oral, Q12H  metroNIDAZOLE, 500 mg, Oral, Q8H  pantoprazole, 40 mg, Oral, Q AM  saccharomyces boulardii, 500 mg, Oral, BID  senna-docusate sodium, 2 tablet, Oral, BID  sodium chloride, 10 mL, Intravenous, Q12H      IV Meds:          Results Reviewed:   I have personally reviewed the results from the time of this admission to 10/7/2023 09:10 EDT     Results from last 7 days   Lab Units 10/07/23  0620 10/06/23  0519 10/05/23  0406 10/04/23  0414   SODIUM mmol/L 137 136 142 140    POTASSIUM mmol/L 4.7 4.8 4.9 4.9   CHLORIDE mmol/L 98 98 101 99   CO2 mmol/L 22.0 22.5 21.0* 27.3   BUN mg/dL 78* 66* 58* 41*   CREATININE mg/dL 7.25* 6.22* 5.94* 4.64*   CALCIUM mg/dL 8.9 9.3 9.2 9.1   BILIRUBIN mg/dL  --  0.2 0.3 0.3   ALK PHOS U/L  --  97 118* 114   ALT (SGPT) U/L  --  18 16 17   AST (SGOT) U/L  --  20 15 10   GLUCOSE mg/dL 104* 120* 103* 119*       Estimated Creatinine Clearance: 12.5 mL/min (A) (by C-G formula based on SCr of 7.25 mg/dL (H)).    Results from last 7 days   Lab Units 10/07/23  0620 10/06/23  0519 10/05/23  0406   MAGNESIUM mg/dL 3.0* 2.8* 2.6   PHOSPHORUS mg/dL 12.1* 12.2* 9.0*       Results from last 7 days   Lab Units 10/07/23  0620 10/06/23  0519 10/05/23  0406 10/04/23  0414 10/03/23  0408   URIC ACID mg/dL 11.5* 11.9* 10.5* 9.9* 9.4*       Results from last 7 days   Lab Units 10/07/23  0620 10/06/23  0519 10/05/23  0406 10/04/23  0414 10/03/23  1916 10/03/23  0408   WBC 10*3/mm3 9.62 10.93* 12.91* 12.10*  --  17.47*   HEMOGLOBIN g/dL 9.8* 9.6* 9.0* 7.8* 8.0* 8.0*   PLATELETS 10*3/mm3 342 331 329 285  --  319             Assessment / Plan     ASSESSMENT:  Acute kidney injury associated with sudden correction of her hypertension initially also possible component of contrast-induced nephropathy because patient had CTA on 10/1/2023.  Creatinine today up to 7.25, appears to be euvolemic, electrolyte within acceptable range, no uremic symptoms patient has a proteinuria on admission protein to creatinine ratio was 2111 mg/g but the urine was very concentrated at that time.  New diagnosis of hypertension, blood pressure with acceptable control  New diagnosis of diabetes mellitus type 2  Super morbid obesity  Anemia, the etiology not very clear, most likely patient have iron deficiency iron saturation 13% but her TIBC is on the low side, suggestive of anemia of chronic disease too.  Hemoglobin today 9.8 improved since yesterday  Severe hyperphosphatemia associated with acute kidney  injury phosphorus 12.1    PLAN:  Continue the same treatment  Recheck the urine protein to creatinine ratio today and decide if patient will need to have a kidney biopsy done  No indication for dialysis today  I will add phosphate binders  Surveillance labs    I discussed the case with the patient and her nurse at the bedside  I reviewed the chart and other providers notes, I reviewed imaging and lab data.  Copied text in this note has been reviewed and is accurate as of 10/07/23.       Thank you for involving us in the care of Nay Gonzalez.  Please feel free to call with any questions.    Iain Owens MD  10/07/23  09:10 EDT    [facility]  [phone]    Please note that portions of this note were completed with a voice recognition program.    Electronically signed by Iain Owens MD at 10/07/23 0915       Larry Turk MD at 10/07/23 0841          ID NOTE    CC: f/u pulmonary abscess due to Strep    Subj: She says she is feeling okay.  Tolerating antibiotics.  She did have a little bit of GI upset but on a probiotic and tolerating them nicely.  She is afebrile.  On 2 L.      Objective   Vital Signs   Temp:  [97.5 øF (36.4 øC)-99.3 øF (37.4 øC)] 98.3 øF (36.8 øC)  Heart Rate:  [71-91] 81  Resp:  [16-18] 18  BP: (126-145)/(66-74) 141/71    Physical Exam:   General: awake, alert, very nice, in chair  Eyes: no scleral icterus  ENT: ETT in place  Cardiovascular: NR  Respiratory: R rales are better; R chest drain w/ thick brown material in tubing  GI: Abdomen is soft, not tender  Skin: No rashes  Vasc: LIJ CVC removed    Labs:   White count 9.62, hemoglobin 9.8, platelet 342  And creatinine 7.25      Urine Eos negative  HIV negative  Hep C negative  Procal 0.59    Microbiology:  10/1 RPP: negative  10/1 BCx: negative  10/1 MRSA nares: negative  10/2 BAL Cx: normal annabel  10/3 ETT Cx: normal annabel  10/3 Lung Abscess Cx: Group G Strep    Chest x-ray  independently interpreted: Chest tube in place with density in  right hemothorax    ASSESSMENT/PLAN:  Right upper lobe pulmonary abscess due to strep  Acute hypercapneic and hypoxic respiratory failure, improving  Sepsis due to #1  Super obesity BMI 54  Uncontrolled diabetes type 2 A1c  7.5%  Hypertension  7.  Acute kidney injury      On 10/3/23, she had an IR-guided CT chest tube placement. The culture is growing Group G Strep. The anaerobic culture is no growth to date. Continue ceftriaxone 2 g IV q24h and Flagyl 500 mg IV q8h for now. I do anticipate switching her to an oral antibiotic at discharge with plans to complete a 6-week course w/ stop date ~11/14/23.     Creatinine continues to climb.  Nephrology is following.  Antibiotics are dosed appropriately for renal dysfunction    I'll check a BMP daily for monitoring. Thanks to nephrologist for their assistance.    I will see her again on 10/10/23 so please call me or CARLO BARRIOS on call at [phone] if any questions or concerns in the interim.     Electronically signed by Larry Turk MD at 10/07/23 3019

## 2023-10-09 NOTE — PROGRESS NOTES
ID NOTE    CC: f/u pulmonary abscess due to Strep    Subj: Afebrile, she states she feels a lot better.  Tolerating antibiotics without abdominal pain vomiting diarrhea or rash    Objective   Vital Signs   Temp:  [97.3 øF (36.3 øC)-98.6 øF (37 øC)] 97.3 øF (36.3 øC)  Heart Rate:  [80-88] 88  Resp:  [16-20] 16  BP: (120-173)/(72-82) 173/82    Physical Exam:   General: in NAD  Cardiovascular: NR  Respiratory: R rales are better; R chest drain in place  GI: Abdomen is soft, not tender  Skin: No rashes    Abx:  Ceftriaxone 2 g IV every 24 hours  Flagyl 500 mg p.o. every 8 hours    Labs:   Lab Results   Component Value Date    WBC 8.50 10/09/2023    HGB 8.8 (L) 10/09/2023    HCT 27.4 (L) 10/09/2023    MCV 82.8 10/09/2023     10/09/2023     Lab Results   Component Value Date    GLUCOSE 95 10/09/2023    BUN 73 (H) 10/09/2023    CREATININE 6.31 (H) 10/09/2023    EGFR 7.4 (L) 10/09/2023    BCR 11.6 10/09/2023    K 5.1 10/09/2023    CO2 22.3 10/09/2023    CALCIUM 8.7 10/09/2023    ALBUMIN 2.8 (L) 10/09/2023    BILITOT 0.2 10/06/2023    AST 20 10/06/2023    ALT 18 10/06/2023     Urine Eos negative  HIV negative  Hep C negative    Microbiology:  10/1 RPP: negative  10/1 BCx: negative  10/1 MRSA nares: negative  10/2 BAL Cx: normal annabel  10/3 ETT Cx: normal annabel  10/3 Lung Abscess Cx: Group G Strep      ASSESSMENT/PLAN:  Right upper lobe pulmonary abscess due to strep  Acute hypercapneic and hypoxic respiratory failure, improving  Sepsis due to #1  Super obesity BMI 54  Uncontrolled diabetes type 2 A1c  7.5%  Hypertension  7.  Acute kidney injury    Continue ceftriaxone 2 g IV q24h and Flagyl 500 mg IV q8h for now. Plan to complete a 6-week course w/ stop date ~11/14/23.  Anticipate discharge on oral antibiotics  Antibiotics are dosed appropriately for renal dysfunction

## 2023-10-09 NOTE — CASE MANAGEMENT/SOCIAL WORK
Continued Stay Note  [facility]     Patient Name: Nay Gonzalez  MRN: [id number]  Today's Date: 10/9/2023    Admit Date: 10/1/2023    Plan: Home with spouse and HH   Discharge Plan       Row Name 10/09/23 1641       Plan    Plan Home with spouse and HH    Patient/Family in Agreement with Plan yes    Plan Comments Met with pt and family at bedside. Discussed need for HH, pt agreeable and requests referral to [facility] and mother has used them in past.  Referral placed in Epic.  If home O2 needed, will use [facility].  Pt would benefit from walker.  Referral to [facility] placed in Epic.  MARION Cabezas RN                   Discharge Codes    No documentation.                 Expected Discharge Date and Time       Expected Discharge Date Expected Discharge Time    Oct 10, 2023               Christine Cabezas, RN

## 2023-10-09 NOTE — PROGRESS NOTES
"                                              LOS: 8 days   Patient Care Team:  Provider, No Known as PCP - General    Chief Complaint:  F/up respiratory failure, lung abscess    Subjective   Interval History      On 2L O2.  No significant cough or dyspnea.      REVIEW OF SYSTEMS:       GASTROINTESTINAL: No anorexia, nausea, vomiting or diarrhea. No abdominal pain.  CONSTITUTIONAL: No fever or chills.     Ventilator/Non-Invasive Ventilation Settings (From admission, onward)             Physical Exam:     Vital Signs  Temp:  [97.3 øF (36.3 øC)-98.6 øF (37 øC)] 97.3 øF (36.3 øC)  Heart Rate:  [82-88] 88  Resp:  [16-20] 16  BP: (127-173)/(73-82) 127/74    Intake/Output Summary (Last 24 hours) at 10/9/2023 1450  Last data filed at 10/9/2023 1320  Gross per 24 hour   Intake 1200 ml   Output 2950 ml   Net -1750 ml     Flowsheet Rows      Flowsheet Row First Filed Value   Admission Height 160 cm (63\") Documented at 10/01/2023 1047   Admission Weight 152 kg (335 lb) Documented at 10/01/2023 1047            PPE used per hospital policy    General Appearance:   Alert, cooperative, in no acute distress   ENMT:  Mallampati score 3, moist mucous membrane   Eyes:  Pupils equal and reactive to light. EOMI   Neck:   Large. Trachea midline. No thyromegaly.   Lungs:    Diminished air entry bilaterally especially on the RLL. NO wheezing or rales.     Heart:   Regular rhythm and normal rate, normal S1 and S2, no         murmur   Skin:   No rash or ecchymosis   Abdomen:    Obese. Soft. No tenderness. No HSM.   Neuro/psych:  Conscious, alert, oriented x3. Strength 5/5 in upper and lower  ext.  Appropriate mood and affect   Extremities:  No cyanosis, clubbing or edema.  Warm extremities and well-perfused          Results Review:        Results from last 7 days   Lab Units 10/09/23  0512 10/08/23  0628 10/07/23  0620   SODIUM mmol/L 134* 137 137   POTASSIUM mmol/L 5.1 4.7 4.7   CHLORIDE mmol/L 97* 99 98   CO2 mmol/L 22.3 24.0 22.0   BUN " mg/dL 73* 82* 78*   CREATININE mg/dL 6.31* 7.05* 7.25*   GLUCOSE mg/dL 95 112* 104*   CALCIUM mg/dL 8.7 8.8 8.9           Results from last 7 days   Lab Units 10/09/23  0512 10/08/23  0628 10/07/23  0620   WBC 10*3/mm3 8.50 8.80 9.62   HEMOGLOBIN g/dL 8.8* 8.9* 9.8*   HEMATOCRIT % 27.4* 28.0* 30.9*   PLATELETS 10*3/mm3 321 303 342                       Results from last 7 days   Lab Units 10/05/23  0406   CRP mg/dL 11.66*       Results from last 7 days   Lab Units 10/04/23  1418 10/04/23  0357 10/03/23  0339 10/02/23  1840 10/02/23  1701 10/02/23  1654 10/02/23  1459   PH, ARTERIAL pH units 7.356 7.376 7.498* 7.309* 7.071* 7.066* 7.236*   PCO2, ARTERIAL mm Hg 45.6* 44.7 34.8* 59.3* 108.4* 104.8* 66.9*   PO2 ART mm Hg 77.8* 127.2* 109.1* 174.3* 90.5 37.5* 122.2*   O2 SATURATION ART % 94.7 98.8* 98.7* 99.4* 91.2* 46.7* 97.8   MODALITY  Adult Vent Adult Vent Adult Vent Adult Vent BiPap BiPap BiPap         I reviewed the patient's new clinical results.        Medication Review:   cefTRIAXone, 2,000 mg, Intravenous, Q24H  heparin (porcine), 5,000 Units, Subcutaneous, Q8H  insulin lispro, 2-7 Units, Subcutaneous, 4x Daily AC & at Bedtime  labetalol, 100 mg, Oral, Q12H  metroNIDAZOLE, 500 mg, Oral, Q8H  pantoprazole, 40 mg, Oral, Q AM  saccharomyces boulardii, 500 mg, Oral, BID  senna-docusate sodium, 2 tablet, Oral, BID  sevelamer, 1,600 mg, Oral, TID With Meals  sodium chloride, 10 mL, Intravenous, Q12H             Diagnostic imaging:  I personally and independently reviewed the following images:  CXR 10/7/2023: Loculated right pleural effusion.  Chest tube noted.  CXR 10/9/23: Pulmonary infiltrates on the right.  Fluid in the right fissure.    Assessment     RUL pulmonary abscess, culture consistent with strep  Loculated right pleural effusion/empyema, s/p chest tube placement 10/3 tPA infusion  Acute hypercapneic and hypoxic respiratory failure, required mechanical ventilation.  Sepsis due to #1  Super obesity BMI  54  Newly diagnosed uncontrolled diabetes type 2 A1c  7.5%  Newly diagnosed hypertension  Acute kidney injury  Probable CRYSTAL: Snoring, apnea and desaturation at night.      Plan         Antibiotics: Rocephin and Flagyl for now.  Plan for total of 6 weeks therapy  DVT prophylaxis with heparin 5000 units 3 times daily  Bowel regimen.  Avoid narcotics if possible due to hypercapnia.  BiPAP at night for respiratory failure and highly suspected sleep apnea.  Not being used consistently at the hospital.  We will ordered outpatient PSG for probable CRYSTAL.  Not a candidate for HST due to morbid obesity.  This was ordered.  Insulin sliding scale  PPI prophylaxis  O2 by NC and titrate to keep SpO2 >90%.  Ex ox tomorrow in preparation for discharge  .      Christian Bernal MD  10/09/23  14:50 EDT          This note was dictated utilizing [facility] [employer] dictation

## 2023-10-09 NOTE — PROGRESS NOTES
"Enter Query Response Below      Query Response:     Yes, all secondary to sepsis          If applicable, please update the problem list.   Patient: Nay Gonzalez \"Graciela\"        : 1970  Account: [id number]           Admit Date: 10/1/23        How to Respond to this query:       a. Click New Note     b. Answer query within the yellow box.                c. Update the Problem List, if applicable.      If you have any questions about this query contact me at: [email or web address]     ,  Patient admitted with sepsis, pneumonia, ELLY and acute respiratory failure. Treated with Zosyn and ceftriaxone. She was also on the vent. but is off now.     Are any of this patient's organ dysfunctions ELLY and acute respiratory failure secondary to sepsis?     -Yes, all secondary to sepsis   -Yes, but not all, please specify____________   -No   -Other- specify____________   -Unable to determine          By submitting this query, we are merely seeking further clarification of documentation to accurately reflect all conditions that you are monitoring, evaluating, treating or that extend the hospitalization or utilize additional resources of care. Please utilize your independent clinical judgment when addressing the question(s) above.     This query and your response, once completed, will be entered into the legal medical record.    Sincerely,  Leena Escamilla  Clinical Documentation Integrity Program     "

## 2023-10-09 NOTE — PLAN OF CARE
Goal Outcome Evaluation:  Plan of Care Reviewed With: patient        Progress: improving     Vital signs stable. Alert and oriented x 4. Up with assistance of one out of bed. Unsteady on feet. Voiding per bedside commode. On 2 liters oxygen per nasal cannula. Normal sinus cardiac. Normal sinus cardiac rhythm. Tolerating consistent carbohydrate diet well. Blood sugar monitored AC/HS. Right upper anterior chest tube removed per Dr. Brantley thoracic surgeon. Duoderm dressing applied. No drainage noted. Denies severe discomfort. Prn tylenol given for complaint of right chest discomfort. Possible discharge in a few days. IV abx given as scheduled daily. Resting quietly in recliner chair. Call light within patient's reach.

## 2023-10-09 NOTE — PROGRESS NOTES
" LOS: 8 days     Name: Nay Gonzalez  Age: 53 y.o.  Sex: female  :  1970  MRN: [id number]         Primary Care Physician: Provider, No Known    Subjective   Subjective    Patient is lying on the bed and does not appear to be any distress.  Denies nausea, vomiting, abdominal pain, chest pain, shortness of breath.    Objective   Vital Signs  Temp:  [97.3 øF (36.3 øC)-98.6 øF (37 øC)] 97.3 øF (36.3 øC)  Heart Rate:  [82-88] 88  Resp:  [16-20] 16  BP: (127-173)/(73-82) 127/74  Body mass index is 55.08 kg/mý.    Objective:  General Appearance:  Comfortable and in no acute distress.    Vital signs: (most recent): Blood pressure 127/74, pulse 88, temperature 97.3 øF (36.3 øC), temperature source Oral, resp. rate 16, height 160 cm (62.99\"), weight (!) 141 kg (310 lb 13.6 oz), last menstrual period 10/01/2023, SpO2 98%.    Lungs:  Normal effort and normal respiratory rate.  She is not in respiratory distress.  There are decreased breath sounds.    Heart: Normal rate.  Regular rhythm.    Chest: (Right anterior chest tube in place)  Abdomen: Abdomen is soft.  Bowel sounds are normal.   There is no abdominal tenderness.     Extremities: There is no dependent edema or local swelling.    Neurological: Patient is alert and oriented to person, place and time.    Skin:  Warm and dry.                Results Review:       I reviewed the patient's new clinical results.    Results from last 7 days   Lab Units 10/09/23  0512 10/08/23  0628 10/07/23  0620 10/06/23  0519 10/05/23  0406 10/04/23  0414 10/03/23  1916 10/03/23  0408   WBC 10*3/mm3 8.50 8.80 9.62 10.93* 12.91* 12.10*  --  17.47*   HEMOGLOBIN g/dL 8.8* 8.9* 9.8* 9.6* 9.0* 7.8* 8.0* 8.0*   PLATELETS 10*3/mm3 321 303 342 331 329 285  --  319     Results from last 7 days   Lab Units 10/09/23  0512 10/08/23  0628 10/07/23  0620 10/06/23  0519 10/05/23  0406 10/04/23  0414 10/03/23  0408   SODIUM mmol/L 134* 137 137 136 142 140 142   POTASSIUM mmol/L 5.1 4.7 4.7 4.8 4.9 " 4.9 4.5   CHLORIDE mmol/L 97* 99 98 98 101 99 102   CO2 mmol/L 22.3 24.0 22.0 22.5 21.0* 27.3 25.0   BUN mg/dL 73* 82* 78* 66* 58* 41* 31*   CREATININE mg/dL 6.31* 7.05* 7.25* 6.22* 5.94* 4.64* 2.73*   CALCIUM mg/dL 8.7 8.8 8.9 9.3 9.2 9.1 9.0   GLUCOSE mg/dL 95 112* 104* 120* 103* 119* 87                 Scheduled Meds:   cefTRIAXone, 2,000 mg, Intravenous, Q24H  heparin (porcine), 5,000 Units, Subcutaneous, Q8H  insulin lispro, 2-7 Units, Subcutaneous, 4x Daily AC & at Bedtime  labetalol, 100 mg, Oral, Q12H  metroNIDAZOLE, 500 mg, Oral, Q8H  pantoprazole, 40 mg, Oral, Q AM  saccharomyces boulardii, 500 mg, Oral, BID  senna-docusate sodium, 2 tablet, Oral, BID  sevelamer, 1,600 mg, Oral, TID With Meals  sodium chloride, 10 mL, Intravenous, Q12H      PRN Meds:     acetaminophen **OR** acetaminophen **OR** acetaminophen    senna-docusate sodium **AND** polyethylene glycol **AND** bisacodyl **AND** bisacodyl    Calcium Replacement - Follow Nurse / BPA Driven Protocol    dextrose    dextrose    fentaNYL citrate (PF)    glucagon (human recombinant)    hydrALAZINE    influenza vaccine    ipratropium-albuterol    Magnesium Standard Dose Replacement - Follow Nurse / BPA Driven Protocol    nitroglycerin    ondansetron **OR** ondansetron    Phosphorus Replacement - Follow Nurse / BPA Driven Protocol    Potassium Replacement - Follow Nurse / BPA Driven Protocol    sodium chloride    sodium chloride    sodium chloride  Continuous Infusions:       Assessment & Plan   Active Hospital Problems    Diagnosis  POA    **PNA (pneumonia) [J18.9]  Yes    ELLY (acute kidney injury) [N17.9]  Unknown    Obesity, morbid, BMI 50 or higher [E66.01]  Unknown    Anemia [D64.9]  Unknown    Sepsis [A41.9]  Unknown    Type 2 diabetes mellitus with hyperglycemia [E11.65]  Unknown    Acute respiratory failure with hypoxia [J96.01]  Unknown    Hypertensive urgency [I16.0]  Unknown    Abscess of upper lobe of right lung with pneumonia [J85.1]  Yes       Resolved Hospital Problems    Diagnosis Date Resolved POA    Hyperkalemia [E87.5] 10/03/2023 Unknown       Assessment & Plan    This is a 53-year-old female with a history of morbid obesity, new diabetes of type 2 diabetes and hypertension who presents to the hospital with cough shortness of breath and fevers and is found to have a right upper lobe pulmonary abscess     1.Acute hypoxic respiratory failure with right upper lobe abscess, now off the ventilator and chest tube has been removed on 10/8/2023.  Cultures grew Streptococcus and currently patient is on Rocephin/Flagyl and stop date is 11/14/2023.  On 2 L of oxygen and have encouraged her to use incentive spirometry.    2.  Hypertension, on labetalol and currently blood pressure is in systolics of 110s therefore hydralazine is being discontinued.    3.  Diabetes mellitus, hemoglobin A1c 7.5 and currently on corrective dose insulin and blood sugars are reasonably well controlled.    4.  Morbid obesity, counseled to lose weight.    5.  Acute kidney injury, felt to be multifactorial etiology and did peak at 7.25 and appears to have plateaued.  Creatinine started trending down and it is at 6.31 today and nephrology is following along.    6.  Anemia, iron deficient and will have her follow-up with hematology and GI as an outpatient basis.  No evidence of any active bleeding.    7.  On heparin for DVT prophylaxis.    8.  CODE STATUS is full code.    Estimated discharge date, to be determined.      Copied text on this note has been reviewed by me on 10/9/2023        Rm Riddle MD  [facility]  10/09/23  11:12 EDT

## 2023-10-09 NOTE — PLAN OF CARE
Goal Outcome Evaluation:  Plan of Care Reviewed With: patient        Progress: improving  Outcome Evaluation: Less assist needed for all mobility this date. Patient sitting UIC upon arrival, stood with CGA to a RW and ambulated 12' CGA with a RW. Room air for therapy session- noted fatigue and mild SOA post activity though O2 sats WFL. Patient hopeful to return home with her spouse upon DC. Patient appears motivated and encouraged patient to continue ambulating to the BR with nsg. PT will continue to follow.      Anticipated Discharge Disposition (PT): home with assist, home with home health

## 2023-10-10 ENCOUNTER — APPOINTMENT (OUTPATIENT)
Dept: GENERAL RADIOLOGY | Facility: HOSPITAL | Age: 53
DRG: 871 | End: 2023-10-10
Payer: COMMERCIAL

## 2023-10-10 LAB
ALBUMIN SERPL-MCNC: 3 G/DL (ref 3.5–5.2)
ANION GAP SERPL CALCULATED.3IONS-SCNC: 13 MMOL/L (ref 5–15)
BASOPHILS # BLD AUTO: 0.06 10*3/MM3 (ref 0–0.2)
BASOPHILS NFR BLD AUTO: 0.7 % (ref 0–1.5)
BUN SERPL-MCNC: 81 MG/DL (ref 6–20)
BUN/CREAT SERPL: 13.4 (ref 7–25)
CALCIUM SPEC-SCNC: 9.3 MG/DL (ref 8.6–10.5)
CHLORIDE SERPL-SCNC: 101 MMOL/L (ref 98–107)
CO2 SERPL-SCNC: 21 MMOL/L (ref 22–29)
CREAT SERPL-MCNC: 6.03 MG/DL (ref 0.57–1)
CRP SERPL-MCNC: 3.86 MG/DL (ref 0–0.5)
DEPRECATED RDW RBC AUTO: 47 FL (ref 37–54)
EGFRCR SERPLBLD CKD-EPI 2021: 7.9 ML/MIN/1.73
EOSINOPHIL # BLD AUTO: 0.04 10*3/MM3 (ref 0–0.4)
EOSINOPHIL NFR BLD AUTO: 0.4 % (ref 0.3–6.2)
ERYTHROCYTE [DISTWIDTH] IN BLOOD BY AUTOMATED COUNT: 15.5 % (ref 12.3–15.4)
GLUCOSE BLDC GLUCOMTR-MCNC: 103 MG/DL (ref 70–130)
GLUCOSE BLDC GLUCOMTR-MCNC: 133 MG/DL (ref 70–130)
GLUCOSE BLDC GLUCOMTR-MCNC: 146 MG/DL (ref 70–130)
GLUCOSE BLDC GLUCOMTR-MCNC: 163 MG/DL (ref 70–130)
GLUCOSE SERPL-MCNC: 119 MG/DL (ref 65–99)
HCT VFR BLD AUTO: 30 % (ref 34–46.6)
HGB BLD-MCNC: 9.5 G/DL (ref 12–15.9)
IMM GRANULOCYTES # BLD AUTO: 0.05 10*3/MM3 (ref 0–0.05)
IMM GRANULOCYTES NFR BLD AUTO: 0.5 % (ref 0–0.5)
LYMPHOCYTES # BLD AUTO: 0.61 10*3/MM3 (ref 0.7–3.1)
LYMPHOCYTES NFR BLD AUTO: 6.6 % (ref 19.6–45.3)
MAGNESIUM SERPL-MCNC: 2.3 MG/DL (ref 1.6–2.6)
MCH RBC QN AUTO: 26.7 PG (ref 26.6–33)
MCHC RBC AUTO-ENTMCNC: 31.7 G/DL (ref 31.5–35.7)
MCV RBC AUTO: 84.3 FL (ref 79–97)
MONOCYTES # BLD AUTO: 0.51 10*3/MM3 (ref 0.1–0.9)
MONOCYTES NFR BLD AUTO: 5.6 % (ref 5–12)
MYCOBACTERIUM SPEC CULT: NORMAL
NEUTROPHILS NFR BLD AUTO: 7.91 10*3/MM3 (ref 1.7–7)
NEUTROPHILS NFR BLD AUTO: 86.2 % (ref 42.7–76)
NIGHT BLUE STAIN TISS: NORMAL
NRBC BLD AUTO-RTO: 0 /100 WBC (ref 0–0.2)
PHOSPHATE SERPL-MCNC: 7.2 MG/DL (ref 2.5–4.5)
PLATELET # BLD AUTO: 358 10*3/MM3 (ref 140–450)
PMV BLD AUTO: 10.8 FL (ref 6–12)
POTASSIUM SERPL-SCNC: 5.1 MMOL/L (ref 3.5–5.2)
RBC # BLD AUTO: 3.56 10*6/MM3 (ref 3.77–5.28)
SODIUM SERPL-SCNC: 135 MMOL/L (ref 136–145)
URATE SERPL-MCNC: 11.4 MG/DL (ref 2.4–5.7)
WBC NRBC COR # BLD: 9.18 10*3/MM3 (ref 3.4–10.8)

## 2023-10-10 PROCEDURE — 94618 PULMONARY STRESS TESTING: CPT

## 2023-10-10 PROCEDURE — 80069 RENAL FUNCTION PANEL: CPT | Performed by: INTERNAL MEDICINE

## 2023-10-10 PROCEDURE — 63710000001 INSULIN LISPRO (HUMAN) PER 5 UNITS: Performed by: INTERNAL MEDICINE

## 2023-10-10 PROCEDURE — 71045 X-RAY EXAM CHEST 1 VIEW: CPT

## 2023-10-10 PROCEDURE — 99232 SBSQ HOSP IP/OBS MODERATE 35: CPT | Performed by: INTERNAL MEDICINE

## 2023-10-10 PROCEDURE — 94799 UNLISTED PULMONARY SVC/PX: CPT

## 2023-10-10 PROCEDURE — 86140 C-REACTIVE PROTEIN: CPT | Performed by: INTERNAL MEDICINE

## 2023-10-10 PROCEDURE — 83735 ASSAY OF MAGNESIUM: CPT | Performed by: INTERNAL MEDICINE

## 2023-10-10 PROCEDURE — 25010000002 HEPARIN (PORCINE) PER 1000 UNITS: Performed by: INTERNAL MEDICINE

## 2023-10-10 PROCEDURE — 82948 REAGENT STRIP/BLOOD GLUCOSE: CPT

## 2023-10-10 PROCEDURE — 85025 COMPLETE CBC W/AUTO DIFF WBC: CPT | Performed by: INTERNAL MEDICINE

## 2023-10-10 PROCEDURE — 25010000002 HYDRALAZINE PER 20 MG: Performed by: INTERNAL MEDICINE

## 2023-10-10 PROCEDURE — 25010000002 CEFTRIAXONE PER 250 MG: Performed by: INTERNAL MEDICINE

## 2023-10-10 PROCEDURE — 84550 ASSAY OF BLOOD/URIC ACID: CPT | Performed by: INTERNAL MEDICINE

## 2023-10-10 RX ADMIN — SEVELAMER CARBONATE 1600 MG: 800 TABLET, FILM COATED ORAL at 11:57

## 2023-10-10 RX ADMIN — LABETALOL HYDROCHLORIDE 100 MG: 100 TABLET, FILM COATED ORAL at 21:15

## 2023-10-10 RX ADMIN — Medication 500 MG: at 21:15

## 2023-10-10 RX ADMIN — SEVELAMER CARBONATE 1600 MG: 800 TABLET, FILM COATED ORAL at 08:48

## 2023-10-10 RX ADMIN — HYDRALAZINE HYDROCHLORIDE 20 MG: 20 INJECTION, SOLUTION INTRAMUSCULAR; INTRAVENOUS at 00:35

## 2023-10-10 RX ADMIN — Medication 500 MG: at 08:48

## 2023-10-10 RX ADMIN — METRONIDAZOLE 500 MG: 500 TABLET, FILM COATED ORAL at 06:35

## 2023-10-10 RX ADMIN — Medication 10 ML: at 21:15

## 2023-10-10 RX ADMIN — HEPARIN SODIUM 5000 UNITS: 5000 INJECTION INTRAVENOUS; SUBCUTANEOUS at 16:16

## 2023-10-10 RX ADMIN — LABETALOL HYDROCHLORIDE 100 MG: 100 TABLET, FILM COATED ORAL at 08:48

## 2023-10-10 RX ADMIN — INSULIN LISPRO 2 UNITS: 100 INJECTION, SOLUTION INTRAVENOUS; SUBCUTANEOUS at 08:48

## 2023-10-10 RX ADMIN — HEPARIN SODIUM 5000 UNITS: 5000 INJECTION INTRAVENOUS; SUBCUTANEOUS at 06:35

## 2023-10-10 RX ADMIN — PANTOPRAZOLE SODIUM 40 MG: 40 TABLET, DELAYED RELEASE ORAL at 06:35

## 2023-10-10 RX ADMIN — CEFTRIAXONE SODIUM 2000 MG: 2 INJECTION, POWDER, FOR SOLUTION INTRAMUSCULAR; INTRAVENOUS at 11:57

## 2023-10-10 RX ADMIN — Medication 10 ML: at 08:49

## 2023-10-10 RX ADMIN — SENNOSIDES AND DOCUSATE SODIUM 2 TABLET: 50; 8.6 TABLET ORAL at 08:48

## 2023-10-10 RX ADMIN — SEVELAMER CARBONATE 1600 MG: 800 TABLET, FILM COATED ORAL at 17:40

## 2023-10-10 NOTE — PROGRESS NOTES
[facility] Progress Note      Patient Name: Nay Gonzalez  : 1970  MRN: [id number]  Primary Care Physician:  Provider, No Known  Date of admission: 10/1/2023    Subjective     Interval History:   Follow-up acute kidney injury    The chest tube was removed, patient is feeling much better, denies any chest pain or shortness of air, no orthopnea or PND, no nausea or vomiting, no dysuria or gross hematuria and she has good urine output.  She had 1450 cc in the past 24 hours  Review of Systems:   As noted above    Objective     Vitals:   Temp:  [98.5 øF (36.9 øC)-99.3 øF (37.4 øC)] 99.3 øF (37.4 øC)  Heart Rate:  [] 98  Resp:  [16] 16  BP: (144-175)/(66-89) 167/87    Intake/Output Summary (Last 24 hours) at 10/10/2023 1110  Last data filed at 10/10/2023 0900  Gross per 24 hour   Intake 480 ml   Output 950 ml   Net -470 ml       Physical Exam:    General Appearance: Morbidly obese, awake and alert, chronically ill, no acute distress  Skin: warm and dry  HEENT: She has core track in place  Neck: Difficult to assess due to body habitus  Lungs: Bilateral rhonchi, breathing effort not labored  Heart: RRR, normal S1 and S2, no rub  Abdomen: soft, no guarding, protuberant, normoactive bowel  : No palpable bladder  Extremities: no edema, cyanosis or clubbing  Neuro: Normal speech and mental status and moving all extremities    Scheduled Meds:     cefTRIAXone, 2,000 mg, Intravenous, Q24H  heparin (porcine), 5,000 Units, Subcutaneous, Q8H  insulin lispro, 2-7 Units, Subcutaneous, 4x Daily AC & at Bedtime  labetalol, 100 mg, Oral, Q12H  pantoprazole, 40 mg, Oral, Q AM  saccharomyces boulardii, 500 mg, Oral, BID  senna-docusate sodium, 2 tablet, Oral, BID  sevelamer, 1,600 mg, Oral, TID With Meals  sodium chloride, 10 mL, Intravenous, Q12H      IV Meds:          Results Reviewed:   I have personally reviewed the results from the time of this admission to 10/10/2023 11:10 EDT      Results from last 7 days   Lab Units 10/10/23  0515 10/09/23  0512 10/08/23  0628 10/07/23  0620 10/06/23  0519 10/05/23  0406 10/04/23  0414   SODIUM mmol/L 135* 134* 137   < > 136 142 140   POTASSIUM mmol/L 5.1 5.1 4.7   < > 4.8 4.9 4.9   CHLORIDE mmol/L 101 97* 99   < > 98 101 99   CO2 mmol/L 21.0* 22.3 24.0   < > 22.5 21.0* 27.3   BUN mg/dL 81* 73* 82*   < > 66* 58* 41*   CREATININE mg/dL 6.03* 6.31* 7.05*   < > 6.22* 5.94* 4.64*   CALCIUM mg/dL 9.3 8.7 8.8   < > 9.3 9.2 9.1   BILIRUBIN mg/dL  --   --   --   --  0.2 0.3 0.3   ALK PHOS U/L  --   --   --   --  97 118* 114   ALT (SGPT) U/L  --   --   --   --  18 16 17   AST (SGOT) U/L  --   --   --   --  20 15 10   GLUCOSE mg/dL 119* 95 112*   < > 120* 103* 119*    < > = values in this interval not displayed.       Estimated Creatinine Clearance: 15.2 mL/min (A) (by C-G formula based on SCr of 6.03 mg/dL (H)).    Results from last 7 days   Lab Units 10/10/23  0515 10/09/23  0512 10/08/23  0628   MAGNESIUM mg/dL 2.3 2.6 2.6   PHOSPHORUS mg/dL 7.2* 10.0* 11.6*       Results from last 7 days   Lab Units 10/10/23  0515 10/09/23  0512 10/08/23  0628 10/07/23  0620 10/06/23  0519 10/05/23  0406 10/04/23  0414   URIC ACID mg/dL 11.4* 12.5* 11.9* 11.5* 11.9* 10.5* 9.9*       Results from last 7 days   Lab Units 10/10/23  0515 10/09/23  0512 10/08/23  0628 10/07/23  0620 10/06/23  0519   WBC 10*3/mm3 9.18 8.50 8.80 9.62 10.93*   HEMOGLOBIN g/dL 9.5* 8.8* 8.9* 9.8* 9.6*   PLATELETS 10*3/mm3 358 321 303 342 331             Assessment / Plan     ASSESSMENT:  Acute kidney injury associated with sudden correction of her hypertension initially also possible component of contrast-induced nephropathy because patient had CTA on 10/1/2023.  Creatinine today is down to 6.03 patient is euvolemic, electrolyte within acceptable range, the acute kidney injury is improving slowly.  Also her proteinuria improved down from 2111 mg/g to 581.4 mg/g  New diagnosis of hypertension, blood  pressure with acceptable control  New diagnosis of diabetes mellitus type 2  Super morbid obesity  Anemia, the etiology not very clear, most likely patient have iron deficiency iron saturation 13% but her TIBC is on the low side, suggestive of anemia of chronic disease too.  Hemoglobin today is up to 9.5  Severe hyperphosphatemia associated with acute kidney injury phosphorus seven-point, was started on sevelamer  Hyperuricemia, uric acid down to 11.4, associate with acute kidney, will continue to monitor    PLAN:  Continue the same treatment  No indication for dialysis today  The patient could be discharged from the renal standpoint and will follow her closely in my office to monitor her renal recovery    I discussed the case with the patient and she voiced good understanding  I reviewed the chart and other providers notes, I reviewed imaging and lab data.  Copied text in this note has been reviewed and is accurate as of 10/10/23.       Thank you for involving us in the care of Nay Gonzalez.  Please feel free to call with any questions.    Iain Owens MD  10/10/23  11:10 EDT    [facility]  [phone]    Please note that portions of this note were completed with a voice recognition program.

## 2023-10-10 NOTE — PROGRESS NOTES
Exercise Oximetry    Patient Name:Nay Gonzalez   MRN: [id number]   Date: 10/10/23             ROOM AIR BASELINE   SpO2%  97   Heart Rate 90   Blood Pressure      EXERCISE ON ROOM AIR SpO2% EXERCISE ON O2 @ LPM SpO2%   1 MINUTE  98 1 MINUTE    2 MINUTES 97 2 MINUTES    3 MINUTES 96 3 MINUTES    4 MINUTES 97 4 MINUTES    5 MINUTES 98 5 MINUTES    6 MINUTES 99 6 MINUTES               Distance Walked   Distance Walked   Dyspnea (Joanie Scale)   Dyspnea (Joanie Scale)   Fatigue (Joanie Scale)   Fatigue (Joanie Scale)   SpO2% Post Exercise  99 SpO2% Post Exercise   HR Post Exercise  89 HR Post Exercise   Time to Recovery  1 min Time to Recovery     Comments: o2 sats at rest on room air 97% with forehead probe, ambulated on room air with a few rest breaks, o2 sats on room air with ambulation 96% to 98%. Erica Nunez RRT

## 2023-10-10 NOTE — PROGRESS NOTES
ID NOTE    CC: f/u pulmonary abscess due to Strep dysgalactiae    Subj: No fever. Feeling better. Drain removed. Renal function recovering slowly. Mild loose stools.     Medications:    Current Facility-Administered Medications:     acetaminophen (TYLENOL) tablet 650 mg, 650 mg, Oral, Q4H PRN, 650 mg at 10/09/23 2116 **OR** acetaminophen (TYLENOL) 160 MG/5ML oral solution 650 mg, 650 mg, Oral, Q4H PRN **OR** acetaminophen (TYLENOL) suppository 650 mg, 650 mg, Rectal, Q4H PRN, Brannon Gonzales MD, 650 mg at 10/02/23 2341    sennosides-docusate (PERICOLACE) 8.6-50 MG per tablet 2 tablet, 2 tablet, Oral, BID **AND** polyethylene glycol (MIRALAX) packet 17 g, 17 g, Oral, Daily PRN **AND** bisacodyl (DULCOLAX) EC tablet 5 mg, 5 mg, Oral, Daily PRN **AND** bisacodyl (DULCOLAX) suppository 10 mg, 10 mg, Rectal, Daily PRN, Brannon Gonzales MD    Calcium Replacement - Follow Nurse / BPA Driven Protocol, , Does not apply, PRN, Brannon Gonzales MD    cefTRIAXone (ROCEPHIN) 2,000 mg in sodium chloride 0.9 % 100 mL IVPB-VTB, 2,000 mg, Intravenous, Q24H, Brannon Gonzales MD, Last Rate: 200 mL/hr at 10/09/23 1030, 2,000 mg at 10/09/23 1030    dextrose (D50W) (25 g/50 mL) IV injection 25 g, 25 g, Intravenous, Q15 Min PRN, Brannon Gonzales MD    dextrose (GLUTOSE) oral gel 15 g, 15 g, Oral, Q15 Min PRN, Brannon Gonzales MD    glucagon (GLUCAGEN) injection 1 mg, 1 mg, Intramuscular, Q15 Min PRN, Brannon Gonzales MD    heparin (porcine) 5000 UNIT/ML injection 5,000 Units, 5,000 Units, Subcutaneous, Q8H, Brannon Gonzales MD, 5,000 Units at 10/10/23 0635    hydrALAZINE (APRESOLINE) injection 20 mg, 20 mg, Intravenous, Q4H PRN, Brannon Gonzales MD, 20 mg at 10/10/23 0035    influenza vac split quad (FLUZONE,FLUARIX,AFLURIA,FLULAVAL) injection 0.5 mL, 0.5 mL, Intramuscular, During Hospitalization, Brannon Gonzales MD    insulin lispro (HUMALOG/ADMELOG) injection 2-7 Units, 2-7 Units, Subcutaneous,  4x Daily AC & at Bedtime, Brannon Gonzales MD, 2 Units at 10/08/23 1800    ipratropium-albuterol (DUO-NEB) nebulizer solution 3 mL, 3 mL, Nebulization, Q6H PRN, Brannon Gonzales MD    labetalol (NORMODYNE) tablet 100 mg, 100 mg, Oral, Q12H, Brannon Gonzales MD, 100 mg at 10/09/23 2117    Magnesium Standard Dose Replacement - Follow Nurse / BPA Driven Protocol, , Does not apply, PRN, Brannon Gonzales MD    metroNIDAZOLE (FLAGYL) tablet 500 mg, 500 mg, Oral, Q8H, Brannon Gonzales MD, 500 mg at 10/10/23 0635    nitroglycerin (NITROSTAT) SL tablet 0.4 mg, 0.4 mg, Sublingual, Q5 Min PRN, Brannon Gonzales MD    ondansetron (ZOFRAN) tablet 4 mg, 4 mg, Oral, Q6H PRN **OR** ondansetron (ZOFRAN) injection 4 mg, 4 mg, Intravenous, Q6H PRN, Brannon Gonzales MD    pantoprazole (PROTONIX) EC tablet 40 mg, 40 mg, Oral, Q AM, Johnny Tuttle MD, 40 mg at 10/10/23 0635    Phosphorus Replacement - Follow Nurse / BPA Driven Protocol, , Does not apply, Christian PALOMARES Mark Edwin, MD    Potassium Replacement - Follow Nurse / BPA Driven Protocol, , Does not apply, PRN, Brannon Gonzales MD    saccharomyces boulardii (FLORASTOR) capsule 500 mg, 500 mg, Oral, BID, Quynh Choe, APRN, 500 mg at 10/09/23 2117    sevelamer (RENVELA) tablet 1,600 mg, 1,600 mg, Oral, TID With Meals, Iain Owens MD, 1,600 mg at 10/09/23 1705    sodium chloride 0.9 % flush 10 mL, 10 mL, Intravenous, Q12H, Brannon Gonzales MD, 10 mL at 10/09/23 2132    sodium chloride 0.9 % flush 10 mL, 10 mL, Intravenous, PRN, Brannon Gonzales MD, 10 mL at 10/01/23 1723    sodium chloride 0.9 % infusion 40 mL, 40 mL, Intravenous, PRN, Brannon Gonzales MD    sodium chloride nasal spray 1 spray, 1 spray, Each Nare, JIMN, Quynh Choe APRN      Objective   Vital Signs   Temp:  [98.5 øF (36.9 øC)-99.3 øF (37.4 øC)] 99.3 øF (37.4 øC)  Heart Rate:  [] 98  Resp:  [16] 16  BP: (127-175)/(66-89) 167/87    Physical Exam:    General: awake, alert, very nice, in chair  Eyes: no scleral icterus  ENT: ETT in place  Cardiovascular: NR  Respiratory: R rales are better; R chest drain removed  GI: Abdomen is soft, not tender  Skin: No rashes    Labs:   CBC, BMP, and blood and body fluid cultures reviewed today  Lab Results   Component Value Date    WBC 9.18 10/10/2023    HGB 9.5 (L) 10/10/2023    HCT 30.0 (L) 10/10/2023    MCV 84.3 10/10/2023     10/10/2023     Lab Results   Component Value Date    GLUCOSE 119 (H) 10/10/2023    CALCIUM 9.3 10/10/2023     (L) 10/10/2023    K 5.1 10/10/2023    CO2 21.0 (L) 10/10/2023     10/10/2023    BUN 81 (H) 10/10/2023    CREATININE 6.03 (H) 10/10/2023    EGFR 7.9 (L) 10/10/2023    BCR 13.4 10/10/2023    ANIONGAP 13.0 10/10/2023     Lab Results   Component Value Date    CRP 11.66 (H) 10/05/2023     Lab Results   Component Value Date    HGBA1C 7.50 (H) 10/01/2023     Urine Eos negative  HIV negative  Hep C negative  Procal 0.59    Microbiology:  10/1 RPP: negative  10/1 BCx: negative  10/1 MRSA nares: negative  10/2 BAL Cx: normal annabel  10/3 ETT Cx: normal annabel  10/3 Lung Abscess Cx: scant growth Strep dysgalactiae (susc penicillin)    New Radiology:  CXR 10/10 personally reviewed and shows improved aeration in the R lung; drain has been removed    ASSESSMENT/PLAN:  Right upper lobe pulmonary abscess due to Strep  Acute hypercapneic and hypoxic respiratory failure requiring mechanical ventilation - resolved  Sepsis due to #1 - resolved  Super obesity BMI 55  Uncontrolled diabetes type 2 A1c  7.5%  Hypertension    She is afebrile and WBC is normal. She is on room air. Renal function is recovering.   Drain is now removed. CXR improving.     On 10/3/23, she had an IR-guided CT chest tube placement. The culture is growing Strep as noted above. While in the hospital, continue ceftriaxone 2 g IV q24h. When ready for discharge, I anticipate being able to switch over to high dose amoxicillin  dosed for weigh and renal function with plans to complete a 6-week course w/ stop date ~11/14/23.     Check updated CRP.     ID will follow.

## 2023-10-10 NOTE — PROGRESS NOTES
"                                              LOS: 9 days   Patient Care Team:  Provider, No Known as PCP - General    Chief Complaint:  F/up respiratory failure, lung abscess    Subjective   Interval History    On RA.  Denies dyspnea or cough.      REVIEW OF SYSTEMS:       GASTROINTESTINAL: No anorexia, nausea, vomiting or diarrhea. No abdominal pain.  CONSTITUTIONAL: No fever or chills.     Ventilator/Non-Invasive Ventilation Settings (From admission, onward)             Physical Exam:     Vital Signs  Temp:  [97.8 øF (36.6 øC)-99.3 øF (37.4 øC)] 97.8 øF (36.6 øC)  Heart Rate:  [] 98  Resp:  [16] 16  BP: (158-175)/(66-87) 158/84    Intake/Output Summary (Last 24 hours) at 10/10/2023 1510  Last data filed at 10/10/2023 1300  Gross per 24 hour   Intake 840 ml   Output 450 ml   Net 390 ml     Flowsheet Rows      Flowsheet Row First Filed Value   Admission Height 160 cm (63\") Documented at 10/01/2023 1047   Admission Weight 152 kg (335 lb) Documented at 10/01/2023 1047            PPE used per hospital policy    General Appearance:   Alert, cooperative, in no acute distress   ENMT:  Mallampati score 3, moist mucous membrane   Eyes:  Pupils equal and reactive to light. EOMI   Neck:   Large. Trachea midline. No thyromegaly.   Lungs:    Diminished air entry bilaterally especially on the RLL. NO wheezing or rales.     Heart:   Regular rhythm and normal rate, normal S1 and S2, no         murmur   Skin:   No rash or ecchymosis   Abdomen:    Obese. Soft. No tenderness. No HSM.   Neuro/psych:  Conscious, alert, oriented x3. Strength 5/5 in upper and lower  ext.  Appropriate mood and affect   Extremities:  No cyanosis, clubbing or edema.  Warm extremities and well-perfused          Results Review:        Results from last 7 days   Lab Units 10/10/23  0515 10/09/23  0512 10/08/23  0628   SODIUM mmol/L 135* 134* 137   POTASSIUM mmol/L 5.1 5.1 4.7   CHLORIDE mmol/L 101 97* 99   CO2 mmol/L 21.0* 22.3 24.0   BUN mg/dL 81* 73* " 82*   CREATININE mg/dL 6.03* 6.31* 7.05*   GLUCOSE mg/dL 119* 95 112*   CALCIUM mg/dL 9.3 8.7 8.8           Results from last 7 days   Lab Units 10/10/23  0515 10/09/23  0512 10/08/23  0628   WBC 10*3/mm3 9.18 8.50 8.80   HEMOGLOBIN g/dL 9.5* 8.8* 8.9*   HEMATOCRIT % 30.0* 27.4* 28.0*   PLATELETS 10*3/mm3 358 321 303                       Results from last 7 days   Lab Units 10/10/23  0515 10/05/23  0406   CRP mg/dL 3.86* 11.66*       Results from last 7 days   Lab Units 10/04/23  1418 10/04/23  0357   PH, ARTERIAL pH units 7.356 7.376   PCO2, ARTERIAL mm Hg 45.6* 44.7   PO2 ART mm Hg 77.8* 127.2*   O2 SATURATION ART % 94.7 98.8*   MODALITY  Adult Vent Adult Vent         I reviewed the patient's new clinical results.        Medication Review:   cefTRIAXone, 2,000 mg, Intravenous, Q24H  heparin (porcine), 5,000 Units, Subcutaneous, Q8H  insulin lispro, 2-7 Units, Subcutaneous, 4x Daily AC & at Bedtime  labetalol, 100 mg, Oral, Q12H  pantoprazole, 40 mg, Oral, Q AM  saccharomyces boulardii, 500 mg, Oral, BID  senna-docusate sodium, 2 tablet, Oral, BID  sevelamer, 1,600 mg, Oral, TID With Meals  sodium chloride, 10 mL, Intravenous, Q12H             Diagnostic imaging:  I personally and independently reviewed the following images:  CXR 10/7/2023: Loculated right pleural effusion.  Chest tube noted.  CXR 10/9/23: Pulmonary infiltrates on the right.  Fluid in the right fissure.    Assessment     RUL pulmonary abscess, culture consistent with strep  Loculated right pleural effusion/empyema, s/p chest tube placement 10/3 tPA infusion  Acute hypercapneic and hypoxic respiratory failure, required mechanical ventilation.  Sepsis due to #1  Super obesity BMI 54  Newly diagnosed uncontrolled diabetes type 2 A1c  7.5%  Newly diagnosed hypertension  Acute kidney injury  Probable CRYSTAL: Snoring, apnea and desaturation at night.      Plan         Antibiotics: Rocephin and Flagyl for now.  Plan for total of 6 weeks therapy  DVT  prophylaxis with heparin 5000 units 3 times daily  Bowel regimen.  Avoid narcotics if possible due to hypercapnia.  BiPAP at night for respiratory failure and highly suspected sleep apnea.  Not being used consistently at the hospital.  We will ordered outpatient PSG for probable CRYSTAL.  Not a candidate for HST due to morbid obesity.  This was ordered.  Insulin sliding scale  PPI prophylaxis  No need for oxygen on DC.  Noted normal ex ox.  Phosphate binder with sevelamer 600 mg 3 times daily.    Dispo: Okay to discharge home from pulmonary perspective.  .      Christian Bernal MD  10/10/23  15:10 EDT          This note was dictated utilizing Shirley Mae'son dictation

## 2023-10-10 NOTE — PLAN OF CARE
Goal Outcome Evaluation:      VSS. RA. R chest dressing CDI. L neck dressing CDI. Prn tylenol x1. Ax standby to toilet. Up in chair to sleep. Possible plans to D/C today.

## 2023-10-10 NOTE — PROGRESS NOTES
" LOS: 9 days     Name: Nay Gonzalez  Age: 53 y.o.  Sex: female  :  1970  MRN: [id number]         Primary Care Physician: Provider, No Known    Subjective   Subjective    Patient is lying on the bed and does not appear to be any distress.  Denies nausea, vomiting, abdominal pain, chest pain, shortness of breath.    Objective   Vital Signs  Temp:  [97.8 øF (36.6 øC)-99.3 øF (37.4 øC)] 97.8 øF (36.6 øC)  Heart Rate:  [] 98  Resp:  [16] 16  BP: (158-175)/(66-87) 158/84  Body mass index is 55.27 kg/mý.    Objective:  General Appearance:  Comfortable and in no acute distress.    Vital signs: (most recent): Blood pressure 158/84, pulse 98, temperature 97.8 øF (36.6 øC), temperature source Oral, resp. rate 16, height 160 cm (62.99\"), weight (!) 141 kg (311 lb 15.2 oz), last menstrual period 10/01/2023, SpO2 97%.    Lungs:  Normal effort and normal respiratory rate.  She is not in respiratory distress.  There are decreased breath sounds.    Heart: Normal rate.  Regular rhythm.    Chest: (Right anterior chest tube in place)  Abdomen: Abdomen is soft.  Bowel sounds are normal.   There is no abdominal tenderness.     Extremities: There is no dependent edema or local swelling.    Neurological: Patient is alert and oriented to person, place and time.    Skin:  Warm and dry.                Results Review:       I reviewed the patient's new clinical results.    Results from last 7 days   Lab Units 10/10/23  0515 10/09/23  0512 10/08/23  0628 10/07/23  0620 10/06/23  0519 10/05/23  0406 10/04/23  0414   WBC 10*3/mm3 9.18 8.50 8.80 9.62 10.93* 12.91* 12.10*   HEMOGLOBIN g/dL 9.5* 8.8* 8.9* 9.8* 9.6* 9.0* 7.8*   PLATELETS 10*3/mm3 358 321 303 342 331 329 285     Results from last 7 days   Lab Units 10/10/23  0515 10/09/23  0512 10/08/23  0628 10/07/23  0620 10/06/23  0519 10/05/23  0406 10/04/23  0414   SODIUM mmol/L 135* 134* 137 137 136 142 140   POTASSIUM mmol/L 5.1 5.1 4.7 4.7 4.8 4.9 4.9   CHLORIDE mmol/L 101 " 97* 99 98 98 101 99   CO2 mmol/L 21.0* 22.3 24.0 22.0 22.5 21.0* 27.3   BUN mg/dL 81* 73* 82* 78* 66* 58* 41*   CREATININE mg/dL 6.03* 6.31* 7.05* 7.25* 6.22* 5.94* 4.64*   CALCIUM mg/dL 9.3 8.7 8.8 8.9 9.3 9.2 9.1   GLUCOSE mg/dL 119* 95 112* 104* 120* 103* 119*                 Scheduled Meds:   cefTRIAXone, 2,000 mg, Intravenous, Q24H  heparin (porcine), 5,000 Units, Subcutaneous, Q8H  insulin lispro, 2-7 Units, Subcutaneous, 4x Daily AC & at Bedtime  labetalol, 100 mg, Oral, Q12H  pantoprazole, 40 mg, Oral, Q AM  saccharomyces boulardii, 500 mg, Oral, BID  senna-docusate sodium, 2 tablet, Oral, BID  sevelamer, 1,600 mg, Oral, TID With Meals  sodium chloride, 10 mL, Intravenous, Q12H      PRN Meds:     acetaminophen **OR** acetaminophen **OR** acetaminophen    senna-docusate sodium **AND** polyethylene glycol **AND** bisacodyl **AND** bisacodyl    Calcium Replacement - Follow Nurse / BPA Driven Protocol    dextrose    dextrose    glucagon (human recombinant)    hydrALAZINE    influenza vaccine    ipratropium-albuterol    Magnesium Standard Dose Replacement - Follow Nurse / BPA Driven Protocol    nitroglycerin    ondansetron **OR** ondansetron    Phosphorus Replacement - Follow Nurse / BPA Driven Protocol    Potassium Replacement - Follow Nurse / BPA Driven Protocol    sodium chloride    sodium chloride    sodium chloride  Continuous Infusions:       Assessment & Plan   Active Hospital Problems    Diagnosis  POA    **PNA (pneumonia) [J18.9]  Yes    ELLY (acute kidney injury) [N17.9]  Unknown    Obesity, morbid, BMI 50 or higher [E66.01]  Unknown    Anemia [D64.9]  Unknown    Sepsis [A41.9]  Unknown    Type 2 diabetes mellitus with hyperglycemia [E11.65]  Unknown    Acute respiratory failure with hypoxia [J96.01]  Unknown    Hypertensive urgency [I16.0]  Unknown    Abscess of upper lobe of right lung with pneumonia [J85.1]  Yes      Resolved Hospital Problems    Diagnosis Date Resolved POA    Hyperkalemia [E87.5]  10/03/2023 Unknown       Assessment & Plan    This is a 53-year-old female with a history of morbid obesity, new diabetes of type 2 diabetes and hypertension who presents to the hospital with cough shortness of breath and fevers and is found to have a right upper lobe pulmonary abscess     1.Acute hypoxic respiratory failure with right upper lobe abscess, now off the ventilator and chest tube has been removed on 10/8/2023.  Cultures grew Streptococcus and currently patient is on Rocephin/Flagyl and stop date is 11/14/2023.  Was requiring 2 L of oxygen and currently off.  Upon discharge will most likely have to be on high-dose amoxicillin with a stop date of 11/14/2023.    2.  Hypertension, on labetalol and currently blood pressure is in systolics of 110s therefore hydralazine is being discontinued.    3.  Diabetes mellitus, hemoglobin A1c 7.5 and currently on corrective dose insulin and blood sugars are reasonably well controlled.    4.  Morbid obesity, counseled to lose weight.    5.  Acute kidney injury, felt to be multifactorial etiology and did peak at 7.25 and appears to have plateaued.  Creatinine started trending down and it is at 6.0.3 today and nephrology is following along.    6.  Anemia, iron deficient and will have her follow-up with hematology and GI as an outpatient basis.  No evidence of any active bleeding.    7.  On heparin for DVT prophylaxis.    8.  CODE STATUS is full code.    Estimated discharge date, hopefully home tomorrow.    Copied text on this note has been reviewed by me on 10/10/2023        Rm Riddle MD  [facility]  10/10/23  11:12 EDT

## 2023-10-11 ENCOUNTER — APPOINTMENT (OUTPATIENT)
Dept: GENERAL RADIOLOGY | Facility: HOSPITAL | Age: 53
DRG: 871 | End: 2023-10-11
Payer: COMMERCIAL

## 2023-10-11 LAB
ALBUMIN SERPL-MCNC: 3.1 G/DL (ref 3.5–5.2)
ALBUMIN SERPL-MCNC: 3.1 G/DL (ref 3.5–5.2)
ANION GAP SERPL CALCULATED.3IONS-SCNC: 10 MMOL/L (ref 5–15)
ANION GAP SERPL CALCULATED.3IONS-SCNC: 10 MMOL/L (ref 5–15)
BUN SERPL-MCNC: 70 MG/DL (ref 6–20)
BUN SERPL-MCNC: 74 MG/DL (ref 6–20)
BUN/CREAT SERPL: 15 (ref 7–25)
BUN/CREAT SERPL: 15.2 (ref 7–25)
CALCIUM SPEC-SCNC: 9.6 MG/DL (ref 8.6–10.5)
CALCIUM SPEC-SCNC: 9.6 MG/DL (ref 8.6–10.5)
CHLORIDE SERPL-SCNC: 102 MMOL/L (ref 98–107)
CHLORIDE SERPL-SCNC: 103 MMOL/L (ref 98–107)
CO2 SERPL-SCNC: 24 MMOL/L (ref 22–29)
CO2 SERPL-SCNC: 24 MMOL/L (ref 22–29)
CREAT SERPL-MCNC: 4.66 MG/DL (ref 0.57–1)
CREAT SERPL-MCNC: 4.88 MG/DL (ref 0.57–1)
EGFRCR SERPLBLD CKD-EPI 2021: 10.1 ML/MIN/1.73
EGFRCR SERPLBLD CKD-EPI 2021: 10.7 ML/MIN/1.73
GLUCOSE BLDC GLUCOMTR-MCNC: 116 MG/DL (ref 70–130)
GLUCOSE BLDC GLUCOMTR-MCNC: 135 MG/DL (ref 70–130)
GLUCOSE BLDC GLUCOMTR-MCNC: 142 MG/DL (ref 70–130)
GLUCOSE BLDC GLUCOMTR-MCNC: 97 MG/DL (ref 70–130)
GLUCOSE SERPL-MCNC: 101 MG/DL (ref 65–99)
GLUCOSE SERPL-MCNC: 141 MG/DL (ref 65–99)
MAGNESIUM SERPL-MCNC: 2.2 MG/DL (ref 1.6–2.6)
PHOSPHATE SERPL-MCNC: 5.8 MG/DL (ref 2.5–4.5)
PHOSPHATE SERPL-MCNC: 6.6 MG/DL (ref 2.5–4.5)
POTASSIUM SERPL-SCNC: 5.3 MMOL/L (ref 3.5–5.2)
POTASSIUM SERPL-SCNC: 5.8 MMOL/L (ref 3.5–5.2)
SODIUM SERPL-SCNC: 136 MMOL/L (ref 136–145)
SODIUM SERPL-SCNC: 137 MMOL/L (ref 136–145)
URATE SERPL-MCNC: 11 MG/DL (ref 2.4–5.7)

## 2023-10-11 PROCEDURE — 99232 SBSQ HOSP IP/OBS MODERATE 35: CPT | Performed by: INTERNAL MEDICINE

## 2023-10-11 PROCEDURE — 25010000002 CEFTRIAXONE PER 250 MG: Performed by: INTERNAL MEDICINE

## 2023-10-11 PROCEDURE — 84550 ASSAY OF BLOOD/URIC ACID: CPT | Performed by: INTERNAL MEDICINE

## 2023-10-11 PROCEDURE — 83735 ASSAY OF MAGNESIUM: CPT | Performed by: INTERNAL MEDICINE

## 2023-10-11 PROCEDURE — 82948 REAGENT STRIP/BLOOD GLUCOSE: CPT

## 2023-10-11 PROCEDURE — 80069 RENAL FUNCTION PANEL: CPT | Performed by: INTERNAL MEDICINE

## 2023-10-11 PROCEDURE — 25010000002 HEPARIN (PORCINE) PER 1000 UNITS: Performed by: INTERNAL MEDICINE

## 2023-10-11 PROCEDURE — 25010000002 HYDRALAZINE PER 20 MG: Performed by: INTERNAL MEDICINE

## 2023-10-11 PROCEDURE — 71045 X-RAY EXAM CHEST 1 VIEW: CPT

## 2023-10-11 PROCEDURE — 97530 THERAPEUTIC ACTIVITIES: CPT

## 2023-10-11 RX ORDER — HYDRALAZINE HYDROCHLORIDE 20 MG/ML
10 INJECTION INTRAMUSCULAR; INTRAVENOUS EVERY 6 HOURS PRN
Status: DISCONTINUED | OUTPATIENT
Start: 2023-10-11 | End: 2023-10-12 | Stop reason: HOSPADM

## 2023-10-11 RX ORDER — AMOXICILLIN 250 MG/1
1000 CAPSULE ORAL EVERY 12 HOURS SCHEDULED
Status: DISCONTINUED | OUTPATIENT
Start: 2023-10-12 | End: 2023-10-12 | Stop reason: HOSPADM

## 2023-10-11 RX ORDER — LABETALOL 200 MG/1
200 TABLET, FILM COATED ORAL EVERY 12 HOURS SCHEDULED
Status: DISCONTINUED | OUTPATIENT
Start: 2023-10-11 | End: 2023-10-12 | Stop reason: HOSPADM

## 2023-10-11 RX ADMIN — PANTOPRAZOLE SODIUM 40 MG: 40 TABLET, DELAYED RELEASE ORAL at 06:22

## 2023-10-11 RX ADMIN — HEPARIN SODIUM 5000 UNITS: 5000 INJECTION INTRAVENOUS; SUBCUTANEOUS at 08:41

## 2023-10-11 RX ADMIN — Medication 500 MG: at 20:45

## 2023-10-11 RX ADMIN — LABETALOL HYDROCHLORIDE 200 MG: 100 TABLET, FILM COATED ORAL at 08:43

## 2023-10-11 RX ADMIN — SODIUM ZIRCONIUM CYCLOSILICATE 10 G: 10 POWDER, FOR SUSPENSION ORAL at 10:12

## 2023-10-11 RX ADMIN — Medication 10 ML: at 08:45

## 2023-10-11 RX ADMIN — Medication 500 MG: at 08:43

## 2023-10-11 RX ADMIN — HEPARIN SODIUM 5000 UNITS: 5000 INJECTION INTRAVENOUS; SUBCUTANEOUS at 15:57

## 2023-10-11 RX ADMIN — LABETALOL HYDROCHLORIDE 200 MG: 100 TABLET, FILM COATED ORAL at 20:45

## 2023-10-11 RX ADMIN — Medication 10 ML: at 20:45

## 2023-10-11 RX ADMIN — SODIUM ZIRCONIUM CYCLOSILICATE 10 G: 10 POWDER, FOR SUSPENSION ORAL at 20:45

## 2023-10-11 RX ADMIN — HYDRALAZINE HYDROCHLORIDE 20 MG: 20 INJECTION, SOLUTION INTRAMUSCULAR; INTRAVENOUS at 10:12

## 2023-10-11 RX ADMIN — SEVELAMER CARBONATE 1600 MG: 800 TABLET, FILM COATED ORAL at 12:17

## 2023-10-11 RX ADMIN — HEPARIN SODIUM 5000 UNITS: 5000 INJECTION INTRAVENOUS; SUBCUTANEOUS at 00:12

## 2023-10-11 RX ADMIN — SEVELAMER CARBONATE 1600 MG: 800 TABLET, FILM COATED ORAL at 08:43

## 2023-10-11 RX ADMIN — CEFTRIAXONE SODIUM 2000 MG: 2 INJECTION, POWDER, FOR SOLUTION INTRAMUSCULAR; INTRAVENOUS at 10:12

## 2023-10-11 RX ADMIN — SODIUM ZIRCONIUM CYCLOSILICATE 10 G: 10 POWDER, FOR SUSPENSION ORAL at 15:57

## 2023-10-11 RX ADMIN — SEVELAMER CARBONATE 1600 MG: 800 TABLET, FILM COATED ORAL at 17:54

## 2023-10-11 NOTE — PROGRESS NOTES
"Nutrition Services    Patient Name:  Nay Gonzalez  YOB: 1970  MRN: [id number]  Admit Date:  10/1/2023  Nutrition Follow Up    Assessment Date:  10/11/23    Encounter Information         Current Summary Pt eating well, % per flowsheet. Labs, meds, skin reviewed. No plans for HD today. Awaiting discharge - poss 1-2 days. RD to follow if needed.      Current Nutrition Orders & Evaluation of Intake       Oral Nutrition     Current PO Diet Diet: Diabetic Diets, Renal Diets; Consistent Carbohydrate; Low Potassium, Low Phosphorus, Low Sodium (2-3g); Texture: Regular Texture (IDDSI 7); Fluid Consistency: Thin (IDDSI 0)   Supplement n/a   PO Evaluation     PO Intake % %    Factors Affecting Intake  No factors at this time   --  Anthropometrics          Height    Weight Height: 160 cm (62.99\")  Weight: 136 kg (299 lb 9.7 oz) (10/11/23 0500)    BMI kg/m2 Body mass index is 53.09 kg/mý.  Obese, Class III (40 or higher)    Weight trend Stable     Labs        Pertinent Labs Reviewed, listed below     Results from last 7 days   Lab Units 10/11/23  1412 10/11/23  0509 10/10/23  0515 10/07/23  0620 10/06/23  0519 10/05/23  0406   SODIUM mmol/L 136 137 135*   < > 136 142   POTASSIUM mmol/L 5.3* 5.8* 5.1   < > 4.8 4.9   CHLORIDE mmol/L 102 103 101   < > 98 101   CO2 mmol/L 24.0 24.0 21.0*   < > 22.5 21.0*   BUN mg/dL 70* 74* 81*   < > 66* 58*   CREATININE mg/dL 4.66* 4.88* 6.03*   < > 6.22* 5.94*   CALCIUM mg/dL 9.6 9.6 9.3   < > 9.3 9.2   BILIRUBIN mg/dL  --   --   --   --  0.2 0.3   ALK PHOS U/L  --   --   --   --  97 118*   ALT (SGPT) U/L  --   --   --   --  18 16   AST (SGOT) U/L  --   --   --   --  20 15   GLUCOSE mg/dL 141* 101* 119*   < > 120* 103*    < > = values in this interval not displayed.     Results from last 7 days   Lab Units 10/11/23  1412 10/11/23  0509 10/10/23  0515 10/09/23  0512 10/06/23  0519 10/05/23  0406   MAGNESIUM mg/dL  --  2.2 2.3 2.6   < > 2.6   PHOSPHORUS mg/dL 5.8* " 6.6* 7.2* 10.0*   < > 9.0*   HEMOGLOBIN g/dL  --   --  9.5* 8.8*   < > 9.0*   HEMATOCRIT %  --   --  30.0* 27.4*   < > 27.9*   WBC 10*3/mm3  --   --  9.18 8.50   < > 12.91*   TRIGLYCERIDES mg/dL  --   --   --   --   --  338*   ALBUMIN g/dL 3.1* 3.1* 3.0* 2.8*   < > 2.6*    < > = values in this interval not displayed.     Results from last 7 days   Lab Units 10/10/23  0515 10/09/23  0512 10/08/23  0628 10/07/23  0620 10/06/23  0519   PLATELETS 10*3/mm3 358 321 303 342 331     COVID19   Date Value Ref Range Status   10/01/2023 Not Detected Not Detected - Ref. Range Final     Lab Results   Component Value Date    HGBA1C 7.50 (H) 10/01/2023          Medications            Scheduled Medications [START ON 10/12/2023] amoxicillin, 1,000 mg, Oral, Q12H  heparin (porcine), 5,000 Units, Subcutaneous, Q8H  insulin lispro, 2-7 Units, Subcutaneous, 4x Daily AC & at Bedtime  labetalol, 200 mg, Oral, Q12H  pantoprazole, 40 mg, Oral, Q AM  saccharomyces boulardii, 500 mg, Oral, BID  senna-docusate sodium, 2 tablet, Oral, BID  sevelamer, 1,600 mg, Oral, TID With Meals  sodium chloride, 10 mL, Intravenous, Q12H  sodium zirconium cyclosilicate, 10 g, Oral, TID        Infusions Pharmacy Consult,         PRN Medications   acetaminophen **OR** acetaminophen **OR** acetaminophen    senna-docusate sodium **AND** polyethylene glycol **AND** bisacodyl **AND** bisacodyl    Calcium Replacement - Follow Nurse / BPA Driven Protocol    dextrose    dextrose    glucagon (human recombinant)    hydrALAZINE    influenza vaccine    ipratropium-albuterol    Magnesium Standard Dose Replacement - Follow Nurse / BPA Driven Protocol    nitroglycerin    ondansetron **OR** ondansetron    Pharmacy Consult    Phosphorus Replacement - Follow Nurse / BPA Driven Protocol    Potassium Replacement - Follow Nurse / BPA Driven Protocol    sodium chloride    sodium chloride    sodium chloride     Physical Findings          General Appearance alert, obese, oriented    Oral/Mouth Cavity WNL   Edema  2+ (mild)   Gastrointestinal last bowel movement: 10/10   Skin  skin intact   Tubes/Drains/Lines none   NFPE No clinical signs of muscle wasting or fat loss   --  NUTRITION INTERVENTION / PLAN OF CARE  Intervention Goal         Intervention Goal(s) Maintain nutrition status, Improved nutrition related labs, Reduce/improve symptoms, Disease management/therapy, Maintain intake, and No significant weight loss     Nutrition Intervention         RD Action Interview for preferences, Encourage intake, Continue to monitor, and Care plan reviewed     Nutrition Prescription         Diet Prescription     Supplement Prescription n/a   EN/PN Prescription    New Prescription Ordered?    --  Monitor/Evaluation        Monitor Per protocol   Discharge Needs Pending clinical course     RD to follow up per protocol.    Electronically signed by:  Marie Montanez RD  10/11/23 14:58 EDT

## 2023-10-11 NOTE — PLAN OF CARE
Goal Outcome Evaluation:  Plan of Care Reviewed With: patient        Progress: improving  Outcome Evaluation: Pt seen by PT this PM for tx. Pt was sitting in chair at beg of session and very pleasant as well as agreeable to PT. Pt stood 3x during session req SBA and use of fww. Pt amb further distance today - approx 54' total - during 3 gait trials of 12', 30' and 12' req SBA and use of fww. Pt req approx 3 min seated rest after each trial w/ HR bet 105 and 111 and O2 sats in high 90s. Pt was steady during gt w/ no overt LOB. Fatigue limited distance. PT will prog as pt love.      Anticipated Discharge Disposition (PT): home with home health, home with assist

## 2023-10-11 NOTE — PROGRESS NOTES
ID NOTE    CC: f/u pulmonary abscess due to Strep dysgalactiae    Subj: No fever. She remains on room air. Crt better but K elevated which is being treated by nephrology. No rash on ceftriaxone. Denies diarrhea.     Medications:    Current Facility-Administered Medications:     acetaminophen (TYLENOL) tablet 650 mg, 650 mg, Oral, Q4H PRN, 650 mg at 10/09/23 2116 **OR** acetaminophen (TYLENOL) 160 MG/5ML oral solution 650 mg, 650 mg, Oral, Q4H PRN **OR** acetaminophen (TYLENOL) suppository 650 mg, 650 mg, Rectal, Q4H PRN, Brannon Gonzales MD, 650 mg at 10/02/23 2341    sennosides-docusate (PERICOLACE) 8.6-50 MG per tablet 2 tablet, 2 tablet, Oral, BID, 2 tablet at 10/10/23 0848 **AND** polyethylene glycol (MIRALAX) packet 17 g, 17 g, Oral, Daily PRN **AND** bisacodyl (DULCOLAX) EC tablet 5 mg, 5 mg, Oral, Daily PRN **AND** bisacodyl (DULCOLAX) suppository 10 mg, 10 mg, Rectal, Daily PRN, Brannon Gonzales MD    Calcium Replacement - Follow Nurse / BPA Driven Protocol, , Does not apply, PRN, Brannon Gonzales MD    cefTRIAXone (ROCEPHIN) 2,000 mg in sodium chloride 0.9 % 100 mL IVPB-VTB, 2,000 mg, Intravenous, Q24H, Brannon Gonzales MD, Last Rate: 200 mL/hr at 10/11/23 1012, 2,000 mg at 10/11/23 1012    dextrose (D50W) (25 g/50 mL) IV injection 25 g, 25 g, Intravenous, Q15 Min PRN, Brannon Gonzales MD    dextrose (GLUTOSE) oral gel 15 g, 15 g, Oral, Q15 Min PRN, Brannon Gonzales MD    glucagon (GLUCAGEN) injection 1 mg, 1 mg, Intramuscular, Q15 Min PRN, Brannon Gonzales MD    heparin (porcine) 5000 UNIT/ML injection 5,000 Units, 5,000 Units, Subcutaneous, Q8H, Brannon Gonzales MD, 5,000 Units at 10/11/23 0841    hydrALAZINE (APRESOLINE) injection 20 mg, 20 mg, Intravenous, Q4H PRN, Brannon Gonzales MD, 20 mg at 10/11/23 1012    influenza vac split quad (FLUZONE,FLUARIX,AFLURIA,FLULAVAL) injection 0.5 mL, 0.5 mL, Intramuscular, During Hospitalization, Brannon Gonzales MD    insulin  lispro (HUMALOG/ADMELOG) injection 2-7 Units, 2-7 Units, Subcutaneous, 4x Daily AC & at Bedtime, Brannon Gonzales MD, 2 Units at 10/10/23 0848    ipratropium-albuterol (DUO-NEB) nebulizer solution 3 mL, 3 mL, Nebulization, Q6H PRN, Brannon Gonzales MD    labetalol (NORMODYNE) tablet 200 mg, 200 mg, Oral, Q12H, Iain Owens MD, 200 mg at 10/11/23 0843    Magnesium Standard Dose Replacement - Follow Nurse / BPA Driven Protocol, , Does not apply, PRN, Brannon Gonzales MD    nitroglycerin (NITROSTAT) SL tablet 0.4 mg, 0.4 mg, Sublingual, Q5 Min PRN, Brannon Gonzales MD    ondansetron (ZOFRAN) tablet 4 mg, 4 mg, Oral, Q6H PRN **OR** ondansetron (ZOFRAN) injection 4 mg, 4 mg, Intravenous, Q6H PRN, Brannon Gonzales MD    pantoprazole (PROTONIX) EC tablet 40 mg, 40 mg, Oral, Q AM, Johnny Tuttle MD, 40 mg at 10/11/23 0622    Pharmacy Consult, , Does not apply, Continuous PRN, Iain Owens MD    Phosphorus Replacement - Follow Nurse / BPA Driven Protocol, , Does not apply, PRN, Brannon Gonzales MD    Potassium Replacement - Follow Nurse / BPA Driven Protocol, , Does not apply, PRN, Brannon Gonzales MD    saccharomyces boulardii (FLORASTOR) capsule 500 mg, 500 mg, Oral, BID, Qunyh Choe, APRN, 500 mg at 10/11/23 0843    sevelamer (RENVELA) tablet 1,600 mg, 1,600 mg, Oral, TID With Meals, Iain Owens MD, 1,600 mg at 10/11/23 0843    sodium chloride 0.9 % flush 10 mL, 10 mL, Intravenous, Q12H, Brannon Gonzales MD, 10 mL at 10/11/23 0845    sodium chloride 0.9 % flush 10 mL, 10 mL, Intravenous, PRN, Brannon Gonzales MD, 10 mL at 10/01/23 1723    sodium chloride 0.9 % infusion 40 mL, 40 mL, Intravenous, PRN, Brannon Gonzales MD    sodium chloride nasal spray 1 spray, 1 spray, Each Nare, PRN, Quynh Choe APRN    sodium zirconium cyclosilicate (LOKELMA) pack 10 g, 10 g, Oral, TID, RomanIain MD, 10 g at 10/11/23 1012      Objective   Vital  Signs   Temp:  [97.8 øF (36.6 øC)-98.3 øF (36.8 øC)] 98.3 øF (36.8 øC)  Heart Rate:  [] 93  Resp:  [16-18] 18  BP: (158-187)/() 175/96    Physical Exam:   General: awake, alert, very nice, in chair  Eyes: no scleral icterus  Cardiovascular: NR  Respiratory: R rales are improved and the R chest drain has been removed  GI: Abdomen is soft, not tender  Skin: No rashes    Labs:   K, Crt, and CRP reviewed today  Lab Results   Component Value Date    K 5.8 (H) 10/11/2023     Lab Results   Component Value Date    CREATININE 4.88 (H) 10/11/2023     Lab Results   Component Value Date    CRP 3.86 (H) 10/10/2023     Lab Results   Component Value Date    HGBA1C 7.50 (H) 10/01/2023     Urine Eos negative  HIV negative  Hep C negative  Procal 0.59    Microbiology:  10/1 RPP: negative  10/1 BCx: negative  10/1 MRSA nares: negative  10/2 BAL Cx: normal annabel  10/3 ETT Cx: normal annabel  10/3 Lung Abscess Cx: scant growth Strep dysgalactiae (susc penicillin)    New Radiology:  CXR 10/11 personally reviewed; there is no evidence of pneumonia on my read    ASSESSMENT/PLAN:  Right upper lobe pulmonary abscess due to Strep dysgalactiae  Acute hypercapneic and hypoxic respiratory failure requiring mechanical ventilation - resolved  Sepsis due to #1 - resolved  Super obesity BMI 53  Uncontrolled diabetes type 2 A1c  7.5%  Hypertension  Hyperkalemia  Acute kidney injury - better    On 10/3/23, she had an IR-guided CT chest tube placement. The culture grewStrep as noted above. She is now afebrile and WBC is normal. She is on room air. Drain has been removed. CRP has come down nicely. Renal function is recovering though K elevated today.     While in the hospital, continue ceftriaxone 2 g IV q24h. When ready for discharge, change to amoxicillin 1 g PO q12h for a 6-week course w/ stop date 11/14/23. I will see her in my office in about 2 weeks to see how she is doing and also check a BMP in case her renal function has recovered to  the point of needing to increase her amoxicillin dose.       ID will follow.

## 2023-10-11 NOTE — THERAPY TREATMENT NOTE
Patient Name: Nay Gonzalez  : 1970    MRN: [id number]                              Today's Date: 10/11/2023       Admit Date: 10/1/2023    Visit Dx:     ICD-10-CM ICD-9-CM   1. Pneumonia of right upper lobe due to infectious organism  J18.9 486   2. Hypoxia  R09.02 799.02   3. Hypertension, unspecified type  I10 401.9   4. CRYSTAL (obstructive sleep apnea)  G47.33 327.23     Patient Active Problem List   Diagnosis    PNA (pneumonia)    Acute respiratory failure with hypoxia    Hypertensive urgency    Abscess of upper lobe of right lung with pneumonia    Sepsis    Type 2 diabetes mellitus with hyperglycemia    ELLY (acute kidney injury)    Obesity, morbid, BMI 50 or higher    Anemia     History reviewed. No pertinent past medical history.  Past Surgical History:   Procedure Laterality Date    TEETH EXTRACTION        General Information       Row Name 10/11/23 1322          Physical Therapy Time and Intention    Document Type therapy note (daily note)  -     Mode of Treatment physical therapy  -       Row Name 10/11/23 1322          General Information    Existing Precautions/Restrictions fall;oxygen therapy device and L/min  -       Row Name 10/11/23 1322          Cognition    Orientation Status (Cognition) oriented x 4  -       Row Name 10/11/23 1322          Safety Issues, Functional Mobility    Impairments Affecting Function (Mobility) endurance/activity tolerance;strength;range of motion (ROM)  -     Comment, Safety Issues/Impairments (Mobility) gt belt and non skid socks donned  -               User Key  (r) = Recorded By, (t) = Taken By, (c) = Cosigned By      Initials Name Provider Type    PH Shikha Meek PTA Physical Therapist Assistant                   Mobility       Row Name 10/11/23 1322          Bed Mobility    Scooting/Bridging [place] (Bed Mobility) not tested  -PH     Supine-Sit [place] (Bed Mobility) not tested  -PH     Sit-Supine [place] (Bed Mobility)  not tested  -PH     Comment, (Bed Mobility) Pt UIC and returned to chair  -PH       Row Name 10/11/23 1322          Sit-Stand Transfer    Comment, (Sit-Stand Transfer) 3x STS; from recliner x 1; from EOB x 2  -PH       Row Name 10/11/23 1322          Gait/Stairs (Locomotion)    [place] Level (Gait) contact guard;standby assist  -PH     Assistive Device (Gait) walker, front-wheeled  -PH     Distance in Feet (Gait) 12', 30', 12' w/ 3 min seated rest bet trials  -PH     Deviations/Abnormal Patterns (Gait) sindi decreased;gait speed decreased;base of support, wide;stride length decreased;weight shifting decreased  -PH     Bilateral Gait Deviations forward flexed posture  -PH     Comment, (Gait/Stairs) slow, steady w/ no overt LOB; fatigue limited distance HR bet 105-111; O2 sats high 90's  -PH               User Key  (r) = Recorded By, (t) = Taken By, (c) = Cosigned By      Initials Name Provider Type     Shikha Meek PTA Physical Therapist Assistant                   Obj/Interventions       Row Name 10/11/23 9242          Balance    Balance Assessment sitting static balance;standing static balance  -PH     Static Sitting Balance modified independence  -PH     Static Standing Balance standby assist  -PH     Position/Device Used, Standing Balance walker, front-wheeled  -PH               User Key  (r) = Recorded By, (t) = Taken By, (c) = Cosigned By      Initials Name Provider Type     Shikha Meek PTA Physical Therapist Assistant                   Goals/Plan    No documentation.                  Clinical Impression       Row Name 10/11/23 2892          Pain    Pretreatment Pain Rating 0/10 - no pain  -PH     Posttreatment Pain Rating 0/10 - no pain  -PH     Additional Documentation Pain Scale: Numbers Pre/Post-Treatment (Group)  -PH       Row Name 10/11/23 1324          Plan of Care Review    Plan of Care Reviewed With patient  -PH     Progress improving  -PH     Outcome Evaluation Pt  seen by PT this PM for tx. Pt was sitting in chair at beg of session and very pleasant as well as agreeable to PT. Pt stood 3x during session req SBA and use of fww. Pt amb further distance today - approx 54' total - during 3 gait trials of 12', 30' and 12' req SBA and use of fww. Pt req approx 3 min seated rest after each trial w/ HR bet 105 and 111 and O2 sats in high 90s. Pt was steady during gt w/ no overt LOB. Fatigue limited distance. PT will prog as pt love.  -PH       Row Name 10/11/23 1324          Vital Signs    O2 Delivery Pre Treatment room air  -PH     O2 Delivery Intra Treatment room air  -PH     O2 Delivery Post Treatment room air  -PH       Row Name 10/11/23 1324          Positioning and Restraints    Pre-Treatment Position sitting in chair/recliner  -PH     Post Treatment Position chair  -PH     In Chair sitting;call light within reach;encouraged to call for assist;exit alarm on;notified nsg  -PH               User Key  (r) = Recorded By, (t) = Taken By, (c) = Cosigned By      Initials Name Provider Type    PH Shikha Meek PTA Physical Therapist Assistant                   Outcome Measures       Row Name 10/11/23 1327 10/11/23 0850       How much help from another person do you currently need...    Turning from your back to your side while in flat bed without using bedrails? 4  -PH 4  -DM    Moving from lying on back to sitting on the side of a flat bed without bedrails? 4  -PH 4  -DM    Moving to and from a bed to a chair (including a wheelchair)? 4  -PH 4  -DM    Standing up from a chair using your arms (e.g., wheelchair, bedside chair)? 4  -PH 4  -DM    Climbing 3-5 steps with a railing? 3  -PH 3  -DM    To walk in hospital room? 3  -PH 3  -DM    AM-PAC 6 Clicks Score (PT) 22  -PH 22  -DM    Highest level of mobility 7 --> Walked 25 feet or more  -PH 7 --> Walked 25 feet or more  -DM      Row Name 10/11/23 1324          Functional Assessment    Outcome Measure Options AM-PAC 6 Clicks  Basic Mobility (PT)  -PH               User Key  (r) = Recorded By, (t) = Taken By, (c) = Cosigned By      Initials Name Provider Type    Shikha De Santiago PTA Physical Therapist Assistant    Ashley Soler, RN Registered Nurse                                 Physical Therapy Education       Title: PT OT SLP Therapies (Done)       Topic: Physical Therapy (Done)       Point: Mobility training (Done)       Learning Progress Summary             Patient Acceptance, E,TB, VU by PH at 10/11/2023 1328    Acceptance, E,TB, VU by LO at 10/10/2023 0450    Acceptance, E,TB, VU by MS at 10/9/2023 1013    Acceptance, E,TB, VU by LO at 10/9/2023 0603    Acceptance, E, VU by KT at 10/8/2023 1446    Acceptance, E,TB,D, VU,NR by  at 10/6/2023 1633    Acceptance, E,D, VU,DU by  at 10/5/2023 1612    Acceptance, E,D, VU,NR by MS1 at 10/2/2023 1110                         Point: Home exercise program (Done)       Learning Progress Summary             Patient Acceptance, E,TB, VU by LO at 10/10/2023 0450    Acceptance, E,TB, VU by LO at 10/9/2023 0603    Acceptance, E, VU by KT at 10/8/2023 1446    Acceptance, E,TB,D, VU,NR by  at 10/6/2023 1633    Acceptance, E,D, VU,NR by MS1 at 10/2/2023 1110                         Point: Body mechanics (Done)       Learning Progress Summary             Patient Acceptance, E,TB, VU by PH at 10/11/2023 1328    Acceptance, E,TB, VU by LO at 10/10/2023 0450    Acceptance, E,TB, VU by MS at 10/9/2023 1013    Acceptance, E,TB, VU by LO at 10/9/2023 0603    Acceptance, E, VU by KT at 10/8/2023 1446    Acceptance, E,TB,D, VU,NR by  at 10/6/2023 1633    Acceptance, E,D, VU,DU by  at 10/5/2023 1612    Acceptance, E,D, VU,NR by MS1 at 10/2/2023 1110                         Point: Precautions (Done)       Learning Progress Summary             Patient Acceptance, E,TB, VU by PH at 10/11/2023 1328    Acceptance, E,TB, VU by LO at 10/10/2023 0450    Acceptance, E,TB, VU by LO at 10/9/2023  0603    Acceptance, E, VU by KT at 10/8/2023 1446    Acceptance, E,TB,D, VU,NR by  at 10/6/2023 1633    Acceptance, E,D, VU,DU by  at 10/5/2023 1612    Acceptance, E,D, VU,NR by MS1 at 10/2/2023 1110                                         User Key       Initials Effective Dates Name Provider Type Discipline     06/16/21 -  Capri Shanks, PT Physical Therapist PT    MS1 06/16/21 -  Aniceto Gandara, PT Physical Therapist PT     06/16/21 -  Jessica Whitfield, RN Registered Nurse Nurse    MS 06/16/21 -  Estela Klein, PT Physical Therapist PT     06/16/21 -  Shikha Meek PTA Physical Therapist Assistant PT     06/27/22 -  Sia Waddell, RN Registered Nurse Nurse     08/10/23 -  Echo Mathur, PT Student PT Student PT                  PT Recommendation and Plan     Plan of Care Reviewed With: patient  Progress: improving  Outcome Evaluation: Pt seen by PT this PM for tx. Pt was sitting in chair at beg of session and very pleasant as well as agreeable to PT. Pt stood 3x during session req SBA and use of fww. Pt amb further distance today - approx 54' total - during 3 gait trials of 12', 30' and 12' req SBA and use of fww. Pt req approx 3 min seated rest after each trial w/ HR bet 105 and 111 and O2 sats in high 90s. Pt was steady during gt w/ no overt LOB. Fatigue limited distance. PT will prog as pt love.     Time Calculation:         PT Charges       Row Name 10/11/23 1328             Time Calculation    Start Time 1249  -PH      Stop Time 1303  -PH      Time Calculation (min) 14 min  -PH      PT Received On 10/11/23  -PH      PT - Next Appointment 10/12/23  -PH         Timed Charges    61947 - PT Therapeutic Activity Minutes 14  -PH         Total Minutes    Timed Charges Total Minutes 14  -PH       Total Minutes 14  -PH                User Key  (r) = Recorded By, (t) = Taken By, (c) = Cosigned By      Initials Name Provider Type    PH Shikha Meek PTA Physical Therapist  Assistant                  Therapy Charges for Today       Code Description Service Date Service Provider Modifiers Qty    [id number]  PT THERAPEUTIC ACT EA 15 MIN 10/11/2023 Shikha Meek, SUSIE GP 1            PT G-Codes  Outcome Measure Options: AM-PAC 6 Clicks Basic Mobility (PT)  AM-PAC 6 Clicks Score (PT): 22  PT Discharge Summary  Anticipated Discharge Disposition (PT): home with home health, home with assist    Shikha Meek PTA  10/11/2023

## 2023-10-11 NOTE — PROGRESS NOTES
[facility] Progress Note      Patient Name: Nay Gonzalez  : 1970  MRN: [id number]  Primary Care Physician:  Provider, No Known  Date of admission: 10/1/2023    Subjective     Interval History:   Follow-up acute kidney injury    The patient is feeling much better, denies any chest pain or shortness of air, no orthopnea or PND, no nausea or vomiting, no abdominal pain, no dysuria or gross hematuria.  Her potassium this morning is up to 5.8 and the creatinine continuing to improve.    Review of Systems:   As noted above    Objective     Vitals:   Temp:  [97.8 øF (36.6 øC)-98.3 øF (36.8 øC)] 98.3 øF (36.8 øC)  Heart Rate:  [92-98] 98  Resp:  [16-18] 18  BP: (158-187)/() 187/98  Flow (L/min):  [1-2] 1    Intake/Output Summary (Last 24 hours) at 10/11/2023 0907  Last data filed at 10/11/2023 0625  Gross per 24 hour   Intake 1080 ml   Output --   Net 1080 ml       Physical Exam:    General Appearance: Morbidly obese, awake and alert, chronically ill, no acute distress  Skin: warm and dry  HEENT: Oral mucosa is normal  Neck: Difficult to assess due to body habitus  Lungs: Clear to auscultation, breathing effort not labored  Heart: RRR, normal S1 and S2, no rub  Abdomen: soft, no guarding, protuberant, normoactive bowel  : No palpable bladder  Extremities: no edema, cyanosis or clubbing  Neuro: Normal speech and mental status and moving all extremities    Scheduled Meds:     cefTRIAXone, 2,000 mg, Intravenous, Q24H  heparin (porcine), 5,000 Units, Subcutaneous, Q8H  insulin lispro, 2-7 Units, Subcutaneous, 4x Daily AC & at Bedtime  labetalol, 200 mg, Oral, Q12H  pantoprazole, 40 mg, Oral, Q AM  saccharomyces boulardii, 500 mg, Oral, BID  senna-docusate sodium, 2 tablet, Oral, BID  sevelamer, 1,600 mg, Oral, TID With Meals  sodium chloride, 10 mL, Intravenous, Q12H  sodium zirconium cyclosilicate, 10 g, Oral, TID      IV Meds:          Results Reviewed:   I have personally  reviewed the results from the time of this admission to 10/11/2023 09:07 EDT     Results from last 7 days   Lab Units 10/11/23  0509 10/10/23  0515 10/09/23  0512 10/07/23  0620 10/06/23  0519 10/05/23  0406   SODIUM mmol/L 137 135* 134*   < > 136 142   POTASSIUM mmol/L 5.8* 5.1 5.1   < > 4.8 4.9   CHLORIDE mmol/L 103 101 97*   < > 98 101   CO2 mmol/L 24.0 21.0* 22.3   < > 22.5 21.0*   BUN mg/dL 74* 81* 73*   < > 66* 58*   CREATININE mg/dL 4.88* 6.03* 6.31*   < > 6.22* 5.94*   CALCIUM mg/dL 9.6 9.3 8.7   < > 9.3 9.2   BILIRUBIN mg/dL  --   --   --   --  0.2 0.3   ALK PHOS U/L  --   --   --   --  97 118*   ALT (SGPT) U/L  --   --   --   --  18 16   AST (SGOT) U/L  --   --   --   --  20 15   GLUCOSE mg/dL 101* 119* 95   < > 120* 103*    < > = values in this interval not displayed.       Estimated Creatinine Clearance: 18.3 mL/min (A) (by C-G formula based on SCr of 4.88 mg/dL (H)).    Results from last 7 days   Lab Units 10/11/23  0509 10/10/23  0515 10/09/23  0512   MAGNESIUM mg/dL 2.2 2.3 2.6   PHOSPHORUS mg/dL 6.6* 7.2* 10.0*       Results from last 7 days   Lab Units 10/11/23  0509 10/10/23  0515 10/09/23  0512 10/08/23  0628 10/07/23  0620 10/06/23  0519 10/05/23  0406   URIC ACID mg/dL 11.0* 11.4* 12.5* 11.9* 11.5* 11.9* 10.5*       Results from last 7 days   Lab Units 10/10/23  0515 10/09/23  0512 10/08/23  0628 10/07/23  0620 10/06/23  0519   WBC 10*3/mm3 9.18 8.50 8.80 9.62 10.93*   HEMOGLOBIN g/dL 9.5* 8.8* 8.9* 9.8* 9.6*   PLATELETS 10*3/mm3 358 321 303 342 331             Assessment / Plan     ASSESSMENT:  Acute kidney injury associated with sudden correction of her hypertension initially also possible component of contrast-induced nephropathy because patient had CTA on 10/1/2023.  Creatinine down to 4.88, potassium 5.8 the remainders of the electrolyte within acceptable.  Also her proteinuria improved down from 2111 mg/g to 581.4 mg/g  New diagnosis of hypertension, blood pressure with acceptable  control  New diagnosis of diabetes mellitus type 2  Super morbid obesity  Anemia, the etiology not very clear, most likely patient have iron deficiency iron saturation 13% but her TIBC is on the low side, suggestive of anemia of chronic disease too.  Hemoglobin yesterday was 9.5  Severe hyperphosphatemia associated with acute kidney injury phosphorus is down to 6.6, was started on sevelamer  Hyperuricemia, uric acid down to 11., associate with acute kidney, will continue to monitor  Hyperkalemia associate with acute kidney injury and may be a component of subcu heparin leading to the hyperkalemia we will treat with Lokelma and I will ask pharmacy to use a different DVT prophylaxis if possible.    PLAN:  Continue the same treatment  No indication for dialysis today  Restrict sodium intake to 2 g daily and potassium intake to 2 g daily  Lokelma 10 g 3 times today recheck potassium this afternoon and treat further if needed  I will hold off discharge today until tomorrow to determine if the electrolytes are stable enough to be discharged safely and I will follow her very closely in my office.  Surveillance labs    I discussed the case with the patient and she voiced good understanding  I reviewed the chart and other providers notes, I reviewed imaging and lab data.  Copied text in this note has been reviewed and is accurate as of 10/11/23.       Thank you for involving us in the care of Nay Gonzalez.  Please feel free to call with any questions.    Iain Owens MD  10/11/23  09:07 EDT    [facility]  [phone]    Please note that portions of this note were completed with a voice recognition program.

## 2023-10-11 NOTE — PROGRESS NOTES
"                                              LOS: 10 days   Patient Care Team:  Provider, No Known as PCP - General    Chief Complaint:  F/up respiratory failure, lung abscess    Subjective   Interval History    On RA.  Denies dyspnea but reported mild cough with clear phlegm.      REVIEW OF SYSTEMS:       GASTROINTESTINAL: No anorexia, nausea, vomiting or diarrhea. No abdominal pain.  CONSTITUTIONAL: No fever or chills.     Ventilator/Non-Invasive Ventilation Settings (From admission, onward)             Physical Exam:     Vital Signs  Temp:  [98 øF (36.7 øC)-98.3 øF (36.8 øC)] 98.1 øF (36.7 øC)  Heart Rate:  [] 93  Resp:  [16-18] 18  BP: (164-187)/() 164/82    Intake/Output Summary (Last 24 hours) at 10/11/2023 1549  Last data filed at 10/11/2023 1219  Gross per 24 hour   Intake 1440 ml   Output 400 ml   Net 1040 ml     Flowsheet Rows      Flowsheet Row First Filed Value   Admission Height 160 cm (63\") Documented at 10/01/2023 1047   Admission Weight 152 kg (335 lb) Documented at 10/01/2023 1047            PPE used per hospital policy    General Appearance:   Alert, cooperative, in no acute distress   ENMT:  Mallampati score 3, moist mucous membrane   Eyes:  Pupils equal and reactive to light. EOMI   Neck:   Large. Trachea midline. No thyromegaly.   Lungs:   Slightly coarse posteriorly in the right upper lobe.  No wheezing.    Heart:   Regular rhythm and normal rate, normal S1 and S2, no         murmur   Skin:   No rash or ecchymosis   Abdomen:    Obese. Soft. No tenderness. No HSM.   Neuro/psych:  Conscious, alert, oriented x3. Strength 5/5 in upper and lower  ext.  Appropriate mood and affect   Extremities:  No cyanosis, clubbing or edema.  Warm extremities and well-perfused          Results Review:        Results from last 7 days   Lab Units 10/11/23  1412 10/11/23  0509 10/10/23  0515   SODIUM mmol/L 136 137 135*   POTASSIUM mmol/L 5.3* 5.8* 5.1   CHLORIDE mmol/L 102 103 101   CO2 mmol/L 24.0 24.0 " 21.0*   BUN mg/dL 70* 74* 81*   CREATININE mg/dL 4.66* 4.88* 6.03*   GLUCOSE mg/dL 141* 101* 119*   CALCIUM mg/dL 9.6 9.6 9.3           Results from last 7 days   Lab Units 10/10/23  0515 10/09/23  0512 10/08/23  0628   WBC 10*3/mm3 9.18 8.50 8.80   HEMOGLOBIN g/dL 9.5* 8.8* 8.9*   HEMATOCRIT % 30.0* 27.4* 28.0*   PLATELETS 10*3/mm3 358 321 303                       Results from last 7 days   Lab Units 10/10/23  0515 10/05/23  0406   CRP mg/dL 3.86* 11.66*                 I reviewed the patient's new clinical results.        Medication Review:   [START ON 10/12/2023] amoxicillin, 1,000 mg, Oral, Q12H  heparin (porcine), 5,000 Units, Subcutaneous, Q8H  insulin lispro, 2-7 Units, Subcutaneous, 4x Daily AC & at Bedtime  labetalol, 200 mg, Oral, Q12H  pantoprazole, 40 mg, Oral, Q AM  saccharomyces boulardii, 500 mg, Oral, BID  senna-docusate sodium, 2 tablet, Oral, BID  sevelamer, 1,600 mg, Oral, TID With Meals  sodium chloride, 10 mL, Intravenous, Q12H  sodium zirconium cyclosilicate, 10 g, Oral, TID        Pharmacy Consult,         Diagnostic imaging:  I personally and independently reviewed the following images:  CXR 10/7/2023: Loculated right pleural effusion.  Chest tube noted.  CXR 10/9/23: Pulmonary infiltrates on the right.  Fluid in the right fissure.    Assessment     RUL pulmonary abscess, culture consistent with strep  Loculated right pleural effusion/empyema, s/p chest tube placement 10/3 tPA infusion  Acute hypercapneic and hypoxic respiratory failure, required mechanical ventilation.  Sepsis due to #1  Super obesity BMI 54  Newly diagnosed uncontrolled diabetes type 2 A1c  7.5%  Newly diagnosed hypertension  Acute kidney injury  Probable CRYSTAL: Snoring, apnea and desaturation at night.  Hypokalemia      Plan         Antibiotics: Amoxicillin.  Plan for total of 6 weeks therapy  DVT prophylaxis with heparin 5000 units 3 times daily  Bowel regimen.  Avoid narcotics if possible due to hypercapnia.  BiPAP at  night for respiratory failure and highly suspected sleep apnea.  Not being used consistently at the hospital.  We will ordered outpatient PSG for probable CRYSTAL.  Not a candidate for HST due to morbid obesity.  This was ordered.  Insulin sliding scale  PPI prophylaxis  No need for oxygen on DC.  Noted normal ex ox.  Lokelma for hyperkalemia      Dispo: Okay to discharge home from pulmonary perspective and once her potassium level is corrected.  .      Christian Bernal MD  10/11/23  15:49 EDT          This note was dictated utilizing [employer] dictation

## 2023-10-11 NOTE — PROGRESS NOTES
Name: Nay Gonzalez ADMIT: 10/1/2023   : 1970  PCP: Provider, No Known    MRN: [id number] LOS: 10 days   AGE/SEX: 53 y.o. female  ROOM: [facility]     Subjective   Subjective   No new events overnight, sitting up in a recliner.  Feeling better.  Denies complaints this morning.  Denies shortness of breath, fevers chills nausea or vomiting.  Denies chest pain, palpitations.  Potassium resolved at 5.8 this morning.    Review of Systems   As above  Objective   Objective   Vital Signs  Temp:  [97.8 øF (36.6 øC)-98.3 øF (36.8 øC)] 98.3 øF (36.8 øC)  Heart Rate:  [] 93  Resp:  [16-18] 18  BP: (158-187)/() 175/96  SpO2:  [87 %-100 %] 94 %  on  Flow (L/min):  [1-2] 1;   Device (Oxygen Therapy): room air  Body mass index is 53.09 kg/mý.  Physical Exam      General: Alert and oriented x3, no acute distress, obese, chronically ill-appearing  HEENT: Normocephalic, atraumatic  CV: Regular rate and rhythm, no murmurs rubs or gallops  Lungs: CTA anteriorly, no wheezing,  Abdomen: Soft, nontender, nondistended  Extremities: No significant peripheral edema , no cyanosis       Results Review     I reviewed the patient's new clinical results.  Results from last 7 days   Lab Units 10/10/23  0515 10/09/23  0512 10/08/23  0628 10/07/23  0620   WBC 10*3/mm3 9.18 8.50 8.80 9.62   HEMOGLOBIN g/dL 9.5* 8.8* 8.9* 9.8*   PLATELETS 10*3/mm3 358 321 303 342     Results from last 7 days   Lab Units 10/11/23  0509 10/10/23  0515 10/09/23  0512 10/08/23  0628   SODIUM mmol/L 137 135* 134* 137   POTASSIUM mmol/L 5.8* 5.1 5.1 4.7   CHLORIDE mmol/L 103 101 97* 99   CO2 mmol/L 24.0 21.0* 22.3 24.0   BUN mg/dL 74* 81* 73* 82*   CREATININE mg/dL 4.88* 6.03* 6.31* 7.05*   GLUCOSE mg/dL 101* 119* 95 112*   Estimated Creatinine Clearance: 18.3 mL/min (A) (by C-G formula based on SCr of 4.88 mg/dL (H)).  Results from last 7 days   Lab Units 10/11/23  0509 10/10/23  0515 10/09/23  0512 10/08/23  0628 10/07/23  0620 10/06/23  0519  10/05/23  0406   ALBUMIN g/dL 3.1* 3.0* 2.8* 2.7*   < > 2.9* 2.6*   BILIRUBIN mg/dL  --   --   --   --   --  0.2 0.3   ALK PHOS U/L  --   --   --   --   --  97 118*   AST (SGOT) U/L  --   --   --   --   --  20 15   ALT (SGPT) U/L  --   --   --   --   --  18 16    < > = values in this interval not displayed.     Results from last 7 days   Lab Units 10/11/23  0509 10/10/23  0515 10/09/23  0512 10/08/23  0628   CALCIUM mg/dL 9.6 9.3 8.7 8.8   ALBUMIN g/dL 3.1* 3.0* 2.8* 2.7*   MAGNESIUM mg/dL 2.2 2.3 2.6 2.6   PHOSPHORUS mg/dL 6.6* 7.2* 10.0* 11.6*       COVID19   Date Value Ref Range Status   10/01/2023 Not Detected Not Detected - Ref. Range Final     Glucose   Date/Time Value Ref Range Status   10/11/2023 0632 97 70 - 130 mg/dL Final   10/10/2023 2055 146 (H) 70 - 130 mg/dL Final   10/10/2023 1551 133 (H) 70 - 130 mg/dL Final   10/10/2023 1139 103 70 - 130 mg/dL Final   10/10/2023 0653 163 (H) 70 - 130 mg/dL Final   10/09/2023 2032 140 (H) 70 - 130 mg/dL Final   10/09/2023 1557 135 (H) 70 - 130 mg/dL Final           XR Chest 1 View  Narrative: XR CHEST 1 VW-10/11/2023     HISTORY: Chest tube management.     Heart size is at the upper limits of normal. Lungs are underinflated  with some vascular crowding. There is some vague increased density in  the right upper lobe which is consistent with atelectasis or developing  pneumonia. Blunting of the right costophrenic sulcus is seen and this  could partially be artifact though there could be some minimal pleural  effusion.     No significant pneumothorax is seen.     Impression: 1. Borderline cardiomegaly.  2. Vague increased density in the right upper lobe could represent some  developing atelectasis and/or pneumonia.  3. There may be some minimal pleural fluid blunting the right  costophrenic sulcus.        This report was finalized on 10/11/2023 8:15 AM by Dr. Jeramy Marquez M.D on Workstation: [id number]       Scheduled Medications  [START ON 10/12/2023]  amoxicillin, 1,000 mg, Oral, Q12H  heparin (porcine), 5,000 Units, Subcutaneous, Q8H  insulin lispro, 2-7 Units, Subcutaneous, 4x Daily AC & at Bedtime  labetalol, 200 mg, Oral, Q12H  pantoprazole, 40 mg, Oral, Q AM  saccharomyces boulardii, 500 mg, Oral, BID  senna-docusate sodium, 2 tablet, Oral, BID  sevelamer, 1,600 mg, Oral, TID With Meals  sodium chloride, 10 mL, Intravenous, Q12H  sodium zirconium cyclosilicate, 10 g, Oral, TID    Infusions  Pharmacy Consult,     Diet  Diet: Diabetic Diets, Renal Diets; Consistent Carbohydrate; Low Potassium, Low Phosphorus, Low Sodium (2-3g); Texture: Regular Texture (IDDSI 7); Fluid Consistency: Thin (IDDSI 0)    I have personally reviewed     [x]  Laboratory   [x]  Microbiology   [x]  Radiology   [x]  EKG/Telemetry  []  Cardiology/Vascular   []  Pathology    []  Records       Assessment/Plan     Active Hospital Problems    Diagnosis  POA    **PNA (pneumonia) [J18.9]  Yes    ELLY (acute kidney injury) [N17.9]  Unknown    Obesity, morbid, BMI 50 or higher [E66.01]  Unknown    Anemia [D64.9]  Unknown    Sepsis [A41.9]  Unknown    Type 2 diabetes mellitus with hyperglycemia [E11.65]  Unknown    Acute respiratory failure with hypoxia [J96.01]  Unknown    Hypertensive urgency [I16.0]  Unknown    Abscess of upper lobe of right lung with pneumonia [J85.1]  Yes      Resolved Hospital Problems    Diagnosis Date Resolved POA    Hyperkalemia [E87.5] 10/03/2023 Unknown         This is a 53-year-old female with a history of morbid obesity, new diabetes of type 2 diabetes and hypertension who presents to the hospital with cough shortness of breath and fevers and is found to have a right upper lobe pulmonary abscess      Acute hypoxic and hypercapnic respiratory failure with right upper lobe abscess,   Patient was intubated, chest tube placed on 10/3/2023.  now off the ventilator and chest tube has been removed on 10/8/2023.    Cultures grew Streptococcus dysgalactiae  Plan to continue  with IV ceftriaxone while in the hospital, and discharge on p.o. amoxicillin 1 g every 12 hours for 6-week course with stop date of 11/14/2023.  For 6 weeks course.       Hypertension: New diagnosis..  Currently on labetalol 200 mg every 12 hours, blood pressure not well controlled.  If remains elevated may need initiation of second regimen.     Diabetes mellitus, hemoglobin A1c 7.5 and currently on corrective dose insulin and blood sugars are reasonably well controlled.        Acute kidney injury,   felt to be multifactorial etiology,   creatinine peaked at 7.25 improving slowly, creatinine 4.88 this morning.  Potassium increased to 5.8, initiated on Lokelma.,  Nephrology managing.      Anemia, iron panel consistent with anemia of chronic disease, however iron saturation of 13 underlying iron deficiency cannot be excluded..  Will need repeat iron panel once acute illness resolves in approximately 4 weeks.  Monitor hemoglobin.           Morbid obesity, counseled to lose weight.     Copied text on this note has been reviewed by me on 10/10/2023      Heparin SC for DVT prophylaxis.  Full code.  Discussed with patient, family, and nursing staff.  Anticipate discharge home in 1-2 days.  If continues to improve and cleared by consultants.    Discussed with mother, Dakota over the  phone     Copied text in this note has been reviewed and is accurate as of 10/11/23.         Dictated utilizing Dragon dictation        Gene Miller MD  [facility]  10/11/23  10:52 EDT

## 2023-10-11 NOTE — PLAN OF CARE
Goal Outcome Evaluation:      VSS, no c/o pain. Pt up with SB assist to BR. Some SOA on exertion--but pt states it is getting better slowly. 1-2LNc placed overnight for O2 85-88%. Pt appeared to sleep some overnight. Pt hopeful for D/C soon. Will CTM, safety maintained.

## 2023-10-11 NOTE — PAYOR COMM NOTE
"Nay Fagan M \"BOBY\" (53 y.o. Female)     PLEASE SEE ATTACHED FOR CONTINUED INPT STAY DAYS    REF # [id number]    PLEASE CALL JOCELYN GEORGE RN/ DEPT @ [phone]   OR [phone]    THANK YOU  JOCELYN GEORGE RN  [facility]        Date of Birth   1970    Social Security Number       Address   [address] [address]    Home Phone   [phone]    MRN   [id number]       [facility]    Marital Status                               Admission Date   10/1/23    Admission Type   Emergency    Admitting Provider   Juan Elena MD    Attending Provider   Gene Miller MD    Department, Room/Bed   [address], E567/1       Discharge Date       Discharge Disposition       Discharge Destination                                 Attending Provider: Gene Miller MD    Allergies: No Known Allergies    Isolation: None   Infection: None   Code Status: CPR    Ht: 160 cm (62.99\")   Wt: 136 kg (299 lb 9.7 oz)    Admission Cmt: None   Principal Problem: PNA (pneumonia) [J18.9]                   Active Insurance as of 10/1/2023       Primary Coverage       Payor Plan Insurance Group Employer/Plan Group    [facility] [employer] [facility] [employer] [facility] PPO [id number]       Payor Plan Address Payor Plan Phone Number Payor Plan Fax Number Effective Dates    [address] [phone]  12/11/2022 - None Entered    [facility] [id number]         Subscriber Name Subscriber Birth Date Member ID       GODWIN FAGAN 5/31/1972 [id number]                     Emergency Contacts        (Rel.) Home Phone Work Phone Mobile Phone    Godwin Fagan (Spouse) -- -- [phone]    Hoa Duncan (Mother) -- -- [phone]              [place]: [facility] [id number]  Tax ID [id number]     Physician Progress Notes (last 72 hours)        Aakash Azul MD at 10/11/23 1015          ID NOTE    CC: f/u pulmonary abscess due " to Strep dysgalactiae    Subj: No fever. She remains on room air. Crt better but K elevated which is being treated by nephrology. No rash on ceftriaxone. Denies diarrhea.     Medications:    Current Facility-Administered Medications:     acetaminophen (TYLENOL) tablet 650 mg, 650 mg, Oral, Q4H PRN, 650 mg at 10/09/23 2116 **OR** acetaminophen (TYLENOL) 160 MG/5ML oral solution 650 mg, 650 mg, Oral, Q4H PRN **OR** acetaminophen (TYLENOL) suppository 650 mg, 650 mg, Rectal, Q4H PRN, Brannon Gonzales MD, 650 mg at 10/02/23 2341    sennosides-docusate (PERICOLACE) 8.6-50 MG per tablet 2 tablet, 2 tablet, Oral, BID, 2 tablet at 10/10/23 0848 **AND** polyethylene glycol (MIRALAX) packet 17 g, 17 g, Oral, Daily PRN **AND** bisacodyl (DULCOLAX) EC tablet 5 mg, 5 mg, Oral, Daily PRN **AND** bisacodyl (DULCOLAX) suppository 10 mg, 10 mg, Rectal, Daily PRN, Brannon Gonzales MD    Calcium Replacement - Follow Nurse / BPA Driven Protocol, , Does not apply, PRN, Brannon Gonzales MD    cefTRIAXone (ROCEPHIN) 2,000 mg in sodium chloride 0.9 % 100 mL IVPB-VTB, 2,000 mg, Intravenous, Q24H, Brannon Gonzales MD, Last Rate: 200 mL/hr at 10/11/23 1012, 2,000 mg at 10/11/23 1012    dextrose (D50W) (25 g/50 mL) IV injection 25 g, 25 g, Intravenous, Q15 Min PRN, Brannon Gonzales MD    dextrose (GLUTOSE) oral gel 15 g, 15 g, Oral, Q15 Min PRN, Brannon Gonzales MD    glucagon (GLUCAGEN) injection 1 mg, 1 mg, Intramuscular, Q15 Min PRN, Brannon Gonzales MD    heparin (porcine) 5000 UNIT/ML injection 5,000 Units, 5,000 Units, Subcutaneous, Q8H, Brannon Gonzales MD, 5,000 Units at 10/11/23 0841    hydrALAZINE (APRESOLINE) injection 20 mg, 20 mg, Intravenous, Q4H PRN, Brannon Gonzales MD, 20 mg at 10/11/23 1012    influenza vac split quad (FLUZONE,FLUARIX,AFLURIA,FLULAVAL) injection 0.5 mL, 0.5 mL, Intramuscular, During Hospitalization, Brannon Gonzales MD    insulin lispro (HUMALOG/ADMELOG) injection 2-7  Units, 2-7 Units, Subcutaneous, 4x Daily AC & at Bedtime, Brannon Gonzales MD, 2 Units at 10/10/23 0848    ipratropium-albuterol (DUO-NEB) nebulizer solution 3 mL, 3 mL, Nebulization, Q6H PRN, Brannon Gonzales MD    labetalol (NORMODYNE) tablet 200 mg, 200 mg, Oral, Q12H, Iain Owens MD, 200 mg at 10/11/23 0843    Magnesium Standard Dose Replacement - Follow Nurse / BPA Driven Protocol, , Does not apply, PRN, Brannon Gonzales MD    nitroglycerin (NITROSTAT) SL tablet 0.4 mg, 0.4 mg, Sublingual, Q5 Min PRN, Brannon Gonzales MD    ondansetron (ZOFRAN) tablet 4 mg, 4 mg, Oral, Q6H PRN **OR** ondansetron (ZOFRAN) injection 4 mg, 4 mg, Intravenous, Q6H PRN, Brannon Gonzales MD    pantoprazole (PROTONIX) EC tablet 40 mg, 40 mg, Oral, Q AM, Johnny Tuttle MD, 40 mg at 10/11/23 0622    Pharmacy Consult, , Does not apply, Continuous PRN, Iain Owens MD    Phosphorus Replacement - Follow Nurse / BPA Driven Protocol, , Does not apply, PRChristian SALCEDO Mark Edwin, MD    Potassium Replacement - Follow Nurse / BPA Driven Protocol, , Does not apply, PRN, Brannon Gonzales MD    saccharomyces boulardii (FLORASTOR) capsule 500 mg, 500 mg, Oral, BID, Quynh Choe, APRN, 500 mg at 10/11/23 0843    sevelamer (RENVELA) tablet 1,600 mg, 1,600 mg, Oral, TID With Meals, Iain Owens MD, 1,600 mg at 10/11/23 0843    sodium chloride 0.9 % flush 10 mL, 10 mL, Intravenous, Q12H, Brannon Gonzales MD, 10 mL at 10/11/23 0845    sodium chloride 0.9 % flush 10 mL, 10 mL, Intravenous, PRN, Brannon Gonzales MD, 10 mL at 10/01/23 1723    sodium chloride 0.9 % infusion 40 mL, 40 mL, Intravenous, PRN, Brannon Gonzales MD    sodium chloride nasal spray 1 spray, 1 spray, Each Nare, PRN, Quynh Choe APRN    sodium zirconium cyclosilicate (LOKELMA) pack 10 g, 10 g, Oral, TID, Roman, Iain Cheema MD, 10 g at 10/11/23 1012      Objective   Vital Signs   Temp:  [97.8 øF (36.6 øC)-98.3  øF (36.8 øC)] 98.3 øF (36.8 øC)  Heart Rate:  [] 93  Resp:  [16-18] 18  BP: (158-187)/() 175/96    Physical Exam:   General: awake, alert, very nice, in chair  Eyes: no scleral icterus  Cardiovascular: NR  Respiratory: R rales are improved and the R chest drain has been removed  GI: Abdomen is soft, not tender  Skin: No rashes    Labs:   K, Crt, and CRP reviewed today  Lab Results   Component Value Date    K 5.8 (H) 10/11/2023     Lab Results   Component Value Date    CREATININE 4.88 (H) 10/11/2023     Lab Results   Component Value Date    CRP 3.86 (H) 10/10/2023     Lab Results   Component Value Date    HGBA1C 7.50 (H) 10/01/2023     Urine Eos negative  HIV negative  Hep C negative  Procal 0.59    Microbiology:  10/1 RPP: negative  10/1 BCx: negative  10/1 MRSA nares: negative  10/2 BAL Cx: normal annabel  10/3 ETT Cx: normal annabel  10/3 Lung Abscess Cx: scant growth Strep dysgalactiae (susc penicillin)    New Radiology:  CXR 10/11 personally reviewed; there is no evidence of pneumonia on my read    ASSESSMENT/PLAN:  Right upper lobe pulmonary abscess due to Strep dysgalactiae  Acute hypercapneic and hypoxic respiratory failure requiring mechanical ventilation - resolved  Sepsis due to #1 - resolved  Super obesity BMI 53  Uncontrolled diabetes type 2 A1c  7.5%  Hypertension  Hyperkalemia  Acute kidney injury - better    On 10/3/23, she had an IR-guided CT chest tube placement. The culture grewStrep as noted above. She is now afebrile and WBC is normal. She is on room air. Drain has been removed. CRP has come down nicely. Renal function is recovering though K elevated today.     While in the hospital, continue ceftriaxone 2 g IV q24h. When ready for discharge, change to amoxicillin 1 g PO q12h for a 6-week course w/ stop date 11/14/23. I will see her in my office in about 2 weeks to see how she is doing and also check a BMP in case her renal function has recovered to the point of needing to increase her  amoxicillin dose.       ID will follow.     Electronically signed by Aakash Azul MD at 10/11/23 1019       Iain Owens MD at 10/11/23 0907              [facility] Progress Note      Patient Name: Nay Gonzalez  : 1970  MRN: [id number]  Primary Care Physician:  Provider, No Known  Date of admission: 10/1/2023    Subjective     Interval History:   Follow-up acute kidney injury    The patient is feeling much better, denies any chest pain or shortness of air, no orthopnea or PND, no nausea or vomiting, no abdominal pain, no dysuria or gross hematuria.  Her potassium this morning is up to 5.8 and the creatinine continuing to improve.    Review of Systems:   As noted above    Objective     Vitals:   Temp:  [97.8 øF (36.6 øC)-98.3 øF (36.8 øC)] 98.3 øF (36.8 øC)  Heart Rate:  [92-98] 98  Resp:  [16-18] 18  BP: (158-187)/() 187/98  Flow (L/min):  [1-2] 1    Intake/Output Summary (Last 24 hours) at 10/11/2023 0907  Last data filed at 10/11/2023 0625  Gross per 24 hour   Intake 1080 ml   Output --   Net 1080 ml       Physical Exam:    General Appearance: Morbidly obese, awake and alert, chronically ill, no acute distress  Skin: warm and dry  HEENT: Oral mucosa is normal  Neck: Difficult to assess due to body habitus  Lungs: Clear to auscultation, breathing effort not labored  Heart: RRR, normal S1 and S2, no rub  Abdomen: soft, no guarding, protuberant, normoactive bowel  : No palpable bladder  Extremities: no edema, cyanosis or clubbing  Neuro: Normal speech and mental status and moving all extremities    Scheduled Meds:     cefTRIAXone, 2,000 mg, Intravenous, Q24H  heparin (porcine), 5,000 Units, Subcutaneous, Q8H  insulin lispro, 2-7 Units, Subcutaneous, 4x Daily AC & at Bedtime  labetalol, 200 mg, Oral, Q12H  pantoprazole, 40 mg, Oral, Q AM  saccharomyces boulardii, 500 mg, Oral, BID  senna-docusate sodium, 2 tablet, Oral, BID  sevelamer, 1,600 mg,  Oral, TID With Meals  sodium chloride, 10 mL, Intravenous, Q12H  sodium zirconium cyclosilicate, 10 g, Oral, TID      IV Meds:          Results Reviewed:   I have personally reviewed the results from the time of this admission to 10/11/2023 09:07 EDT     Results from last 7 days   Lab Units 10/11/23  0509 10/10/23  0515 10/09/23  0512 10/07/23  0620 10/06/23  0519 10/05/23  0406   SODIUM mmol/L 137 135* 134*   < > 136 142   POTASSIUM mmol/L 5.8* 5.1 5.1   < > 4.8 4.9   CHLORIDE mmol/L 103 101 97*   < > 98 101   CO2 mmol/L 24.0 21.0* 22.3   < > 22.5 21.0*   BUN mg/dL 74* 81* 73*   < > 66* 58*   CREATININE mg/dL 4.88* 6.03* 6.31*   < > 6.22* 5.94*   CALCIUM mg/dL 9.6 9.3 8.7   < > 9.3 9.2   BILIRUBIN mg/dL  --   --   --   --  0.2 0.3   ALK PHOS U/L  --   --   --   --  97 118*   ALT (SGPT) U/L  --   --   --   --  18 16   AST (SGOT) U/L  --   --   --   --  20 15   GLUCOSE mg/dL 101* 119* 95   < > 120* 103*    < > = values in this interval not displayed.       Estimated Creatinine Clearance: 18.3 mL/min (A) (by C-G formula based on SCr of 4.88 mg/dL (H)).    Results from last 7 days   Lab Units 10/11/23  0509 10/10/23  0515 10/09/23  0512   MAGNESIUM mg/dL 2.2 2.3 2.6   PHOSPHORUS mg/dL 6.6* 7.2* 10.0*       Results from last 7 days   Lab Units 10/11/23  0509 10/10/23  0515 10/09/23  0512 10/08/23  0628 10/07/23  0620 10/06/23  0519 10/05/23  0406   URIC ACID mg/dL 11.0* 11.4* 12.5* 11.9* 11.5* 11.9* 10.5*       Results from last 7 days   Lab Units 10/10/23  0515 10/09/23  0512 10/08/23  0628 10/07/23  0620 10/06/23  0519   WBC 10*3/mm3 9.18 8.50 8.80 9.62 10.93*   HEMOGLOBIN g/dL 9.5* 8.8* 8.9* 9.8* 9.6*   PLATELETS 10*3/mm3 358 321 303 342 331             Assessment / Plan     ASSESSMENT:  Acute kidney injury associated with sudden correction of her hypertension initially also possible component of contrast-induced nephropathy because patient had CTA on 10/1/2023.  Creatinine down to 4.88, potassium 5.8 the remainders  of the electrolyte within acceptable.  Also her proteinuria improved down from 2111 mg/g to 581.4 mg/g  New diagnosis of hypertension, blood pressure with acceptable control  New diagnosis of diabetes mellitus type 2  Super morbid obesity  Anemia, the etiology not very clear, most likely patient have iron deficiency iron saturation 13% but her TIBC is on the low side, suggestive of anemia of chronic disease too.  Hemoglobin yesterday was 9.5  Severe hyperphosphatemia associated with acute kidney injury phosphorus is down to 6.6, was started on sevelamer  Hyperuricemia, uric acid down to 11., associate with acute kidney, will continue to monitor  Hyperkalemia associate with acute kidney injury and may be a component of subcu heparin leading to the hyperkalemia we will treat with Lokelma and I will ask pharmacy to use a different DVT prophylaxis if possible.    PLAN:  Continue the same treatment  No indication for dialysis today  Restrict sodium intake to 2 g daily and potassium intake to 2 g daily  Lokelma 10 g 3 times today recheck potassium this afternoon and treat further if needed  I will hold off discharge today until tomorrow to determine if the electrolytes are stable enough to be discharged safely and I will follow her very closely in my office.  Surveillance labs    I discussed the case with the patient and she voiced good understanding  I reviewed the chart and other providers notes, I reviewed imaging and lab data.  Copied text in this note has been reviewed and is accurate as of 10/11/23.       Thank you for involving us in the care of Nay Gonzalez.  Please feel free to call with any questions.    Iain Owens MD  10/11/23  09:07 EDT    [facility]  [phone]    Please note that portions of this note were completed with a voice recognition program.    Electronically signed by Iain Owens MD at 10/11/23 4904       Rm Riddle MD at 10/10/23 1894  "          LOS: 9 days     Name: Nay Gonzalez  Age: 53 y.o.  Sex: female  :  1970  MRN: [id number]         Primary Care Physician: Provider, No Known    Subjective   Subjective    Patient is lying on the bed and does not appear to be any distress.  Denies nausea, vomiting, abdominal pain, chest pain, shortness of breath.    Objective   Vital Signs  Temp:  [97.8 øF (36.6 øC)-99.3 øF (37.4 øC)] 97.8 øF (36.6 øC)  Heart Rate:  [] 98  Resp:  [16] 16  BP: (158-175)/(66-87) 158/84  Body mass index is 55.27 kg/mý.    Objective:  General Appearance:  Comfortable and in no acute distress.    Vital signs: (most recent): Blood pressure 158/84, pulse 98, temperature 97.8 øF (36.6 øC), temperature source Oral, resp. rate 16, height 160 cm (62.99\"), weight (!) 141 kg (311 lb 15.2 oz), last menstrual period 10/01/2023, SpO2 97%.    Lungs:  Normal effort and normal respiratory rate.  She is not in respiratory distress.  There are decreased breath sounds.    Heart: Normal rate.  Regular rhythm.    Chest: (Right anterior chest tube in place)  Abdomen: Abdomen is soft.  Bowel sounds are normal.   There is no abdominal tenderness.     Extremities: There is no dependent edema or local swelling.    Neurological: Patient is alert and oriented to person, place and time.    Skin:  Warm and dry.                Results Review:       I reviewed the patient's new clinical results.    Results from last 7 days   Lab Units 10/10/23  0515 10/09/23  0512 10/08/23  0628 10/07/23  0620 10/06/23  0519 10/05/23  0406 10/04/23  0414   WBC 10*3/mm3 9.18 8.50 8.80 9.62 10.93* 12.91* 12.10*   HEMOGLOBIN g/dL 9.5* 8.8* 8.9* 9.8* 9.6* 9.0* 7.8*   PLATELETS 10*3/mm3 358 321 303 342 331 329 285     Results from last 7 days   Lab Units 10/10/23  0515 10/09/23  0512 10/08/23  0628 10/07/23  0620 10/06/23  0519 10/05/23  0406 10/04/23  0414   SODIUM mmol/L 135* 134* 137 137 136 142 140   POTASSIUM mmol/L 5.1 5.1 4.7 4.7 4.8 4.9 4.9   CHLORIDE " mmol/L 101 97* 99 98 98 101 99   CO2 mmol/L 21.0* 22.3 24.0 22.0 22.5 21.0* 27.3   BUN mg/dL 81* 73* 82* 78* 66* 58* 41*   CREATININE mg/dL 6.03* 6.31* 7.05* 7.25* 6.22* 5.94* 4.64*   CALCIUM mg/dL 9.3 8.7 8.8 8.9 9.3 9.2 9.1   GLUCOSE mg/dL 119* 95 112* 104* 120* 103* 119*                 Scheduled Meds:   cefTRIAXone, 2,000 mg, Intravenous, Q24H  heparin (porcine), 5,000 Units, Subcutaneous, Q8H  insulin lispro, 2-7 Units, Subcutaneous, 4x Daily AC & at Bedtime  labetalol, 100 mg, Oral, Q12H  pantoprazole, 40 mg, Oral, Q AM  saccharomyces boulardii, 500 mg, Oral, BID  senna-docusate sodium, 2 tablet, Oral, BID  sevelamer, 1,600 mg, Oral, TID With Meals  sodium chloride, 10 mL, Intravenous, Q12H      PRN Meds:     acetaminophen **OR** acetaminophen **OR** acetaminophen    senna-docusate sodium **AND** polyethylene glycol **AND** bisacodyl **AND** bisacodyl    Calcium Replacement - Follow Nurse / BPA Driven Protocol    dextrose    dextrose    glucagon (human recombinant)    hydrALAZINE    influenza vaccine    ipratropium-albuterol    Magnesium Standard Dose Replacement - Follow Nurse / BPA Driven Protocol    nitroglycerin    ondansetron **OR** ondansetron    Phosphorus Replacement - Follow Nurse / BPA Driven Protocol    Potassium Replacement - Follow Nurse / BPA Driven Protocol    sodium chloride    sodium chloride    sodium chloride  Continuous Infusions:       Assessment & Plan   Active Hospital Problems    Diagnosis  POA    **PNA (pneumonia) [J18.9]  Yes    ELLY (acute kidney injury) [N17.9]  Unknown    Obesity, morbid, BMI 50 or higher [E66.01]  Unknown    Anemia [D64.9]  Unknown    Sepsis [A41.9]  Unknown    Type 2 diabetes mellitus with hyperglycemia [E11.65]  Unknown    Acute respiratory failure with hypoxia [J96.01]  Unknown    Hypertensive urgency [I16.0]  Unknown    Abscess of upper lobe of right lung with pneumonia [J85.1]  Yes      Resolved Hospital Problems    Diagnosis Date Resolved POA    Hyperkalemia  [E87.5] 10/03/2023 Unknown       Assessment & Plan    This is a 53-year-old female with a history of morbid obesity, new diabetes of type 2 diabetes and hypertension who presents to the hospital with cough shortness of breath and fevers and is found to have a right upper lobe pulmonary abscess     1.Acute hypoxic respiratory failure with right upper lobe abscess, now off the ventilator and chest tube has been removed on 10/8/2023.  Cultures grew Streptococcus and currently patient is on Rocephin/Flagyl and stop date is 11/14/2023.  Was requiring 2 L of oxygen and currently off.  Upon discharge will most likely have to be on high-dose amoxicillin with a stop date of 11/14/2023.    2.  Hypertension, on labetalol and currently blood pressure is in systolics of 110s therefore hydralazine is being discontinued.    3.  Diabetes mellitus, hemoglobin A1c 7.5 and currently on corrective dose insulin and blood sugars are reasonably well controlled.    4.  Morbid obesity, counseled to lose weight.    5.  Acute kidney injury, felt to be multifactorial etiology and did peak at 7.25 and appears to have plateaued.  Creatinine started trending down and it is at 6.0.3 today and nephrology is following along.    6.  Anemia, iron deficient and will have her follow-up with hematology and GI as an outpatient basis.  No evidence of any active bleeding.    7.  On heparin for DVT prophylaxis.    8.  CODE STATUS is full code.    Estimated discharge date, hopefully home tomorrow.    Copied text on this note has been reviewed by me on 10/10/2023        Rm Riddle MD  [facility]  10/10/23  11:12 EDT     Electronically signed by Rm Riddle MD at 10/10/23 1518       Christian Bernal MD at 10/10/23 1510                                                        LOS: 9 days   Patient Care Team:  Provider, No Known as PCP - General    Chief Complaint:  F/up respiratory failure, lung abscess    Subjective  "  Interval History    On RA.  Denies dyspnea or cough.      REVIEW OF SYSTEMS:       GASTROINTESTINAL: No anorexia, nausea, vomiting or diarrhea. No abdominal pain.  CONSTITUTIONAL: No fever or chills.     Ventilator/Non-Invasive Ventilation Settings (From admission, onward)             Physical Exam:     Vital Signs  Temp:  [97.8 øF (36.6 øC)-99.3 øF (37.4 øC)] 97.8 øF (36.6 øC)  Heart Rate:  [] 98  Resp:  [16] 16  BP: (158-175)/(66-87) 158/84    Intake/Output Summary (Last 24 hours) at 10/10/2023 1510  Last data filed at 10/10/2023 1300  Gross per 24 hour   Intake 840 ml   Output 450 ml   Net 390 ml     Flowsheet Rows      Flowsheet Row First Filed Value   Admission Height 160 cm (63\") Documented at 10/01/2023 1047   Admission Weight 152 kg (335 lb) Documented at 10/01/2023 1047            PPE used per hospital policy    General Appearance:   Alert, cooperative, in no acute distress   ENMT:  Mallampati score 3, moist mucous membrane   Eyes:  Pupils equal and reactive to light. EOMI   Neck:   Large. Trachea midline. No thyromegaly.   Lungs:    Diminished air entry bilaterally especially on the RLL. NO wheezing or rales.     Heart:   Regular rhythm and normal rate, normal S1 and S2, no         murmur   Skin:   No rash or ecchymosis   Abdomen:    Obese. Soft. No tenderness. No HSM.   Neuro/psych:  Conscious, alert, oriented x3. Strength 5/5 in upper and lower  ext.  Appropriate mood and affect   Extremities:  No cyanosis, clubbing or edema.  Warm extremities and well-perfused          Results Review:        Results from last 7 days   Lab Units 10/10/23  0515 10/09/23  0512 10/08/23  0628   SODIUM mmol/L 135* 134* 137   POTASSIUM mmol/L 5.1 5.1 4.7   CHLORIDE mmol/L 101 97* 99   CO2 mmol/L 21.0* 22.3 24.0   BUN mg/dL 81* 73* 82*   CREATININE mg/dL 6.03* 6.31* 7.05*   GLUCOSE mg/dL 119* 95 112*   CALCIUM mg/dL 9.3 8.7 8.8           Results from last 7 days   Lab Units 10/10/23  0515 10/09/23  0512 10/08/23  0628 "   WBC 10*3/mm3 9.18 8.50 8.80   HEMOGLOBIN g/dL 9.5* 8.8* 8.9*   HEMATOCRIT % 30.0* 27.4* 28.0*   PLATELETS 10*3/mm3 358 321 303                       Results from last 7 days   Lab Units 10/10/23  0515 10/05/23  0406   CRP mg/dL 3.86* 11.66*       Results from last 7 days   Lab Units 10/04/23  1418 10/04/23  0357   PH, ARTERIAL pH units 7.356 7.376   PCO2, ARTERIAL mm Hg 45.6* 44.7   PO2 ART mm Hg 77.8* 127.2*   O2 SATURATION ART % 94.7 98.8*   MODALITY  Adult Vent Adult Vent         I reviewed the patient's new clinical results.        Medication Review:   cefTRIAXone, 2,000 mg, Intravenous, Q24H  heparin (porcine), 5,000 Units, Subcutaneous, Q8H  insulin lispro, 2-7 Units, Subcutaneous, 4x Daily AC & at Bedtime  labetalol, 100 mg, Oral, Q12H  pantoprazole, 40 mg, Oral, Q AM  saccharomyces boulardii, 500 mg, Oral, BID  senna-docusate sodium, 2 tablet, Oral, BID  sevelamer, 1,600 mg, Oral, TID With Meals  sodium chloride, 10 mL, Intravenous, Q12H             Diagnostic imaging:  I personally and independently reviewed the following images:  CXR 10/7/2023: Loculated right pleural effusion.  Chest tube noted.  CXR 10/9/23: Pulmonary infiltrates on the right.  Fluid in the right fissure.    Assessment     RUL pulmonary abscess, culture consistent with strep  Loculated right pleural effusion/empyema, s/p chest tube placement 10/3 tPA infusion  Acute hypercapneic and hypoxic respiratory failure, required mechanical ventilation.  Sepsis due to #1  Super obesity BMI 54  Newly diagnosed uncontrolled diabetes type 2 A1c  7.5%  Newly diagnosed hypertension  Acute kidney injury  Probable CRYSTAL: Snoring, apnea and desaturation at night.      Plan         Antibiotics: Rocephin and Flagyl for now.  Plan for total of 6 weeks therapy  DVT prophylaxis with heparin 5000 units 3 times daily  Bowel regimen.  Avoid narcotics if possible due to hypercapnia.  BiPAP at night for respiratory failure and highly suspected sleep apnea.  Not  being used consistently at the hospital.  We will ordered outpatient PSG for probable CRYSTAL.  Not a candidate for HST due to morbid obesity.  This was ordered.  Insulin sliding scale  PPI prophylaxis  No need for oxygen on DC.  Noted normal ex ox.  Phosphate binder with sevelamer 600 mg 3 times daily.    Dispo: Okay to discharge home from pulmonary perspective.  .      Christian Bernal MD  10/10/23  15:10 EDT          This note was dictated utilizing Dragon dictation     Electronically signed by Christian Bernal MD at 10/10/23 1511       Iain Owens MD at 10/10/23 1110              [facility] Progress Note      Patient Name: Nay Gonzalez  : 1970  MRN: [id number]  Primary Care Physician:  Provider, No Known  Date of admission: 10/1/2023    Subjective     Interval History:   Follow-up acute kidney injury    The chest tube was removed, patient is feeling much better, denies any chest pain or shortness of air, no orthopnea or PND, no nausea or vomiting, no dysuria or gross hematuria and she has good urine output.  She had 1450 cc in the past 24 hours  Review of Systems:   As noted above    Objective     Vitals:   Temp:  [98.5 øF (36.9 øC)-99.3 øF (37.4 øC)] 99.3 øF (37.4 øC)  Heart Rate:  [] 98  Resp:  [16] 16  BP: (144-175)/(66-89) 167/87    Intake/Output Summary (Last 24 hours) at 10/10/2023 1110  Last data filed at 10/10/2023 0900  Gross per 24 hour   Intake 480 ml   Output 950 ml   Net -470 ml       Physical Exam:    General Appearance: Morbidly obese, awake and alert, chronically ill, no acute distress  Skin: warm and dry  HEENT: She has core track in place  Neck: Difficult to assess due to body habitus  Lungs: Bilateral rhonchi, breathing effort not labored  Heart: RRR, normal S1 and S2, no rub  Abdomen: soft, no guarding, protuberant, normoactive bowel  : No palpable bladder  Extremities: no edema, cyanosis or clubbing  Neuro: Normal speech and mental status and  moving all extremities    Scheduled Meds:     cefTRIAXone, 2,000 mg, Intravenous, Q24H  heparin (porcine), 5,000 Units, Subcutaneous, Q8H  insulin lispro, 2-7 Units, Subcutaneous, 4x Daily AC & at Bedtime  labetalol, 100 mg, Oral, Q12H  pantoprazole, 40 mg, Oral, Q AM  saccharomyces boulardii, 500 mg, Oral, BID  senna-docusate sodium, 2 tablet, Oral, BID  sevelamer, 1,600 mg, Oral, TID With Meals  sodium chloride, 10 mL, Intravenous, Q12H      IV Meds:          Results Reviewed:   I have personally reviewed the results from the time of this admission to 10/10/2023 11:10 EDT     Results from last 7 days   Lab Units 10/10/23  0515 10/09/23  0512 10/08/23  0628 10/07/23  0620 10/06/23  0519 10/05/23  0406 10/04/23  0414   SODIUM mmol/L 135* 134* 137   < > 136 142 140   POTASSIUM mmol/L 5.1 5.1 4.7   < > 4.8 4.9 4.9   CHLORIDE mmol/L 101 97* 99   < > 98 101 99   CO2 mmol/L 21.0* 22.3 24.0   < > 22.5 21.0* 27.3   BUN mg/dL 81* 73* 82*   < > 66* 58* 41*   CREATININE mg/dL 6.03* 6.31* 7.05*   < > 6.22* 5.94* 4.64*   CALCIUM mg/dL 9.3 8.7 8.8   < > 9.3 9.2 9.1   BILIRUBIN mg/dL  --   --   --   --  0.2 0.3 0.3   ALK PHOS U/L  --   --   --   --  97 118* 114   ALT (SGPT) U/L  --   --   --   --  18 16 17   AST (SGOT) U/L  --   --   --   --  20 15 10   GLUCOSE mg/dL 119* 95 112*   < > 120* 103* 119*    < > = values in this interval not displayed.       Estimated Creatinine Clearance: 15.2 mL/min (A) (by C-G formula based on SCr of 6.03 mg/dL (H)).    Results from last 7 days   Lab Units 10/10/23  0515 10/09/23  0512 10/08/23  0628   MAGNESIUM mg/dL 2.3 2.6 2.6   PHOSPHORUS mg/dL 7.2* 10.0* 11.6*       Results from last 7 days   Lab Units 10/10/23  0515 10/09/23  0512 10/08/23  0628 10/07/23  0620 10/06/23  0519 10/05/23  0406 10/04/23  0414   URIC ACID mg/dL 11.4* 12.5* 11.9* 11.5* 11.9* 10.5* 9.9*       Results from last 7 days   Lab Units 10/10/23  0515 10/09/23  0512 10/08/23  0628 10/07/23  0620 10/06/23  0519   WBC 10*3/mm3  9.18 8.50 8.80 9.62 10.93*   HEMOGLOBIN g/dL 9.5* 8.8* 8.9* 9.8* 9.6*   PLATELETS 10*3/mm3 358 321 303 342 331             Assessment / Plan     ASSESSMENT:  Acute kidney injury associated with sudden correction of her hypertension initially also possible component of contrast-induced nephropathy because patient had CTA on 10/1/2023.  Creatinine today is down to 6.03 patient is euvolemic, electrolyte within acceptable range, the acute kidney injury is improving slowly.  Also her proteinuria improved down from 2111 mg/g to 581.4 mg/g  New diagnosis of hypertension, blood pressure with acceptable control  New diagnosis of diabetes mellitus type 2  Super morbid obesity  Anemia, the etiology not very clear, most likely patient have iron deficiency iron saturation 13% but her TIBC is on the low side, suggestive of anemia of chronic disease too.  Hemoglobin today is up to 9.5  Severe hyperphosphatemia associated with acute kidney injury phosphorus seven-point, was started on sevelamer  Hyperuricemia, uric acid down to 11.4, associate with acute kidney, will continue to monitor    PLAN:  Continue the same treatment  No indication for dialysis today  The patient could be discharged from the renal standpoint and will follow her closely in my office to monitor her renal recovery    I discussed the case with the patient and she voiced good understanding  I reviewed the chart and other providers notes, I reviewed imaging and lab data.  Copied text in this note has been reviewed and is accurate as of 10/10/23.       Thank you for involving us in the care of Nay Gonzalez.  Please feel free to call with any questions.    Iain Owens MD  10/10/23  11:10 EDT    [facility]  [phone]    Please note that portions of this note were completed with a voice recognition program.    Electronically signed by Iain Owens MD at 10/10/23 9289       Aakash Azul MD at 10/10/23  0842          ID NOTE    CC: f/u pulmonary abscess due to Strep dysgalactiae    Subj: No fever. Feeling better. Drain removed. Renal function recovering slowly. Mild loose stools.     Medications:    Current Facility-Administered Medications:     acetaminophen (TYLENOL) tablet 650 mg, 650 mg, Oral, Q4H PRN, 650 mg at 10/09/23 2116 **OR** acetaminophen (TYLENOL) 160 MG/5ML oral solution 650 mg, 650 mg, Oral, Q4H PRN **OR** acetaminophen (TYLENOL) suppository 650 mg, 650 mg, Rectal, Q4H PRN, Brannon Gonzalse MD, 650 mg at 10/02/23 2341    sennosides-docusate (PERICOLACE) 8.6-50 MG per tablet 2 tablet, 2 tablet, Oral, BID **AND** polyethylene glycol (MIRALAX) packet 17 g, 17 g, Oral, Daily PRN **AND** bisacodyl (DULCOLAX) EC tablet 5 mg, 5 mg, Oral, Daily PRN **AND** bisacodyl (DULCOLAX) suppository 10 mg, 10 mg, Rectal, Daily PRN, Brannon Gonzales MD    Calcium Replacement - Follow Nurse / BPA Driven Protocol, , Does not apply, PRN, Brannon Gonzales MD    cefTRIAXone (ROCEPHIN) 2,000 mg in sodium chloride 0.9 % 100 mL IVPB-VTB, 2,000 mg, Intravenous, Q24H, Brannon Gonzales MD, Last Rate: 200 mL/hr at 10/09/23 1030, 2,000 mg at 10/09/23 1030    dextrose (D50W) (25 g/50 mL) IV injection 25 g, 25 g, Intravenous, Q15 Min PRN, Brannon Gonzales MD    dextrose (GLUTOSE) oral gel 15 g, 15 g, Oral, Q15 Min PRN, Brannon Gonzales MD    glucagon (GLUCAGEN) injection 1 mg, 1 mg, Intramuscular, Q15 Min PRN, Brannon Gonzales MD    heparin (porcine) 5000 UNIT/ML injection 5,000 Units, 5,000 Units, Subcutaneous, Q8H, Brannon Gonzales MD, 5,000 Units at 10/10/23 0635    hydrALAZINE (APRESOLINE) injection 20 mg, 20 mg, Intravenous, Q4H PRN, Brannon Gonzales MD, 20 mg at 10/10/23 0035    influenza vac split quad (FLUZONE,FLUARIX,AFLURIA,FLULAVAL) injection 0.5 mL, 0.5 mL, Intramuscular, During Hospitalization, Brannon Gonzales MD    insulin lispro (HUMALOG/ADMELOG) injection 2-7 Units, 2-7 Units,  Subcutaneous, 4x Daily AC & at Bedtime, Brannon Gonzales MD, 2 Units at 10/08/23 1800    ipratropium-albuterol (DUO-NEB) nebulizer solution 3 mL, 3 mL, Nebulization, Q6H PRN, Brannon Gonzales MD    labetalol (NORMODYNE) tablet 100 mg, 100 mg, Oral, Q12H, Brannon Gonzales MD, 100 mg at 10/09/23 2117    Magnesium Standard Dose Replacement - Follow Nurse / BPA Driven Protocol, , Does not apply, PRN, Brannon Gonzales MD    metroNIDAZOLE (FLAGYL) tablet 500 mg, 500 mg, Oral, Q8H, Brannon Gonzales MD, 500 mg at 10/10/23 0635    nitroglycerin (NITROSTAT) SL tablet 0.4 mg, 0.4 mg, Sublingual, Q5 Min PRN, Brannon Gonzales MD    ondansetron (ZOFRAN) tablet 4 mg, 4 mg, Oral, Q6H PRN **OR** ondansetron (ZOFRAN) injection 4 mg, 4 mg, Intravenous, Q6H PRN, Brannon Gonzales MD    pantoprazole (PROTONIX) EC tablet 40 mg, 40 mg, Oral, Q AM, Johnny Tuttle MD, 40 mg at 10/10/23 0635    Phosphorus Replacement - Follow Nurse / BPA Driven Protocol, , Does not apply, PRN, Brannon Gonzales MD    Potassium Replacement - Follow Nurse / BPA Driven Protocol, , Does not apply, PRN, Brannon Gonzales MD    saccharomyces boulardii (FLORASTOR) capsule 500 mg, 500 mg, Oral, BID, Quynh Choe, APRN, 500 mg at 10/09/23 2117    sevelamer (RENVELA) tablet 1,600 mg, 1,600 mg, Oral, TID With Meals, Iain Owens MD, 1,600 mg at 10/09/23 1705    sodium chloride 0.9 % flush 10 mL, 10 mL, Intravenous, Q12H, Brannon Gonzales MD, 10 mL at 10/09/23 2132    sodium chloride 0.9 % flush 10 mL, 10 mL, Intravenous, PRN, Brannon Gonzales MD, 10 mL at 10/01/23 1723    sodium chloride 0.9 % infusion 40 mL, 40 mL, Intravenous, PRN, Brannon Gonzales MD    sodium chloride nasal spray 1 spray, 1 spray, Each Nare, PRN, Quynh Choe APRN      Objective   Vital Signs   Temp:  [98.5 øF (36.9 øC)-99.3 øF (37.4 øC)] 99.3 øF (37.4 øC)  Heart Rate:  [] 98  Resp:  [16] 16  BP: (127-175)/(66-89)  167/87    Physical Exam:   General: awake, alert, very nice, in chair  Eyes: no scleral icterus  ENT: ETT in place  Cardiovascular: NR  Respiratory: R rales are better; R chest drain removed  GI: Abdomen is soft, not tender  Skin: No rashes    Labs:   CBC, BMP, and blood and body fluid cultures reviewed today  Lab Results   Component Value Date    WBC 9.18 10/10/2023    HGB 9.5 (L) 10/10/2023    HCT 30.0 (L) 10/10/2023    MCV 84.3 10/10/2023     10/10/2023     Lab Results   Component Value Date    GLUCOSE 119 (H) 10/10/2023    CALCIUM 9.3 10/10/2023     (L) 10/10/2023    K 5.1 10/10/2023    CO2 21.0 (L) 10/10/2023     10/10/2023    BUN 81 (H) 10/10/2023    CREATININE 6.03 (H) 10/10/2023    EGFR 7.9 (L) 10/10/2023    BCR 13.4 10/10/2023    ANIONGAP 13.0 10/10/2023     Lab Results   Component Value Date    CRP 11.66 (H) 10/05/2023     Lab Results   Component Value Date    HGBA1C 7.50 (H) 10/01/2023     Urine Eos negative  HIV negative  Hep C negative  Procal 0.59    Microbiology:  10/1 RPP: negative  10/1 BCx: negative  10/1 MRSA nares: negative  10/2 BAL Cx: normal annabel  10/3 ETT Cx: normal annabel  10/3 Lung Abscess Cx: scant growth Strep dysgalactiae (susc penicillin)    New Radiology:  CXR 10/10 personally reviewed and shows improved aeration in the R lung; drain has been removed    ASSESSMENT/PLAN:  Right upper lobe pulmonary abscess due to Strep  Acute hypercapneic and hypoxic respiratory failure requiring mechanical ventilation - resolved  Sepsis due to #1 - resolved  Super obesity BMI 55  Uncontrolled diabetes type 2 A1c  7.5%  Hypertension    She is afebrile and WBC is normal. She is on room air. Renal function is recovering.   Drain is now removed. CXR improving.     On 10/3/23, she had an IR-guided CT chest tube placement. The culture is growing Strep as noted above. While in the hospital, continue ceftriaxone 2 g IV q24h. When ready for discharge, I anticipate being able to switch over to  "high dose amoxicillin dosed for weigh and renal function with plans to complete a 6-week course w/ stop date ~11/14/23.     Check updated CRP.     ID will follow.     Electronically signed by Aakash Azul MD at 10/10/23 0855       Christian Bernal MD at 10/09/23 8765                                                        LOS: 8 days   Patient Care Team:  Provider, No Known as PCP - General    Chief Complaint:  F/up respiratory failure, lung abscess    Subjective   Interval History      On 2L O2.  No significant cough or dyspnea.      REVIEW OF SYSTEMS:       GASTROINTESTINAL: No anorexia, nausea, vomiting or diarrhea. No abdominal pain.  CONSTITUTIONAL: No fever or chills.     Ventilator/Non-Invasive Ventilation Settings (From admission, onward)             Physical Exam:     Vital Signs  Temp:  [97.3 øF (36.3 øC)-98.6 øF (37 øC)] 97.3 øF (36.3 øC)  Heart Rate:  [82-88] 88  Resp:  [16-20] 16  BP: (127-173)/(73-82) 127/74    Intake/Output Summary (Last 24 hours) at 10/9/2023 1450  Last data filed at 10/9/2023 1320  Gross per 24 hour   Intake 1200 ml   Output 2950 ml   Net -1750 ml     Flowsheet Rows      Flowsheet Row First Filed Value   Admission Height 160 cm (63\") Documented at 10/01/2023 1047   Admission Weight 152 kg (335 lb) Documented at 10/01/2023 1047            PPE used per hospital policy    General Appearance:   Alert, cooperative, in no acute distress   ENMT:  Mallampati score 3, moist mucous membrane   Eyes:  Pupils equal and reactive to light. EOMI   Neck:   Large. Trachea midline. No thyromegaly.   Lungs:    Diminished air entry bilaterally especially on the RLL. NO wheezing or rales.     Heart:   Regular rhythm and normal rate, normal S1 and S2, no         murmur   Skin:   No rash or ecchymosis   Abdomen:    Obese. Soft. No tenderness. No HSM.   Neuro/psych:  Conscious, alert, oriented x3. Strength 5/5 in upper and lower  ext.  Appropriate mood and affect   Extremities:  No cyanosis, " clubbing or edema.  Warm extremities and well-perfused          Results Review:        Results from last 7 days   Lab Units 10/09/23  0512 10/08/23  0628 10/07/23  0620   SODIUM mmol/L 134* 137 137   POTASSIUM mmol/L 5.1 4.7 4.7   CHLORIDE mmol/L 97* 99 98   CO2 mmol/L 22.3 24.0 22.0   BUN mg/dL 73* 82* 78*   CREATININE mg/dL 6.31* 7.05* 7.25*   GLUCOSE mg/dL 95 112* 104*   CALCIUM mg/dL 8.7 8.8 8.9           Results from last 7 days   Lab Units 10/09/23  0512 10/08/23  0628 10/07/23  0620   WBC 10*3/mm3 8.50 8.80 9.62   HEMOGLOBIN g/dL 8.8* 8.9* 9.8*   HEMATOCRIT % 27.4* 28.0* 30.9*   PLATELETS 10*3/mm3 321 303 342                       Results from last 7 days   Lab Units 10/05/23  0406   CRP mg/dL 11.66*       Results from last 7 days   Lab Units 10/04/23  1418 10/04/23  0357 10/03/23  0339 10/02/23  1840 10/02/23  1701 10/02/23  1654 10/02/23  1459   PH, ARTERIAL pH units 7.356 7.376 7.498* 7.309* 7.071* 7.066* 7.236*   PCO2, ARTERIAL mm Hg 45.6* 44.7 34.8* 59.3* 108.4* 104.8* 66.9*   PO2 ART mm Hg 77.8* 127.2* 109.1* 174.3* 90.5 37.5* 122.2*   O2 SATURATION ART % 94.7 98.8* 98.7* 99.4* 91.2* 46.7* 97.8   MODALITY  Adult Vent Adult Vent Adult Vent Adult Vent BiPap BiPap BiPap         I reviewed the patient's new clinical results.        Medication Review:   cefTRIAXone, 2,000 mg, Intravenous, Q24H  heparin (porcine), 5,000 Units, Subcutaneous, Q8H  insulin lispro, 2-7 Units, Subcutaneous, 4x Daily AC & at Bedtime  labetalol, 100 mg, Oral, Q12H  metroNIDAZOLE, 500 mg, Oral, Q8H  pantoprazole, 40 mg, Oral, Q AM  saccharomyces boulardii, 500 mg, Oral, BID  senna-docusate sodium, 2 tablet, Oral, BID  sevelamer, 1,600 mg, Oral, TID With Meals  sodium chloride, 10 mL, Intravenous, Q12H             Diagnostic imaging:  I personally and independently reviewed the following images:  CXR 10/7/2023: Loculated right pleural effusion.  Chest tube noted.  CXR 10/9/23: Pulmonary infiltrates on the right.  Fluid in the right  fissure.    Assessment     RUL pulmonary abscess, culture consistent with strep  Loculated right pleural effusion/empyema, s/p chest tube placement 10/3 tPA infusion  Acute hypercapneic and hypoxic respiratory failure, required mechanical ventilation.  Sepsis due to #1  Super obesity BMI 54  Newly diagnosed uncontrolled diabetes type 2 A1c  7.5%  Newly diagnosed hypertension  Acute kidney injury  Probable CRYSTAL: Snoring, apnea and desaturation at night.      Plan         Antibiotics: Rocephin and Flagyl for now.  Plan for total of 6 weeks therapy  DVT prophylaxis with heparin 5000 units 3 times daily  Bowel regimen.  Avoid narcotics if possible due to hypercapnia.  BiPAP at night for respiratory failure and highly suspected sleep apnea.  Not being used consistently at the hospital.  We will ordered outpatient PSG for probable CRYSTAL.  Not a candidate for HST due to morbid obesity.  This was ordered.  Insulin sliding scale  PPI prophylaxis  O2 by NC and titrate to keep SpO2 >90%.  Ex ox tomorrow in preparation for discharge  .      Christian Bernal MD  10/09/23  14:50 EDT          This note was dictated utilizing Dragon dictation     Electronically signed by Chrsitian Bernal MD at 10/09/23 1455       Rm Riddle MD at 10/09/23 1341           LOS: 8 days     Name: Nay Gonzalez  Age: 53 y.o.  Sex: female  :  1970  MRN: [id number]         Primary Care Physician: Provider, No Known    Subjective   Subjective    Patient is lying on the bed and does not appear to be any distress.  Denies nausea, vomiting, abdominal pain, chest pain, shortness of breath.    Objective   Vital Signs  Temp:  [97.3 øF (36.3 øC)-98.6 øF (37 øC)] 97.3 øF (36.3 øC)  Heart Rate:  [82-88] 88  Resp:  [16-20] 16  BP: (127-173)/(73-82) 127/74  Body mass index is 55.08 kg/mý.    Objective:  General Appearance:  Comfortable and in no acute distress.    Vital signs: (most recent): Blood pressure 127/74, pulse 88, temperature 97.3 øF (36.3  "øC), temperature source Oral, resp. rate 16, height 160 cm (62.99\"), weight (!) 141 kg (310 lb 13.6 oz), last menstrual period 10/01/2023, SpO2 98%.    Lungs:  Normal effort and normal respiratory rate.  She is not in respiratory distress.  There are decreased breath sounds.    Heart: Normal rate.  Regular rhythm.    Chest: (Right anterior chest tube in place)  Abdomen: Abdomen is soft.  Bowel sounds are normal.   There is no abdominal tenderness.     Extremities: There is no dependent edema or local swelling.    Neurological: Patient is alert and oriented to person, place and time.    Skin:  Warm and dry.                Results Review:       I reviewed the patient's new clinical results.    Results from last 7 days   Lab Units 10/09/23  0512 10/08/23  0628 10/07/23  0620 10/06/23  0519 10/05/23  0406 10/04/23  0414 10/03/23  1916 10/03/23  0408   WBC 10*3/mm3 8.50 8.80 9.62 10.93* 12.91* 12.10*  --  17.47*   HEMOGLOBIN g/dL 8.8* 8.9* 9.8* 9.6* 9.0* 7.8* 8.0* 8.0*   PLATELETS 10*3/mm3 321 303 342 331 329 285  --  319     Results from last 7 days   Lab Units 10/09/23  0512 10/08/23  0628 10/07/23  0620 10/06/23  0519 10/05/23  0406 10/04/23  0414 10/03/23  0408   SODIUM mmol/L 134* 137 137 136 142 140 142   POTASSIUM mmol/L 5.1 4.7 4.7 4.8 4.9 4.9 4.5   CHLORIDE mmol/L 97* 99 98 98 101 99 102   CO2 mmol/L 22.3 24.0 22.0 22.5 21.0* 27.3 25.0   BUN mg/dL 73* 82* 78* 66* 58* 41* 31*   CREATININE mg/dL 6.31* 7.05* 7.25* 6.22* 5.94* 4.64* 2.73*   CALCIUM mg/dL 8.7 8.8 8.9 9.3 9.2 9.1 9.0   GLUCOSE mg/dL 95 112* 104* 120* 103* 119* 87                 Scheduled Meds:   cefTRIAXone, 2,000 mg, Intravenous, Q24H  heparin (porcine), 5,000 Units, Subcutaneous, Q8H  insulin lispro, 2-7 Units, Subcutaneous, 4x Daily AC & at Bedtime  labetalol, 100 mg, Oral, Q12H  metroNIDAZOLE, 500 mg, Oral, Q8H  pantoprazole, 40 mg, Oral, Q AM  saccharomyces boulardii, 500 mg, Oral, BID  senna-docusate sodium, 2 tablet, Oral, BID  sevelamer, " 1,600 mg, Oral, TID With Meals  sodium chloride, 10 mL, Intravenous, Q12H      PRN Meds:     acetaminophen **OR** acetaminophen **OR** acetaminophen    senna-docusate sodium **AND** polyethylene glycol **AND** bisacodyl **AND** bisacodyl    Calcium Replacement - Follow Nurse / BPA Driven Protocol    dextrose    dextrose    fentaNYL citrate (PF)    glucagon (human recombinant)    hydrALAZINE    influenza vaccine    ipratropium-albuterol    Magnesium Standard Dose Replacement - Follow Nurse / BPA Driven Protocol    nitroglycerin    ondansetron **OR** ondansetron    Phosphorus Replacement - Follow Nurse / BPA Driven Protocol    Potassium Replacement - Follow Nurse / BPA Driven Protocol    sodium chloride    sodium chloride    sodium chloride  Continuous Infusions:       Assessment & Plan   Active Hospital Problems    Diagnosis  POA    **PNA (pneumonia) [J18.9]  Yes    ELLY (acute kidney injury) [N17.9]  Unknown    Obesity, morbid, BMI 50 or higher [E66.01]  Unknown    Anemia [D64.9]  Unknown    Sepsis [A41.9]  Unknown    Type 2 diabetes mellitus with hyperglycemia [E11.65]  Unknown    Acute respiratory failure with hypoxia [J96.01]  Unknown    Hypertensive urgency [I16.0]  Unknown    Abscess of upper lobe of right lung with pneumonia [J85.1]  Yes      Resolved Hospital Problems    Diagnosis Date Resolved POA    Hyperkalemia [E87.5] 10/03/2023 Unknown       Assessment & Plan    This is a 53-year-old female with a history of morbid obesity, new diabetes of type 2 diabetes and hypertension who presents to the hospital with cough shortness of breath and fevers and is found to have a right upper lobe pulmonary abscess     1.Acute hypoxic respiratory failure with right upper lobe abscess, now off the ventilator and chest tube has been removed on 10/8/2023.  Cultures grew Streptococcus and currently patient is on Rocephin/Flagyl and stop date is 11/14/2023.  On 2 L of oxygen and have encouraged her to use incentive  spirometry.    2.  Hypertension, on labetalol and currently blood pressure is in systolics of 110s therefore hydralazine is being discontinued.    3.  Diabetes mellitus, hemoglobin A1c 7.5 and currently on corrective dose insulin and blood sugars are reasonably well controlled.    4.  Morbid obesity, counseled to lose weight.    5.  Acute kidney injury, felt to be multifactorial etiology and did peak at 7.25 and appears to have plateaued.  Creatinine started trending down and it is at 6.31 today and nephrology is following along.    6.  Anemia, iron deficient and will have her follow-up with hematology and GI as an outpatient basis.  No evidence of any active bleeding.    7.  On heparin for DVT prophylaxis.    8.  CODE STATUS is full code.    Estimated discharge date, to be determined.      Copied text on this note has been reviewed by me on 10/9/2023        mR Riddle MD  [facility]  10/09/23  11:12 EDT     Electronically signed by Rm Riddle MD at 10/09/23 1342       Georgie Jean MD at 10/09/23 1042          ID NOTE    CC: f/u pulmonary abscess due to Strep    Subj: Afebrile, she states she feels a lot better.  Tolerating antibiotics without abdominal pain vomiting diarrhea or rash    Objective   Vital Signs   Temp:  [97.3 øF (36.3 øC)-98.6 øF (37 øC)] 97.3 øF (36.3 øC)  Heart Rate:  [80-88] 88  Resp:  [16-20] 16  BP: (120-173)/(72-82) 173/82    Physical Exam:   General: in NAD  Cardiovascular: NR  Respiratory: R rales are better; R chest drain in place  GI: Abdomen is soft, not tender  Skin: No rashes    Abx:  Ceftriaxone 2 g IV every 24 hours  Flagyl 500 mg p.o. every 8 hours    Labs:   Lab Results   Component Value Date    WBC 8.50 10/09/2023    HGB 8.8 (L) 10/09/2023    HCT 27.4 (L) 10/09/2023    MCV 82.8 10/09/2023     10/09/2023     Lab Results   Component Value Date    GLUCOSE 95 10/09/2023    BUN 73 (H) 10/09/2023    CREATININE 6.31 (H) 10/09/2023    EGFR  7.4 (L) 10/09/2023    BCR 11.6 10/09/2023    K 5.1 10/09/2023    CO2 22.3 10/09/2023    CALCIUM 8.7 10/09/2023    ALBUMIN 2.8 (L) 10/09/2023    BILITOT 0.2 10/06/2023    AST 20 10/06/2023    ALT 18 10/06/2023     Urine Eos negative  HIV negative  Hep C negative    Microbiology:  10/1 RPP: negative  10/1 BCx: negative  10/1 MRSA nares: negative  10/2 BAL Cx: normal annabel  10/3 ETT Cx: normal annabel  10/3 Lung Abscess Cx: Group G Strep      ASSESSMENT/PLAN:  Right upper lobe pulmonary abscess due to strep  Acute hypercapneic and hypoxic respiratory failure, improving  Sepsis due to #1  Super obesity BMI 54  Uncontrolled diabetes type 2 A1c  7.5%  Hypertension  7.  Acute kidney injury    Continue ceftriaxone 2 g IV q24h and Flagyl 500 mg IV q8h for now. Plan to complete a 6-week course w/ stop date ~23.  Anticipate discharge on oral antibiotics  Antibiotics are dosed appropriately for renal dysfunction      Electronically signed by Georgie Jean MD at 10/09/23 1331       Iain Owens MD at 10/09/23 1038              [facility] Progress Note      Patient Name: Nay Gonzalez  : 1970  MRN: [id number]  Primary Care Physician:  Provider, No Known  Date of admission: 10/1/2023    Subjective     Interval History:   Follow-up acute kidney injury    The chest tube was removed, patient is feeling much better, denies any chest pain or shortness of air, no orthopnea or PND, no nausea or vomiting, no dysuria or gross hematuria and she has good urine output.  Review of Systems:   As noted above    Objective     Vitals:   Temp:  [97.3 øF (36.3 øC)-98.6 øF (37 øC)] 97.3 øF (36.3 øC)  Heart Rate:  [80-88] 88  Resp:  [16-20] 16  BP: (120-173)/(72-82) 173/82  Flow (L/min):  [2] 2    Intake/Output Summary (Last 24 hours) at 10/9/2023 1038  Last data filed at 10/9/2023 1018  Gross per 24 hour   Intake 1440 ml   Output 2750 ml   Net -1310 ml       Physical Exam:    General Appearance:  Morbidly obese, awake and alert, chronically ill, no acute distress  Skin: warm and dry  HEENT: She has core track in place  Neck: Difficult to assess due to body habitus  Lungs: Bilateral rhonchi, breathing effort not labored  Heart: RRR, normal S1 and S2, no rub  Abdomen: soft, no guarding, protuberant, normoactive bowel  : No palpable bladder  Extremities: no edema, cyanosis or clubbing  Neuro: Normal speech and mental status and moving all extremities    Scheduled Meds:     cefTRIAXone, 2,000 mg, Intravenous, Q24H  heparin (porcine), 5,000 Units, Subcutaneous, Q8H  insulin lispro, 2-7 Units, Subcutaneous, 4x Daily AC & at Bedtime  labetalol, 100 mg, Oral, Q12H  metroNIDAZOLE, 500 mg, Oral, Q8H  pantoprazole, 40 mg, Oral, Q AM  saccharomyces boulardii, 500 mg, Oral, BID  senna-docusate sodium, 2 tablet, Oral, BID  sevelamer, 1,600 mg, Oral, TID With Meals  sodium chloride, 10 mL, Intravenous, Q12H      IV Meds:          Results Reviewed:   I have personally reviewed the results from the time of this admission to 10/9/2023 10:38 EDT     Results from last 7 days   Lab Units 10/09/23  0512 10/08/23  0628 10/07/23  0620 10/06/23  0519 10/05/23  0406 10/04/23  0414   SODIUM mmol/L 134* 137 137 136 142 140   POTASSIUM mmol/L 5.1 4.7 4.7 4.8 4.9 4.9   CHLORIDE mmol/L 97* 99 98 98 101 99   CO2 mmol/L 22.3 24.0 22.0 22.5 21.0* 27.3   BUN mg/dL 73* 82* 78* 66* 58* 41*   CREATININE mg/dL 6.31* 7.05* 7.25* 6.22* 5.94* 4.64*   CALCIUM mg/dL 8.7 8.8 8.9 9.3 9.2 9.1   BILIRUBIN mg/dL  --   --   --  0.2 0.3 0.3   ALK PHOS U/L  --   --   --  97 118* 114   ALT (SGPT) U/L  --   --   --  18 16 17   AST (SGOT) U/L  --   --   --  20 15 10   GLUCOSE mg/dL 95 112* 104* 120* 103* 119*       Estimated Creatinine Clearance: 14.5 mL/min (A) (by C-G formula based on SCr of 6.31 mg/dL (H)).    Results from last 7 days   Lab Units 10/09/23  0512 10/08/23  0628 10/07/23  0620   MAGNESIUM mg/dL 2.6 2.6 3.0*   PHOSPHORUS mg/dL 10.0* 11.6* 12.1*        Results from last 7 days   Lab Units 10/09/23  0512 10/08/23  0628 10/07/23  0620 10/06/23  0519 10/05/23  0406 10/04/23  0414 10/03/23  0408   URIC ACID mg/dL 12.5* 11.9* 11.5* 11.9* 10.5* 9.9* 9.4*       Results from last 7 days   Lab Units 10/09/23  0512 10/08/23  0628 10/07/23  0620 10/06/23  0519 10/05/23  0406   WBC 10*3/mm3 8.50 8.80 9.62 10.93* 12.91*   HEMOGLOBIN g/dL 8.8* 8.9* 9.8* 9.6* 9.0*   PLATELETS 10*3/mm3 321 303 342 331 329             Assessment / Plan     ASSESSMENT:  Acute kidney injury associated with sudden correction of her hypertension initially also possible component of contrast-induced nephropathy because patient had CTA on 10/1/2023.  Creatinine today is down to 6.31 patient is euvolemic, electrolyte within acceptable range and has increased urine output, she is entering in the recovery phase of her ATN, also her proteinuria improved down from 2111 mg/g to 581.4 mg/g  New diagnosis of hypertension, blood pressure with acceptable control  New diagnosis of diabetes mellitus type 2  Super morbid obesity  Anemia, the etiology not very clear, most likely patient have iron deficiency iron saturation 13% but her TIBC is on the low side, suggestive of anemia of chronic disease too.  Hemoglobin today 8.8  Severe hyperphosphatemia associated with acute kidney injury phosphorus 10, was started on sevelamer  Hyperuricemia, uric acid 12.5, associate with acute kidney, will continue to monitor    PLAN:  Continue the same treatment  No indication for dialysis today  Surveillance labs    I discussed the case with the patient and she voiced good understanding  I reviewed the chart and other providers notes, I reviewed imaging and lab data.  Copied text in this note has been reviewed and is accurate as of 10/09/23.       Thank you for involving us in the care of Nay Gonzalez.  Please feel free to call with any questions.    Iain Owens MD  10/09/23  10:38 EDT    [facility]  "[facility]  [phone]    Please note that portions of this note were completed with a voice recognition program.    Electronically signed by Iain Owens MD at 10/09/23 1041       Brannon Gonzales MD at 10/09/23 0741            Enter Query Response Below      Query Response:     Yes, all secondary to sepsis          If applicable, please update the problem list.   Patient: Nay Gonzalez \"Graciela\"        : 1970  Account: [id number]           Admit Date: 10/1/23        How to Respond to this query:       a. Click New Note     b. Answer query within the yellow box.                c. Update the Problem List, if applicable.      If you have any questions about this query contact me at: [email or web address]     ,  Patient admitted with sepsis, pneumonia, ELLY and acute respiratory failure. Treated with Zosyn and ceftriaxone. She was also on the vent. but is off now.     Are any of this patient's organ dysfunctions ELLY and acute respiratory failure secondary to sepsis?     -Yes, all secondary to sepsis   -Yes, but not all, please specify____________   -No   -Other- specify____________   -Unable to determine          By submitting this query, we are merely seeking further clarification of documentation to accurately reflect all conditions that you are monitoring, evaluating, treating or that extend the hospitalization or utilize additional resources of care. Please utilize your independent clinical judgment when addressing the question(s) above.     This query and your response, once completed, will be entered into the legal medical record.    Sincerely,  Leena Escamilla  Clinical Documentation Integrity Program       Electronically signed by Brannon Gonzales MD at 10/09/23 0716       Christian Bernal MD at 10/08/23 5845                                                        LOS: 7 days   Patient Care Team:  Provider, No Known as PCP - General    Chief Complaint:  F/up " "respiratory failure, lung abscess    Subjective   Interval History      On 2L O2. She reported minimal cough without sputum production.  No dyspnea at rest.      REVIEW OF SYSTEMS:   CARDIOVASCULAR: No chest pain, chest pressure or chest discomfort. No palpitations or edema.     GASTROINTESTINAL: No anorexia, nausea, vomiting or diarrhea. No abdominal pain.  CONSTITUTIONAL: No fever or chills.     Ventilator/Non-Invasive Ventilation Settings (From admission, onward)             Physical Exam:     Vital Signs  Temp:  [98.2 øF (36.8 øC)-98.6 øF (37 øC)] 98.6 øF (37 øC)  Heart Rate:  [80-88] 82  Resp:  [18-20] 18  BP: (120-150)/(59-80) 133/78    Intake/Output Summary (Last 24 hours) at 10/8/2023 1631  Last data filed at 10/8/2023 1300  Gross per 24 hour   Intake 580 ml   Output 1410 ml   Net -830 ml     Flowsheet Rows      Flowsheet Row First Filed Value   Admission Height 160 cm (63\") Documented at 10/01/2023 1047   Admission Weight 152 kg (335 lb) Documented at 10/01/2023 1047            PPE used per hospital policy    General Appearance:   Alert, cooperative, in no acute distress   ENMT:  Mallampati score 3, moist mucous membrane   Eyes:  Pupils equal and reactive to light. EOMI   Neck:   Large. Trachea midline. No thyromegaly.   Lungs:    Diminished air entry bilaterally especially on the RLL. NO wheezing or rales.     Heart:   Regular rhythm and normal rate, normal S1 and S2, no         murmur   Skin:   No rash or ecchymosis   Abdomen:    Obese. Soft. No tenderness. No HSM.   Neuro/psych:  Conscious, alert, oriented x3. Strength 5/5 in upper and lower  ext.  Appropriate mood and affect   Extremities:  No cyanosis, clubbing or edema.  Warm extremities and well-perfused          Results Review:        Results from last 7 days   Lab Units 10/08/23  0628 10/07/23  0620 10/06/23  0519   SODIUM mmol/L 137 137 136   POTASSIUM mmol/L 4.7 4.7 4.8   CHLORIDE mmol/L 99 98 98   CO2 mmol/L 24.0 22.0 22.5   BUN mg/dL 82* 78* " 66*   CREATININE mg/dL 7.05* 7.25* 6.22*   GLUCOSE mg/dL 112* 104* 120*   CALCIUM mg/dL 8.8 8.9 9.3           Results from last 7 days   Lab Units 10/08/23  0628 10/07/23  0620 10/06/23  0519   WBC 10*3/mm3 8.80 9.62 10.93*   HEMOGLOBIN g/dL 8.9* 9.8* 9.6*   HEMATOCRIT % 28.0* 30.9* 30.0*   PLATELETS 10*3/mm3 303 342 331                       Results from last 7 days   Lab Units 10/05/23  0406 10/02/23  0501   CRP mg/dL 11.66* 28.05*       Results from last 7 days   Lab Units 10/04/23  1418 10/04/23  0357 10/03/23  0339 10/02/23  1840 10/02/23  1701 10/02/23  1459 10/02/23  1344   PH, ARTERIAL pH units 7.356 7.376 7.498* 7.309* 7.071* 7.236* 7.074*   PCO2, ARTERIAL mm Hg 45.6* 44.7 34.8* 59.3* 108.4* 66.9* 112.4*   PO2 ART mm Hg 77.8* 127.2* 109.1* 174.3* 90.5 122.2* 96.8   O2 SATURATION ART % 94.7 98.8* 98.7* 99.4* 91.2* 97.8 92.7   FLOW RATE lpm  --   --   --   --   --   --  6.0000   MODALITY  Adult Vent Adult Vent Adult Vent Adult Vent BiPap BiPap Cannula         I reviewed the patient's new clinical results.        Medication Review:   cefTRIAXone, 2,000 mg, Intravenous, Q24H  heparin (porcine), 5,000 Units, Subcutaneous, Q8H  insulin lispro, 2-7 Units, Subcutaneous, 4x Daily AC & at Bedtime  labetalol, 100 mg, Oral, Q12H  metroNIDAZOLE, 500 mg, Oral, Q8H  pantoprazole, 40 mg, Oral, Q AM  saccharomyces boulardii, 500 mg, Oral, BID  senna-docusate sodium, 2 tablet, Oral, BID  sevelamer, 1,600 mg, Oral, TID With Meals  sodium chloride, 10 mL, Intravenous, Q12H             Diagnostic imaging:  I personally and independently reviewed the following images:  CXR 10/7/2023: Loculated right pleural effusion.  Chest tube noted.  CXR 10/8/2023: Cardiomegaly.  Possible pulmonary vascular congestion but difficult to interpret due to body habitus.    Assessment     RUL pulmonary abscess, culture consistent with strep  Loculated right pleural effusion/empyema, s/p chest tube placement 10/3 tPA infusion  Acute hypercapneic and  "hypoxic respiratory failure, required mechanical ventilation.  Sepsis due to #1  Super obesity BMI 54  Newly diagnosed uncontrolled diabetes type 2 A1c  7.5%  Newly diagnosed hypertension  Acute kidney injury  Probable CRYSTAL: Snoring, apnea and desaturation at night.      Plan       Chest tube discontinued.  Antibiotics for 6 weeks.  ID following  DVT prophylaxis with heparin subcu.  Bowel regimen.  Avoid narcotics if possible due to hypercapnia.  BiPAP at night for respiratory failure and highly suspected sleep apnea.  Not being used consistently at the hospital.  We will ordered outpatient PSG for probable CRYSTAL.  Not a candidate for HST due to morbid obesity.  O2 by NC and titrate to keep SpO2 >90%    Dispo: Okay to discharge from pulmonary perspective.    Christian Bernal MD  10/08/23  16:31 EDT          This note was dictated utilizing Dragon dictation     Electronically signed by Christian Bernal MD at 10/08/23 1633       Rm Riddle MD at 10/08/23 1331           LOS: 7 days     Name: Nay Gonzalez  Age: 53 y.o.  Sex: female  :  1970  MRN: [id number]         Primary Care Physician: Provider, No Known    Subjective   Subjective    Patient is lying on the bed and does not appear to be any distress.  Denies nausea, vomiting, abdominal pain, chest pain, shortness of breath.    Objective   Vital Signs  Temp:  [98.2 øF (36.8 øC)-98.6 øF (37 øC)] 98.5 øF (36.9 øC)  Heart Rate:  [83-88] 84  Resp:  [18-20] 18  BP: (111-150)/(59-80) 120/72  Body mass index is 54.65 kg/mý.    Objective:  General Appearance:  Comfortable and in no acute distress.    Vital signs: (most recent): Blood pressure 120/72, pulse 84, temperature 98.5 øF (36.9 øC), temperature source Oral, resp. rate 18, height 160 cm (62.99\"), weight (!) 140 kg (308 lb 6.8 oz), last menstrual period 10/01/2023, SpO2 98%.    Lungs:  Normal effort and normal respiratory rate.  She is not in respiratory distress.  There are decreased breath sounds.  "   Heart: Normal rate.  Regular rhythm.    Chest: (Right anterior chest tube in place)  Abdomen: Abdomen is soft.  Bowel sounds are normal.   There is no abdominal tenderness.     Extremities: There is no dependent edema or local swelling.    Neurological: Patient is alert and oriented to person, place and time.    Skin:  Warm and dry.                Results Review:       I reviewed the patient's new clinical results.    Results from last 7 days   Lab Units 10/08/23  0628 10/07/23  0620 10/06/23  0519 10/05/23  0406 10/04/23  0414 10/03/23  1916 10/03/23  0408 10/02/23  0501   WBC 10*3/mm3 8.80 9.62 10.93* 12.91* 12.10*  --  17.47* 24.25*   HEMOGLOBIN g/dL 8.9* 9.8* 9.6* 9.0* 7.8* 8.0* 8.0* 10.7*   PLATELETS 10*3/mm3 303 342 331 329 285  --  319 353     Results from last 7 days   Lab Units 10/08/23  0628 10/07/23  0620 10/06/23  0519 10/05/23  0406 10/04/23  0414 10/03/23  0408 10/02/23  2026   SODIUM mmol/L 137 137 136 142 140 142 142   POTASSIUM mmol/L 4.7 4.7 4.8 4.9 4.9 4.5 5.2   CHLORIDE mmol/L 99 98 98 101 99 102 102   CO2 mmol/L 24.0 22.0 22.5 21.0* 27.3 25.0 29.7*   BUN mg/dL 82* 78* 66* 58* 41* 31* 27*   CREATININE mg/dL 7.05* 7.25* 6.22* 5.94* 4.64* 2.73* 1.95*   CALCIUM mg/dL 8.8 8.9 9.3 9.2 9.1 9.0 9.3   GLUCOSE mg/dL 112* 104* 120* 103* 119* 87 123*                 Scheduled Meds:   cefTRIAXone, 2,000 mg, Intravenous, Q24H  heparin (porcine), 5,000 Units, Subcutaneous, Q8H  insulin lispro, 2-7 Units, Subcutaneous, 4x Daily AC & at Bedtime  labetalol, 100 mg, Oral, Q12H  metroNIDAZOLE, 500 mg, Oral, Q8H  pantoprazole, 40 mg, Oral, Q AM  saccharomyces boulardii, 500 mg, Oral, BID  senna-docusate sodium, 2 tablet, Oral, BID  sevelamer, 1,600 mg, Oral, TID With Meals  sodium chloride, 10 mL, Intravenous, Q12H      PRN Meds:     acetaminophen **OR** acetaminophen **OR** acetaminophen    senna-docusate sodium **AND** polyethylene glycol **AND** bisacodyl **AND** bisacodyl    Calcium Replacement - Follow Nurse  / BPA Driven Protocol    dextrose    dextrose    fentaNYL citrate (PF)    glucagon (human recombinant)    hydrALAZINE    influenza vaccine    ipratropium-albuterol    Magnesium Standard Dose Replacement - Follow Nurse / BPA Driven Protocol    nitroglycerin    ondansetron **OR** ondansetron    Phosphorus Replacement - Follow Nurse / BPA Driven Protocol    Potassium Replacement - Follow Nurse / BPA Driven Protocol    sodium chloride    sodium chloride    sodium chloride  Continuous Infusions:       Assessment & Plan   Active Hospital Problems    Diagnosis  POA    **PNA (pneumonia) [J18.9]  Yes    ELLY (acute kidney injury) [N17.9]  Unknown    Obesity, morbid, BMI 50 or higher [E66.01]  Unknown    Anemia [D64.9]  Unknown    Sepsis [A41.9]  Unknown    Type 2 diabetes mellitus with hyperglycemia [E11.65]  Unknown    Acute respiratory failure with hypoxia [J96.01]  Unknown    Hypertensive urgency [I16.0]  Unknown    Abscess of upper lobe of right lung with pneumonia [J85.1]  Yes      Resolved Hospital Problems    Diagnosis Date Resolved POA    Hyperkalemia [E87.5] 10/03/2023 Unknown       Assessment & Plan    This is a 53-year-old female with a history of morbid obesity, new diabetes of type 2 diabetes and hypertension who presents to the hospital with cough shortness of breath and fevers and is found to have a right upper lobe pulmonary abscess     1.Acute hypoxic respiratory failure with right upper lobe abscess, now off the ventilator and chest tube has been removed today 10/8/2023.  Cultures grew Streptococcus and currently patient is on Rocephin/Flagyl.  On 2 L of oxygen and have encouraged her to use incentive spirometry.    2.  Hypertension, on labetalol and currently blood pressure is in systolics of 110s therefore hydralazine is being discontinued.    3.  Diabetes mellitus, hemoglobin A1c 7.5 and currently on corrective dose insulin and blood sugars are reasonably well controlled.    4.  Morbid obesity, counseled  to lose weight.    5.  Acute kidney injury, felt to be multifactorial etiology and did peak at 7.25 and appears to have plateaued.  Hopefully renal function is starting to recover and nephrology is following along as well.    6.  Anemia, iron deficient and will have her follow-up with hematology and GI as an outpatient basis.  No evidence of any active bleeding.    7.  On heparin for DVT prophylaxis.    8.  CODE STATUS is full code.    Estimated discharge date, to be determined.      Copied text on this note has been reviewed by me on 10/8/2023        Rm Riddle MD  [facility]  10/08/23  11:12 EDT     Electronically signed by Rm Riddle MD at 10/08/23 1332       Lonnie Brantley MD at 10/08/23 1028          Progress note    Reason for consult: Right empyema thoracis    No acute issues overnight.  Pain well controlled.  In good spirits.  Feeling better.  Denies resting shortness of breath.  Sitting in chair comfortably.    Afebrile.  Hemodynamically stable.  Nonlabored breathing.  On 2 LPM nasal cannula, saturating 100%.  Chest tube with serous drainage, no air leak.  Extremities warm.  Lower extremity edematous.  Alert, oriented x3.    Imaging reviewed.  X-ray showed adequate lung expansion.    Patient presented with empyema thoracis that responded well to chest tube placement and tPA x3.  Chest tube transition to waterseal yesterday.  X-ray showed adequate lung expansion and chest tube was removed today.  She will need 6 weeks antibiotics for empyema thoracis.  She can be discharged from thoracic surgery standpoint.  She will follow-up in our clinic in 2 to 3 weeks.  Rest of care per primary team.    Please call thoracic surgery with questions/concerns.    Lonnie Brantley MD  Thoracic surgeon    Electronically signed by Lonnie Brantley MD at 10/08/23 1039

## 2023-10-12 ENCOUNTER — APPOINTMENT (OUTPATIENT)
Dept: GENERAL RADIOLOGY | Facility: HOSPITAL | Age: 53
DRG: 871 | End: 2023-10-12
Payer: COMMERCIAL

## 2023-10-12 ENCOUNTER — READMISSION MANAGEMENT (OUTPATIENT)
Dept: CALL CENTER | Facility: HOSPITAL | Age: 53
End: 2023-10-12
Payer: COMMERCIAL

## 2023-10-12 ENCOUNTER — HOME HEALTH ADMISSION (OUTPATIENT)
Dept: HOME HEALTH SERVICES | Facility: HOME HEALTHCARE | Age: 53
End: 2023-10-12
Payer: COMMERCIAL

## 2023-10-12 VITALS
RESPIRATION RATE: 18 BRPM | OXYGEN SATURATION: 100 % | HEIGHT: 63 IN | TEMPERATURE: 98.6 F | BODY MASS INDEX: 51.91 KG/M2 | WEIGHT: 293 LBS | SYSTOLIC BLOOD PRESSURE: 180 MMHG | DIASTOLIC BLOOD PRESSURE: 95 MMHG | HEART RATE: 99 BPM

## 2023-10-12 LAB
ALBUMIN SERPL-MCNC: 3.1 G/DL (ref 3.5–5.2)
ANION GAP SERPL CALCULATED.3IONS-SCNC: 12.4 MMOL/L (ref 5–15)
BASOPHILS # BLD AUTO: 0.1 10*3/MM3 (ref 0–0.2)
BASOPHILS NFR BLD AUTO: 1.1 % (ref 0–1.5)
BUN SERPL-MCNC: 62 MG/DL (ref 6–20)
BUN/CREAT SERPL: 15.3 (ref 7–25)
CALCIUM SPEC-SCNC: 9.7 MG/DL (ref 8.6–10.5)
CHLORIDE SERPL-SCNC: 101 MMOL/L (ref 98–107)
CO2 SERPL-SCNC: 23.6 MMOL/L (ref 22–29)
CREAT SERPL-MCNC: 4.04 MG/DL (ref 0.57–1)
DEPRECATED RDW RBC AUTO: 45.8 FL (ref 37–54)
EGFRCR SERPLBLD CKD-EPI 2021: 12.7 ML/MIN/1.73
EOSINOPHIL # BLD AUTO: 0.05 10*3/MM3 (ref 0–0.4)
EOSINOPHIL NFR BLD AUTO: 0.6 % (ref 0.3–6.2)
ERYTHROCYTE [DISTWIDTH] IN BLOOD BY AUTOMATED COUNT: 15.3 % (ref 12.3–15.4)
GLUCOSE BLDC GLUCOMTR-MCNC: 107 MG/DL (ref 70–130)
GLUCOSE BLDC GLUCOMTR-MCNC: 136 MG/DL (ref 70–130)
GLUCOSE SERPL-MCNC: 99 MG/DL (ref 65–99)
HCT VFR BLD AUTO: 29.3 % (ref 34–46.6)
HGB BLD-MCNC: 9.4 G/DL (ref 12–15.9)
IMM GRANULOCYTES # BLD AUTO: 0.03 10*3/MM3 (ref 0–0.05)
IMM GRANULOCYTES NFR BLD AUTO: 0.3 % (ref 0–0.5)
LYMPHOCYTES # BLD AUTO: 0.82 10*3/MM3 (ref 0.7–3.1)
LYMPHOCYTES NFR BLD AUTO: 9.3 % (ref 19.6–45.3)
MCH RBC QN AUTO: 26.7 PG (ref 26.6–33)
MCHC RBC AUTO-ENTMCNC: 32.1 G/DL (ref 31.5–35.7)
MCV RBC AUTO: 83.2 FL (ref 79–97)
MONOCYTES # BLD AUTO: 0.63 10*3/MM3 (ref 0.1–0.9)
MONOCYTES NFR BLD AUTO: 7.1 % (ref 5–12)
NEUTROPHILS NFR BLD AUTO: 7.19 10*3/MM3 (ref 1.7–7)
NEUTROPHILS NFR BLD AUTO: 81.6 % (ref 42.7–76)
NRBC BLD AUTO-RTO: 0 /100 WBC (ref 0–0.2)
PHOSPHATE SERPL-MCNC: 5.6 MG/DL (ref 2.5–4.5)
PLATELET # BLD AUTO: 401 10*3/MM3 (ref 140–450)
PMV BLD AUTO: 11.6 FL (ref 6–12)
POTASSIUM SERPL-SCNC: 5.2 MMOL/L (ref 3.5–5.2)
RBC # BLD AUTO: 3.52 10*6/MM3 (ref 3.77–5.28)
SODIUM SERPL-SCNC: 137 MMOL/L (ref 136–145)
URATE SERPL-MCNC: 11.4 MG/DL (ref 2.4–5.7)
WBC NRBC COR # BLD: 8.82 10*3/MM3 (ref 3.4–10.8)

## 2023-10-12 PROCEDURE — 84550 ASSAY OF BLOOD/URIC ACID: CPT | Performed by: INTERNAL MEDICINE

## 2023-10-12 PROCEDURE — 71045 X-RAY EXAM CHEST 1 VIEW: CPT

## 2023-10-12 PROCEDURE — 80069 RENAL FUNCTION PANEL: CPT | Performed by: INTERNAL MEDICINE

## 2023-10-12 PROCEDURE — 99232 SBSQ HOSP IP/OBS MODERATE 35: CPT | Performed by: INTERNAL MEDICINE

## 2023-10-12 PROCEDURE — 85025 COMPLETE CBC W/AUTO DIFF WBC: CPT | Performed by: INTERNAL MEDICINE

## 2023-10-12 PROCEDURE — 25010000002 HEPARIN (PORCINE) PER 1000 UNITS: Performed by: INTERNAL MEDICINE

## 2023-10-12 PROCEDURE — 82948 REAGENT STRIP/BLOOD GLUCOSE: CPT

## 2023-10-12 RX ORDER — GLIPIZIDE 5 MG/1
5 TABLET ORAL
Qty: 30 TABLET | Refills: 0 | Status: SHIPPED | OUTPATIENT
Start: 2023-10-12 | End: 2023-11-11

## 2023-10-12 RX ORDER — SEVELAMER CARBONATE 800 MG/1
1600 TABLET, FILM COATED ORAL
Qty: 180 TABLET | Refills: 0 | Status: SHIPPED | OUTPATIENT
Start: 2023-10-12 | End: 2023-11-11

## 2023-10-12 RX ORDER — HEPARIN SODIUM 5000 [USP'U]/ML
5000 INJECTION, SOLUTION INTRAVENOUS; SUBCUTANEOUS EVERY 12 HOURS SCHEDULED
Status: DISCONTINUED | OUTPATIENT
Start: 2023-10-12 | End: 2023-10-12 | Stop reason: HOSPADM

## 2023-10-12 RX ORDER — AMOXICILLIN 500 MG/1
1000 CAPSULE ORAL EVERY 12 HOURS SCHEDULED
Qty: 120 CAPSULE | Refills: 1 | Status: SHIPPED | OUTPATIENT
Start: 2023-10-12 | End: 2023-11-15

## 2023-10-12 RX ORDER — SACCHAROMYCES BOULARDII 250 MG
500 CAPSULE ORAL 2 TIMES DAILY
Qty: 120 CAPSULE | Refills: 0 | Status: SHIPPED | OUTPATIENT
Start: 2023-10-12 | End: 2023-11-11

## 2023-10-12 RX ORDER — LABETALOL 200 MG/1
200 TABLET, FILM COATED ORAL EVERY 12 HOURS SCHEDULED
Qty: 60 TABLET | Refills: 0 | Status: SHIPPED | OUTPATIENT
Start: 2023-10-12 | End: 2023-11-11

## 2023-10-12 RX ORDER — PANTOPRAZOLE SODIUM 40 MG/1
40 TABLET, DELAYED RELEASE ORAL
Qty: 30 TABLET | Refills: 0 | Status: SHIPPED | OUTPATIENT
Start: 2023-10-13 | End: 2023-11-12

## 2023-10-12 RX ADMIN — SODIUM ZIRCONIUM CYCLOSILICATE 10 G: 10 POWDER, FOR SUSPENSION ORAL at 08:45

## 2023-10-12 RX ADMIN — HEPARIN SODIUM 5000 UNITS: 5000 INJECTION INTRAVENOUS; SUBCUTANEOUS at 00:15

## 2023-10-12 RX ADMIN — SEVELAMER CARBONATE 1600 MG: 800 TABLET, FILM COATED ORAL at 09:40

## 2023-10-12 RX ADMIN — PANTOPRAZOLE SODIUM 40 MG: 40 TABLET, DELAYED RELEASE ORAL at 06:08

## 2023-10-12 RX ADMIN — HEPARIN SODIUM 5000 UNITS: 5000 INJECTION INTRAVENOUS; SUBCUTANEOUS at 09:40

## 2023-10-12 RX ADMIN — AMOXICILLIN 1000 MG: 250 CAPSULE ORAL at 09:40

## 2023-10-12 RX ADMIN — Medication 500 MG: at 09:40

## 2023-10-12 RX ADMIN — LABETALOL HYDROCHLORIDE 200 MG: 100 TABLET, FILM COATED ORAL at 09:40

## 2023-10-12 NOTE — PROGRESS NOTES
ID NOTE    CC: f/u pulmonary abscess due to Strep dysgalactiae    Subj: No fever. She remains on room air. Eager for discharge. Changed to amoxicillin this morning. Crt better again today.     Medications:    Current Facility-Administered Medications:     acetaminophen (TYLENOL) tablet 650 mg, 650 mg, Oral, Q4H PRN, 650 mg at 10/09/23 2116 **OR** acetaminophen (TYLENOL) 160 MG/5ML oral solution 650 mg, 650 mg, Oral, Q4H PRN **OR** acetaminophen (TYLENOL) suppository 650 mg, 650 mg, Rectal, Q4H PRN, Brannon Gonzales MD, 650 mg at 10/02/23 2341    amoxicillin (AMOXIL) capsule 1,000 mg, 1,000 mg, Oral, Q12H, Aakash Azul MD, 1,000 mg at 10/12/23 0940    sennosides-docusate (PERICOLACE) 8.6-50 MG per tablet 2 tablet, 2 tablet, Oral, BID, 2 tablet at 10/10/23 0848 **AND** polyethylene glycol (MIRALAX) packet 17 g, 17 g, Oral, Daily PRN **AND** bisacodyl (DULCOLAX) EC tablet 5 mg, 5 mg, Oral, Daily PRN **AND** bisacodyl (DULCOLAX) suppository 10 mg, 10 mg, Rectal, Daily PRN, Brannon Gonzales MD    Calcium Replacement - Follow Nurse / BPA Driven Protocol, , Does not apply, PRN, Brannon Gonzales MD    dextrose (D50W) (25 g/50 mL) IV injection 25 g, 25 g, Intravenous, Q15 Min PRN, Brannon Gonzales MD    dextrose (GLUTOSE) oral gel 15 g, 15 g, Oral, Q15 Min PRN, Brannon Gonzales MD    glucagon (GLUCAGEN) injection 1 mg, 1 mg, Intramuscular, Q15 Min PRN, Brannon Gonzales MD    heparin (porcine) 5000 UNIT/ML injection 5,000 Units, 5,000 Units, Subcutaneous, Q12H, Iain Owens MD    hydrALAZINE (APRESOLINE) injection 10 mg, 10 mg, Intravenous, Q6H PRN, Gene Miller MD    influenza vac split quad (FLUZONE,FLUARIX,AFLURIA,FLULAVAL) injection 0.5 mL, 0.5 mL, Intramuscular, During Hospitalization, Brannon Gonzales MD    insulin lispro (HUMALOG/ADMELOG) injection 2-7 Units, 2-7 Units, Subcutaneous, 4x Daily AC & at Bedtime, Brannon Gonzales MD, 2 Units at 10/10/23 0848     ipratropium-albuterol (DUO-NEB) nebulizer solution 3 mL, 3 mL, Nebulization, Q6H PRN, Brannon Gonzales MD    labetalol (NORMODYNE) tablet 200 mg, 200 mg, Oral, Q12H, Iain Owens MD, 200 mg at 10/12/23 0940    Magnesium Standard Dose Replacement - Follow Nurse / BPA Driven Protocol, , Does not apply, PRN, Brannon Gonzales MD    nitroglycerin (NITROSTAT) SL tablet 0.4 mg, 0.4 mg, Sublingual, Q5 Min PRN, Brannon Gonzales MD    ondansetron (ZOFRAN) tablet 4 mg, 4 mg, Oral, Q6H PRN **OR** ondansetron (ZOFRAN) injection 4 mg, 4 mg, Intravenous, Q6H PRN, Brannon Gonzales MD    pantoprazole (PROTONIX) EC tablet 40 mg, 40 mg, Oral, Q AM, Johnny Tuttle MD, 40 mg at 10/12/23 0608    Pharmacy Consult, , Does not apply, Continuous PRN, Iain Owens MD    Phosphorus Replacement - Follow Nurse / BPA Driven Protocol, , Does not apply, PRN, Brannon Gonzales MD    Potassium Replacement - Follow Nurse / BPA Driven Protocol, , Does not apply, PRN, Brannon Gonzales MD    saccharomyces boulardii (FLORASTOR) capsule 500 mg, 500 mg, Oral, BID, Quynh Choe, APRN, 500 mg at 10/12/23 0940    sevelamer (RENVELA) tablet 1,600 mg, 1,600 mg, Oral, TID With Meals, Iain Owens MD, 1,600 mg at 10/12/23 0940    sodium chloride 0.9 % flush 10 mL, 10 mL, Intravenous, Q12H, Brannon Gonzales MD, 10 mL at 10/11/23 2045    sodium chloride 0.9 % flush 10 mL, 10 mL, Intravenous, PRN, Brannon Gonzales MD, 10 mL at 10/01/23 1723    sodium chloride 0.9 % infusion 40 mL, 40 mL, Intravenous, PRN, Brannon Gonzales MD    sodium chloride nasal spray 1 spray, 1 spray, Each Nare, PRN, Quynh Choe APRN    sodium zirconium cyclosilicate (LOKELMA) pack 10 g, 10 g, Oral, Once, Roman, Iain Cheema MD      Objective   Vital Signs   Temp:  [98 øF (36.7 øC)-98.6 øF (37 øC)] 98 øF (36.7 øC)  Heart Rate:  [] 99  Resp:  [18-22] 18  BP: (157-180)/(81-96) 171/92    Physical Exam:   General:  awake, alert, very nice, in chair  Eyes: no scleral icterus  Cardiovascular: NR  GI: Abdomen is soft, not tender  Skin: No rashes  Vasc: PIV w/o erythema    Labs:   CBC, K, Crt reviewed today    Lab Results   Component Value Date    WBC 8.82 10/12/2023    HGB 9.4 (L) 10/12/2023    HCT 29.3 (L) 10/12/2023    MCV 83.2 10/12/2023     10/12/2023       Lab Results   Component Value Date    K 5.2 10/12/2023     Lab Results   Component Value Date    CREATININE 4.04 (H) 10/12/2023     Lab Results   Component Value Date    CRP 3.86 (H) 10/10/2023     Lab Results   Component Value Date    HGBA1C 7.50 (H) 10/01/2023     Urine Eos negative  HIV negative  Hep C negative  Procal 0.59    Microbiology:  10/1 RPP: negative  10/1 BCx: negative  10/1 MRSA nares: negative  10/2 BAL Cx: normal annabel  10/3 ETT Cx: normal annabel  10/3 Lung Abscess Cx: scant growth Strep dysgalactiae (susc penicillin)    New Radiology:  CXR 10/11 personally reviewed; there is no evidence of pneumonia on my read    ASSESSMENT/PLAN:  Right upper lobe pulmonary abscess due to Strep dysgalactiae  Acute hypercapneic and hypoxic respiratory failure requiring mechanical ventilation - resolved  Sepsis due to #1 - resolved  Super obesity BMI 53  Uncontrolled diabetes type 2 A1c  7.5%  Hypertension  Hyperkalemia  Acute kidney injury - better    On 10/3/23, she had an IR-guided CT chest tube placement. The culture grew Strep as noted above. She is now afebrile and WBC is normal. She is on room air. Drain has been removed. CRP has come down nicely. Renal function is recovering.     As of today, she has been changed to amoxicillin 1 g PO q12h to complete a 6-week total course w/ stop date 11/14/23. I will see her in my office in about 2 weeks to see how she is doing and also check a BMP in case her renal function has recovered to the point of needing to increase her amoxicillin frequency to q8h.     Thank you for allowing me to be involved in the care of this  patient. Infectious diseases will sign off at this time with antibiotics plan in place, but please call me at [phone] if any further ID questions or new ID concerns.

## 2023-10-12 NOTE — PROGRESS NOTES
[facility] Progress Note      Patient Name: Nay Gonzalez  : 1970  MRN: [id number]  Primary Care Physician:  Provider, No Known  Date of admission: 10/1/2023    Subjective     Interval History:   Follow-up acute kidney injury    The patient is feeling much better, denies any chest pain or shortness of air, no orthopnea or PND, no nausea or vomiting, no abdominal pain, no dysuria or gross hematuria.  Her potassium down to 5.2,  I requested that the pharmacy removed the subcu heparin but they opted to give less.    Review of Systems:   As noted above    Objective     Vitals:   Temp:  [98 øF (36.7 øC)-98.6 øF (37 øC)] 98 øF (36.7 øC)  Heart Rate:  [] 99  Resp:  [18-22] 18  BP: (157-180)/(81-96) 171/92    Intake/Output Summary (Last 24 hours) at 10/12/2023 0746  Last data filed at 10/11/2023 1700  Gross per 24 hour   Intake 1560 ml   Output 400 ml   Net 1160 ml       Physical Exam:    General Appearance: Morbidly obese, awake and alert, chronically ill, no acute distress  Skin: warm and dry  HEENT: Oral mucosa is normal  Neck: Difficult to assess due to body habitus  Lungs: Clear to auscultation, breathing effort not labored  Heart: RRR, normal S1 and S2, no rub  Abdomen: soft, no guarding, protuberant, normoactive bowel  : No palpable bladder  Extremities: no edema, cyanosis or clubbing  Neuro: Normal speech and mental status and moving all extremities    Scheduled Meds:     amoxicillin, 1,000 mg, Oral, Q12H  heparin (porcine), 5,000 Units, Subcutaneous, Q8H  insulin lispro, 2-7 Units, Subcutaneous, 4x Daily AC & at Bedtime  labetalol, 200 mg, Oral, Q12H  pantoprazole, 40 mg, Oral, Q AM  saccharomyces boulardii, 500 mg, Oral, BID  senna-docusate sodium, 2 tablet, Oral, BID  sevelamer, 1,600 mg, Oral, TID With Meals  sodium chloride, 10 mL, Intravenous, Q12H      IV Meds:   Pharmacy Consult,           Results Reviewed:   I have personally reviewed the results from the  time of this admission to 10/12/2023 07:46 EDT     Results from last 7 days   Lab Units 10/12/23  0505 10/11/23  1412 10/11/23  0509 10/07/23  0620 10/06/23  0519   SODIUM mmol/L 137 136 137   < > 136   POTASSIUM mmol/L 5.2 5.3* 5.8*   < > 4.8   CHLORIDE mmol/L 101 102 103   < > 98   CO2 mmol/L 23.6 24.0 24.0   < > 22.5   BUN mg/dL 62* 70* 74*   < > 66*   CREATININE mg/dL 4.04* 4.66* 4.88*   < > 6.22*   CALCIUM mg/dL 9.7 9.6 9.6   < > 9.3   BILIRUBIN mg/dL  --   --   --   --  0.2   ALK PHOS U/L  --   --   --   --  97   ALT (SGPT) U/L  --   --   --   --  18   AST (SGOT) U/L  --   --   --   --  20   GLUCOSE mg/dL 99 141* 101*   < > 120*    < > = values in this interval not displayed.       Estimated Creatinine Clearance: 21.9 mL/min (A) (by C-G formula based on SCr of 4.04 mg/dL (H)).    Results from last 7 days   Lab Units 10/12/23  0505 10/11/23  1412 10/11/23  0509 10/10/23  0515 10/09/23  0512   MAGNESIUM mg/dL  --   --  2.2 2.3 2.6   PHOSPHORUS mg/dL 5.6* 5.8* 6.6* 7.2* 10.0*       Results from last 7 days   Lab Units 10/12/23  0505 10/11/23  0509 10/10/23  0515 10/09/23  0512 10/08/23  0628 10/07/23  0620 10/06/23  0519   URIC ACID mg/dL 11.4* 11.0* 11.4* 12.5* 11.9* 11.5* 11.9*       Results from last 7 days   Lab Units 10/12/23  0505 10/10/23  0515 10/09/23  0512 10/08/23  0628 10/07/23  0620   WBC 10*3/mm3 8.82 9.18 8.50 8.80 9.62   HEMOGLOBIN g/dL 9.4* 9.5* 8.8* 8.9* 9.8*   PLATELETS 10*3/mm3 401 358 321 303 342             Assessment / Plan     ASSESSMENT:  Acute kidney injury associated with sudden correction of her hypertension initially also possible component of contrast-induced nephropathy because patient had CTA on 10/1/2023.  Creatinine down to 4.04, the potassium down to 5.2 and the patient appears to be euvolemic potassium 5.2 the remainders of the electrolyte within acceptable.  Also her proteinuria improved down from 2111 mg/g to 581.4 mg/g  New diagnosis of hypertension, blood pressure with  acceptable control  New diagnosis of diabetes mellitus type 2  Super morbid obesity  Anemia, the etiology not very clear, most likely patient have iron deficiency iron saturation 13% but her TIBC is on the low side, suggestive of anemia of chronic disease too.  Hemoglobin today is 9.4  Severe hyperphosphatemia associated with acute kidney injury phosphorus is down to 5.6, was started on sevelamer  Hyperuricemia, uric acid is 11.4., associate with acute kidney, will continue to monitor  Hyperkalemia associate with subcu heparin which should block aldosterone lead to hyperkalemia potassium improved with low K  PLAN:  Continue the same treatment  Instruct the patient about 2 g sodium and 2 g potassium diet  Give another dose of Lokelma today  Patient could be discharged from the renal standpoint I will follow her very closely in my office    I discussed the case with the patient and she voiced good understanding  I reviewed the chart and other providers notes, I reviewed imaging and lab data.  Copied text in this note has been reviewed and is accurate as of 10/12/23.       Thank you for involving us in the care of Nay Gonzalez.  Please feel free to call with any questions.    Iain Owens MD  10/12/23  07:46 EDT    [facility]  [phone]    Please note that portions of this note were completed with a voice recognition program.

## 2023-10-12 NOTE — DISCHARGE SUMMARY
Patient Name: Nay Gonzalez  : 1970  MRN: [id number]    Date of Admission: 10/1/2023  Date of Discharge:  10/12/2023  Primary Care Physician: Chelle Ryder MD      Chief Complaint:   Shortness of Breath and Fever      Discharge Diagnoses     Active Hospital Problems    Diagnosis  POA    **PNA (pneumonia) [J18.9]  Yes    ELLY (acute kidney injury) [N17.9]  Unknown    Obesity, morbid, BMI 50 or higher [E66.01]  Unknown    Anemia [D64.9]  Unknown    Sepsis [A41.9]  Unknown    Type 2 diabetes mellitus with hyperglycemia [E11.65]  Unknown    Acute respiratory failure with hypoxia [J96.01]  Unknown    Hypertensive urgency [I16.0]  Unknown    Abscess of upper lobe of right lung with pneumonia [J85.1]  Yes      Resolved Hospital Problems    Diagnosis Date Resolved POA    Hyperkalemia [E87.5] 10/03/2023 Unknown        Hospital Course       This is a 53-year-old female with a history of morbid obesity, new diabetes of type 2 diabetes and hypertension who presents to the hospital with cough shortness of breath and fevers and is found to have a right upper lobe pulmonary abscess      Acute hypoxic and hypercapnic respiratory failure with right upper lobe abscess/Empyema    Pulmonology and cardiothoracic surgery was consulted.  Patient was initiated on broad-spectrum antibiotics, was intubated on 10/3/2023, chest tube placed on 10/3/2023, successfully extubated and chest tube has been removed on 10/8/2023.  Cultures grew Streptococcus dysgalactiae.  Infection disease was consulteed.  Treated with IV ceftriaxone up until discharge, transition to  p.o. amoxicillin 1 g every 12 hours for 6-week course with stop date of 2023  For 6 weeks course..  Will need to follow-up with infectious disease in 2 weeks, and medication dose/frequency may need to be readjusted if renal function improves.  Will need follow-up with thoracic surgery in 2 to 3 weeks.        Hypertension: New diagnosis..  Discharged on  labetalol 200 mg twice daily, BP improving but not at goal.  May need additional regimen if remains elevated.  Patient to follow-up with nephrology on Monday.      Diabetes mellitus, hemoglobin A1c 7.5 , new diagnosis.  Discharged home on glipizide 5 mg daily.  Follow-up PCP for further management and initiation of additional regimen as indicated.        Acute kidney injury,-felt to be multifactorial etiology, creatinine peaked at 7.25, has been improving slowly afterwards with creatinine of 4.04 prior to discharge.  Patient to follow-up with urology on Monday, will need repeat BMP to monitor renal function.       Anemia, iron panel consistent with anemia of chronic disease, however iron saturation of 13 underlying iron deficiency cannot be excluded..  Will need repeat iron panel once acute illness resolves in approximately 4 weeks.  Hemoglobin remained stable around 9.5.  Repeat CBC       Morbid obesity, complicates all aspects of above care lifestyle modifications, diet and weight loss  were strongly encouraged.      Probable sleep apnea: Pulmonology evaluated, was initiated on BiPAP at night for highly suspected sleep apnea, however has not been used consistently by patient.  Will need outpatient PSG  for probable CRYSTAL.      At the time of discharge patient was told to take all medications as prescribed, keep all follow-up appointments, and call their doctor or return to the hospital with any worsening or concerning symptoms.                Day of Discharge     Subjective:  Sitting up in the recliner.  No new events overnight.  Denies complaints.  No shortness of breath, chest pain or palpitations, fevers, chills.  Reports feeling better.    Physical Exam:  Temp:  [98 øF (36.7 øC)-98.6 øF (37 øC)] 98.6 øF (37 øC)  Heart Rate:  [] 99  Resp:  [18-22] 18  BP: (157-180)/(81-95) 180/95  Body mass index is 52.45 kg/mý.  Physical Exam      General: Alert and oriented x3, no acute distress, obese   HEENT:  Normocephalic, atraumatic  CV: Regular rate and rhythm, no murmurs rubs or gallops  Lungs: Decreased, no wheezing, nonlabored breathing  Abdomen: Soft, nontender, nondistended  Extremities: No significant peripheral edema , no cyanosis         Consultants     Consult Orders (all) (From admission, onward)       Start     Ordered    10/03/23 0956  Inpatient Nephrology Consult  Once        Specialty:  Nephrology  Provider:  Iain Owens MD    10/03/23 0955    10/03/23 0918  Inpatient Nephrology Consult  Once        Specialty:  Nephrology  Provider:  Iain Owens MD    10/03/23 0918    10/02/23 1151  Inpatient Case Management  Consult  Once        Provider:  (Not yet assigned)    10/02/23 1151    10/02/23 1014  Inpatient General Surgery Consult  Once        Specialty:  General Surgery  Provider:  Ilya Briones MD    10/02/23 1013    10/02/23 1012  Inpatient General Surgery Consult  Once,   Status:  Canceled        Specialty:  General Surgery  Provider:  (Not yet assigned)    10/02/23 1012    10/02/23 0916  Inpatient Vascular Surgery Consult  Once,   Status:  Canceled        Specialty:  Vascular Surgery  Provider:  Chetna Ferreira MD    10/02/23 0917    10/01/23 1543  Inpatient Infectious Diseases Consult  Once        Specialty:  Infectious Diseases  Provider:  Aakash Azul MD    10/01/23 1543    10/01/23 1534  Inpatient Thoracic Surgery Consult  Once        Specialty:  Thoracic Surgery  Provider:  Lonnie Brantley MD    10/01/23 1533    10/01/23 1511  Inpatient Pulmonology Consult  Once        Specialty:  Pulmonary Disease  Provider:  Christian Bernal MD    10/01/23 1510    10/01/23 1238  [facility] (on-call MD unless specified) Details  Once        Specialty:  Hospitalist  Provider:  Juan Elena MD    10/01/23 1237                  Procedures     * Surgery not found *      Imaging Results (All)       Procedure Component Value Units Date/Time    XR Chest 1 View  [[id number]] Collected: 10/12/23 0825     Updated: 10/12/23 0829    Narrative:      XR CHEST 1 VW-     Clinical: Abscess, chest tube management     COMPARISON examination dated 10/11/2023     FINDINGS: The cardiomediastinal silhouette is stable. Pulmonary  opacities similar to previous examination. No new airspace disease has  developed. No vascular congestion seen. Persistent blunting of the right  costophrenic angle suggest trace pleural effusion as before.     CONCLUSION: Stable chest     This report was finalized on 10/12/2023 8:26 AM by Dr. Marc Vaughn M.D on Workstation: [id number]       XR Chest 1 View [[id number]] Collected: 10/11/23 0807     Updated: 10/11/23 0818    Narrative:      XR CHEST 1 VW-10/11/2023     HISTORY: Chest tube management.     Heart size is at the upper limits of normal. Lungs are underinflated  with some vascular crowding. There is some vague increased density in  the right upper lobe which is consistent with atelectasis or developing  pneumonia. Blunting of the right costophrenic sulcus is seen and this  could partially be artifact though there could be some minimal pleural  effusion.     No significant pneumothorax is seen.       Impression:      1. Borderline cardiomegaly.  2. Vague increased density in the right upper lobe could represent some  developing atelectasis and/or pneumonia.  3. There may be some minimal pleural fluid blunting the right  costophrenic sulcus.        This report was finalized on 10/11/2023 8:15 AM by Dr. Jeramy Marquez M.D on Workstation: [id number]       XR Chest 1 View [[id number]] Collected: 10/10/23 0738     Updated: 10/10/23 0744    Narrative:      XR CHEST 1 VW-     Clinical: Pulmonary abscess     COMPARISON 10/9/2023     FINDINGS: Diminished airspace disease at the right lung base. The  remainder is similar to the previous examination. The cardiomediastinal  silhouette is stable. No other interval change has occurred.     CONCLUSION: Improvement at  the right lung base since 10/9/2023.     This report was finalized on 10/10/2023 7:40 AM by Dr. Marc Vaughn M.D on Workstation: [id number]       XR Chest 1 View [[id number]] Collected: 10/09/23 0705     Updated: 10/09/23 0709    Narrative:      XR CHEST 1 VW-     Clinical: Pulmonary abscess, chest tube management     COMPARISON examination 10/8/2023     FINDINGS: Right-sided chest tube removed in the interim. No pneumothorax  has developed. The cardiomediastinal silhouette is stable. Airspace  disease similar to the prior examination.     CONCLUSION: Right-sided chest tube removed, no other interval change.     This report was finalized on 10/9/2023 7:06 AM by Dr. Marc Vaughn M.D  on Workstation: [id number]       XR Chest 1 View [[id number]] Collected: 10/08/23 1546     Updated: 10/08/23 1614    Narrative:      PORTABLE CHEST X-RAY     HISTORY: Chest tube management, pulmonary abscess.     TECHNIQUE: Portable chest x-ray is correlated with chest x-ray from  yesterday morning.     FINDINGS: There is a pigtail drain over the right hemithorax which  appears unchanged. The cardiac silhouette is enlarged but no different  than before. Central pulmonary vasculature is engorged but also  unchanged. There appears to be a small volume right pleural effusion  layering posteriorly. No pneumothorax or pneumonia.       Impression:      1. No change compared with the x-ray yesterday.   2. There is cardiomegaly with pulmonary vascular engorgement and small  posteriorly layering right pleural effusion. Right chest tube remains in  place; no pneumothorax.     This report was finalized on 10/8/2023 4:11 PM by Dr. Henrique Lino M.D on Workstation: [id number]       XR Chest 1 View [[id number]] Collected: 10/07/23 1400     Updated: 10/07/23 1725    Narrative:      PORTABLE CHEST     HISTORY: Chest tube management.     COMPARISON: Chest x-ray 10/06/2023.     FINDINGS: The study is hampered by the patient's body habitus. A  single  portable view of the chest demonstrates moderate cardiomegaly. A small  bore chest tube is noted, unchanged in position. A loculated pleural  fluid collection was present superolaterally on the right on the CT  examination of the chest performed on 10/05/2023. This is less prominent  as compared to the chest x-ray of 10/06/2023. There is no evidence of  pneumothorax. Mild atelectasis/infiltrate at the right lung base is  noted, unchanged.     This report was finalized on 10/7/2023 5:21 PM by Dr. Juan Britton M.D  on Workstation: [id number]       XR Chest 1 View [[id number]] Collected: 10/06/23 0720     Updated: 10/06/23 0725    Narrative:      XR CHEST 1 VW-10/6/2023     HISTORY: Evaluate for effusion.     Heart size is mildly enlarged. There is moderate ill-defined increased  density in the right hemithorax which is likely combination of pleural  fluid a portion of which appears loculated in the right upper hemithorax  as well as some atelectasis and infiltrate. Lungs are slightly  underinflated. No significant pneumothorax is seen.     Pigtail catheter terminates over the right upper to mid hemithorax.  There has been removal of a left central venous catheter since  yesterday's study.       Impression:      1. There has been apparent removal of the left central line since  yesterday.  2. Chest otherwise appears largely unchanged.  3. Please see additional dictation for the CT of the chest from  yesterday.     This report was finalized on 10/6/2023 7:22 AM by Dr. Jeramy Marquez M.D on Workstation: [id number]       CT Chest Without Contrast Diagnostic [[id number]] Collected: 10/05/23 1441     Updated: 10/05/23 1448    Narrative:      CT CHEST WITHOUT CONTRAST     HISTORY: Percutaneous drainage right upper lobe abscess.        TECHNIQUE: Radiation dose reduction techniques were utilized, including  automated exposure control and exposure modulation based on body size.   3 mm images were obtained through the  chest without the administration  of IV contrast. Lack of IV contrast limits evaluation of mediastinal,  hilar, and vascular structures. Sensitivity for underlying lesions and  infection decreased. Artifact from motion/overlying soft  tissue/monitors.     COMPARISON: Chest x-ray 10/05/2023, 10/03/2023, 10/01/2023     FINDINGS:     Thoracic inlet: Subcutaneous soft tissue edema/stranding in the ventral  chest similar in appearance to the prior.     Heart and great vessels: The heart size is stable. No significant  pericardial effusion. Vascular evaluation limited without IV contrast.  Enlargement of the main pulmonary artery which can be seen with  pulmonary arterial  hypertension.     Lymphatics: Mediastinal adenopathy similar to the prior. Hilar  evaluation limited without contrast. Conglomerate of right paratracheal  adenopathy approximates 2.3 cm in short axis. Recommend attention on  follow-up after treatment.     Lung parenchyma and pleural space: Interval decrease in size of a  loculated collection in the anterior right upper lobe status post  percutaneous drainage. The residual collection approximates 2.9 x 9.5 cm  (previously 6.3 x 13.1 cm when remeasured in the same plane). Persistent  right lower lobe airspace disease favoring atelectasis. Improved  aeration of the right upper lobe with patchy reticulonodular opacities.  Increasing left lower lobe atelectasis.     Upper abdomen: Enteric tube in the stomach incompletely imaged.  Morphologic changes of chronic liver disease. Mild splenomegaly.  Visualized bowel loops are normal in caliber. Follow-up imaging of the  abdomen can be performed as clinically indicated.     Bone windows: Multilevel degenerative changes.          Impression:      Impression:     1.  Pigtail catheter within a smaller loculated fluid collection in the  anterior right hemithorax approximating 2.9 x 9.5 cm (previously 6.3 x  13.1 cm). Recommend correlation with drain output and  continued  conservative surveillance.  2.  Improved aeration of the right upper lobe with patchy  reticulonodular and groundglass opacities likely infectious or  inflammatory. Increasing left lower lobe airspace disease.  3.  Please see above for additional findings/recommendations.     This report was finalized on 10/5/2023 2:45 PM by Dr. Chelsey Warner M.D on  Workstation: [id number]       XR Chest 1 View [[id number]] Collected: 10/05/23 0945     Updated: 10/05/23 0956    Narrative:      XR CHEST 1 VW-     Clinical: Evaluate for pleural effusion     COMPARISON 10/4/2023     FINDINGS: There has been interval removal of the endotracheal tube.  Feeding tube, central venous catheter and right-sided chest tube in  position as before. No pneumothorax. There is cardiac enlargement.  Mediastinum stable. The left lung appears clear. Vague opacity right  upper lung zone which probably represents residual loculated pleural  fluid. There is vague opacity noted at the right lung base which could  represent infiltrate/consolidation and/or pleural fluid. The remainder  is unremarkable.     This report was finalized on 10/5/2023 9:53 AM by Dr. Marc Vaughn M.D  on Workstation: [id number]       XR Chest 1 View [[id number]] Collected: 10/04/23 0548     Updated: 10/04/23 0554    Narrative:      SINGLE VIEW OF THE CHEST     HISTORY: Respiratory failure     COMPARISON: October 30,023     FINDINGS:  Weighted enteric feeding tube extends into the upper abdomen. Otherwise,  tubes and lines are stable. Left internal jugular vein central venous  line is again noted with the tip extending towards the right innominate  vein. A pigtail pleural drainage catheter has been placed. No  pneumothorax is seen dense consolidation within the right upper lobe  slightly improved, but overall opacification of the right hemithorax is  worsened. There is some persistent left basilar consolidation. No  definite effusion is seen.       Impression:      There has  been some increased aeration within the right upper lobe when  compared to the prior study. However, overall opacification of the right  hemithorax has worsened.     This report was finalized on 10/4/2023 5:50 AM by Dr. Zulay Paredes M.D on Workstation: [id number]       US Renal Bilateral [[id number]] Collected: 10/03/23 1641     Updated: 10/03/23 1647    Narrative:      RENAL ULTRASOUND     HISTORY: Acute kidney injury     COMPARISON: None     TECHNIQUE: Grayscale, color Doppler images of the kidneys and bladder  were obtained.     FINDINGS:  Grayscale and color Doppler images of the kidneys and bladder were  obtained.     The right kidney measures 14.2 cm in length.     The left kidney measures 14.1 cm in length.     The kidneys demonstrate normal echogenicity and cortical thickness.  There is no hydronephrosis. Trace bilateral perinephric fluid is  present. Irregular, bulging contour of the midpole of the left kidney  measuring up to approximately 3.5 cm.     The bladder is unremarkable.     Visualized portions of the aorta and IVC are normal in caliber and  appearance. Findings suggestive of multiple uterine fibroids on limited  evaluation of the uterus.       Impression:      1.  Trace bilateral perinephric fluid. There is prominence of the  midpole of the left kidney which has a slightly bulged contour,  measuring approximate 3.5 cm. Underlying renal mass cannot be excluded  and further evaluation with CT versus MRI of the abdomen with and  without contrast is recommended to better characterize.  2.  There appear to be multiple fibroids in the uterus; however findings  are incompletely evaluated. Findings can be better characterized with  pelvic sonogram if clinically indicated.  3.  Other findings as above.           This report was finalized on 10/3/2023 4:44 PM by Dr. Diego Brown M.D  on Workstation: [id number]       CT Guided Chest Tube [[id number]] Collected: 10/03/23 1543     Updated: 10/03/23 1556     Narrative:      CT GUIDED CHEST TUBE     HISTORY:  Right lung abscess.     COMPARISON: CTA 10/1/2023     PROCEDURE: After informed consent was obtained and documented, the  patient was placed on the CT table in supine position. A verbal time out  was performed with appropriate identifiers confirmed. An ICU nurse was  continuously present for monitoring. A preliminary partial scan of the  thorax was obtained; in the interval there was improvement of anterior  right upper lobe aeration but increase of right pleural effusion. A  route was planned for catheter placement and an appropriate skin site  chosen. This site was then prepped and draped in the usual sterile  manner and the soft tissues anesthetized with 1% lidocaine down to the  pleura. A needle was placed into the apparent right upper lobe abscess.  A wire was advanced through the needle which was then removed. After  serial dilation, a 14 Chinese skater drainage catheter was placed into  the fluid collection, coiled, and locked. The catheter was secured with  Dermabond reinforced suture and stay-fix dressing and attached to an  atrium device. 60 mL purulent appearing fluid were manually removed with  specimen sent to lab. The patient was stable throughout, there were no  immediate complications. Radiation dose reduction techniques were  utilized, including automated exposure control and exposure modulation  based on body size.          Impression:      Successful 14 Chinese chest tube placement for right lung abscess as  described above.        This report was finalized on 10/3/2023 3:51 PM by Dr. Jon Singer M.D  on Workstation: [id number]       XR Chest 1 View [[id number]] Collected: 10/03/23 0720     Updated: 10/03/23 0725    Narrative:      XR CHEST 1 VW-10/30/2023     HISTORY: Intubation.     Endotracheal tube is seen with its tip overlying the trachea at the  level of the aortic arch. Heart size is mildly enlarged. There is  wedge-shaped moderately large  area of dense consolidation/atelectasis in  the right upper lobe. Left lung appears clear.     Left-sided central venous catheter is again seen but it courses across  the midline terminating in the right apex possibly in the right  subclavian and/or back into the lower portion of the right internal  jugular vein near its junction with the right subclavian vein.     No pneumothorax is seen.       Impression:      1. Endotracheal tube in good position as described.  2. Dense consolidation/atelectasis of the right upper lobe is again  seen.  3. Again, the left side central venous catheter courses across the  midline terminating in the right apex as described.  4. No pneumothorax is seen.     This report was finalized on 10/3/2023 7:22 AM by Dr. Jeramy Marquez M.D.       XR Chest Post CVA Port [[id number]] Collected: 10/02/23 1501     Updated: 10/02/23 1510    Narrative:      CHEST SINGLE VIEW     HISTORY: CVL placement.     COMPARISON: CT angiogram chest 10/01/2023, AP chest 10/01/2023.     FINDINGS: There has been placement of a left central venous catheter  that appears to extend into the left brachiocephalic vein and toward the  IVC and the tip extends superiorly in the region of the SVC and right  brachiocephalic vein. There is abnormal right upper lobe opacity similar  to yesterday's exam and there is a right pleural effusion. No  pneumothorax is evident.       Impression:      Left central venous catheter tip is directed superiorly in  the region of the SVC and right brachiocephalic vein. No pneumothorax.     This report was finalized on 10/2/2023 3:07 PM by Dr. Jeffery Fung M.D.       CT Angiogram Chest [[id number]] Collected: 10/01/23 1521     Updated: 10/01/23 1543    Narrative:      EXAM: CT ANGIOGRAM CHEST-     HISTORY: Hypoxia with abnormal chest x-ray.     TECHNIQUE: Radiation dose reduction techniques were utilized, including  automated exposure control and exposure modulation based on body size.    3 mm images were obtained through the chest after the administration of  IV contrast.     COMPARISON: Same day chest radiograph        FINDINGS: Organized right upper lobe 11.8 x 7.2 cm air and fluid  collection (series 5 image 50). Collection causes compressive  atelectasis of the adjacent right upper lobe. Fluid and locules of air  extending to the anterior chest wall anterior to the first right rib and  anterior superior to the medial right clavicle (series 5/image 30,  series 5/image 15, series 7/image 73 and dedicated screenshot). No  osteolysis of the adjacent osseous structures. Small dependent right  pleural effusion. Right axillary, mediastinal and right hilar lymph  nodes are likely reactive. Reference 1.9 cm low right paratracheal lymph  node (series 5/image 39). Additional reference 1.3 cm right axillary  lymph node (series 5/image 45). No pneumomediastinum or organized  mediastinal fluid collection.     Dilated main pulmonary artery (36 mm). Contrast bolus timing limits  evaluation of the pulmonary arteries. No central pulmonary embolus  (main, interlobar and proximal segmental). Nondilated thoracic aorta.  Nondilated esophagus.       Impression:      1. Large (12 cm) right upper lobe pulmonary abscess with extension into  the anterior right chest wall as detailed above.  2. Small right pleural effusion.  3. Mediastinal, right hilar and right axillary lymphadenopathy is likely  reactive.  4. No central pulmonary embolus. Contrast bolus timing limits evaluation  of the more distal pulmonary arteries.  5. Dilated main pulmonary artery (36 mm).     Above findings were discussed with Dr. Elena at 3:30 p.m. on  10/1/2023.     This report was finalized on 10/1/2023 3:40 PM by Dr. Aniceto Epstein M.D.       XR Chest 1 View [[id number]] Collected: 10/01/23 1115     Updated: 10/01/23 1221    Narrative:      CHEST SINGLE VIEW     HISTORY: Shortness of breath and fever.     COMPARISON: None     FINDINGS:  There is essentially complete opacity of the right upper lobe  in the upper half of the right thorax extending above the minor fissure.  This is consistent with large airspace disease/infiltrate. Mass or  obstructing mass with postobstructive atelectasis or infiltrate also in  the differential diagnosis. Findings need short interval follow-up or  further evaluation with chest CT. The heart size is upper normal. Left  lung appears clear.          Impression:      Opacification of the upper half of the right thorax most  likely due to a large right upper lobe infiltrate and this could be  correlated with clinical data. Mass or obstructing mass with  postobstructive atelectasis or infiltrate are also in the differential  diagnosis and recommend short interval follow-up to evaluate for  resolution or CT.     This report was finalized on 10/1/2023 12:18 PM by Dr. Jeffery Fung M.D.               Results for orders placed during the hospital encounter of 10/01/23    Adult Transthoracic Echo Complete w/ Color, Spectral and Contrast if Necessary Per Protocol    Interpretation Summary    Left ventricular systolic function is normal. Left ventricular ejection fraction appears to be 61 - 65%.    Left ventricular wall thickness is consistent with mild to moderate concentric hypertrophy.    Left ventricular diastolic function is consistent with (grade II w/high LAP) pseudonormalization.    Normal right ventricular cavity size and systolic function noted.    The left atrial cavity is mildly dilated.    There is mild to moderate mitral annular calcification. There is mild to moderate calcification of the mitral valve leaflets    Mild mitral valve regurgitation is present    Insufficient TR velocity profile to estimate the right ventricular systolic pressure.    There is no evidence of pericardial effusion.    Pertinent Labs     Results from last 7 days   Lab Units 10/12/23  0505 10/10/23  0515 10/09/23  0512  "10/08/23  0628   WBC 10*3/mm3 8.82 9.18 8.50 8.80   HEMOGLOBIN g/dL 9.4* 9.5* 8.8* 8.9*   PLATELETS 10*3/mm3 401 358 321 303     Results from last 7 days   Lab Units 10/12/23  0505 10/11/23  1412 10/11/23  0509 10/10/23  0515   SODIUM mmol/L 137 136 137 135*   POTASSIUM mmol/L 5.2 5.3* 5.8* 5.1   CHLORIDE mmol/L 101 102 103 101   CO2 mmol/L 23.6 24.0 24.0 21.0*   BUN mg/dL 62* 70* 74* 81*   CREATININE mg/dL 4.04* 4.66* 4.88* 6.03*   GLUCOSE mg/dL 99 141* 101* 119*   Estimated Creatinine Clearance: 21.9 mL/min (A) (by C-G formula based on SCr of 4.04 mg/dL (H)).  Results from last 7 days   Lab Units 10/12/23  0505 10/11/23  1412 10/11/23  0509 10/10/23  0515 10/07/23  0620 10/06/23  0519   ALBUMIN g/dL 3.1* 3.1* 3.1* 3.0*   < > 2.9*   BILIRUBIN mg/dL  --   --   --   --   --  0.2   ALK PHOS U/L  --   --   --   --   --  97   AST (SGOT) U/L  --   --   --   --   --  20   ALT (SGPT) U/L  --   --   --   --   --  18    < > = values in this interval not displayed.     Results from last 7 days   Lab Units 10/12/23  0505 10/11/23  1412 10/11/23  0509 10/10/23  0515 10/09/23  0512 10/08/23  0628   CALCIUM mg/dL 9.7 9.6 9.6 9.3 8.7 8.8   ALBUMIN g/dL 3.1* 3.1* 3.1* 3.0* 2.8* 2.7*   MAGNESIUM mg/dL  --   --  2.2 2.3 2.6 2.6   PHOSPHORUS mg/dL 5.6* 5.8* 6.6* 7.2* 10.0* 11.6*         Results from last 7 days   Lab Units 10/12/23  0505 10/08/23  0628 10/07/23  2002   CREATININE UR mg/dL  --   --  108.7   PROTEIN TOTAL URINE mg/dL  --   --  63.2   URIC ACID mg/dL 11.4*   < >  --    PROT/CREAT RATIO UR mg/G Crea  --   --  581.4*    < > = values in this interval not displayed.         Invalid input(s): \"LDLCALC\"          Test Results Pending at Discharge     Pending Labs       Order Current Status    AFB Culture - Body Fluid, Pleural Cavity Preliminary result            Discharge Details        Discharge Medications        New Medications        Instructions Start Date   amoxicillin 500 MG capsule  Commonly known as: AMOXIL   Take 2 " capsules by mouth Every 12 (Twelve) Hours. Indications: lung abscess      Florastor 250 MG capsule  Generic drug: saccharomyces boulardii   500 mg, Oral, 2 Times Daily      glipizide 5 MG tablet  Commonly known as: Glucotrol   5 mg, Oral, Every Morning Before Breakfast      labetalol 200 MG tablet  Commonly known as: NORMODYNE   200 mg, Oral, Every 12 Hours Scheduled      pantoprazole 40 MG EC tablet  Commonly known as: PROTONIX   40 mg, Oral, Every Early Morning   Start Date: October 13, 2023     sevelamer 800 MG tablet  Commonly known as: RENVELA   1,600 mg, Oral, 3 Times Daily With Meals               No Known Allergies    [place] [facility] [place] Diet:  Diet Order   Procedures    Diet: Diabetic Diets, Renal Diets; Consistent Carbohydrate; Low Potassium, Low Phosphorus, Low Sodium (2-3g); Texture: Regular Texture (IDDSI 7); Fluid Consistency: Thin (IDDSI 0)       Discharge Activity:       CODE STATUS:    Code Status and Medical Interventions:   Ordered at: 10/01/23 1256     Code Status (Patient has no pulse and is not breathing):    CPR (Attempt to Resuscitate)     Medical Interventions (Patient has pulse or is breathing):    Full Support       Future Appointments   Date Time Provider Department Center   10/16/2023  3:00 PM Jm Ryder MD [facility] [place]   10/27/2023 10:40 AM Aakash Azul MD [facility]   11/2/2023  9:15 AM Lonnie Brantley MD [facility]   11/12/2023  7:30 PM  [facility]   11/14/2023  1:30 PM Aakash Azul MD [facility]     Additional Instructions for the Follow-ups that You Need to Schedule       Ambulatory Referral to Home Health (Hospital)   As directed      Face to Face Visit Date: 10/12/2023   Follow-up provider for Plan of Care?: I treated the patient in an acute care facility and will not continue treatment after discharge.   Follow-up provider: JM RYDER [[id number]]   Reason/Clinical  Findings: Pneumonia with abscess, acute kidney injury   Describe mobility limitations that make leaving home difficult: Generalized weakness   Nursing/Therapeutic Services Requested: Physical Therapy   PT orders: Transfer training Strengthening Therapeutic exercise   Frequency: 1 Week 1               Follow-up Information       Aakash Azul MD. Go on 10/27/2023.    Specialty: Infectious Diseases  Why: Spoke to Pablo at the [facility]. Appointment set for 10:40am on 10/27/2023. Please arrive 15 minutes early for appointment.   Contact information:  [address]  [facility] [address]  [place] [address]  [phone]               Iain Owens MD. Go on 11/1/2023.    Specialty: Nephrology  Why: Spoke to [place]. Appointment set for 3:15pm on 11/01/2023.   Contact information:  [address]  [facility] [address]  [facility] [id number] [phone]               Christian Bernal MD. Go on 10/17/2023.    Specialties: Pulmonary Disease, Sleep Medicine  Why: Spoke to Devika at the [facility]. Appointment set for 9:15am on 10/17/2023. Please arrive for appointment 15 minutes early.   Contact information:  [address]  [facility] [address]  [place] [address]  [phone]               Dr Aleksey Corbett. Go on 10/16/2023.    Why: Appointment set for 3:00pm on 10/16/2023.     Basic metabolic panel (BMP) to monitor renal function             Lonnie Brantley MD Follow up in 2 week(s).    Specialty: Thoracic Surgery  Contact information:  [address] [facility] [address]  [place] [address]  [phone]                             Additional Instructions for the Follow-ups that You Need to Schedule       Ambulatory Referral to Home Health (Hospital)   As directed      Face to Face Visit Date: 10/12/2023   Follow-up provider for Plan of Care?: I treated the patient in an acute care facility and will not continue treatment after discharge.   Follow-up provider: JM YUN [[id number]]   Reason/Clinical Findings: Pneumonia with abscess, acute  kidney injury   Describe mobility limitations that make leaving home difficult: Generalized weakness   Nursing/Therapeutic Services Requested: Physical Therapy   PT orders: Transfer training Strengthening Therapeutic exercise   Frequency: 1 Week 1            Time Spent on Discharge:  Greater than 30 minutes      Gene Miller MD  [facility]  10/12/23  13:25 EDT

## 2023-10-12 NOTE — OUTREACH NOTE
Prep Survey      Flowsheet Row Responses   [facility] facility patient discharged from? [facility]   Is LACE score < 7 ? No   Eligibility [facility]   Date of Admission 10/01/23   Date of Discharge 10/12/23   Discharge Disposition Home-Health Care Sv   Discharge diagnosis (pneumonia)   Does the patient have one of the following disease processes/diagnoses(primary or secondary)? Pneumonia   Does the patient have Home health ordered? Yes   What is the Home health agency?  she has a new PCP appt on Monday and plans to request HH order at that time   Is there a DME ordered? Yes   What DME was ordered? walker provided by Nancy   Prep survey completed? Yes            ASHLEY RODRIGUEZ - Registered Nurse

## 2023-10-12 NOTE — CASE MANAGEMENT/SOCIAL WORK
Case Management Discharge Note      Final Note: Pt discharging home with walker provided by Lesley.  Pt states she has a new PCP appt on Monday and plans to request HH order at that time.  No further needs identified.  MARION Cabezas RN    Provided Post Acute Provider List?: N/A  Provided Post Acute Provider Quality & Resource List?: N/A    Selected Continued Care - Admitted Since 10/1/2023       Destination    No services have been selected for the patient.                Durable Medical Equipment    No services have been selected for the patient.                Dialysis/Infusion    No services have been selected for the patient.                Home Medical Care    No services have been selected for the patient.                Therapy    No services have been selected for the patient.                Community Resources    No services have been selected for the patient.                Community & DME    No services have been selected for the patient.                         Final Discharge Disposition Code: 01 - home or self-care

## 2023-10-12 NOTE — DISCHARGE PLACEMENT REQUEST
"Carlos Nay M \"BOBY\" (53 y.o. Female)       Date of Birth   1970    Social Security Number       Address   [address] [place] [address]    Home Phone   [phone]    MRN   [id number]       [facility]    Marital Status                               Admission Date   10/1/23    Admission Type   Emergency    Admitting Provider   Juan Elena MD    Attending Provider   Gene Miller MD    Department, Room/Bed   [address], [address]/1       Discharge Date       Dis[place] Dis[place] [facility] [place]ination                                 Attending Provider: Gene Miller MD    Allergies: No Known Allergies    Isolation: None   Infection: None   Code Status: CPR    Ht: 160 cm (62.99\")   Wt: 134 kg (296 lb)    Admission Cmt: None   Principal Problem: PNA (pneumonia) [J18.9]                   Active Insurance as of 10/1/2023       Primary Coverage       Payor Plan Insurance Group Employer/Plan Group    [facility] [employer] [facility] PPO [id number]       Payor Plan Address Payor Plan Phone Number Payor Plan Fax Number Effective Dates    [address] [phone]  12/11/2022 - None Entered    [facility] [id number]         Subscriber Name Subscriber Birth Date Member ID       GODWIN FAGAN 5/31/1972 [id number]                     Emergency Contacts        (Rel.) Home Phone Work Phone Mobile Phone    Godwin Fagan (Spouse) -- -- [phone]    Hoa Duncan (Mother) -- -- [phone]              Emergency Contact Information       Name Relation Home Work Mobile    Godwin Fagan Spouse   [phone]    Hoa Duncan Mother   [phone]          "

## 2023-10-13 ENCOUNTER — TRANSITIONAL CARE MANAGEMENT TELEPHONE ENCOUNTER (OUTPATIENT)
Dept: CALL CENTER | Facility: HOSPITAL | Age: 53
End: 2023-10-13
Payer: COMMERCIAL

## 2023-10-13 NOTE — OUTREACH NOTE
Call Center TCM Note      Flowsheet Row Responses   [facility] patient discharged from? [facility]   Does the patient have one of the following disease processes/diagnoses(primary or secondary)? Pneumonia   TCM attempt successful? Yes   Call start time 1003   Call end time 1007   Discharge diagnosis (pneumonia)   Meds reviewed with patient/caregiver? Yes   Is the patient having any side effects they believe may be caused by any medication additions or changes? No   Does the patient have all medications ordered at discharge? Yes   Is the patient taking all medications as directed (includes completed medication regime)? Yes   Comments New patient appt with PCP Dr. Ryder for 10/16   Does the patient have an appointment with their PCP within 7-14 days of discharge? Yes   What is the Home health agency?  States she will discuss HH need at new PCP appt on 10/16   Has all DME been delivered? Yes   DME comments Walker   Pulse Ox monitoring None   Psychosocial issues? No   Did the patient receive a copy of their discharge instructions? Yes   Nursing interventions Reviewed instructions with patient   What is the patient's perception of their health status since discharge? Improving   Nursing Interventions Nurse provided patient education   Is the patient/caregiver able to teach back the hierarchy of who to call/visit for symptoms/problems? PCP, Specialist, Home health nurse, Urgent Care, ED, 911 Yes   Is the patient/caregiver able to teach back signs and symptoms of worsening condition: Fever/chills, Shortness of breath, Chest pain   Is the patient/caregiver able to teach back importance of completing antibiotic course of treatment? Yes   TCM call completed? Yes   Wrap up additional comments Doing well, no questions, confirmed new patient appt with PCP for 10/16.   Call end time 1007   Would this patient benefit from a Referral to Amb Social Work? No   Is the patient interested in additional calls from an ambulatory case  manager? No            Lucinda Kirkpatrick RN    10/13/2023, 10:07 EDT

## 2023-10-16 ENCOUNTER — OFFICE VISIT (OUTPATIENT)
Dept: FAMILY MEDICINE CLINIC | Facility: CLINIC | Age: 53
End: 2023-10-16
Payer: COMMERCIAL

## 2023-10-16 VITALS
BODY MASS INDEX: 51.91 KG/M2 | HEIGHT: 63 IN | HEART RATE: 100 BPM | WEIGHT: 293 LBS | DIASTOLIC BLOOD PRESSURE: 84 MMHG | OXYGEN SATURATION: 95 % | RESPIRATION RATE: 16 BRPM | SYSTOLIC BLOOD PRESSURE: 152 MMHG | TEMPERATURE: 98 F

## 2023-10-16 DIAGNOSIS — J85.1 ABSCESS OF UPPER LOBE OF RIGHT LUNG WITH PNEUMONIA: ICD-10-CM

## 2023-10-16 DIAGNOSIS — I10 HYPERTENSION, UNSPECIFIED TYPE: ICD-10-CM

## 2023-10-16 DIAGNOSIS — Z09 HOSPITAL DISCHARGE FOLLOW-UP: ICD-10-CM

## 2023-10-16 DIAGNOSIS — D64.9 ANEMIA, UNSPECIFIED TYPE: ICD-10-CM

## 2023-10-16 DIAGNOSIS — Z00.00 ENCOUNTER FOR MEDICAL EXAMINATION TO ESTABLISH CARE: Primary | ICD-10-CM

## 2023-10-16 DIAGNOSIS — J18.9 PNEUMONIA OF RIGHT LOWER LOBE DUE TO INFECTIOUS ORGANISM: ICD-10-CM

## 2023-10-16 DIAGNOSIS — E66.01 CLASS 3 SEVERE OBESITY WITH SERIOUS COMORBIDITY AND BODY MASS INDEX (BMI) OF 50.0 TO 59.9 IN ADULT, UNSPECIFIED OBESITY TYPE: ICD-10-CM

## 2023-10-16 DIAGNOSIS — N17.9 AKI (ACUTE KIDNEY INJURY): ICD-10-CM

## 2023-10-16 DIAGNOSIS — E11.65 TYPE 2 DIABETES MELLITUS WITH HYPERGLYCEMIA, WITHOUT LONG-TERM CURRENT USE OF INSULIN: ICD-10-CM

## 2023-10-16 NOTE — PROGRESS NOTES
"Chief Complaint  Establish Care (And a hospital follow up. )    Subjective        Nay Gonzalez presents to [facility]  History of Present Illness  For establishing medical care  Hospital follow up  Stated feeling much better than before.  Feel anxious in doctor office appointment.  Review of system is negative for fever, headache, chest pain, shortness of breath, palpitation, nausea, vomiting, any recent change in bladder habits.      Date of Admission: 10/1/2023  Date of Discharge:  10/12/2023  Objective   Vital Signs:  /84   Pulse 100   Temp 98 °F (36.7 °C)   Resp 16   Ht 160 cm (62.99\")   Wt (!) 138 kg (305 lb)   SpO2 95%   BMI 54.04 kg/m²   Estimated body mass index is 54.04 kg/m² as calculated from the following:    Height as of this encounter: 160 cm (62.99\").    Weight as of this encounter: 138 kg (305 lb).       Class 3 Severe Obesity (BMI >=40). Obesity-related health conditions include the following: obstructive sleep apnea, hypertension, diabetes mellitus, and dyslipidemias. Obesity is newly identified. BMI is is above average; BMI management plan is completed. We discussed portion control and increasing exercise.      Physical Exam  Constitutional:       Appearance: She is obese.   HENT:      Mouth/Throat:      Mouth: Mucous membranes are moist.      Pharynx: Oropharynx is clear.   Eyes:      Extraocular Movements: Extraocular movements intact.      Conjunctiva/sclera: Conjunctivae normal.      Pupils: Pupils are equal, round, and reactive to light.   Cardiovascular:      Rate and Rhythm: Normal rate.      Pulses: Normal pulses.      Heart sounds: Normal heart sounds.   Pulmonary:      Effort: Pulmonary effort is normal.      Comments: Decreased breath sounds b/l   Abdominal:      General: Bowel sounds are normal.      Palpations: Abdomen is soft.   Musculoskeletal:      Cervical back: Normal range of motion.   Skin:     General: Skin is warm. "   Neurological:      Mental Status: She is alert.   Psychiatric:      Comments: Very emotional , crying. Accompanied with her mother        Result Review :  The following data was reviewed by: Chelle Ryder MD on 10/16/2023:  CMP          10/10/2023    05:15 10/11/2023    05:09 10/11/2023    14:12 10/12/2023    05:05   CMP   Glucose 119  101  141  99    BUN 81  74  70  62    Creatinine 6.03  4.88  4.66  4.04    EGFR 7.9  10.1  10.7  12.7    Sodium 135  137  136  137    Potassium 5.1  5.8  5.3  5.2    Chloride 101  103  102  101    Calcium 9.3  9.6  9.6  9.7    Albumin 3.0  3.1  3.1  3.1    BUN/Creatinine Ratio 13.4  15.2  15.0  15.3    Anion Gap 13.0  10.0  10.0  12.4      CBC          10/9/2023    05:12 10/10/2023    05:15 10/12/2023    05:05   CBC   WBC 8.50  9.18  8.82    RBC 3.31  3.56  3.52    Hemoglobin 8.8  9.5  9.4    Hematocrit 27.4  30.0  29.3    MCV 82.8  84.3  83.2    MCH 26.6  26.7  26.7    MCHC 32.1  31.7  32.1    RDW 15.1  15.5  15.3    Platelets 321  358  401      Lipid Panel          10/5/2023    04:06   Lipid Panel   Triglycerides 338        Data reviewed : Recent hospitalization notes Patient went to hospital for SOB and fever and was admitted for Acute resp failure s/p intubated and extubated. Seen by ID specialist, thoracic surgeon for Right Upper Lung abscess. Also got new ly diagnosed with HTN, DM during hospitalization.. Also seen by nephrologist due to ARF secondary to sepsis.             Assessment and Plan   Diagnoses and all orders for this visit:    1. Encounter for medical examination to establish care (Primary)    2. Hospital discharge follow-up    3. Type 2 diabetes mellitus with hyperglycemia, without long-term current use of insulin    4. Class 3 severe obesity with serious comorbidity and body mass index (BMI) of 50.0 to 59.9 in adult, unspecified obesity type  Comments:  BMI 54.04, TSh Wnl, advise weight loss, exercise, more fruits and vegetables in diet    5. ELLY (acute kidney  injury)    6. Anemia, unspecified type    7. Abscess of upper lobe of right lung with pneumonia    8. Pneumonia of right lower lobe due to infectious organism    9. Hypertension, unspecified type           #Hospital follow up  and establishing care visit  # RUL abscess/Empyema   S/p chest tube ( Intubated and extubated due to resp failure), has appointment with ID and thoracic surgeon. On antibiotics.  # hypertension  On labetalol 200 mg Po BID, not at goal    # Type 2 DM  A1c 7.5  On glipizide , continue same     # ELLY  Creatinine labs pt will do tomorrow for nephrology appointment  Advise pt to avoid any NSAIDS for now.  Encourage to avoid any dehydration.    #Morbid obesity  # CRYSTAL suspected  Pt has appointment with sleep medicine    RTC in month for follow up      Follow Up   No follow-ups on file.  Patient was given instructions and counseling regarding her condition or for health maintenance advice. Please see specific information pulled into the AVS if appropriate.

## 2023-10-16 NOTE — PAYOR COMM NOTE
"Anushkaaveryалександр Nay MERVIN \"BOBY\" (53 y.o. Female)     PLEASE SEE ATTACHED FOR DC NOTICE    REF #   [id number]     THANK YOU  JOCELYN GEORGE RN/ DEPT  [facility]   [phone]  [phone]        Date of Birth   1970    Social Security Number       Address   [address] [place] [address]    Home Phone   [phone]    MRN   [id number]       [facility]    Marital Status                               Admission Date   10/1/23    Admission Type   Emergency    Admitting Provider   Juan Elena MD    Attending Provider       Department, Room/Bed   [facility], E567/       Discharge Date   10/12/2023    Discharge Disposition   Home-Health Care [facility] [place]    Discharge Destination                                 Attending Provider: (none)   Allergies: No Known Allergies    Isolation: None   Infection: None   Code Status: Prior    Ht: 160 cm (62.99\")   Wt: 134 kg (296 lb)    Admission Cmt: None   Principal Problem: PNA (pneumonia) [J18.9]                   Active Insurance as of 10/1/2023       Primary Coverage       Payor Plan Insurance Group Employer/Plan Group    [employer]HE[employer] BLUE CROSS ANTHEM BLUE CROSS BLUE SHIELD PPO [id number]       Payor Plan Address Payor Plan Phone Number Payor Plan Fax Number Effective Dates    [address] [phone]  2022 - None Entered    [facility] [id number]         Subscriber Name Subscriber Birth Date Member ID       YUE FAGAN 1971 [id number]                     Emergency Contacts        (Rel.) Home Phone Work Phone Mobile Phone    Yue Fagan (Spouse) -- -- [phone]    Hoa Duncan (Mother) -- -- [phone]              [place]: [facility] [id number]  Tax ID [id number]     Discharge Summary        Gene Miller MD at 10/12/23 1325              Patient Name: Nay Gibsonалександр  : 1970  MRN: [id number]    Date of Admission: 10/1/2023  Date of Discharge:  " 10/12/2023  Primary Care Physician: Chelle Ryder MD      Chief Complaint:   Shortness of Breath and Fever      Discharge Diagnoses     Active Hospital Problems    Diagnosis  POA    **PNA (pneumonia) [J18.9]  Yes    ELLY (acute kidney injury) [N17.9]  Unknown    Obesity, morbid, BMI 50 or higher [E66.01]  Unknown    Anemia [D64.9]  Unknown    Sepsis [A41.9]  Unknown    Type 2 diabetes mellitus with hyperglycemia [E11.65]  Unknown    Acute respiratory failure with hypoxia [J96.01]  Unknown    Hypertensive urgency [I16.0]  Unknown    Abscess of upper lobe of right lung with pneumonia [J85.1]  Yes      Resolved Hospital Problems    Diagnosis Date Resolved POA    Hyperkalemia [E87.5] 10/03/2023 Unknown        Hospital Course       This is a 53-year-old female with a history of morbid obesity, new diabetes of type 2 diabetes and hypertension who presents to the hospital with cough shortness of breath and fevers and is found to have a right upper lobe pulmonary abscess      Acute hypoxic and hypercapnic respiratory failure with right upper lobe abscess/Empyema    Pulmonology and cardiothoracic surgery was consulted.  Patient was initiated on broad-spectrum antibiotics, was intubated on 10/3/2023, chest tube placed on 10/3/2023, successfully extubated and chest tube has been removed on 10/8/2023.  Cultures grew Streptococcus dysgalactiae.  Infection disease was consulteed.  Treated with IV ceftriaxone up until discharge, transition to  p.o. amoxicillin 1 g every 12 hours for 6-week course with stop date of 11/14/2023  For 6 weeks course..  Will need to follow-up with infectious disease in 2 weeks, and medication dose/frequency may need to be readjusted if renal function improves.  Will need follow-up with thoracic surgery in 2 to 3 weeks.        Hypertension: New diagnosis..  Discharged on labetalol 200 mg twice daily, BP improving but not at goal.  May need additional regimen if remains elevated.  Patient to follow-up  with nephrology on Monday.      Diabetes mellitus, hemoglobin A1c 7.5 , new diagnosis.  Discharged home on glipizide 5 mg daily.  Follow-up PCP for further management and initiation of additional regimen as indicated.        Acute kidney injury,-felt to be multifactorial etiology, creatinine peaked at 7.25, has been improving slowly afterwards with creatinine of 4.04 prior to discharge.  Patient to follow-up with urology on Monday, will need repeat BMP to monitor renal function.       Anemia, iron panel consistent with anemia of chronic disease, however iron saturation of 13 underlying iron deficiency cannot be excluded..  Will need repeat iron panel once acute illness resolves in approximately 4 weeks.  Hemoglobin remained stable around 9.5.  Repeat CBC       Morbid obesity, complicates all aspects of above care lifestyle modifications, diet and weight loss  were strongly encouraged.      Probable sleep apnea: Pulmonology evaluated, was initiated on BiPAP at night for highly suspected sleep apnea, however has not been used consistently by patient.  Will need outpatient PSG  for probable CRYSTAL.      At the time of discharge patient was told to take all medications as prescribed, keep all follow-up appointments, and call their doctor or return to the hospital with any worsening or concerning symptoms.                Day of Discharge     Subjective:  Sitting up in the recliner.  No new events overnight.  Denies complaints.  No shortness of breath, chest pain or palpitations, fevers, chills.  Reports feeling better.    Physical Exam:  Temp:  [98 øF (36.7 øC)-98.6 øF (37 øC)] 98.6 øF (37 øC)  Heart Rate:  [] 99  Resp:  [18-22] 18  BP: (157-180)/(81-95) 180/95  Body mass index is 52.45 kg/mý.  Physical Exam      General: Alert and oriented x3, no acute distress, obese   HEENT: Normocephalic, atraumatic  CV: Regular rate and rhythm, no murmurs rubs or gallops  Lungs: Decreased, no wheezing, nonlabored  breathing  Abdomen: Soft, nontender, nondistended  Extremities: No significant peripheral edema , no cyanosis         Consultants     Consult Orders (all) (From admission, onward)       Start     Ordered    10/03/23 0956  Inpatient Nephrology Consult  Once        Specialty:  Nephrology  Provider:  Iain Owens MD    10/03/23 0955    10/03/23 0918  Inpatient Nephrology Consult  Once        Specialty:  Nephrology  Provider:  Iain Owens MD    10/03/23 0918    10/02/23 1151  Inpatient Case Management  Consult  Once        Provider:  (Not yet assigned)    10/02/23 1151    10/02/23 1014  Inpatient General Surgery Consult  Once        Specialty:  General Surgery  Provider:  Ilya Briones MD    10/02/23 1013    10/02/23 1012  Inpatient General Surgery Consult  Once,   Status:  Canceled        Specialty:  General Surgery  Provider:  (Not yet assigned)    10/02/23 1012    10/02/23 0916  Inpatient Vascular Surgery Consult  Once,   Status:  Canceled        Specialty:  Vascular Surgery  Provider:  Chetna Ferreira MD    10/02/23 0917    10/01/23 1543  Inpatient Infectious Diseases Consult  Once        Specialty:  Infectious Diseases  Provider:  Aakash Azul MD    10/01/23 1543    10/01/23 1534  Inpatient Thoracic Surgery Consult  Once        Specialty:  Thoracic Surgery  Provider:  Lonnie Brantley MD    10/01/23 1533    10/01/23 1511  Inpatient Pulmonology Consult  Once        Specialty:  Pulmonary Disease  Provider:  Christian Bernal MD    10/01/23 1510    10/01/23 1238  [facility] (on-call MD unless specified) Details  Once        Specialty:  Hospitalist  Provider:  Juan Elena MD    10/01/23 1237                  Procedures     * Surgery not found *      Imaging Results (All)       Procedure Component Value Units Date/Time    XR Chest 1 View [[id number]] Collected: 10/12/23 0825     Updated: 10/12/23 0829    Narrative:      XR CHEST 1 VW-     Clinical: Abscess,  chest tube management     COMPARISON examination dated 10/11/2023     FINDINGS: The cardiomediastinal silhouette is stable. Pulmonary  opacities similar to previous examination. No new airspace disease has  developed. No vascular congestion seen. Persistent blunting of the right  costophrenic angle suggest trace pleural effusion as before.     CONCLUSION: Stable chest     This report was finalized on 10/12/2023 8:26 AM by Dr. Marc Vaughn M.D on Workstation: [id number]       XR Chest 1 View [[id number]] Collected: 10/11/23 0807     Updated: 10/11/23 0818    Narrative:      XR CHEST 1 VW-10/11/2023     HISTORY: Chest tube management.     Heart size is at the upper limits of normal. Lungs are underinflated  with some vascular crowding. There is some vague increased density in  the right upper lobe which is consistent with atelectasis or developing  pneumonia. Blunting of the right costophrenic sulcus is seen and this  could partially be artifact though there could be some minimal pleural  effusion.     No significant pneumothorax is seen.       Impression:      1. Borderline cardiomegaly.  2. Vague increased density in the right upper lobe could represent some  developing atelectasis and/or pneumonia.  3. There may be some minimal pleural fluid blunting the right  costophrenic sulcus.        This report was finalized on 10/11/2023 8:15 AM by Dr. Jeramy Marquez M.D on Workstation: [id number]       XR Chest 1 View [[id number]] Collected: 10/10/23 0738     Updated: 10/10/23 0744    Narrative:      XR CHEST 1 VW-     Clinical: Pulmonary abscess     COMPARISON 10/9/2023     FINDINGS: Diminished airspace disease at the right lung base. The  remainder is similar to the previous examination. The cardiomediastinal  silhouette is stable. No other interval change has occurred.     CONCLUSION: Improvement at the right lung base since 10/9/2023.     This report was finalized on 10/10/2023 7:40 AM by Dr. Marc Vaughn M.D on  Workstation: [id number]       XR Chest 1 View [[id number]] Collected: 10/09/23 0705     Updated: 10/09/23 0709    Narrative:      XR CHEST 1 VW-     Clinical: Pulmonary abscess, chest tube management     COMPARISON examination 10/8/2023     FINDINGS: Right-sided chest tube removed in the interim. No pneumothorax  has developed. The cardiomediastinal silhouette is stable. Airspace  disease similar to the prior examination.     CONCLUSION: Right-sided chest tube removed, no other interval change.     This report was finalized on 10/9/2023 7:06 AM by Dr. Marc Vaughn M.D  on Workstation: [id number]       XR Chest 1 View [[id number]] Collected: 10/08/23 1546     Updated: 10/08/23 1614    Narrative:      PORTABLE CHEST X-RAY     HISTORY: Chest tube management, pulmonary abscess.     TECHNIQUE: Portable chest x-ray is correlated with chest x-ray from  yesterday morning.     FINDINGS: There is a pigtail drain over the right hemithorax which  appears unchanged. The cardiac silhouette is enlarged but no different  than before. Central pulmonary vasculature is engorged but also  unchanged. There appears to be a small volume right pleural effusion  layering posteriorly. No pneumothorax or pneumonia.       Impression:      1. No change compared with the x-ray yesterday.   2. There is cardiomegaly with pulmonary vascular engorgement and small  posteriorly layering right pleural effusion. Right chest tube remains in  place; no pneumothorax.     This report was finalized on 10/8/2023 4:11 PM by Dr. Henrique Lino M.D on Workstation: [id number]       XR Chest 1 View [[id number]] Collected: 10/07/23 1400     Updated: 10/07/23 1725    Narrative:      PORTABLE CHEST     HISTORY: Chest tube management.     COMPARISON: Chest x-ray 10/06/2023.     FINDINGS: The study is hampered by the patient's body habitus. A single  portable view of the chest demonstrates moderate cardiomegaly. A small  bore chest tube is noted, unchanged in position. A  loculated pleural  fluid collection was present superolaterally on the right on the CT  examination of the chest performed on 10/05/2023. This is less prominent  as compared to the chest x-ray of 10/06/2023. There is no evidence of  pneumothorax. Mild atelectasis/infiltrate at the right lung base is  noted, unchanged.     This report was finalized on 10/7/2023 5:21 PM by Dr. Juan Britton M.D  on Workstation: [id number]       XR Chest 1 View [[id number]] Collected: 10/06/23 0720     Updated: 10/06/23 0725    Narrative:      XR CHEST 1 VW-10/6/2023     HISTORY: Evaluate for effusion.     Heart size is mildly enlarged. There is moderate ill-defined increased  density in the right hemithorax which is likely combination of pleural  fluid a portion of which appears loculated in the right upper hemithorax  as well as some atelectasis and infiltrate. Lungs are slightly  underinflated. No significant pneumothorax is seen.     Pigtail catheter terminates over the right upper to mid hemithorax.  There has been removal of a left central venous catheter since  yesterday's study.       Impression:      1. There has been apparent removal of the left central line since  yesterday.  2. Chest otherwise appears largely unchanged.  3. Please see additional dictation for the CT of the chest from  yesterday.     This report was finalized on 10/6/2023 7:22 AM by Dr. Jeramy Marquez M.D on Workstation: [id number]       CT Chest Without Contrast Diagnostic [[id number]] Collected: 10/05/23 1441     Updated: 10/05/23 1448    Narrative:      CT CHEST WITHOUT CONTRAST     HISTORY: Percutaneous drainage right upper lobe abscess.        TECHNIQUE: Radiation dose reduction techniques were utilized, including  automated exposure control and exposure modulation based on body size.   3 mm images were obtained through the chest without the administration  of IV contrast. Lack of IV contrast limits evaluation of mediastinal,  hilar, and vascular  structures. Sensitivity for underlying lesions and  infection decreased. Artifact from motion/overlying soft  tissue/monitors.     COMPARISON: Chest x-ray 10/05/2023, 10/03/2023, 10/01/2023     FINDINGS:     Thoracic inlet: Subcutaneous soft tissue edema/stranding in the ventral  chest similar in appearance to the prior.     Heart and great vessels: The heart size is stable. No significant  pericardial effusion. Vascular evaluation limited without IV contrast.  Enlargement of the main pulmonary artery which can be seen with  pulmonary arterial  hypertension.     Lymphatics: Mediastinal adenopathy similar to the prior. Hilar  evaluation limited without contrast. Conglomerate of right paratracheal  adenopathy approximates 2.3 cm in short axis. Recommend attention on  follow-up after treatment.     Lung parenchyma and pleural space: Interval decrease in size of a  loculated collection in the anterior right upper lobe status post  percutaneous drainage. The residual collection approximates 2.9 x 9.5 cm  (previously 6.3 x 13.1 cm when remeasured in the same plane). Persistent  right lower lobe airspace disease favoring atelectasis. Improved  aeration of the right upper lobe with patchy reticulonodular opacities.  Increasing left lower lobe atelectasis.     Upper abdomen: Enteric tube in the stomach incompletely imaged.  Morphologic changes of chronic liver disease. Mild splenomegaly.  Visualized bowel loops are normal in caliber. Follow-up imaging of the  abdomen can be performed as clinically indicated.     Bone windows: Multilevel degenerative changes.          Impression:      Impression:     1.  Pigtail catheter within a smaller loculated fluid collection in the  anterior right hemithorax approximating 2.9 x 9.5 cm (previously 6.3 x  13.1 cm). Recommend correlation with drain output and continued  conservative surveillance.  2.  Improved aeration of the right upper lobe with patchy  reticulonodular and groundglass  opacities likely infectious or  inflammatory. Increasing left lower lobe airspace disease.  3.  Please see above for additional findings/recommendations.     This report was finalized on 10/5/2023 2:45 PM by Dr. Chelsey Warner M.D on  Workstation: [id number]       XR Chest 1 View [[id number]] Collected: 10/05/23 0945     Updated: 10/05/23 0956    Narrative:      XR CHEST 1 VW-     Clinical: Evaluate for pleural effusion     COMPARISON 10/4/2023     FINDINGS: There has been interval removal of the endotracheal tube.  Feeding tube, central venous catheter and right-sided chest tube in  position as before. No pneumothorax. There is cardiac enlargement.  Mediastinum stable. The left lung appears clear. Vague opacity right  upper lung zone which probably represents residual loculated pleural  fluid. There is vague opacity noted at the right lung base which could  represent infiltrate/consolidation and/or pleural fluid. The remainder  is unremarkable.     This report was finalized on 10/5/2023 9:53 AM by Dr. Marc Vaughn M.D  on Workstation: [id number]       XR Chest 1 View [[id number]] Collected: 10/04/23 0548     Updated: 10/04/23 0554    Narrative:      SINGLE VIEW OF THE CHEST     HISTORY: Respiratory failure     COMPARISON: October 30,023     FINDINGS:  Weighted enteric feeding tube extends into the upper abdomen. Otherwise,  tubes and lines are stable. Left internal jugular vein central venous  line is again noted with the tip extending towards the right innominate  vein. A pigtail pleural drainage catheter has been placed. No  pneumothorax is seen dense consolidation within the right upper lobe  slightly improved, but overall opacification of the right hemithorax is  worsened. There is some persistent left basilar consolidation. No  definite effusion is seen.       Impression:      There has been some increased aeration within the right upper lobe when  compared to the prior study. However, overall opacification of the  right  hemithorax has worsened.     This report was finalized on 10/4/2023 5:50 AM by Dr. Zulay Paredes M.D on Workstation: [id number]       US Renal Bilateral [[id number]] Collected: 10/03/23 1641     Updated: 10/03/23 1647    Narrative:      RENAL ULTRASOUND     HISTORY: Acute kidney injury     COMPARISON: None     TECHNIQUE: Grayscale, color Doppler images of the kidneys and bladder  were obtained.     FINDINGS:  Grayscale and color Doppler images of the kidneys and bladder were  obtained.     The right kidney measures 14.2 cm in length.     The left kidney measures 14.1 cm in length.     The kidneys demonstrate normal echogenicity and cortical thickness.  There is no hydronephrosis. Trace bilateral perinephric fluid is  present. Irregular, bulging contour of the midpole of the left kidney  measuring up to approximately 3.5 cm.     The bladder is unremarkable.     Visualized portions of the aorta and IVC are normal in caliber and  appearance. Findings suggestive of multiple uterine fibroids on limited  evaluation of the uterus.       Impression:      1.  Trace bilateral perinephric fluid. There is prominence of the  midpole of the left kidney which has a slightly bulged contour,  measuring approximate 3.5 cm. Underlying renal mass cannot be excluded  and further evaluation with CT versus MRI of the abdomen with and  without contrast is recommended to better characterize.  2.  There appear to be multiple fibroids in the uterus; however findings  are incompletely evaluated. Findings can be better characterized with  pelvic sonogram if clinically indicated.  3.  Other findings as above.           This report was finalized on 10/3/2023 4:44 PM by Dr. Diego Brown M.D  on Workstation: [id number]       CT Guided Chest Tube [[id number]] Collected: 10/03/23 1543     Updated: 10/03/23 1554    Narrative:      CT GUIDED CHEST TUBE     HISTORY:  Right lung abscess.     COMPARISON: CTA 10/1/2023     PROCEDURE: After  informed consent was obtained and documented, the  patient was placed on the CT table in supine position. A verbal time out  was performed with appropriate identifiers confirmed. An ICU nurse was  continuously present for monitoring. A preliminary partial scan of the  thorax was obtained; in the interval there was improvement of anterior  right upper lobe aeration but increase of right pleural effusion. A  route was planned for catheter placement and an appropriate skin site  chosen. This site was then prepped and draped in the usual sterile  manner and the soft tissues anesthetized with 1% lidocaine down to the  pleura. A needle was placed into the apparent right upper lobe abscess.  A wire was advanced through the needle which was then removed. After  serial dilation, a 14 Tajik skater drainage catheter was placed into  the fluid collection, coiled, and locked. The catheter was secured with  Dermabond reinforced suture and stay-fix dressing and attached to an  atrium device. 60 mL purulent appearing fluid were manually removed with  specimen sent to lab. The patient was stable throughout, there were no  immediate complications. Radiation dose reduction techniques were  utilized, including automated exposure control and exposure modulation  based on body size.          Impression:      Successful 14 Tajik chest tube placement for right lung abscess as  described above.        This report was finalized on 10/3/2023 3:51 PM by Dr. Jon Singer M.D  on Workstation: [id number]       XR Chest 1 View [[id number]] Collected: 10/03/23 0720     Updated: 10/03/23 0725    Narrative:      XR CHEST 1 VW-10/30/2023     HISTORY: Intubation.     Endotracheal tube is seen with its tip overlying the trachea at the  level of the aortic arch. Heart size is mildly enlarged. There is  wedge-shaped moderately large area of dense consolidation/atelectasis in  the right upper lobe. Left lung appears clear.     Left-sided central venous  catheter is again seen but it courses across  the midline terminating in the right apex possibly in the right  subclavian and/or back into the lower portion of the right internal  jugular vein near its junction with the right subclavian vein.     No pneumothorax is seen.       Impression:      1. Endotracheal tube in good position as described.  2. Dense consolidation/atelectasis of the right upper lobe is again  seen.  3. Again, the left side central venous catheter courses across the  midline terminating in the right apex as described.  4. No pneumothorax is seen.     This report was finalized on 10/3/2023 7:22 AM by Dr. Jeramy Marquez M.D.       XR Chest Post CVA Port [[id number]] Collected: 10/02/23 1501     Updated: 10/02/23 1510    Narrative:      CHEST SINGLE VIEW     HISTORY: CVL placement.     COMPARISON: CT angiogram chest 10/01/2023, AP chest 10/01/2023.     FINDINGS: There has been placement of a left central venous catheter  that appears to extend into the left brachiocephalic vein and toward the  IVC and the tip extends superiorly in the region of the SVC and right  brachiocephalic vein. There is abnormal right upper lobe opacity similar  to yesterday's exam and there is a right pleural effusion. No  pneumothorax is evident.       Impression:      Left central venous catheter tip is directed superiorly in  the region of the SVC and right brachiocephalic vein. No pneumothorax.     This report was finalized on 10/2/2023 3:07 PM by Dr. Jeffery Fugn M.D.       CT Angiogram Chest [[id number]] Collected: 10/01/23 1521     Updated: 10/01/23 1543    Narrative:      EXAM: CT ANGIOGRAM CHEST-     HISTORY: Hypoxia with abnormal chest x-ray.     TECHNIQUE: Radiation dose reduction techniques were utilized, including  automated exposure control and exposure modulation based on body size.   3 mm images were obtained through the chest after the administration of  IV contrast.     COMPARISON: Same day chest  radiograph        FINDINGS: Organized right upper lobe 11.8 x 7.2 cm air and fluid  collection (series 5 image 50). Collection causes compressive  atelectasis of the adjacent right upper lobe. Fluid and locules of air  extending to the anterior chest wall anterior to the first right rib and  anterior superior to the medial right clavicle (series 5/image 30,  series 5/image 15, series 7/image 73 and dedicated screenshot). No  osteolysis of the adjacent osseous structures. Small dependent right  pleural effusion. Right axillary, mediastinal and right hilar lymph  nodes are likely reactive. Reference 1.9 cm low right paratracheal lymph  node (series 5/image 39). Additional reference 1.3 cm right axillary  lymph node (series 5/image 45). No pneumomediastinum or organized  mediastinal fluid collection.     Dilated main pulmonary artery (36 mm). Contrast bolus timing limits  evaluation of the pulmonary arteries. No central pulmonary embolus  (main, interlobar and proximal segmental). Nondilated thoracic aorta.  Nondilated esophagus.       Impression:      1. Large (12 cm) right upper lobe pulmonary abscess with extension into  the anterior right chest wall as detailed above.  2. Small right pleural effusion.  3. Mediastinal, right hilar and right axillary lymphadenopathy is likely  reactive.  4. No central pulmonary embolus. Contrast bolus timing limits evaluation  of the more distal pulmonary arteries.  5. Dilated main pulmonary artery (36 mm).     Above findings were discussed with Dr. Elena at 3:30 p.m. on  10/1/2023.     This report was finalized on 10/1/2023 3:40 PM by Dr. Aniceto Epstein M.D.       XR Chest 1 View [[id number]] Collected: 10/01/23 1115     Updated: 10/01/23 1221    Narrative:      CHEST SINGLE VIEW     HISTORY: Shortness of breath and fever.     COMPARISON: None     FINDINGS: There is essentially complete opacity of the right upper lobe  in the upper half of the right thorax extending above the  minor fissure.  This is consistent with large airspace disease/infiltrate. Mass or  obstructing mass with postobstructive atelectasis or infiltrate also in  the differential diagnosis. Findings need short interval follow-up or  further evaluation with chest CT. The heart size is upper normal. Left  lung appears clear.          Impression:      Opacification of the upper half of the right thorax most  likely due to a large right upper lobe infiltrate and this could be  correlated with clinical data. Mass or obstructing mass with  postobstructive atelectasis or infiltrate are also in the differential  diagnosis and recommend short interval follow-up to evaluate for  resolution or CT.     This report was finalized on 10/1/2023 12:18 PM by Dr. Jeffery Fung M.D.               Results for orders placed during the hospital encounter of 10/01/23    Adult Transthoracic Echo Complete w/ Color, Spectral and Contrast if Necessary Per Protocol    Interpretation Summary    Left ventricular systolic function is normal. Left ventricular ejection fraction appears to be 61 - 65%.    Left ventricular wall thickness is consistent with mild to moderate concentric hypertrophy.    Left ventricular diastolic function is consistent with (grade II w/high LAP) pseudonormalization.    Normal right ventricular cavity size and systolic function noted.    The left atrial cavity is mildly dilated.    There is mild to moderate mitral annular calcification. There is mild to moderate calcification of the mitral valve leaflets    Mild mitral valve regurgitation is present    Insufficient TR velocity profile to estimate the right ventricular systolic pressure.    There is no evidence of pericardial effusion.    Pertinent Labs     Results from last 7 days   Lab Units 10/12/23  0505 10/10/23  0515 10/09/23  0512 10/08/23  0628   WBC 10*3/mm3 8.82 9.18 8.50 8.80   HEMOGLOBIN g/dL 9.4* 9.5* 8.8* 8.9*   PLATELETS 10*3/mm3 401 358 321 303     Results  "from last 7 days   Lab Units 10/12/23  0505 10/11/23  1412 10/11/23  0509 10/10/23  0515   SODIUM mmol/L 137 136 137 135*   POTASSIUM mmol/L 5.2 5.3* 5.8* 5.1   CHLORIDE mmol/L 101 102 103 101   CO2 mmol/L 23.6 24.0 24.0 21.0*   BUN mg/dL 62* 70* 74* 81*   CREATININE mg/dL 4.04* 4.66* 4.88* 6.03*   GLUCOSE mg/dL 99 141* 101* 119*   Estimated Creatinine Clearance: 21.9 mL/min (A) (by C-G formula based on SCr of 4.04 mg/dL (H)).  Results from last 7 days   Lab Units 10/12/23  0505 10/11/23  1412 10/11/23  0509 10/10/23  0515 10/07/23  0620 10/06/23  0519   ALBUMIN g/dL 3.1* 3.1* 3.1* 3.0*   < > 2.9*   BILIRUBIN mg/dL  --   --   --   --   --  0.2   ALK PHOS U/L  --   --   --   --   --  97   AST (SGOT) U/L  --   --   --   --   --  20   ALT (SGPT) U/L  --   --   --   --   --  18    < > = values in this interval not displayed.     Results from last 7 days   Lab Units 10/12/23  0505 10/11/23  1412 10/11/23  0509 10/10/23  0515 10/09/23  0512 10/08/23  0628   CALCIUM mg/dL 9.7 9.6 9.6 9.3 8.7 8.8   ALBUMIN g/dL 3.1* 3.1* 3.1* 3.0* 2.8* 2.7*   MAGNESIUM mg/dL  --   --  2.2 2.3 2.6 2.6   PHOSPHORUS mg/dL 5.6* 5.8* 6.6* 7.2* 10.0* 11.6*         Results from last 7 days   Lab Units 10/12/23  0505 10/08/23  0628 10/07/23  2002   CREATININE UR mg/dL  --   --  108.7   PROTEIN TOTAL URINE mg/dL  --   --  63.2   URIC ACID mg/dL 11.4*   < >  --    PROT/CREAT RATIO UR mg/G Crea  --   --  581.4*    < > = values in this interval not displayed.         Invalid input(s): \"LDLCALC\"          Test Results Pending at Discharge     Pending Labs       Order Current Status    AFB Culture - Body Fluid, Pleural Cavity Preliminary result            Discharge Details        Discharge Medications        New Medications        Instructions Start Date   amoxicillin 500 MG capsule  Commonly known as: AMOXIL   Take 2 capsules by mouth Every 12 (Twelve) Hours. Indications: lung abscess      Florastor 250 MG capsule  Generic drug: saccharomyces boulardii   " 500 mg, Oral, 2 Times Daily      glipizide 5 MG tablet  Commonly known as: Glucotrol   5 mg, Oral, Every Morning Before Breakfast      labetalol 200 MG tablet  Commonly known as: NORMODYNE   200 mg, Oral, Every 12 Hours Scheduled      pantoprazole 40 MG EC tablet  Commonly known as: PROTONIX   40 mg, Oral, Every Early Morning   Start Date: October 13, 2023     sevelamer 800 MG tablet  Commonly known as: RENVELA   1,600 mg, Oral, 3 Times Daily With Meals               No Known Allergies    Discharge Disposition:  [place] [facility] [place]      Discharge Diet:  Diet Order   Procedures    Diet: Diabetic Diets, Renal Diets; Consistent Carbohydrate; Low Potassium, Low Phosphorus, Low Sodium (2-3g); Texture: Regular Texture (IDDSI 7); Fluid Consistency: Thin (IDDSI 0)       Discharge Activity:       CODE STATUS:    Code Status and Medical Interventions:   Ordered at: 10/01/23 1256     Code Status (Patient has no pulse and is not breathing):    CPR (Attempt to Resuscitate)     Medical Interventions (Patient has pulse or is breathing):    Full Support       Future Appointments   Date Time Provider Department Center   10/16/2023  3:00 PM Jm Ryder MD [facility] [place]   10/27/2023 10:40 AM Aakash Azul MD [facility]   11/2/2023  9:15 AM Lonnie Brantley MD [facility]   11/12/2023  7:30 PM  [facility]   11/14/2023  1:30 PM Aakash Azul MD [facility]     Additional Instructions for the Follow-ups that You Need to Schedule       Ambulatory Referral to Home Health (Hospital)   As directed      Face to Face Visit Date: 10/12/2023   Follow-up provider for Plan of Care?: I treated the patient in an acute care facility and will not continue treatment after discharge.   Follow-up provider: JM RYDER [[id number]]   Reason/Clinical Findings: Pneumonia with abscess, acute kidney injury   Describe mobility limitations that make leaving home difficult: Generalized weakness    Nursing/Therapeutic Services Requested: Physical Therapy   PT orders: Transfer training Strengthening Therapeutic exercise   Frequency: 1 Week 1               Follow-up Information       Aakash Azul MD. Go on 10/27/2023.    Specialty: Infectious Diseases  Why: Spoke to Pablo at the [facility]. Appointment set for 10:40am on 10/27/2023. Please arrive 15 minutes early for appointment.   Contact information:  [address]  [place] [address]  [phone]               Iain Owens MD. Go on 11/1/2023.    Specialty: Nephrology  Why: Spoke to Sophia. Appointment set for 3:15pm on 11/01/2023.   Contact information:  [address]  [facility] [address]  [facility] [address]  [phone]               Christian Bernal MD. Go on 10/17/2023.    Specialties: Pulmonary Disease, Sleep Medicine  Why: Spoke to Devika at the [facility]. Appointment set for 9:15am on 10/17/2023. Please arrive for appointment 15 minutes early.   Contact information:  [address]  [place] [address]  [phone]               Dr Aleksey Corbett. Go on 10/16/2023.    Why: Appointment set for 3:00pm on 10/16/2023.     Basic metabolic panel (BMP) to monitor renal function             Lonnie Brantley MD Follow up in 2 week(s).    Specialty: Thoracic Surgery  Contact information:  [address] [facility] [address] [address]  [place] [address]  [phone]                             Additional Instructions for the Follow-ups that You Need to Schedule       Ambulatory Referral to Home Health (Hospital)   As directed      Face to Face Visit Date: 10/12/2023   Follow-up provider for Plan of Care?: I treated the patient in an acute care facility and will not continue treatment after discharge.   Follow-up provider: JM YUN [[id number]]   Reason/Clinical Findings: Pneumonia with abscess, acute kidney injury   Describe mobility limitations that make leaving home difficult: Generalized weakness   Nursing/Therapeutic Services Requested:  Physical Therapy   PT orders: Transfer training Strengthening Therapeutic exercise   Frequency: 1 Week 1            Time Spent on Discharge:  Greater than 30 minutes      Gene Miller MD  [facility]  10/12/23  13:25 EDT                Electronically signed by Gene Miller MD at 10/12/23 [id number]

## 2023-10-17 ENCOUNTER — PATIENT ROUNDING (BHMG ONLY) (OUTPATIENT)
Dept: FAMILY MEDICINE CLINIC | Facility: CLINIC | Age: 53
End: 2023-10-17
Payer: COMMERCIAL

## 2023-10-17 LAB
MYCOBACTERIUM SPEC CULT: NORMAL
NIGHT BLUE STAIN TISS: NORMAL

## 2023-10-17 NOTE — PROGRESS NOTES
October 17, 2023      Scot Gonzalez,     I want to officially welcome you to our practice and ask about your recent visit.     Overall were you satisfied with your visit? yes      Would you recommend our office to friends and family? yes      Your experience is very important to us.  You may receive an email regarding a survey.  Please take a moment to complete.  This will help us to improve.      I appreciate you taking the time to answer these questions.       Thank you and have a great day.

## 2023-10-18 ENCOUNTER — HOME HEALTH ADMISSION (OUTPATIENT)
Dept: HOME HEALTH SERVICES | Facility: HOME HEALTHCARE | Age: 53
End: 2023-10-18
Payer: COMMERCIAL

## 2023-10-24 LAB
MYCOBACTERIUM SPEC CULT: NORMAL
NIGHT BLUE STAIN TISS: NORMAL

## 2023-10-27 ENCOUNTER — TELEPHONE (OUTPATIENT)
Dept: SURGERY | Facility: CLINIC | Age: 53
End: 2023-10-27
Payer: COMMERCIAL

## 2023-10-27 ENCOUNTER — OFFICE VISIT (OUTPATIENT)
Dept: INFECTIOUS DISEASES | Facility: CLINIC | Age: 53
End: 2023-10-27
Payer: COMMERCIAL

## 2023-10-27 ENCOUNTER — HOSPITAL ENCOUNTER (OUTPATIENT)
Dept: GENERAL RADIOLOGY | Facility: HOSPITAL | Age: 53
Discharge: HOME | End: 2023-10-27
Admitting: INTERNAL MEDICINE
Payer: COMMERCIAL

## 2023-10-27 VITALS
SYSTOLIC BLOOD PRESSURE: 154 MMHG | DIASTOLIC BLOOD PRESSURE: 92 MMHG | BODY MASS INDEX: 52.84 KG/M2 | TEMPERATURE: 97.1 F | WEIGHT: 293 LBS | RESPIRATION RATE: 20 BRPM | HEART RATE: 98 BPM

## 2023-10-27 DIAGNOSIS — N17.9 ACUTE KIDNEY INJURY: ICD-10-CM

## 2023-10-27 DIAGNOSIS — J85.1 ABSCESS OF UPPER LOBE OF RIGHT LUNG WITH PNEUMONIA: Primary | ICD-10-CM

## 2023-10-27 DIAGNOSIS — A49.1 STREPTOCOCCAL INFECTION: ICD-10-CM

## 2023-10-27 DIAGNOSIS — Z79.2 LONG TERM (CURRENT) USE OF ANTIBIOTICS: ICD-10-CM

## 2023-10-27 DIAGNOSIS — E66.9 SUPER OBESITY: ICD-10-CM

## 2023-10-27 PROCEDURE — 71046 X-RAY EXAM CHEST 2 VIEWS: CPT

## 2023-10-27 RX ORDER — AMOXICILLIN 500 MG/1
1000 CAPSULE ORAL EVERY 8 HOURS
Qty: 60 CAPSULE | Refills: 0 | Status: SHIPPED | OUTPATIENT
Start: 2023-10-27 | End: 2023-11-06

## 2023-10-27 NOTE — TELEPHONE ENCOUNTER
Left VM with pt regarding upcoming visit. Reminded pt to obtain CXR 30 min prior to visit. Informed pt of office location.

## 2023-10-27 NOTE — PROGRESS NOTES
ID CLINIC NOTE    CC: f/u pulmonary abscess due to Strep dysgalactiae     HPI: Nay Gonzalez is a 53 y.o. female here for f/u pulmonary abscess due to Strep dysgalactiae. History from pt and her mother.     I saw her in the hospital for this problem from 10/2-10/12/2023.  On 10/3/23, she had an IR-guided CT chest tube placement. The culture grew Strep as noted above. She had a very lalo early course requiring intubation and mechanical ventilation. Thankfully she responded well to her medical interventions. Her WBC normalized and CRP and creatinine were trending down. She was weaned to room air at discharge.     I discharged her on amoxicillin 1 g PO q12h (dosed for renal function) to complete a 6-week total course w/ stop date 11/14/23. I scheduled her in my office today for an interval check-up but also to check renal function to see if I need to re-dose the amoxicillin.     She says she is doing really well. She is much improved. She isn't having any cough. She saw nephrology as an outpatient. Most recent creatinine was down to 1.25. She is tolerating amoxicillin pretty well. She has some loose stools but not watery diarrhea. It is improved w/ a probiotic.       PMH:  Super obesity BMI 54  Pulmonary abscess due to Strep  Uncontrolled DM2    Past Surgical History:   Procedure Laterality Date    TEETH EXTRACTION       Social History:  Works from home for [employer]    Non-smoker  No IV drug use     Antibiotic allergies and intolerances:  None    Medications:     Current Outpatient Medications:     amoxicillin (AMOXIL) 500 MG capsule, Take 2 capsules by mouth Every 12 (Twelve) Hours. Indications: lung abscess, Disp: 120 capsule, Rfl: 1    glipizide (Glucotrol) 5 MG tablet, Take 1 tablet by mouth Every Morning Before Breakfast for 30 days., Disp: 30 tablet, Rfl: 0    labetalol (NORMODYNE) 200 MG tablet, Take 1 tablet by mouth Every 12 (Twelve) Hours for 30 days., Disp: 60 tablet, Rfl: 0     pantoprazole (PROTONIX) 40 MG EC tablet, Take 1 tablet by mouth Every Morning for 30 days., Disp: 30 tablet, Rfl: 0    saccharomyces boulardii (FLORASTOR) 250 MG capsule, Take 2 capsules by mouth 2 (Two) Times a Day for 30 days., Disp: 120 capsule, Rfl: 0    sevelamer (RENVELA) 800 MG tablet, Take 2 tablets by mouth 3 (Three) Times a Day With Meals for 30 days., Disp: 180 tablet, Rfl: 0      OBJECTIVE:  /92   Pulse 98   Temp 97.1 °F (36.2 °C)   Resp 20   Wt 135 kg (298 lb 3.2 oz)   LMP 10/01/2023   BMI 52.84 kg/m²       General: awake, alert, very nice, in chair  Eyes: no scleral icterus  Cardiovascular: NR  Pulm: normal WOB on room air; no wheezing; no ralese  GI: Abdomen is soft, not tender  Skin: No rashes  Vasc: no cyanosis    DIAGNOSTICS:  Nephrology labs 10/25/23  Crt 1.25  Na 142  K 4.3      Lab Results   Component Value Date    WBC 8.82 10/12/2023    HGB 9.4 (L) 10/12/2023    HCT 29.3 (L) 10/12/2023     10/12/2023     Lab Results   Component Value Date    CRP 3.86 (H) 10/10/2023     Lab Results   Component Value Date    HGBA1C 7.50 (H) 10/01/2023     Urine Eos negative  HIV negative  Hep C negative  Procal 0.59     Microbiology:  10/1 RPP: negative  10/1 BCx: negative  10/1 MRSA nares: negative  10/2 BAL Cx: normal annabel  10/3 ETT Cx: normal annabel  10/3 Lung Abscess Cx: scant growth Strep dysgalactiae (susc penicillin)    Radiology:  No new imaging today    ASSESSMENT/PLAN:  Right upper lobe pulmonary abscess due to Strep dysgalactiae  Super obesity BMI 54  Uncontrolled diabetes type 2 A1c  7.5%  Acute kidney injury - better  Long term use of antibiotics    She continues to improve from a respiratory standpoint. I will get an interval chest x-ray today.     Given improvement in renal function, change amoxicillin to 1 g q8h dosing. Stop date remains 11/14/23 at which time she will follow-up w/ me again. Prior to next visit, she will get Check CBC, CMP, and CRP done.     I reminded her about  here thoracic surgery appt with Dr Brantley on 11/2/23.

## 2023-10-31 LAB
MYCOBACTERIUM SPEC CULT: NORMAL
NIGHT BLUE STAIN TISS: NORMAL

## 2023-11-03 DIAGNOSIS — J18.9 PNEUMONIA OF RIGHT LOWER LOBE DUE TO INFECTIOUS ORGANISM: Primary | ICD-10-CM

## 2023-11-07 LAB
MYCOBACTERIUM SPEC CULT: NORMAL
NIGHT BLUE STAIN TISS: NORMAL

## 2023-11-08 ENCOUNTER — TELEPHONE (OUTPATIENT)
Dept: SURGERY | Facility: CLINIC | Age: 53
End: 2023-11-08
Payer: COMMERCIAL

## 2023-11-08 NOTE — TELEPHONE ENCOUNTER
Spoke with pt regarding upcoming appt. Informed pt of office location. Reminded pt to obtain CXR 30 min prior to visit.

## 2023-11-09 ENCOUNTER — OFFICE VISIT (OUTPATIENT)
Dept: SURGERY | Facility: CLINIC | Age: 53
End: 2023-11-09
Payer: COMMERCIAL

## 2023-11-09 ENCOUNTER — HOSPITAL ENCOUNTER (OUTPATIENT)
Dept: GENERAL RADIOLOGY | Facility: HOSPITAL | Age: 53
Discharge: HOME | End: 2023-11-09
Admitting: NURSE PRACTITIONER
Payer: COMMERCIAL

## 2023-11-09 VITALS
DIASTOLIC BLOOD PRESSURE: 98 MMHG | SYSTOLIC BLOOD PRESSURE: 140 MMHG | OXYGEN SATURATION: 93 % | HEART RATE: 90 BPM | BODY MASS INDEX: 51.91 KG/M2 | WEIGHT: 293 LBS | HEIGHT: 63 IN

## 2023-11-09 DIAGNOSIS — J18.9 PNEUMONIA OF RIGHT LOWER LOBE DUE TO INFECTIOUS ORGANISM: ICD-10-CM

## 2023-11-09 DIAGNOSIS — J18.9 PNEUMONIA OF RIGHT LOWER LOBE DUE TO INFECTIOUS ORGANISM: Primary | ICD-10-CM

## 2023-11-09 PROCEDURE — 71046 X-RAY EXAM CHEST 2 VIEWS: CPT

## 2023-11-09 RX ORDER — AMOXICILLIN 500 MG/1
1000 CAPSULE ORAL 3 TIMES DAILY
COMMUNITY
Start: 2023-10-12 | End: 2023-11-14

## 2023-11-09 RX ORDER — NIFEDIPINE 60 MG/1
60 TABLET, EXTENDED RELEASE ORAL DAILY
COMMUNITY
Start: 2023-10-18 | End: 2023-11-28 | Stop reason: SDUPTHER

## 2023-11-09 NOTE — PROGRESS NOTES
THORACIC SURGERY CLINIC FOLLOW  UP NOTE    REASON FOR VISIT: Right empyema s/p chest tube placement with lytics    Subjective   HISTORY OF PRESENTING ILLNESS:   Nay Gonzalez is a 53 y.o. female who presents for a follow-up visit.     On 10/1/2023, she was admitted with signs and symptoms of pneumonia. She was septic by clinic criteria without evidence of shock or organ failure. She had hypertensive crisis on admission with mild troponin leak which I believed was related to demand ischemia. She had a large 12 cm right upper chest mass/ abscess with upper lobe consolidation and extension to the chest wall. There was a pocket of air next to right sternoclavicular joint, which raises the suspicion for SC joint involvement. I did not appreciate cortical changes on the CT scan which is not sensitive to assess bone infection. Her BMI is 60 and she has a relatively small skeleton for her body habitus. She also has small lung volumes.  Thoracic surgery was consulted.  Chest tube was placed and intrapleural lytics were performed.  He had excellent response with lytics treatment and demonstrated adequate lung expansion and resolution of sepsis.  She was discharged home on antibiotics after the chest tube was removed.    She came to clinic for follow-up visit.  Her breathing is doing well, but she occasionally feels fatigued. Her infection of the lungs has improved. She had a chest x-ray today, 11/09/2023. She denies breathing heavily with exertion. She is taking amoxicillin, which she is prescribed to continue for 10 more days. The patient has another appointment with Dr. Azul, infectious disease specialist, next week. She initially had a cough and denies pneumonia. She denies any blood in her sputum.     Past Medical History:   Diagnosis Date    Diabetes mellitus     Hypertension        Past Surgical History:   Procedure Laterality Date    TEETH EXTRACTION         History reviewed. No pertinent family  "history.    Social History     Socioeconomic History    Marital status:    Tobacco Use    Smoking status: Never     Passive exposure: Never    Smokeless tobacco: Never   Vaping Use    Vaping Use: Never used   Substance and Sexual Activity    Alcohol use: Yes     Alcohol/week: 1.0 standard drink of alcohol     Types: 1 Glasses of wine per week     Comment: MAYBE ONE DRINK A  MONTH    Drug use: Never    Sexual activity: Defer         Current Outpatient Medications:     glipizide (Glucotrol) 5 MG tablet, Take 1 tablet by mouth Daily. 30 minutes before breakfast, Disp: 90 tablet, Rfl: 1    glucose blood test strip, Use as instructed, check fasting glucose in morning daily before breakfast, Disp: 100 each, Rfl: 3    glucose monitor monitoring kit, Use 1 each Daily. Please check fasting glucose in morning before breakfast, Disp: 1 each, Rfl: 0    labetalol (NORMODYNE) 200 MG tablet, Take 1 tablet by mouth 2 (Two) Times a Day., Disp: 90 tablet, Rfl: 1    NIFEdipine XL (PROCARDIA XL) 60 MG 24 hr tablet, Take 1 tablet by mouth Daily., Disp: 90 tablet, Rfl: 1     No Known Allergies          Objective    OBJECTIVE:     VITAL SIGNS:  /98 (BP Location: Left arm, Patient Position: Sitting, Cuff Size: Adult)   Pulse 90   Ht 160 cm (62.99\")   Wt 133 kg (294 lb)   LMP 10/01/2023   SpO2 93%   BMI 52.09 kg/m²     PHYSICAL EXAM:  Constitutional:       Appearance: Normal appearance.   HENT:      Head: Normocephalic.      Right Ear: External ear normal.      Left Ear: External ear normal.      Nose: Nose normal.      Mouth/Throat: No obvious deformity     Pharynx: Oropharynx is clear.   Eyes:      Conjunctiva/sclera: Conjunctivae normal.   Cardiovascular:      Rate and Rhythm: Normal rate.      Pulses: Normal pulses.   Pulmonary:      Effort: Pulmonary effort is normal.  Abdominal:      Palpations: Abdomen is soft.   Musculoskeletal:         General: Normal range of motion.      Cervical back: Normal range of motion. "   Skin:     General: Skin is warm.   Neurological:      General: No focal deficit present.      Mental Status: He is alert and oriented to person, place, and time.     LAB RESULTS:  I have reviewed all the available laboratory results in the chart.    RESULTS REVIEW:  I have reviewed the patient's all relevant laboratory and imaging findings.     IMAGING RESULTS:    XR Chest 2 View (11/09/2023 11:23)        ASSESSMENT & PLAN:  Nay Gonzalez is a 53 y.o. female with significant medical conditions as mentioned above presented to my clinic.    Diagnosis: Right empyema s/p chest tube placement with lytic treatment    She responded well to conservative management and did not require surgery.  Her breathing is almost close to baseline.  She does not demonstrate any signs and symptoms of ongoing infection.  The right sternoclavicular joint swelling has improved as well.  I recommended follow-up with infectious disease for antibiotic management.  Her x-ray showed adequate lung expansion.  She will follow-up in our clinic as needed in future.    I discussed the patients findings and my recommendations with the patient. The patient was given adequate time to ask questions and all questions were answered to patient satisfaction.     Lonnie Brantley MD  Thoracic Surgeon  [facility]        Dictated utilizing Dragon dictation    I spent 40 minutes caring for Nay on this date of service. This time includes time spent by me in the following activities:preparing for the visit, reviewing tests, obtaining and/or reviewing a separately obtained history, performing a medically appropriate examination and/or evaluation , counseling and educating the patient/family/caregiver, ordering medications, tests, or procedures, referring and communicating with other health care professionals , documenting information in the medical record, independently interpreting results and communicating that information with  the patient/family/caregiver, and care coordination and more than half the time was spent in direct face to face evaluation and decision making.     Transcribed from ambient dictation for Lonnie Brantley MD by Bonny Kirkpatrick.  11/09/23   18:16 EST    Patient or patient representative verbalized consent to the visit recording.  I have personally performed the services described in this document as transcribed by the above individual, and it is both accurate and complete.

## 2023-11-13 ENCOUNTER — LAB (OUTPATIENT)
Dept: LAB | Facility: HOSPITAL | Age: 53
End: 2023-11-13
Payer: COMMERCIAL

## 2023-11-13 LAB
ALBUMIN SERPL-MCNC: 4.5 G/DL (ref 3.5–5.2)
ALBUMIN/GLOB SERPL: 1.1 G/DL
ALP SERPL-CCNC: 70 U/L (ref 39–117)
ALT SERPL W P-5'-P-CCNC: 28 U/L (ref 1–33)
ANION GAP SERPL CALCULATED.3IONS-SCNC: 9.7 MMOL/L (ref 5–15)
AST SERPL-CCNC: 38 U/L (ref 1–32)
BASOPHILS # BLD AUTO: 0.06 10*3/MM3 (ref 0–0.2)
BASOPHILS NFR BLD AUTO: 0.7 % (ref 0–1.5)
BILIRUB SERPL-MCNC: 0.4 MG/DL (ref 0–1.2)
BUN SERPL-MCNC: 12 MG/DL (ref 6–20)
BUN/CREAT SERPL: 12.9 (ref 7–25)
CALCIUM SPEC-SCNC: 10 MG/DL (ref 8.6–10.5)
CHLORIDE SERPL-SCNC: 100 MMOL/L (ref 98–107)
CO2 SERPL-SCNC: 30.3 MMOL/L (ref 22–29)
CREAT SERPL-MCNC: 0.93 MG/DL (ref 0.57–1)
CRP SERPL-MCNC: 0.8 MG/DL (ref 0–0.5)
DEPRECATED RDW RBC AUTO: 44.3 FL (ref 37–54)
EGFRCR SERPLBLD CKD-EPI 2021: 74.1 ML/MIN/1.73
EOSINOPHIL # BLD AUTO: 0.17 10*3/MM3 (ref 0–0.4)
EOSINOPHIL NFR BLD AUTO: 1.9 % (ref 0.3–6.2)
ERYTHROCYTE [DISTWIDTH] IN BLOOD BY AUTOMATED COUNT: 14.9 % (ref 12.3–15.4)
GLOBULIN UR ELPH-MCNC: 4.2 GM/DL
GLUCOSE SERPL-MCNC: 102 MG/DL (ref 65–99)
HCT VFR BLD AUTO: 37.3 % (ref 34–46.6)
HGB BLD-MCNC: 11.6 G/DL (ref 12–15.9)
IMM GRANULOCYTES # BLD AUTO: 0.02 10*3/MM3 (ref 0–0.05)
IMM GRANULOCYTES NFR BLD AUTO: 0.2 % (ref 0–0.5)
LYMPHOCYTES # BLD AUTO: 1.07 10*3/MM3 (ref 0.7–3.1)
LYMPHOCYTES NFR BLD AUTO: 12.1 % (ref 19.6–45.3)
MCH RBC QN AUTO: 25.6 PG (ref 26.6–33)
MCHC RBC AUTO-ENTMCNC: 31.1 G/DL (ref 31.5–35.7)
MCV RBC AUTO: 82.3 FL (ref 79–97)
MONOCYTES # BLD AUTO: 0.6 10*3/MM3 (ref 0.1–0.9)
MONOCYTES NFR BLD AUTO: 6.8 % (ref 5–12)
NEUTROPHILS NFR BLD AUTO: 6.92 10*3/MM3 (ref 1.7–7)
NEUTROPHILS NFR BLD AUTO: 78.3 % (ref 42.7–76)
NRBC BLD AUTO-RTO: 0 /100 WBC (ref 0–0.2)
PLATELET # BLD AUTO: 277 10*3/MM3 (ref 140–450)
PMV BLD AUTO: 12.3 FL (ref 6–12)
POTASSIUM SERPL-SCNC: 3.9 MMOL/L (ref 3.5–5.2)
PROT SERPL-MCNC: 8.7 G/DL (ref 6–8.5)
RBC # BLD AUTO: 4.53 10*6/MM3 (ref 3.77–5.28)
SODIUM SERPL-SCNC: 140 MMOL/L (ref 136–145)
WBC NRBC COR # BLD: 8.84 10*3/MM3 (ref 3.4–10.8)

## 2023-11-13 PROCEDURE — 85025 COMPLETE CBC W/AUTO DIFF WBC: CPT | Performed by: INTERNAL MEDICINE

## 2023-11-13 PROCEDURE — 86140 C-REACTIVE PROTEIN: CPT | Performed by: INTERNAL MEDICINE

## 2023-11-13 PROCEDURE — 80053 COMPREHEN METABOLIC PANEL: CPT | Performed by: INTERNAL MEDICINE

## 2023-11-14 ENCOUNTER — OFFICE VISIT (OUTPATIENT)
Dept: INFECTIOUS DISEASES | Facility: CLINIC | Age: 53
End: 2023-11-14
Payer: COMMERCIAL

## 2023-11-14 VITALS
DIASTOLIC BLOOD PRESSURE: 101 MMHG | RESPIRATION RATE: 20 BRPM | HEART RATE: 102 BPM | BODY MASS INDEX: 50.57 KG/M2 | TEMPERATURE: 97.1 F | WEIGHT: 285.4 LBS | SYSTOLIC BLOOD PRESSURE: 182 MMHG

## 2023-11-14 DIAGNOSIS — A49.1 STREPTOCOCCAL INFECTION: ICD-10-CM

## 2023-11-14 DIAGNOSIS — E66.9 SUPER OBESITY: ICD-10-CM

## 2023-11-14 DIAGNOSIS — N17.9 ACUTE KIDNEY INJURY: ICD-10-CM

## 2023-11-14 DIAGNOSIS — J85.1 ABSCESS OF UPPER LOBE OF RIGHT LUNG WITH PNEUMONIA: Primary | ICD-10-CM

## 2023-11-14 DIAGNOSIS — Z79.2 LONG TERM (CURRENT) USE OF ANTIBIOTICS: ICD-10-CM

## 2023-11-14 LAB
MYCOBACTERIUM SPEC CULT: NORMAL
NIGHT BLUE STAIN TISS: NORMAL

## 2023-11-14 NOTE — PROGRESS NOTES
ID CLINIC NOTE    CC: f/u pulmonary abscess due to Strep dysgalactiae     HPI: Nay Gonzalez is a 53 y.o. female here for f/u pulmonary abscess due to Strep dysgalactiae.  She is doing well. She is not short of breath. No fever. She has more energy. She is losing weight. She has modified her diet. She had a good report from her thoracic surgeon. Recent labs and CXR were also reassuring. Crt is back to normal.     PMH:  Super obesity BMI 54  Pulmonary abscess due to Strep  Uncontrolled DM2    Past Surgical History:   Procedure Laterality Date    TEETH EXTRACTION       Social History:  Works from home for [employer]    Non-smoker  No IV drug use     Antibiotic allergies and intolerances:  None    Medications:     Current Outpatient Medications:     amoxicillin (AMOXIL) 500 MG capsule, Take 2 capsules by mouth 3 times a day., Disp: , Rfl:     NIFEdipine XL (PROCARDIA XL) 60 MG 24 hr tablet, Take 1 tablet by mouth Daily., Disp: , Rfl:     glipizide (Glucotrol) 5 MG tablet, Take 1 tablet by mouth Every Morning Before Breakfast for 30 days. (Patient not taking: Reported on 11/14/2023), Disp: 30 tablet, Rfl: 0    labetalol (NORMODYNE) 200 MG tablet, Take 1 tablet by mouth Every 12 (Twelve) Hours for 30 days. (Patient not taking: Reported on 11/14/2023), Disp: 60 tablet, Rfl: 0      OBJECTIVE:  BP (!) 182/101 Comment: Denies h/a or chest pain or dizziness. States she has medicines on hold and one med is one of her BP meds and so she is waiting to go to PCP to see about getting refills on this med.  Pulse 102   Temp 97.1 °F (36.2 °C)   Resp 20   Wt 129 kg (285 lb 6.4 oz)   BMI 50.57 kg/m²     General: awake, alert, very nice, in chair  Eyes: no scleral icterus  Cardiovascular: NR  Pulm: normal work of breathing on room air; no wheezing; no rales  GI: Abdomen is soft, not tender  Skin: No rashes  Vasc: no cyanosis    DIAGNOSTICS:  CBC, CMP, and CRP reviewed today    Lab Results   Component Value Date     WBC 8.84 11/13/2023    HGB 11.6 (L) 11/13/2023    HCT 37.3 11/13/2023     11/13/2023     Lab Results   Component Value Date    GLUCOSE 102 (H) 11/13/2023    BUN 12 11/13/2023    CREATININE 0.93 11/13/2023    EGFR 74.1 11/13/2023    BCR 12.9 11/13/2023    K 3.9 11/13/2023    CO2 30.3 (H) 11/13/2023    CALCIUM 10.0 11/13/2023    ALBUMIN 4.5 11/13/2023    BILITOT 0.4 11/13/2023    AST 38 (H) 11/13/2023    ALT 28 11/13/2023     Lab Results   Component Value Date    CRP 0.80 (H) 11/13/2023     Lab Results   Component Value Date    HGBA1C 7.50 (H) 10/01/2023     Urine Eos negative  HIV negative  Hep C negative  Procal 0.59     Microbiology:  10/1 RPP: negative  10/1 BCx: negative  10/1 MRSA nares: negative  10/2 BAL Cx: normal annabel  10/3 ETT Cx: normal annabel  10/3 Lung Abscess Cx: scant growth Strep dysgalactiae (susc penicillin)    Radiology:  CXR with improved pneumonia on my read    ASSESSMENT/PLAN:  Right upper lobe pulmonary abscess due to Strep dysgalactiae  Super obesity BMI 50  Uncontrolled diabetes type 2 A1c  7.5%  Acute kidney injury - resolved  Long term use of antibiotics    She's had an excellent clinical response to IV antibiotics in-house then long-term amoxicillin. She is afebrile with a normal WBC and CRP has trended down nicely. She has had improvement on her chest imaging. No current concerning symptoms. Her renal function has recovered. She has a few doses of amoxicillin left at home which she will finish. She may follow-up w/ me as needed.

## 2023-11-28 ENCOUNTER — OFFICE VISIT (OUTPATIENT)
Dept: FAMILY MEDICINE CLINIC | Facility: CLINIC | Age: 53
End: 2023-11-28
Payer: COMMERCIAL

## 2023-11-28 VITALS
DIASTOLIC BLOOD PRESSURE: 110 MMHG | HEART RATE: 107 BPM | HEIGHT: 63 IN | OXYGEN SATURATION: 98 % | WEIGHT: 286 LBS | TEMPERATURE: 98.3 F | BODY MASS INDEX: 50.68 KG/M2 | SYSTOLIC BLOOD PRESSURE: 188 MMHG | RESPIRATION RATE: 16 BRPM

## 2023-11-28 DIAGNOSIS — I10 HYPERTENSION, UNSPECIFIED TYPE: ICD-10-CM

## 2023-11-28 DIAGNOSIS — E11.65 TYPE 2 DIABETES MELLITUS WITH HYPERGLYCEMIA, WITHOUT LONG-TERM CURRENT USE OF INSULIN: Primary | ICD-10-CM

## 2023-11-28 PROCEDURE — 99214 OFFICE O/P EST MOD 30 MIN: CPT | Performed by: STUDENT IN AN ORGANIZED HEALTH CARE EDUCATION/TRAINING PROGRAM

## 2023-11-28 RX ORDER — NIFEDIPINE 60 MG/1
60 TABLET, EXTENDED RELEASE ORAL DAILY
Qty: 90 TABLET | Refills: 1 | Status: SHIPPED | OUTPATIENT
Start: 2023-11-28 | End: 2024-11-27

## 2023-11-28 RX ORDER — GLIPIZIDE 5 MG/1
5 TABLET ORAL DAILY
Qty: 90 TABLET | Refills: 1 | Status: SHIPPED | OUTPATIENT
Start: 2023-11-28

## 2023-11-28 RX ORDER — LABETALOL 200 MG/1
200 TABLET, FILM COATED ORAL 2 TIMES DAILY
Qty: 90 TABLET | Refills: 1 | Status: SHIPPED | OUTPATIENT
Start: 2023-11-28

## 2023-11-28 RX ORDER — BLOOD-GLUCOSE METER
1 KIT MISCELLANEOUS DAILY
Qty: 1 EACH | Refills: 0 | Status: SHIPPED | OUTPATIENT
Start: 2023-11-28

## 2023-11-28 NOTE — PROGRESS NOTES
"Chief Complaint  Follow-up (1 month follow up)    Subjective        Nay Gonzalez presents to [facility]  History of Present Illness  For follow up on hypertension and medication refills.    Works at [employer], work schedule 9AM to 6 PM, works 2 days in office and 3 days at home, job duties involves managing delinquent accounts. Doesnot involve any lifting or any physical activity , more like phone calls.    No new complaints, feeling better than before, pt is glad she has lost some weight.        Objective   Vital Signs:  BP (!) 188/110 (BP Location: Left arm, Patient Position: Sitting, Cuff Size: Large Adult)   Pulse 107   Temp 98.3 °F (36.8 °C) (Oral)   Resp 16   Ht 160 cm (62.99\")   Wt 130 kg (286 lb)   SpO2 98%   BMI 50.68 kg/m²   Estimated body mass index is 50.68 kg/m² as calculated from the following:    Height as of this encounter: 160 cm (62.99\").    Weight as of this encounter: 130 kg (286 lb).               Physical Exam  Constitutional:       Appearance: She is obese.   HENT:      Head: Normocephalic and atraumatic.      Mouth/Throat:      Mouth: Mucous membranes are moist.      Pharynx: Oropharynx is clear.   Eyes:      Extraocular Movements: Extraocular movements intact.      Conjunctiva/sclera: Conjunctivae normal.      Pupils: Pupils are equal, round, and reactive to light.   Cardiovascular:      Rate and Rhythm: Normal rate and regular rhythm.   Pulmonary:      Effort: Pulmonary effort is normal.      Breath sounds: Normal breath sounds.   Abdominal:      General: Bowel sounds are normal.      Palpations: Abdomen is soft.   Musculoskeletal:         General: Normal range of motion.      Cervical back: Neck supple.   Skin:     General: Skin is warm.      Capillary Refill: Capillary refill takes less than 2 seconds.   Neurological:      General: No focal deficit present.      Mental Status: She is alert and oriented to person, place, and time. " Mental status is at baseline.   Psychiatric:         Mood and Affect: Mood normal.        Result Review :                   Assessment and Plan   Diagnoses and all orders for this visit:    1. Type 2 diabetes mellitus with hyperglycemia, without long-term current use of insulin (Primary)  -     glipizide (Glucotrol) 5 MG tablet; Take 1 tablet by mouth Daily. 30 minutes before breakfast  Dispense: 90 tablet; Refill: 1  -     glucose monitor monitoring kit; Use 1 each Daily. Please check fasting glucose in morning before breakfast  Dispense: 1 each; Refill: 0  -     glucose blood test strip; Use as instructed, check fasting glucose in morning daily before breakfast  Dispense: 100 each; Refill: 3    2. Hypertension, unspecified type  -     labetalol (NORMODYNE) 200 MG tablet; Take 1 tablet by mouth 2 (Two) Times a Day.  Dispense: 90 tablet; Refill: 1  -     NIFEdipine XL (PROCARDIA XL) 60 MG 24 hr tablet; Take 1 tablet by mouth Daily.  Dispense: 90 tablet; Refill: 1    # Hypertension  Not controlled   Pt has not taken medication for a week  Pt stated she ran out of her medication and was not sure that she needs to be on these medication or not so she never called office for any refill  Educated pt about medication compliance and informed that she needs to take this medication for her life unless her BP start trending down with other measures such as weight loss and dietary changes.  Pt is advised to monitor BP at home and RTC if it is coming >150/100 at home otherwise will do f/u in a month    # DM type 2   Educated about medication compliance  Pt is advised to check fasting glucose at least one time a day edi fasting early morning and bring the readings in next visit.  For now continue glipizide 5 mg PO daily    RTC in a month for follow up           Follow Up   No follow-ups on file.  Patient was given instructions and counseling regarding her condition or for health maintenance advice. Please see specific  information pulled into the AVS if appropriate.         Answers submitted by the patient for this visit:  Primary Reason for Visit (Submitted on 11/26/2023)  What is the primary reason for your visit?: High Blood Pressure

## 2024-01-26 ENCOUNTER — OFFICE VISIT (OUTPATIENT)
Dept: FAMILY MEDICINE CLINIC | Facility: CLINIC | Age: 54
End: 2024-01-26
Payer: COMMERCIAL

## 2024-01-26 VITALS
HEIGHT: 63 IN | OXYGEN SATURATION: 98 % | TEMPERATURE: 98.8 F | BODY MASS INDEX: 50.04 KG/M2 | SYSTOLIC BLOOD PRESSURE: 160 MMHG | DIASTOLIC BLOOD PRESSURE: 96 MMHG | RESPIRATION RATE: 16 BRPM | HEART RATE: 88 BPM | WEIGHT: 282.4 LBS

## 2024-01-26 DIAGNOSIS — E11.65 TYPE 2 DIABETES MELLITUS WITH HYPERGLYCEMIA, WITHOUT LONG-TERM CURRENT USE OF INSULIN: Primary | ICD-10-CM

## 2024-01-26 DIAGNOSIS — I10 HYPERTENSION, UNSPECIFIED TYPE: ICD-10-CM

## 2024-01-26 PROCEDURE — 99214 OFFICE O/P EST MOD 30 MIN: CPT | Performed by: STUDENT IN AN ORGANIZED HEALTH CARE EDUCATION/TRAINING PROGRAM

## 2024-01-26 RX ORDER — LABETALOL 300 MG/1
300 TABLET, FILM COATED ORAL 2 TIMES DAILY
Qty: 60 TABLET | Refills: 1 | Status: SHIPPED | OUTPATIENT
Start: 2024-01-26

## 2024-01-27 LAB
ALBUMIN SERPL-MCNC: 4.7 G/DL (ref 3.8–4.9)
ALBUMIN/GLOB SERPL: 1.3 {RATIO} (ref 1.2–2.2)
ALP SERPL-CCNC: 48 IU/L (ref 44–121)
ALT SERPL-CCNC: 16 IU/L (ref 0–32)
AST SERPL-CCNC: 20 IU/L (ref 0–40)
BILIRUB SERPL-MCNC: <0.2 MG/DL (ref 0–1.2)
BUN SERPL-MCNC: 19 MG/DL (ref 6–24)
BUN/CREAT SERPL: 20 (ref 9–23)
CALCIUM SERPL-MCNC: 10.3 MG/DL (ref 8.7–10.2)
CHLORIDE SERPL-SCNC: 101 MMOL/L (ref 96–106)
CHOLEST SERPL-MCNC: 189 MG/DL (ref 100–199)
CHOLEST/HDLC SERPL: 5.1 RATIO (ref 0–4.4)
CO2 SERPL-SCNC: 23 MMOL/L (ref 20–29)
CREAT SERPL-MCNC: 0.95 MG/DL (ref 0.57–1)
EGFRCR SERPLBLD CKD-EPI 2021: 72 ML/MIN/1.73
GLOBULIN SER CALC-MCNC: 3.6 G/DL (ref 1.5–4.5)
GLUCOSE SERPL-MCNC: 72 MG/DL (ref 70–99)
HBA1C MFR BLD: 6.2 % (ref 4.8–5.6)
HDLC SERPL-MCNC: 37 MG/DL
LDLC SERPL CALC-MCNC: 125 MG/DL (ref 0–99)
POTASSIUM SERPL-SCNC: 4.4 MMOL/L (ref 3.5–5.2)
PROT SERPL-MCNC: 8.3 G/DL (ref 6–8.5)
SODIUM SERPL-SCNC: 139 MMOL/L (ref 134–144)
TRIGL SERPL-MCNC: 153 MG/DL (ref 0–149)
TSH SERPL DL<=0.005 MIU/L-ACNC: 3.27 UIU/ML (ref 0.45–4.5)
UNABLE TO VOID: NORMAL
VLDLC SERPL CALC-MCNC: 27 MG/DL (ref 5–40)

## 2024-01-30 NOTE — PROGRESS NOTES
"Chief Complaint  Follow-up (Follow up on meds, pt said medication seems to be working okay. )    Subjective        Nay Gonzalez presents to [facility]  History of Present Illness  For follow-up on hypertension, type 2 diabetes mellitus  Patient stated she is taking her medication every day and she is monitoring blood pressure at home also and seems like her blood pressure has improved in comparison to previous readings  Denies having any new issue  Review of system is negative for fever, headache, chest pain, shortness of breath, palpitation, nausea, vomiting, any recent change in bladder habits.      Objective   Vital Signs:  /96 (BP Location: Left arm, Patient Position: Sitting, Cuff Size: Large Adult)   Pulse 88   Temp 98.8 °F (37.1 °C) (Oral)   Resp 16   Ht 160 cm (62.99\")   Wt 128 kg (282 lb 6.4 oz)   SpO2 98%   BMI 50.04 kg/m²   Estimated body mass index is 50.04 kg/m² as calculated from the following:    Height as of this encounter: 160 cm (62.99\").    Weight as of this encounter: 128 kg (282 lb 6.4 oz).               Physical Exam  Constitutional:       Appearance: She is obese.   HENT:      Head: Normocephalic and atraumatic.      Mouth/Throat:      Mouth: Mucous membranes are moist.      Pharynx: Oropharynx is clear.   Eyes:      Extraocular Movements: Extraocular movements intact.      Conjunctiva/sclera: Conjunctivae normal.      Pupils: Pupils are equal, round, and reactive to light.   Cardiovascular:      Rate and Rhythm: Normal rate and regular rhythm.   Pulmonary:      Effort: Pulmonary effort is normal.      Breath sounds: Normal breath sounds.   Abdominal:      General: Bowel sounds are normal.      Palpations: Abdomen is soft.   Musculoskeletal:         General: Normal range of motion.      Cervical back: Neck supple.   Skin:     General: Skin is warm.      Capillary Refill: Capillary refill takes less than 2 seconds.   Neurological:      General: " No focal deficit present.      Mental Status: She is alert and oriented to person, place, and time. Mental status is at baseline.   Psychiatric:         Mood and Affect: Mood normal.        Result Review :    The following data was reviewed by: Chelle Ryder MD on 01/26/2024:  CMP          10/12/2023    05:05 11/13/2023    15:00 1/26/2024    11:04   CMP   Glucose 99  102  72    BUN 62  12  19    Creatinine 4.04  0.93  0.95    EGFR 12.7  74.1     Sodium 137  140  139    Potassium 5.2  3.9  4.4    Chloride 101  100  101    Calcium 9.7  10.0  10.3    Total Protein   8.3    Total Protein  8.7     Albumin 3.1  4.5  4.7    Globulin   3.6    Globulin  4.2     Total Bilirubin  0.4  <0.2    Alkaline Phosphatase  70  48    AST (SGOT)  38  20    ALT (SGPT)  28  16    Albumin/Globulin Ratio  1.1     BUN/Creatinine Ratio 15.3  12.9  20    Anion Gap 12.4  9.7       CBC          10/10/2023    05:15 10/12/2023    05:05 11/13/2023    15:00   CBC   WBC 9.18  8.82  8.84    RBC 3.56  3.52  4.53    Hemoglobin 9.5  9.4  11.6    Hematocrit 30.0  29.3  37.3    MCV 84.3  83.2  82.3    MCH 26.7  26.7  25.6    MCHC 31.7  32.1  31.1    RDW 15.5  15.3  14.9    Platelets 358  401  277      Lipid Panel          10/5/2023    04:06 1/26/2024    11:04   Lipid Panel   Total Cholesterol  189    Triglycerides 338  153    HDL Cholesterol  37    VLDL Cholesterol  27    LDL Cholesterol   125      TSH          1/26/2024    11:04   TSH   TSH 3.270                   Assessment and Plan     Diagnoses and all orders for this visit:    1. Type 2 diabetes mellitus with hyperglycemia, without long-term current use of insulin (Primary)  -     Hemoglobin A1c  -     Microalbumin / Creatinine Urine Ratio - Urine, Clean Catch  -     Comprehensive Metabolic Panel  -     Lipid Panel With / Chol / HDL Ratio  -     TSH    2. Hypertension, unspecified type  -     Hemoglobin A1c  -     Microalbumin / Creatinine Urine Ratio - Urine, Clean Catch  -     Comprehensive  Metabolic Panel  -     Lipid Panel With / Chol / HDL Ratio  -     TSH  -     labetalol (NORMODYNE) 300 MG tablet; Take 1 tablet by mouth 2 (Two) Times a Day.  Dispense: 60 tablet; Refill: 1    Other orders  -     Unable To Void    # Hypertension  Blood pressure improved but still on the higher side, not at goal  Increase labetalol 300 mg twice a day  RTC in a month with a blood pressure reading for medication management  Recommended the following for better blood pressure management: take medication(s) daily as recommended, maintain low sodium diet (< 3 grams), lose weight , exercise regularly, minimizealcohol , manage stress better, and decrease caffeine    # Type 2 diabetes mellitus  Continue glipizide 5 mg p.o. daily  Continue low-carb diet, exercise  Encourage patient to introduce more fruits and vegetable in the diet and exercise and weight loss    RTC in a month with labs         Follow Up     No follow-ups on file.  Patient was given instructions and counseling regarding her condition or for health maintenance advice. Please see specific information pulled into the AVS if appropriate.

## 2024-02-26 DIAGNOSIS — I10 HYPERTENSION, UNSPECIFIED TYPE: ICD-10-CM

## 2024-02-26 RX ORDER — LABETALOL 200 MG/1
200 TABLET, FILM COATED ORAL 2 TIMES DAILY
Qty: 180 TABLET | Refills: 1 | Status: SHIPPED | OUTPATIENT
Start: 2024-02-26

## 2024-03-25 DIAGNOSIS — I10 HYPERTENSION, UNSPECIFIED TYPE: ICD-10-CM

## 2024-03-25 RX ORDER — LABETALOL 300 MG/1
300 TABLET, FILM COATED ORAL 2 TIMES DAILY
Qty: 60 TABLET | Refills: 1 | Status: SHIPPED | OUTPATIENT
Start: 2024-03-25

## 2024-04-15 ENCOUNTER — OFFICE VISIT (OUTPATIENT)
Dept: FAMILY MEDICINE CLINIC | Facility: CLINIC | Age: 54
End: 2024-04-15
Payer: COMMERCIAL

## 2024-04-15 VITALS
SYSTOLIC BLOOD PRESSURE: 160 MMHG | BODY MASS INDEX: 50.27 KG/M2 | WEIGHT: 283.7 LBS | DIASTOLIC BLOOD PRESSURE: 70 MMHG | HEART RATE: 85 BPM | HEIGHT: 63 IN | OXYGEN SATURATION: 97 % | TEMPERATURE: 98.4 F | RESPIRATION RATE: 16 BRPM

## 2024-04-15 DIAGNOSIS — I10 HYPERTENSION, UNSPECIFIED TYPE: ICD-10-CM

## 2024-04-15 DIAGNOSIS — E78.5 HYPERLIPIDEMIA, UNSPECIFIED HYPERLIPIDEMIA TYPE: Primary | ICD-10-CM

## 2024-04-15 PROCEDURE — 99214 OFFICE O/P EST MOD 30 MIN: CPT | Performed by: STUDENT IN AN ORGANIZED HEALTH CARE EDUCATION/TRAINING PROGRAM

## 2024-04-15 RX ORDER — HYDROCHLOROTHIAZIDE 12.5 MG/1
12.5 TABLET ORAL DAILY
Qty: 90 TABLET | Refills: 0 | Status: SHIPPED | OUTPATIENT
Start: 2024-04-15

## 2024-04-15 RX ORDER — NIFEDIPINE 90 MG/1
90 TABLET, EXTENDED RELEASE ORAL DAILY
Qty: 90 TABLET | Refills: 1 | Status: SHIPPED | OUTPATIENT
Start: 2024-04-15

## 2024-04-15 RX ORDER — ROSUVASTATIN CALCIUM 5 MG/1
5 TABLET, COATED ORAL DAILY
Qty: 90 TABLET | Refills: 1 | Status: SHIPPED | OUTPATIENT
Start: 2024-04-15

## 2024-04-15 NOTE — PROGRESS NOTES
"Chief Complaint  Hyperlipidemia (Follow up )    Subjective        Nay Gonzalez presents to [facility]  History of Present Illness  53-year-old pleasant female who presented to the clinic for follow-up on her labs and her blood pressure.  Patient is a known case of type 2 diabetes mellitus, anemia, hypertension, history of abscess of upper lobe of right lung with pneumonia.  Denies having any new complaints.taking medications as prescribed.  Objective   Vital Signs:  /70 (BP Location: Left arm, Patient Position: Sitting, Cuff Size: Large Adult)   Pulse 85   Temp 98.4 °F (36.9 °C) (Oral)   Resp 16   Ht 160 cm (62.99\")   Wt 129 kg (283 lb 11.2 oz)   SpO2 97%   BMI 50.27 kg/m²   Estimated body mass index is 50.27 kg/m² as calculated from the following:    Height as of this encounter: 160 cm (62.99\").    Weight as of this encounter: 129 kg (283 lb 11.2 oz).               Physical Exam  Cardiovascular:      Pulses: Normal pulses.      Heart sounds: Normal heart sounds.   Pulmonary:      Effort: Pulmonary effort is normal.      Breath sounds: Normal breath sounds.   Musculoskeletal:         General: Normal range of motion.   Skin:     General: Skin is warm.   Neurological:      Mental Status: She is alert and oriented to person, place, and time.        Result Review :    The following data was reviewed by: Chelle Ryder MD on 04/15/2024:  CMP          10/12/2023    05:05 11/13/2023    15:00 1/26/2024    11:04   CMP   Glucose 99  102  72    BUN 62  12  19    Creatinine 4.04  0.93  0.95    EGFR 12.7  74.1     Sodium 137  140  139    Potassium 5.2  3.9  4.4    Chloride 101  100  101    Calcium 9.7  10.0  10.3    Total Protein   8.3    Total Protein  8.7     Albumin 3.1  4.5  4.7    Globulin   3.6    Globulin  4.2     Total Bilirubin  0.4  <0.2    Alkaline Phosphatase  70  48    AST (SGOT)  38  20    ALT (SGPT)  28  16    Albumin/Globulin Ratio  1.1     BUN/Creatinine Ratio " 15.3  12.9  20    Anion Gap 12.4  9.7       CBC          10/10/2023    05:15 10/12/2023    05:05 11/13/2023    15:00   CBC   WBC 9.18  8.82  8.84    RBC 3.56  3.52  4.53    Hemoglobin 9.5  9.4  11.6    Hematocrit 30.0  29.3  37.3    MCV 84.3  83.2  82.3    MCH 26.7  26.7  25.6    MCHC 31.7  32.1  31.1    RDW 15.5  15.3  14.9    Platelets 358  401  277      Lipid Panel          10/5/2023    04:06 1/26/2024    11:04   Lipid Panel   Total Cholesterol  189    Triglycerides 338  153    HDL Cholesterol  37    VLDL Cholesterol  27    LDL Cholesterol   125      TSH          1/26/2024    11:04   TSH   TSH 3.270      Most Recent A1C          1/26/2024    11:04   HGBA1C Most Recent   Hemoglobin A1C 6.2                   Assessment and Plan     Diagnoses and all orders for this visit:    1. Hyperlipidemia, unspecified hyperlipidemia type (Primary)  Comments:  informed  LDL goal is <70, started on rosuvastatin 5 mg Po daily.  Orders:  -     rosuvastatin (Crestor) 5 MG tablet; Take 1 tablet by mouth Daily.  Dispense: 90 tablet; Refill: 1    2. Hypertension, unspecified type  Comments:  added HCTZ to nifedipine and labettalol, RTC in a month for recheckup.  Orders:  -     hydroCHLOROthiazide 12.5 MG tablet; Take 1 tablet by mouth Daily.  Dispense: 90 tablet; Refill: 0  -     NIFEdipine XL (PROCARDIA XL) 90 MG 24 hr tablet; Take 1 tablet by mouth Daily.  Dispense: 90 tablet; Refill: 1             Follow Up     No follow-ups on file.  Patient was given instructions and counseling regarding her condition or for health maintenance advice. Please see specific information pulled into the AVS if appropriate.

## 2024-05-19 DIAGNOSIS — E11.65 TYPE 2 DIABETES MELLITUS WITH HYPERGLYCEMIA, WITHOUT LONG-TERM CURRENT USE OF INSULIN: ICD-10-CM

## 2024-05-20 RX ORDER — GLIPIZIDE 5 MG/1
5 TABLET ORAL DAILY
Qty: 90 TABLET | Refills: 1 | Status: SHIPPED | OUTPATIENT
Start: 2024-05-20

## 2024-05-23 DIAGNOSIS — I10 HYPERTENSION, UNSPECIFIED TYPE: ICD-10-CM

## 2024-05-24 RX ORDER — LABETALOL 300 MG/1
300 TABLET, FILM COATED ORAL 2 TIMES DAILY
Qty: 60 TABLET | Refills: 0 | Status: SHIPPED | OUTPATIENT
Start: 2024-05-24

## 2024-05-29 DIAGNOSIS — I10 HYPERTENSION, UNSPECIFIED TYPE: ICD-10-CM

## 2024-05-29 RX ORDER — LABETALOL 300 MG/1
300 TABLET, FILM COATED ORAL 2 TIMES DAILY
Qty: 180 TABLET | Refills: 0 | Status: SHIPPED | OUTPATIENT
Start: 2024-05-29

## 2024-07-13 DIAGNOSIS — I10 HYPERTENSION, UNSPECIFIED TYPE: ICD-10-CM

## 2024-07-15 RX ORDER — HYDROCHLOROTHIAZIDE 12.5 MG/1
12.5 TABLET ORAL DAILY
Qty: 90 TABLET | Refills: 0 | Status: SHIPPED | OUTPATIENT
Start: 2024-07-15

## 2024-09-23 DIAGNOSIS — I10 HYPERTENSION, UNSPECIFIED TYPE: ICD-10-CM

## 2024-09-23 DIAGNOSIS — E78.5 HYPERLIPIDEMIA, UNSPECIFIED HYPERLIPIDEMIA TYPE: ICD-10-CM

## 2024-09-23 DIAGNOSIS — E11.65 TYPE 2 DIABETES MELLITUS WITH HYPERGLYCEMIA, WITHOUT LONG-TERM CURRENT USE OF INSULIN: ICD-10-CM

## 2024-09-23 RX ORDER — ROSUVASTATIN CALCIUM 5 MG/1
5 TABLET, COATED ORAL DAILY
Qty: 90 TABLET | Refills: 1 | Status: SHIPPED | OUTPATIENT
Start: 2024-09-23

## 2024-09-23 RX ORDER — GLIPIZIDE 5 MG/1
5 TABLET ORAL DAILY
Qty: 90 TABLET | Refills: 1 | Status: SHIPPED | OUTPATIENT
Start: 2024-09-23

## 2024-09-23 RX ORDER — NIFEDIPINE 90 MG/1
90 TABLET, EXTENDED RELEASE ORAL DAILY
Qty: 90 TABLET | Refills: 1 | Status: SHIPPED | OUTPATIENT
Start: 2024-09-23

## 2024-09-23 RX ORDER — NIFEDIPINE 60 MG/1
60 TABLET, EXTENDED RELEASE ORAL DAILY
Qty: 90 TABLET | Refills: 1 | OUTPATIENT
Start: 2024-09-23

## 2024-10-01 ENCOUNTER — DOCUMENTATION (OUTPATIENT)
Dept: FAMILY MEDICINE CLINIC | Facility: CLINIC | Age: 54
End: 2024-10-01
Payer: COMMERCIAL

## 2024-10-01 DIAGNOSIS — I10 HYPERTENSION, UNSPECIFIED TYPE: ICD-10-CM

## 2024-10-01 RX ORDER — LABETALOL 200 MG/1
200 TABLET, FILM COATED ORAL 2 TIMES DAILY
COMMUNITY

## 2024-10-01 RX ORDER — LABETALOL 300 MG/1
300 TABLET, FILM COATED ORAL 2 TIMES DAILY
Qty: 180 TABLET | Refills: 0 | Status: SHIPPED | OUTPATIENT
Start: 2024-10-01

## 2024-10-01 RX ORDER — NIFEDIPINE 60 MG/1
TABLET, EXTENDED RELEASE ORAL
COMMUNITY
Start: 2024-08-16

## 2024-10-01 NOTE — PROGRESS NOTES
PHONED PT, SHARON, LVM TO CALL OFFICE, ALSO MENTIONED THAT I WOULD SEND HER A MESSAGE VIA [employer] TO WHAT IT IS IN REGARDS TO.    HUB OK TO RELAY TO PT PER DR. YUN NEEDS BOTH FASTING LABS & DUE FOR A PHYSICAL EXAM APPOINTMENTS.

## 2024-10-13 DIAGNOSIS — I10 HYPERTENSION, UNSPECIFIED TYPE: ICD-10-CM

## 2024-10-14 RX ORDER — HYDROCHLOROTHIAZIDE 12.5 MG/1
12.5 TABLET ORAL DAILY
Qty: 90 TABLET | Refills: 0 | Status: SHIPPED | OUTPATIENT
Start: 2024-10-14

## 2024-12-19 DIAGNOSIS — I10 HYPERTENSION, UNSPECIFIED TYPE: ICD-10-CM

## 2024-12-19 RX ORDER — LABETALOL 300 MG/1
300 TABLET, FILM COATED ORAL 2 TIMES DAILY
Qty: 60 TABLET | Refills: 0 | Status: SHIPPED | OUTPATIENT
Start: 2024-12-19

## 2025-01-03 DIAGNOSIS — I10 HYPERTENSION, UNSPECIFIED TYPE: ICD-10-CM

## 2025-01-03 RX ORDER — LABETALOL 300 MG/1
300 TABLET, FILM COATED ORAL 2 TIMES DAILY
Qty: 180 TABLET | OUTPATIENT
Start: 2025-01-03

## 2025-01-13 DIAGNOSIS — I10 HYPERTENSION, UNSPECIFIED TYPE: ICD-10-CM

## 2025-01-13 RX ORDER — HYDROCHLOROTHIAZIDE 12.5 MG/1
12.5 TABLET ORAL DAILY
Qty: 90 TABLET | Refills: 0 | Status: SHIPPED | OUTPATIENT
Start: 2025-01-13

## 2025-03-24 DIAGNOSIS — E11.65 TYPE 2 DIABETES MELLITUS WITH HYPERGLYCEMIA, WITHOUT LONG-TERM CURRENT USE OF INSULIN: ICD-10-CM

## 2025-03-25 RX ORDER — GLIPIZIDE 5 MG/1
5 TABLET ORAL DAILY
Qty: 30 TABLET | Refills: 0 | Status: SHIPPED | OUTPATIENT
Start: 2025-03-25

## 2025-03-30 DIAGNOSIS — I10 HYPERTENSION, UNSPECIFIED TYPE: ICD-10-CM

## 2025-03-30 DIAGNOSIS — E78.5 HYPERLIPIDEMIA, UNSPECIFIED HYPERLIPIDEMIA TYPE: ICD-10-CM

## 2025-03-31 RX ORDER — NIFEDIPINE 90 MG/1
90 TABLET, EXTENDED RELEASE ORAL DAILY
Qty: 90 TABLET | Refills: 1 | Status: SHIPPED | OUTPATIENT
Start: 2025-03-31

## 2025-03-31 RX ORDER — ROSUVASTATIN CALCIUM 5 MG/1
5 TABLET, COATED ORAL DAILY
Qty: 90 TABLET | Refills: 1 | Status: SHIPPED | OUTPATIENT
Start: 2025-03-31

## 2025-06-17 DIAGNOSIS — E11.65 TYPE 2 DIABETES MELLITUS WITH HYPERGLYCEMIA, WITHOUT LONG-TERM CURRENT USE OF INSULIN: ICD-10-CM

## 2025-06-19 RX ORDER — GLIPIZIDE 5 MG/1
5 TABLET ORAL DAILY
Qty: 10 TABLET | Refills: 0 | OUTPATIENT
Start: 2025-06-19